# Patient Record
Sex: MALE | Race: WHITE | Employment: OTHER | URBAN - NONMETROPOLITAN AREA
[De-identification: names, ages, dates, MRNs, and addresses within clinical notes are randomized per-mention and may not be internally consistent; named-entity substitution may affect disease eponyms.]

---

## 2017-06-08 ENCOUNTER — HOSPITAL ENCOUNTER (OUTPATIENT)
Age: 62
Discharge: HOME | End: 2017-06-08
Payer: COMMERCIAL

## 2017-06-08 DIAGNOSIS — E78.49 OTHER HYPERLIPIDEMIA: ICD-10-CM

## 2017-06-08 DIAGNOSIS — Z12.5 SCREENING PSA (PROSTATE SPECIFIC ANTIGEN): ICD-10-CM

## 2017-06-08 LAB
ALT SERPL-CCNC: 18 U/L (ref 5–41)
ANION GAP SERPL CALCULATED.3IONS-SCNC: 13 MMOL/L (ref 9–17)
AST SERPL-CCNC: 19 U/L
BUN BLDV-MCNC: 7 MG/DL (ref 8–23)
BUN/CREAT BLD: 9 (ref 9–20)
CALCIUM SERPL-MCNC: 9.1 MG/DL (ref 8.6–10.4)
CHLORIDE BLD-SCNC: 99 MMOL/L (ref 98–107)
CHOLESTEROL/HDL RATIO: 2.5
CHOLESTEROL: 175 MG/DL
CO2: 24 MMOL/L (ref 20–31)
CREAT SERPL-MCNC: 0.78 MG/DL (ref 0.7–1.2)
GFR AFRICAN AMERICAN: >60 ML/MIN
GFR NON-AFRICAN AMERICAN: >60 ML/MIN
GFR SERPL CREATININE-BSD FRML MDRD: ABNORMAL ML/MIN/{1.73_M2}
GFR SERPL CREATININE-BSD FRML MDRD: ABNORMAL ML/MIN/{1.73_M2}
GLUCOSE BLD-MCNC: 81 MG/DL (ref 70–99)
HCT VFR BLD CALC: 40.9 % (ref 41–53)
HDLC SERPL-MCNC: 69 MG/DL
HEMOGLOBIN: 13.9 G/DL (ref 13.5–17)
HIGH SENSITIVE C-REACTIVE PROTEIN: 2.2 MG/L
LDL CHOLESTEROL: 96 MG/DL (ref 0–130)
MCH RBC QN AUTO: 33.8 PG (ref 26–34)
MCHC RBC AUTO-ENTMCNC: 34 G/DL (ref 31–37)
MCV RBC AUTO: 99.3 FL (ref 80–100)
PDW BLD-RTO: 13.5 % (ref 12.1–15.2)
PLATELET # BLD: 151 K/UL (ref 140–450)
PMV BLD AUTO: ABNORMAL FL (ref 6–12)
POTASSIUM SERPL-SCNC: 4.9 MMOL/L (ref 3.7–5.3)
PROSTATE SPECIFIC ANTIGEN: 3.2 UG/L
RBC # BLD: 4.12 M/UL (ref 4.5–5.9)
SODIUM BLD-SCNC: 136 MMOL/L (ref 135–144)
TRIGL SERPL-MCNC: 48 MG/DL
VLDLC SERPL CALC-MCNC: NORMAL MG/DL (ref 1–30)
WBC # BLD: 4.9 K/UL (ref 3.5–11)

## 2017-06-08 PROCEDURE — 80061 LIPID PANEL: CPT

## 2017-06-08 PROCEDURE — 80048 BASIC METABOLIC PNL TOTAL CA: CPT

## 2017-06-08 PROCEDURE — G0103 PSA SCREENING: HCPCS

## 2017-06-08 PROCEDURE — 85027 COMPLETE CBC AUTOMATED: CPT

## 2017-06-08 PROCEDURE — 36415 COLL VENOUS BLD VENIPUNCTURE: CPT

## 2017-06-08 PROCEDURE — 84460 ALANINE AMINO (ALT) (SGPT): CPT

## 2017-06-08 PROCEDURE — 86141 C-REACTIVE PROTEIN HS: CPT

## 2017-06-08 PROCEDURE — 84450 TRANSFERASE (AST) (SGOT): CPT

## 2017-06-13 PROBLEM — R97.20 RISING PSA LEVEL: Status: ACTIVE | Noted: 2017-06-13

## 2017-06-13 PROBLEM — N40.0 BENIGN PROSTATIC HYPERPLASIA: Status: ACTIVE | Noted: 2017-06-13

## 2017-12-05 PROBLEM — H25.042 POSTERIOR SUBCAPSULAR AGE-RELATED CATARACT OF LEFT EYE: Chronic | Status: ACTIVE | Noted: 2017-12-05

## 2017-12-06 ENCOUNTER — HOSPITAL ENCOUNTER (OUTPATIENT)
Age: 62
Setting detail: OUTPATIENT SURGERY
Discharge: HOME | End: 2017-12-06
Attending: OPHTHALMOLOGY | Admitting: OPHTHALMOLOGY
Payer: COMMERCIAL

## 2017-12-06 VITALS
HEART RATE: 68 BPM | RESPIRATION RATE: 18 BRPM | TEMPERATURE: 97.9 F | DIASTOLIC BLOOD PRESSURE: 87 MMHG | SYSTOLIC BLOOD PRESSURE: 165 MMHG | OXYGEN SATURATION: 98 % | BODY MASS INDEX: 35.79 KG/M2 | HEIGHT: 70 IN | WEIGHT: 250 LBS

## 2017-12-06 PROBLEM — H25.042 POSTERIOR SUBCAPSULAR AGE-RELATED CATARACT OF LEFT EYE: Chronic | Status: RESOLVED | Noted: 2017-12-05 | Resolved: 2017-12-06

## 2017-12-06 PROCEDURE — 2580000003 HC RX 258: Performed by: OPHTHALMOLOGY

## 2017-12-06 PROCEDURE — 99152 MOD SED SAME PHYS/QHP 5/>YRS: CPT | Performed by: OPHTHALMOLOGY

## 2017-12-06 PROCEDURE — 7100000011 HC PHASE II RECOVERY - ADDTL 15 MIN: Performed by: OPHTHALMOLOGY

## 2017-12-06 PROCEDURE — 6370000000 HC RX 637 (ALT 250 FOR IP): Performed by: OPHTHALMOLOGY

## 2017-12-06 PROCEDURE — 3600000003 HC SURGERY LEVEL 3 BASE: Performed by: OPHTHALMOLOGY

## 2017-12-06 PROCEDURE — 3600000013 HC SURGERY LEVEL 3 ADDTL 15MIN: Performed by: OPHTHALMOLOGY

## 2017-12-06 PROCEDURE — 2500000003 HC RX 250 WO HCPCS: Performed by: OPHTHALMOLOGY

## 2017-12-06 PROCEDURE — 7100000010 HC PHASE II RECOVERY - FIRST 15 MIN: Performed by: OPHTHALMOLOGY

## 2017-12-06 PROCEDURE — 6360000002 HC RX W HCPCS: Performed by: OPHTHALMOLOGY

## 2017-12-06 PROCEDURE — V2632 POST CHMBR INTRAOCULAR LENS: HCPCS | Performed by: OPHTHALMOLOGY

## 2017-12-06 PROCEDURE — 99153 MOD SED SAME PHYS/QHP EA: CPT | Performed by: OPHTHALMOLOGY

## 2017-12-06 DEVICE — IMPLANTABLE DEVICE: Type: IMPLANTABLE DEVICE | Site: EYE | Status: FUNCTIONAL

## 2017-12-06 RX ORDER — PHENYLEPHRINE HCL 2.5 %
1 DROPS OPHTHALMIC (EYE) SEE ADMIN INSTRUCTIONS
Status: DISCONTINUED | OUTPATIENT
Start: 2017-12-06 | End: 2017-12-06 | Stop reason: HOSPADM

## 2017-12-06 RX ORDER — ACETAMINOPHEN 325 MG/1
650 TABLET ORAL EVERY 4 HOURS PRN
Status: DISCONTINUED | OUTPATIENT
Start: 2017-12-06 | End: 2017-12-06 | Stop reason: HOSPADM

## 2017-12-06 RX ORDER — MIDAZOLAM HYDROCHLORIDE 1 MG/ML
INJECTION INTRAMUSCULAR; INTRAVENOUS PRN
Status: DISCONTINUED | OUTPATIENT
Start: 2017-12-06 | End: 2017-12-06 | Stop reason: HOSPADM

## 2017-12-06 RX ORDER — TETRACAINE HYDROCHLORIDE 5 MG/ML
SOLUTION OPHTHALMIC PRN
Status: DISCONTINUED | OUTPATIENT
Start: 2017-12-06 | End: 2017-12-06 | Stop reason: HOSPADM

## 2017-12-06 RX ORDER — SODIUM CHLORIDE 9 MG/ML
INJECTION, SOLUTION INTRAVENOUS CONTINUOUS
Status: DISCONTINUED | OUTPATIENT
Start: 2017-12-06 | End: 2017-12-06 | Stop reason: HOSPADM

## 2017-12-06 RX ORDER — SODIUM CHLORIDE 0.9 % (FLUSH) 0.9 %
10 SYRINGE (ML) INJECTION PRN
Status: DISCONTINUED | OUTPATIENT
Start: 2017-12-06 | End: 2017-12-06 | Stop reason: HOSPADM

## 2017-12-06 RX ORDER — TROPICAMIDE 10 MG/ML
1 SOLUTION/ DROPS OPHTHALMIC SEE ADMIN INSTRUCTIONS
Status: DISCONTINUED | OUTPATIENT
Start: 2017-12-06 | End: 2017-12-06 | Stop reason: HOSPADM

## 2017-12-06 RX ORDER — ONDANSETRON 2 MG/ML
4 INJECTION INTRAMUSCULAR; INTRAVENOUS EVERY 6 HOURS PRN
Status: DISCONTINUED | OUTPATIENT
Start: 2017-12-06 | End: 2017-12-06 | Stop reason: HOSPADM

## 2017-12-06 RX ORDER — SODIUM CHLORIDE 0.9 % (FLUSH) 0.9 %
10 SYRINGE (ML) INJECTION EVERY 12 HOURS SCHEDULED
Status: DISCONTINUED | OUTPATIENT
Start: 2017-12-06 | End: 2017-12-06 | Stop reason: HOSPADM

## 2017-12-06 RX ORDER — LIDOCAINE HYDROCHLORIDE 10 MG/ML
INJECTION, SOLUTION EPIDURAL; INFILTRATION; INTRACAUDAL; PERINEURAL PRN
Status: DISCONTINUED | OUTPATIENT
Start: 2017-12-06 | End: 2017-12-06 | Stop reason: HOSPADM

## 2017-12-06 RX ORDER — PROPARACAINE HYDROCHLORIDE 5 MG/ML
1 SOLUTION/ DROPS OPHTHALMIC SEE ADMIN INSTRUCTIONS
Status: DISCONTINUED | OUTPATIENT
Start: 2017-12-06 | End: 2017-12-06 | Stop reason: HOSPADM

## 2017-12-06 RX ORDER — TETRACAINE HYDROCHLORIDE 5 MG/ML
1 SOLUTION OPHTHALMIC SEE ADMIN INSTRUCTIONS
Status: DISCONTINUED | OUTPATIENT
Start: 2017-12-06 | End: 2017-12-06 | Stop reason: HOSPADM

## 2017-12-06 RX ORDER — CYCLOPENTOLATE HYDROCHLORIDE 10 MG/ML
1 SOLUTION/ DROPS OPHTHALMIC SEE ADMIN INSTRUCTIONS
Status: DISCONTINUED | OUTPATIENT
Start: 2017-12-06 | End: 2017-12-06 | Stop reason: HOSPADM

## 2017-12-06 RX ORDER — FENTANYL CITRATE 50 UG/ML
INJECTION, SOLUTION INTRAMUSCULAR; INTRAVENOUS PRN
Status: DISCONTINUED | OUTPATIENT
Start: 2017-12-06 | End: 2017-12-06 | Stop reason: HOSPADM

## 2017-12-06 RX ADMIN — TROPICAMIDE 1 DROP: 10 SOLUTION/ DROPS OPHTHALMIC at 10:25

## 2017-12-06 RX ADMIN — PROPARACAINE HYDROCHLORIDE 1 DROP: 5 SOLUTION/ DROPS OPHTHALMIC at 10:14

## 2017-12-06 RX ADMIN — PHENYLEPHRINE HYDROCHLORIDE 1 DROP: 25 SOLUTION/ DROPS OPHTHALMIC at 10:14

## 2017-12-06 RX ADMIN — TROPICAMIDE 1 DROP: 10 SOLUTION/ DROPS OPHTHALMIC at 10:14

## 2017-12-06 RX ADMIN — TROPICAMIDE 1 DROP: 10 SOLUTION/ DROPS OPHTHALMIC at 10:19

## 2017-12-06 RX ADMIN — PHENYLEPHRINE HYDROCHLORIDE 1 DROP: 25 SOLUTION/ DROPS OPHTHALMIC at 10:19

## 2017-12-06 RX ADMIN — SODIUM CHLORIDE: 9 INJECTION, SOLUTION INTRAVENOUS at 10:14

## 2017-12-06 RX ADMIN — PHENYLEPHRINE HYDROCHLORIDE 1 DROP: 25 SOLUTION/ DROPS OPHTHALMIC at 10:25

## 2017-12-06 RX ADMIN — TETRACAINE HYDROCHLORIDE 1 DROP: 25 LIQUID OPHTHALMIC at 10:34

## 2017-12-06 ASSESSMENT — PAIN SCALES - GENERAL
PAINLEVEL_OUTOF10: 0

## 2017-12-06 NOTE — H&P
Kalpana Pantoja is a 57y.o. year old who presents for elective outpatient ophthalmic surgery with Haleigh Baez. The patient complains of decreased vision, glare and halos around lights, and trouble with vision for activities of daily living. Past Medical History:   Diagnosis Date    Achilles tendon tear 2013    Allergic rhinitis     Atrial flutter ([address] 1996    Congestive heart failure ([address]     Coronary atherosclerosis     Hyperglycemia 2007    Hyperlipidemia 1996    Hypertension 2004    Mitral insufficiency     Obesity 2006    Osteoarthritis     Status post ablation of atrial flutter 05/09/13    Vasodepressor syncope 2001       Past Surgical History:   Procedure Laterality Date   [address] [address], 2013    st.vincents AV node    COLONOSCOPY  2/1/2010    negative    CYST REMOVAL      right side of face    KNEE SURGERY Right 2006    medial meniscus arthroscopy    MITRAL VALVE SURGERY  8/11/11    34 mm ring annuloplasty       Home meds:   Prior to Admission medications    Medication Sig Start Date End Date Taking?  Authorizing Provider   lovastatin (MEVACOR) 40 MG tablet TAKE 1 TABLET DAILY 6/8/17   Nikolay Allison MD   metoprolol succinate (TOPROL XL) 100 MG extended release tablet TAKE 1 TABLET DAILY 5/3/17   Nikolay Allison MD   lisinopril (PRINIVIL;ZESTRIL) 10 MG tablet TAKE 1 TABLET DAILY 2/14/17   Nikolay Allison MD   sertraline (ZOLOFT) 50 MG tablet TAKE 1 TABLET DAILY 2/14/17   Nikolay Allison MD   clobetasol (TEMOVATE) 0.05 % ointment Apply topically qhs 12/7/16   Nikolay Allison MD   rivaroxaban (XARELTO) 20 MG TABS tablet Take 1 tablet by mouth daily for 90 doses 12/7/16 6/13/17  Nikolay Allison MD   Spacer/Aero Chamber Mouthpiece MISC 1 each by Does not apply route once as needed 5/20/16 5/23/16  Nikolay Allison MD   albuterol sulfate HFA (VENTOLIN HFA) 108 (90 BASE) MCG/ACT inhaler Inhale 2 puffs into the lungs 4 times daily 5/20/16   Nikolay Allison MD   aspirin 81 MG chewable tablet Take 81 mg by mouth daily. Historical Provider, MD     Scheduled Meds:  Continuous Infusions:  PRN Meds:. No Known Allergies    There were no vitals filed for this visit. PHYSICAL EXAMINATION    Gen: NAD  HEENT: BCVA= 20/400, Glare testing= CF, Cataract severity/type-Posterior subcapsular Cataract-left eye, Other significant ocular findings= none  Pulm: CTA   Heart: RRR, no C/M/R/G  Abd: S/NT/ND  Neuro: no focal defecits    Assessment:   1. Posterior subcapsular Cataract-left eye  2. ASA Score-2  3. Mallampati Score-Class 2    Plan:   1. Risks, benefits, alternatives to surgery discussed with the patient and family. 2. All questions were answered to their satisfaction. 3. Ok to proceed with surgery as planned.     America December, DO

## 2017-12-06 NOTE — PROGRESS NOTES
Discharge instructions given to patient and patients wife. Both verbalize understanding and denies any questions at this time.

## 2017-12-06 NOTE — OP NOTE
disposable blade into the anterior chamber. Intracameral preservative free xylocaine was placed into the anterior chamber. Amvisc was then used to replace the aqueous of the anterior chamber. A 2.2 mm keratome blade was used to make a 3-plane clear corneal incision into cornea depending on the patient's astigmatism. The cystitome was used to make a tear in the anterior capsule. Fine forceps were used to make a complete curvilinear capsulorrhexis. The lens was hydrodissected and freely rotated using a balanced salt filled syringe. The ultrasound handpiece was then used to emulsify the nucleus and epi nucleus. The residual cortex was then aspirated using the IA handpiece. A small tear was noted in the posterior capsule. The eye was filled with viscoelastic and a foldable posterior chamber intraocular lens was injected into the ciliary sulcus. Irrigation/aspiration was used to remove all excess viscoelastic. The eye was pressurized and the wounds were check for leaks and none were found. A collagen shield, which had been soaked in antibiotic drops, was then placed onto the cornea. The lid speculum was removed. The eye was covered with a clear plastic shield. The patient was taken to the recovery area in excellent condition, having tolerated the procedure well, and will follow up with me tomorrow for postop care. ADDENDUM:  The phacoemulsification time 1:11.61 seconds @ 17% average ultrasound power for an effective phacoemulsification time of 12.46 seconds. The lens inserted was a model [id number] made by [employer] [facility] [employer] that was +17.5 diopters in power. The lens was inserted into the capsular bag utilizing the EZ-28 injector made by [employer] [facility] [employer].  The serial number on this lens was [id number]. There was no stitch placed to close this temporal posterior corneal incision. EBL was less than 1.0 ml. Reji Duggan.  Javier Cagle DO

## 2017-12-06 NOTE — H&P
I have examined the patient and reviewed the history and physical completed on 12/5/17 and find no relevant changes. I have reviewed with the patient and/or family the risks, benefits, and alternatives to the procedure and they have agreed to proceed.     Ciarra Nearing  12/6/2017  7:52 AM

## 2017-12-11 ENCOUNTER — HOSPITAL ENCOUNTER (OUTPATIENT)
Age: 62
Discharge: HOME | End: 2017-12-11
Payer: COMMERCIAL

## 2017-12-11 DIAGNOSIS — E78.5 HYPERLIPIDEMIA, UNSPECIFIED: ICD-10-CM

## 2017-12-11 LAB
ALT SERPL-CCNC: 12 U/L (ref 5–41)
ANION GAP SERPL CALCULATED.3IONS-SCNC: 13 MMOL/L (ref 9–17)
AST SERPL-CCNC: 16 U/L
BUN BLDV-MCNC: 8 MG/DL (ref 8–23)
BUN/CREAT BLD: 12 (ref 9–20)
CALCIUM SERPL-MCNC: 8.8 MG/DL (ref 8.6–10.4)
CHLORIDE BLD-SCNC: 100 MMOL/L (ref 98–107)
CHOLESTEROL/HDL RATIO: 2.7
CHOLESTEROL: 151 MG/DL
CO2: 25 MMOL/L (ref 20–31)
CREAT SERPL-MCNC: 0.68 MG/DL (ref 0.7–1.2)
GFR AFRICAN AMERICAN: >60 ML/MIN
GFR NON-AFRICAN AMERICAN: >60 ML/MIN
GFR SERPL CREATININE-BSD FRML MDRD: ABNORMAL ML/MIN/{1.73_M2}
GFR SERPL CREATININE-BSD FRML MDRD: ABNORMAL ML/MIN/{1.73_M2}
GLUCOSE BLD-MCNC: 83 MG/DL (ref 70–99)
HCT VFR BLD CALC: 42.3 % (ref 41–53)
HDLC SERPL-MCNC: 55 MG/DL
HEMOGLOBIN: 13.9 G/DL (ref 13.5–17)
HIGH SENSITIVE C-REACTIVE PROTEIN: 2.7 MG/L
LDL CHOLESTEROL: 87 MG/DL (ref 0–130)
MCH RBC QN AUTO: 33.5 PG (ref 26–34)
MCHC RBC AUTO-ENTMCNC: 32.8 G/DL (ref 31–37)
MCV RBC AUTO: 102.1 FL (ref 80–100)
PDW BLD-RTO: 14.1 % (ref 12.1–15.2)
PLATELET # BLD: 173 K/UL (ref 140–450)
PMV BLD AUTO: 9.6 FL (ref 6–12)
POTASSIUM SERPL-SCNC: 4.2 MMOL/L (ref 3.7–5.3)
RBC # BLD: 4.15 M/UL (ref 4.5–5.9)
SODIUM BLD-SCNC: 138 MMOL/L (ref 135–144)
T4 TOTAL: 5.4 UG/DL (ref 4.5–12)
TRIGL SERPL-MCNC: 45 MG/DL
TSH SERPL DL<=0.05 MIU/L-ACNC: 1.51 MIU/L (ref 0.3–5)
VLDLC SERPL CALC-MCNC: NORMAL MG/DL (ref 1–30)
WBC # BLD: 6.2 K/UL (ref 3.5–11)

## 2017-12-11 PROCEDURE — 80061 LIPID PANEL: CPT

## 2017-12-11 PROCEDURE — 84450 TRANSFERASE (AST) (SGOT): CPT

## 2017-12-11 PROCEDURE — 86141 C-REACTIVE PROTEIN HS: CPT

## 2017-12-11 PROCEDURE — 80048 BASIC METABOLIC PNL TOTAL CA: CPT

## 2017-12-11 PROCEDURE — 84436 ASSAY OF TOTAL THYROXINE: CPT

## 2017-12-11 PROCEDURE — 84460 ALANINE AMINO (ALT) (SGPT): CPT

## 2017-12-11 PROCEDURE — 84443 ASSAY THYROID STIM HORMONE: CPT

## 2017-12-11 PROCEDURE — 36415 COLL VENOUS BLD VENIPUNCTURE: CPT

## 2017-12-11 PROCEDURE — 85027 COMPLETE CBC AUTOMATED: CPT

## 2017-12-18 ENCOUNTER — HOSPITAL ENCOUNTER (OUTPATIENT)
Dept: GENERAL RADIOLOGY | Age: 62
Discharge: HOME | End: 2017-12-18
Payer: COMMERCIAL

## 2017-12-18 ENCOUNTER — HOSPITAL ENCOUNTER (OUTPATIENT)
Age: 62
Discharge: HOME | End: 2017-12-18
Payer: COMMERCIAL

## 2017-12-18 ENCOUNTER — OFFICE VISIT (OUTPATIENT)
Dept: FAMILY MEDICINE CLINIC | Age: 62
End: 2017-12-18
Payer: COMMERCIAL

## 2017-12-18 VITALS
SYSTOLIC BLOOD PRESSURE: 136 MMHG | BODY MASS INDEX: 35.48 KG/M2 | HEIGHT: 71 IN | DIASTOLIC BLOOD PRESSURE: 70 MMHG | WEIGHT: 253.4 LBS

## 2017-12-18 DIAGNOSIS — I10 HYPERTENSION, ESSENTIAL: ICD-10-CM

## 2017-12-18 DIAGNOSIS — M17.12 OSTEOARTHRITIS OF LEFT KNEE, UNSPECIFIED OSTEOARTHRITIS TYPE: ICD-10-CM

## 2017-12-18 DIAGNOSIS — R97.20 ELEVATED PSA: ICD-10-CM

## 2017-12-18 DIAGNOSIS — R97.20 ELEVATED PSA: Primary | ICD-10-CM

## 2017-12-18 DIAGNOSIS — E78.49 OTHER HYPERLIPIDEMIA: ICD-10-CM

## 2017-12-18 DIAGNOSIS — M17.12 OSTEOARTHRITIS OF LEFT KNEE, UNSPECIFIED OSTEOARTHRITIS TYPE: Primary | ICD-10-CM

## 2017-12-18 DIAGNOSIS — I48.0 PAROXYSMAL A-FIB (HCC): ICD-10-CM

## 2017-12-18 DIAGNOSIS — R73.9 HYPERGLYCEMIA: ICD-10-CM

## 2017-12-18 PROCEDURE — G8427 DOCREV CUR MEDS BY ELIG CLIN: HCPCS | Performed by: FAMILY MEDICINE

## 2017-12-18 PROCEDURE — 84154 ASSAY OF PSA FREE: CPT

## 2017-12-18 PROCEDURE — 36415 COLL VENOUS BLD VENIPUNCTURE: CPT

## 2017-12-18 PROCEDURE — G8598 ASA/ANTIPLAT THER USED: HCPCS | Performed by: FAMILY MEDICINE

## 2017-12-18 PROCEDURE — 1036F TOBACCO NON-USER: CPT | Performed by: FAMILY MEDICINE

## 2017-12-18 PROCEDURE — 99214 OFFICE O/P EST MOD 30 MIN: CPT | Performed by: FAMILY MEDICINE

## 2017-12-18 PROCEDURE — 73562 X-RAY EXAM OF KNEE 3: CPT

## 2017-12-18 PROCEDURE — G8484 FLU IMMUNIZE NO ADMIN: HCPCS | Performed by: FAMILY MEDICINE

## 2017-12-18 PROCEDURE — G8417 CALC BMI ABV UP PARAM F/U: HCPCS | Performed by: FAMILY MEDICINE

## 2017-12-18 PROCEDURE — 3017F COLORECTAL CA SCREEN DOC REV: CPT | Performed by: FAMILY MEDICINE

## 2017-12-18 RX ORDER — TRAMADOL HYDROCHLORIDE 50 MG/1
TABLET ORAL
Qty: 60 TABLET | Refills: 0 | Status: SHIPPED | OUTPATIENT
Start: 2017-12-18 | End: 2018-04-26 | Stop reason: ALTCHOICE

## 2017-12-18 ASSESSMENT — PATIENT HEALTH QUESTIONNAIRE - PHQ9
SUM OF ALL RESPONSES TO PHQ9 QUESTIONS 1 & 2: 0
2. FEELING DOWN, DEPRESSED OR HOPELESS: 0
1. LITTLE INTEREST OR PLEASURE IN DOING THINGS: 0
SUM OF ALL RESPONSES TO PHQ QUESTIONS 1-9: 0

## 2017-12-18 NOTE — PROGRESS NOTES
[address]  [phone]  Dept: [phone]    Dennise Dunlap is a 57 y.o. male here for 6 Month Follow-Up; Hypertension; Hyperlipidemia; and Hyperglycemia    HPI:  HYPERTENSION  He is not exercising and is adherent to a low-salt diet. Blood pressure is not being monitored at home. Cardiac symptoms: none. Patient denies: chest pain, dyspnea and palpitations.      HYPERLIPIDEMIA   Medication compliance: compliant all of the time. Patient is following a low fat, low cholesterol diet. He is not exercising regularly.      HYPERGLYCEMIA:  Diet compliance: compliant most of the time  Current exercise: no regular exercise, d/t L knee pain    Prior to Admission medications    Medication Sig Start Date End Date Taking? Authorizing Provider   rivaroxaban (XARELTO) 20 MG TABS tablet Take 1 tablet by mouth daily 12/18/17  Yes Nikolay Allison MD   sertraline (ZOLOFT) 50 MG tablet TAKE 1 TABLET DAILY 12/11/17  Yes Nikolay Allison MD   lisinopril (PRINIVIL;ZESTRIL) 10 MG tablet TAKE 1 TABLET DAILY 12/11/17  Yes Nikolay Allison MD   lovastatin (MEVACOR) 40 MG tablet TAKE 1 TABLET DAILY 6/8/17  Yes Nikolay Allison MD   metoprolol succinate (TOPROL XL) 100 MG extended release tablet TAKE 1 TABLET DAILY 5/3/17  Yes Nikolay Allison MD   aspirin 81 MG chewable tablet Take 81 mg by mouth daily. Yes Historical Provider, MD     ROS:  General Constitutional: Denies chills. Denies fever. Denies headache. Denies lightheadedness. Ophthalmologic: Denies blurred vision. ENT: Denies nasal congestion. Denies sore throat. Denies ear pain and pressure. Respiratory: Denies cough. Denies shortness of breath. Denies wheezing. Cardiovascular: Denies chest pain at rest. Denies irregular heartbeat. Denies palpitations. Gastrointestinal: Denies abdominal pain. Denies blood in the stool. Denies constipation. Denies diarrhea. Denies nausea. Denies vomiting.   Genitourinary: Denies blood in the urine. Denies difficulty urinating. Denies frequent urination. Denies painful urination. Denies urinary incontinence  Musculoskeletal: Denies muscle aches. Denies swollen joints. Admits L lateral knee pain, has been intermittent but over the past 3 weeks the pain has got worse and is constant, painful to stand up now. \"it almost feels like torn cartilage\", pain going down back of knee. Peripheral Vascular: Denies pain/cramping in legs after exertion. Skin: Denies dry skin. Denies itching. Denies rash. Neurologic: Denies falls. Denies dizziness. Denies fainting. Denies tingling/numbness. Psychiatric: Denies sleep disturbance. Denies anxiety. Denies depressed mood.     Past Surgical History:   Procedure Laterality Date    ATRIAL ABLATION SURGERY  1998, 2013    st.vincents AV node    CATARACT REMOVAL WITH IMPLANT  12/06/2017    Dr. Ranjeet Lawson COLONOSCOPY  2/1/2010    negative    CYST REMOVAL      right side of face    KNEE SURGERY Right 2006    medial meniscus arthroscopy, Carolyn Lopez    MITRAL VALVE SURGERY  8/11/11    34 mm ring annuloplasty    DC REMV CATARACT EXTRACAP,INSERT LENS Left 12/6/2017    EYE CATARACT EMULSIFICATION IOL IMPLANT performed by Ruby Gibbons DO at [facility] 99 History   Problem Relation Age of Onset    Heart Disease Mother      sudden cardiac death    Heart Attack Father        Past Medical History:   Diagnosis Date    Achilles tendon tear 2013    Allergic rhinitis     Atrial flutter ([address] 1996    Congestive heart failure ([address]     Coronary atherosclerosis     Hyperglycemia 2007    Hyperlipidemia 1996    Hypertension 2004    Mitral insufficiency     Obesity 2006    Osteoarthritis     Status post ablation of atrial flutter 05/09/13    Vasodepressor syncope 2001      Social History   Substance Use Topics    Smoking status: Former Smoker     Packs/day: 1.50     Years: 20.00     Types: Cigarettes     Quit date: 11/22/1996    Smokeless tobacco: Never Used  Alcohol use 0.0 oz/week      Comment: 6 per day      Current Outpatient Prescriptions   Medication Sig Dispense Refill    rivaroxaban (XARELTO) 20 MG TABS tablet Take 1 tablet by mouth daily 90 tablet 3    sertraline (ZOLOFT) 50 MG tablet TAKE 1 TABLET DAILY 90 tablet 2    lisinopril (PRINIVIL;ZESTRIL) 10 MG tablet TAKE 1 TABLET DAILY 90 tablet 2    lovastatin (MEVACOR) 40 MG tablet TAKE 1 TABLET DAILY 90 tablet 3    metoprolol succinate (TOPROL XL) 100 MG extended release tablet TAKE 1 TABLET DAILY 90 tablet 3    aspirin 81 MG chewable tablet Take 81 mg by mouth daily. No current facility-administered medications for this visit. No Known Allergies    PHYSICAL EXAM:    /70 (Site: Left Arm, Position: Sitting, Cuff Size: Large Adult)   Ht 5' 11\" (1.803 m)   Wt 253 lb 6.4 oz (114.9 kg)   BMI 35.34 kg/m²   Wt Readings from Last 3 Encounters:   12/18/17 253 lb 6.4 oz (114.9 kg)   12/06/17 250 lb (113.4 kg)   06/13/17 268 lb (121.6 kg)     BP Readings from Last 3 Encounters:   12/18/17 136/70   12/06/17 (!) 165/87   06/13/17 (!) 140/78     General Appearance: in no acute distress, well developed, well nourished. Eyes: pupils equal, round reactive to light and accommodation. Ears: normal canal and TM's. Nose: nares patent, no lesions. Oral Cavity: mucosa moist.  Throat: clear. Neck/Thyroid: neck supple, full range of motion, no cervical lymphadenopathy, no thyromegaly or carotid bruits. Skin: warm and dry. No suspicious lesions. Heart: No murmurs. S1, S2 normal, no gallops. Rate 75, irregular  Lungs: clear to auscultation bilaterally. Abdomen: bowel sounds present, soft, nontender, nondistended, no masses or organomegaly. Musculoskeletal: normal, full range of motion in hips, no swelling or tenderness.  Good ROM R knee, nodularity medial aspect, L knee nodularity swelling and medially, full extension and good flexion, moderate effusion, tender, external hip with squat and limited weight baring  Extremities: no cyanosis or edema. Peripheral Pulses: 2+ throughout, symetric. Neurologic: nonfocal, motor strength normal upper and lower extremities, sensory exam intact. Psych: normal affect, speech fluent. ASSESSMENT:  1. Elevated PSA  PSA, Diagnostic    PSA, Free   2. Osteoarthritis of left knee, unspecified osteoarthritis type  XR KNEE LEFT (3 VIEWS)   3. Hyperglycemia     4. Hypertension, essential     5. Other hyperlipidemia     6. Paroxysmal a-fib (HCC)         PLAN:  I will order an x-ray of his knee and will call with results and further treatment. Labs reviewed with patient, I am pleased with these results. I will also order a PSA level, there was an elevation in June and this has not been re-checked. He is agreeable with this. I will see him back in 6 months  Orders Placed This Encounter   Procedures    XR KNEE LEFT (3 VIEWS)     Standing Status:   Future     Number of Occurrences:   1     Standing Expiration Date:   12/19/2018    PSA, Diagnostic     Standing Status:   Future     Standing Expiration Date:   12/18/2018    PSA, Free     Standing Status:   Future     Number of Occurrences:   1     Standing Expiration Date:   12/18/2018     Orders Placed This Encounter   Medications    rivaroxaban (XARELTO) 20 MG TABS tablet     Sig: Take 1 tablet by mouth daily     Dispense:  90 tablet     Refill:  3     Scribed by: PERCY Schroeder     Controlled Substances Monitoring:     Attestation: The Prescription Monitoring Report for this patient was reviewed today. Stanislav dEward MD)  Documentation: No signs of potential drug abuse or diversion identified.  Stanislav Edward MD)

## 2017-12-18 NOTE — TELEPHONE ENCOUNTER
Notes Recorded by Stanislav Edward MD on 12/18/2017 at 11:57 AM EST  Notify mild to moderate arthritis in the joint  Lets give him tramadol 50mg tid # 60  And lets have him go to PT at [facility] to see if improves  See me back in about 3-4 weeks

## 2017-12-18 NOTE — PROGRESS NOTES
Notify mild to moderate arthritis in the joint  Lets give him tramadol 50mg tid # 60  And lets have him go to PT at [facility] to see if improves  See me back in about 3-4 weeks

## 2017-12-19 LAB
PROSTATE SPECIFIC ANTIGEN FREE: 0.3 UG/L
PROSTATE SPECIFIC ANTIGEN PERCENT FREE: 21.4 %
PROSTATE SPECIFIC ANTIGEN: 1.4 UG/L (ref 0–4)

## 2018-01-26 ENCOUNTER — OFFICE VISIT (OUTPATIENT)
Dept: FAMILY MEDICINE CLINIC | Age: 63
End: 2018-01-26
Payer: COMMERCIAL

## 2018-01-26 VITALS
WEIGHT: 251 LBS | HEIGHT: 71 IN | BODY MASS INDEX: 35.14 KG/M2 | SYSTOLIC BLOOD PRESSURE: 134 MMHG | DIASTOLIC BLOOD PRESSURE: 78 MMHG

## 2018-01-26 DIAGNOSIS — S83.8X2A ACUTE MEDIAL MENISCAL INJURY OF LEFT KNEE, INITIAL ENCOUNTER: Primary | ICD-10-CM

## 2018-01-26 PROCEDURE — G8598 ASA/ANTIPLAT THER USED: HCPCS | Performed by: FAMILY MEDICINE

## 2018-01-26 PROCEDURE — 3017F COLORECTAL CA SCREEN DOC REV: CPT | Performed by: FAMILY MEDICINE

## 2018-01-26 PROCEDURE — 99213 OFFICE O/P EST LOW 20 MIN: CPT | Performed by: FAMILY MEDICINE

## 2018-01-26 PROCEDURE — G8417 CALC BMI ABV UP PARAM F/U: HCPCS | Performed by: FAMILY MEDICINE

## 2018-01-26 PROCEDURE — G8484 FLU IMMUNIZE NO ADMIN: HCPCS | Performed by: FAMILY MEDICINE

## 2018-01-26 PROCEDURE — G8427 DOCREV CUR MEDS BY ELIG CLIN: HCPCS | Performed by: FAMILY MEDICINE

## 2018-01-26 PROCEDURE — 1036F TOBACCO NON-USER: CPT | Performed by: FAMILY MEDICINE

## 2018-01-26 RX ORDER — TRAMADOL HYDROCHLORIDE 50 MG/1
100 TABLET ORAL 2 TIMES DAILY
Qty: 32 TABLET | Refills: 0 | Status: SHIPPED | OUTPATIENT
Start: 2018-01-26 | End: 2018-02-03

## 2018-01-26 NOTE — PATIENT INSTRUCTIONS
PLAN:  I review his xray with him. He has some narrowing of cartilage on the medial aspect. He also has some thickening/sclerosis of the bone and some spurring. Based on his exam today, I suspect that he has a meniscus injury. I would like to get an MRI and then determine what the next step will be. He had been using advil for pain. He should avoid taking ibuprofen or any NSAIDs due to the xarelto treatment because this can cause ulcers and bleeding. He can try to take 100 mg tramadol bid to see if this is helpful. I will call with the results.

## 2018-01-26 NOTE — PROGRESS NOTES
[address] [address]  [phone]  Dept: [phone]    HPI:  Pt. Presents for f/u on knee pain. He was seen on 12/18 and c/o knee pain xray showed arthritis. And he was to use tramadol and go to therapy and he didn't realize he was supposed to go to therapy. He was taking tramadol 50 mg 1 tab po bid and this was not helpful. \"Advil (one in the morning and one at night) was helping more than the tramadol\". He states that pain has improved some but is still there. Sleeping is difficult because pain is worse with full extension while lying down. Pain is on medial aspect and radiates down the back of the leg. Current Outpatient Prescriptions   Medication Sig Dispense Refill    traMADol (ULTRAM) 50 MG tablet Take 2 tablets by mouth 2 times daily for 8 days. 32 tablet 0    rivaroxaban (XARELTO) 20 MG TABS tablet Take 1 tablet by mouth daily 90 tablet 3    sertraline (ZOLOFT) 50 MG tablet TAKE 1 TABLET DAILY 90 tablet 2    lisinopril (PRINIVIL;ZESTRIL) 10 MG tablet TAKE 1 TABLET DAILY 90 tablet 2    lovastatin (MEVACOR) 40 MG tablet TAKE 1 TABLET DAILY 90 tablet 3    metoprolol succinate (TOPROL XL) 100 MG extended release tablet TAKE 1 TABLET DAILY 90 tablet 3    aspirin 81 MG chewable tablet Take 81 mg by mouth daily.  traMADol (ULTRAM) 50 MG tablet TAKE 1 TABLET 3 TIMES A DAY. 60 tablet 0     No current facility-administered medications for this visit. ROS:  Admits L knee pain medially, radiates down the back of the leg, worse with ambulation. Admits sleep disturbance due to pain, has to sleep with a pillow under his leg because he cannot fully extend it due to pain. Denies tingling/numbness. Denies weakness.      EXAM:  /78   Ht 5' 11\" (1.803 m)   Wt 251 lb (113.9 kg)   BMI 35.01 kg/m²   Wt Readings from Last 3 Encounters:   01/26/18 251 lb (113.9 kg)   12/18/17 253 lb 6.4 oz (114.9 kg)   12/06/17 250 lb (113.4 kg)

## 2018-02-14 ENCOUNTER — TELEPHONE (OUTPATIENT)
Dept: FAMILY MEDICINE CLINIC | Age: 63
End: 2018-02-14

## 2018-02-14 NOTE — TELEPHONE ENCOUNTER
MRI knee is being denied by [facility]    Peer to Peer [phone] option #3    [facility]#[id number]

## 2018-04-26 ENCOUNTER — HOSPITAL ENCOUNTER (OUTPATIENT)
Dept: PREADMISSION TESTING | Age: 63
Discharge: HOME | End: 2018-04-30
Payer: COMMERCIAL

## 2018-04-26 VITALS
BODY MASS INDEX: 36.62 KG/M2 | HEIGHT: 71 IN | HEART RATE: 63 BPM | OXYGEN SATURATION: 97 % | SYSTOLIC BLOOD PRESSURE: 162 MMHG | RESPIRATION RATE: 16 BRPM | TEMPERATURE: 97.9 F | WEIGHT: 261.6 LBS | DIASTOLIC BLOOD PRESSURE: 98 MMHG

## 2018-04-26 LAB
ABSOLUTE EOS #: 0.21 K/UL (ref 0–0.44)
ABSOLUTE IMMATURE GRANULOCYTE: <0.03 K/UL (ref 0–0.3)
ABSOLUTE LYMPH #: 1.16 K/UL (ref 1.1–3.7)
ABSOLUTE MONO #: 0.62 K/UL (ref 0.1–1.2)
ANION GAP SERPL CALCULATED.3IONS-SCNC: 12 MMOL/L (ref 9–17)
BASOPHILS # BLD: 0 % (ref 0–2)
BASOPHILS ABSOLUTE: 0.03 K/UL (ref 0–0.2)
BUN BLDV-MCNC: 8 MG/DL (ref 8–23)
BUN/CREAT BLD: 10 (ref 9–20)
CALCIUM SERPL-MCNC: 9.1 MG/DL (ref 8.6–10.4)
CHLORIDE BLD-SCNC: 102 MMOL/L (ref 98–107)
CO2: 27 MMOL/L (ref 20–31)
CREAT SERPL-MCNC: 0.8 MG/DL (ref 0.7–1.2)
DIFFERENTIAL TYPE: ABNORMAL
EKG ATRIAL RATE: 36 BPM
EKG Q-T INTERVAL: 460 MS
EKG QRS DURATION: 88 MS
EKG QTC CALCULATION (BAZETT): 436 MS
EKG R AXIS: 51 DEGREES
EKG T AXIS: 42 DEGREES
EKG VENTRICULAR RATE: 54 BPM
EOSINOPHILS RELATIVE PERCENT: 3 % (ref 1–4)
GFR AFRICAN AMERICAN: >60 ML/MIN
GFR NON-AFRICAN AMERICAN: >60 ML/MIN
GFR SERPL CREATININE-BSD FRML MDRD: NORMAL ML/MIN/{1.73_M2}
GFR SERPL CREATININE-BSD FRML MDRD: NORMAL ML/MIN/{1.73_M2}
GLUCOSE BLD-MCNC: 83 MG/DL (ref 70–99)
HCT VFR BLD CALC: 39.2 % (ref 40.7–50.3)
HEMOGLOBIN: 12.8 G/DL (ref 13–17)
IMMATURE GRANULOCYTES: 0 %
LYMPHOCYTES # BLD: 17 % (ref 24–43)
MCH RBC QN AUTO: 33.2 PG (ref 25.2–33.5)
MCHC RBC AUTO-ENTMCNC: 32.7 G/DL (ref 28.4–34.8)
MCV RBC AUTO: 101.8 FL (ref 82.6–102.9)
MONOCYTES # BLD: 9 % (ref 3–12)
NRBC AUTOMATED: 0 PER 100 WBC
PDW BLD-RTO: 13.4 % (ref 11.8–14.4)
PLATELET # BLD: 183 K/UL (ref 138–453)
PLATELET ESTIMATE: ABNORMAL
PMV BLD AUTO: 10.4 FL (ref 8.1–13.5)
POTASSIUM SERPL-SCNC: 4.5 MMOL/L (ref 3.7–5.3)
RBC # BLD: 3.85 M/UL (ref 4.21–5.77)
RBC # BLD: ABNORMAL 10*6/UL
SEG NEUTROPHILS: 71 % (ref 36–65)
SEGMENTED NEUTROPHILS ABSOLUTE COUNT: 4.96 K/UL (ref 1.5–8.1)
SODIUM BLD-SCNC: 141 MMOL/L (ref 135–144)
WBC # BLD: 7 K/UL (ref 3.5–11.3)
WBC # BLD: ABNORMAL 10*3/UL

## 2018-04-26 PROCEDURE — 85025 COMPLETE CBC W/AUTO DIFF WBC: CPT

## 2018-04-26 PROCEDURE — 93005 ELECTROCARDIOGRAM TRACING: CPT

## 2018-04-26 PROCEDURE — 80048 BASIC METABOLIC PNL TOTAL CA: CPT

## 2018-04-26 PROCEDURE — 36415 COLL VENOUS BLD VENIPUNCTURE: CPT

## 2018-04-30 RX ORDER — METOPROLOL SUCCINATE 100 MG/1
TABLET, EXTENDED RELEASE ORAL
Qty: 90 TABLET | Refills: 3 | Status: SHIPPED | OUTPATIENT
Start: 2018-04-30 | End: 2018-12-26 | Stop reason: SDUPTHER

## 2018-05-03 ENCOUNTER — OFFICE VISIT (OUTPATIENT)
Dept: FAMILY MEDICINE CLINIC | Age: 63
End: 2018-05-03
Payer: COMMERCIAL

## 2018-05-03 VITALS
SYSTOLIC BLOOD PRESSURE: 138 MMHG | HEIGHT: 71 IN | WEIGHT: 256 LBS | DIASTOLIC BLOOD PRESSURE: 82 MMHG | BODY MASS INDEX: 35.84 KG/M2

## 2018-05-03 DIAGNOSIS — J45.909 REACTIVE AIRWAY DISEASE WITHOUT COMPLICATION, UNSPECIFIED ASTHMA SEVERITY, UNSPECIFIED WHETHER PERSISTENT: Primary | ICD-10-CM

## 2018-05-03 DIAGNOSIS — J01.90 ACUTE SINUSITIS, RECURRENCE NOT SPECIFIED, UNSPECIFIED LOCATION: ICD-10-CM

## 2018-05-03 DIAGNOSIS — J40 BRONCHITIS: ICD-10-CM

## 2018-05-03 PROCEDURE — G8427 DOCREV CUR MEDS BY ELIG CLIN: HCPCS | Performed by: FAMILY MEDICINE

## 2018-05-03 PROCEDURE — 99213 OFFICE O/P EST LOW 20 MIN: CPT | Performed by: FAMILY MEDICINE

## 2018-05-03 PROCEDURE — 1036F TOBACCO NON-USER: CPT | Performed by: FAMILY MEDICINE

## 2018-05-03 PROCEDURE — G8417 CALC BMI ABV UP PARAM F/U: HCPCS | Performed by: FAMILY MEDICINE

## 2018-05-03 PROCEDURE — G8598 ASA/ANTIPLAT THER USED: HCPCS | Performed by: FAMILY MEDICINE

## 2018-05-03 PROCEDURE — 3017F COLORECTAL CA SCREEN DOC REV: CPT | Performed by: FAMILY MEDICINE

## 2018-05-03 RX ORDER — AMOXICILLIN AND CLAVULANATE POTASSIUM 875; 125 MG/1; MG/1
1 TABLET, FILM COATED ORAL EVERY 12 HOURS
Qty: 20 TABLET | Refills: 0 | Status: SHIPPED | OUTPATIENT
Start: 2018-05-03 | End: 2018-05-13

## 2018-05-03 RX ORDER — BENZONATATE 100 MG/1
100 CAPSULE ORAL 3 TIMES DAILY PRN
Qty: 30 CAPSULE | Refills: 0 | Status: SHIPPED | OUTPATIENT
Start: 2018-05-03 | End: 2018-05-10

## 2018-05-03 RX ORDER — PREDNISONE 20 MG/1
TABLET ORAL
Qty: 9 TABLET | Refills: 0 | Status: SHIPPED | OUTPATIENT
Start: 2018-05-03 | End: 2018-06-11 | Stop reason: ALTCHOICE

## 2018-05-03 RX ORDER — INHALER, ASSIST DEVICES
1 SPACER (EA) MISCELLANEOUS DAILY
Qty: 1 DEVICE | Refills: 0 | Status: SHIPPED | OUTPATIENT
Start: 2018-05-03 | End: 2022-06-14

## 2018-05-03 RX ORDER — ALBUTEROL SULFATE 90 UG/1
2 AEROSOL, METERED RESPIRATORY (INHALATION) EVERY 6 HOURS PRN
Qty: 1 INHALER | Refills: 0 | Status: SHIPPED | OUTPATIENT
Start: 2018-05-03 | End: 2020-06-01 | Stop reason: SDUPTHER

## 2018-05-07 ENCOUNTER — OFFICE VISIT (OUTPATIENT)
Dept: FAMILY MEDICINE CLINIC | Age: 63
End: 2018-05-07
Payer: COMMERCIAL

## 2018-05-07 VITALS
WEIGHT: 262 LBS | HEIGHT: 71 IN | BODY MASS INDEX: 36.68 KG/M2 | DIASTOLIC BLOOD PRESSURE: 82 MMHG | SYSTOLIC BLOOD PRESSURE: 132 MMHG

## 2018-05-07 DIAGNOSIS — J45.21 MILD INTERMITTENT REACTIVE AIRWAY DISEASE WITH ACUTE EXACERBATION: Primary | ICD-10-CM

## 2018-05-07 DIAGNOSIS — J40 BRONCHITIS: ICD-10-CM

## 2018-05-07 PROCEDURE — G8598 ASA/ANTIPLAT THER USED: HCPCS | Performed by: FAMILY MEDICINE

## 2018-05-07 PROCEDURE — G8427 DOCREV CUR MEDS BY ELIG CLIN: HCPCS | Performed by: FAMILY MEDICINE

## 2018-05-07 PROCEDURE — 1036F TOBACCO NON-USER: CPT | Performed by: FAMILY MEDICINE

## 2018-05-07 PROCEDURE — 3017F COLORECTAL CA SCREEN DOC REV: CPT | Performed by: FAMILY MEDICINE

## 2018-05-07 PROCEDURE — 99213 OFFICE O/P EST LOW 20 MIN: CPT | Performed by: FAMILY MEDICINE

## 2018-05-07 PROCEDURE — G8417 CALC BMI ABV UP PARAM F/U: HCPCS | Performed by: FAMILY MEDICINE

## 2018-06-15 ENCOUNTER — HOSPITAL ENCOUNTER (OUTPATIENT)
Age: 63
Discharge: HOME | End: 2018-06-15
Payer: COMMERCIAL

## 2018-06-15 DIAGNOSIS — R97.20 ELEVATED PSA: ICD-10-CM

## 2018-06-15 LAB — PROSTATE SPECIFIC ANTIGEN: 2.99 UG/L

## 2018-06-15 PROCEDURE — 84153 ASSAY OF PSA TOTAL: CPT

## 2018-06-15 PROCEDURE — 36415 COLL VENOUS BLD VENIPUNCTURE: CPT

## 2018-06-19 ENCOUNTER — OFFICE VISIT (OUTPATIENT)
Dept: FAMILY MEDICINE CLINIC | Age: 63
End: 2018-06-19
Payer: COMMERCIAL

## 2018-06-19 VITALS
BODY MASS INDEX: 34.44 KG/M2 | DIASTOLIC BLOOD PRESSURE: 84 MMHG | HEIGHT: 71 IN | WEIGHT: 246 LBS | SYSTOLIC BLOOD PRESSURE: 126 MMHG

## 2018-06-19 DIAGNOSIS — M17.12 OSTEOARTHRITIS OF LEFT KNEE, UNSPECIFIED OSTEOARTHRITIS TYPE: ICD-10-CM

## 2018-06-19 DIAGNOSIS — E78.49 OTHER HYPERLIPIDEMIA: ICD-10-CM

## 2018-06-19 DIAGNOSIS — R73.9 HYPERGLYCEMIA: Primary | ICD-10-CM

## 2018-06-19 DIAGNOSIS — I10 HYPERTENSION, ESSENTIAL: ICD-10-CM

## 2018-06-19 DIAGNOSIS — J45.909 REACTIVE AIRWAY DISEASE WITHOUT COMPLICATION, UNSPECIFIED ASTHMA SEVERITY, UNSPECIFIED WHETHER PERSISTENT: ICD-10-CM

## 2018-06-19 PROCEDURE — 3017F COLORECTAL CA SCREEN DOC REV: CPT | Performed by: FAMILY MEDICINE

## 2018-06-19 PROCEDURE — 1036F TOBACCO NON-USER: CPT | Performed by: FAMILY MEDICINE

## 2018-06-19 PROCEDURE — G8427 DOCREV CUR MEDS BY ELIG CLIN: HCPCS | Performed by: FAMILY MEDICINE

## 2018-06-19 PROCEDURE — 99214 OFFICE O/P EST MOD 30 MIN: CPT | Performed by: FAMILY MEDICINE

## 2018-06-19 PROCEDURE — G8598 ASA/ANTIPLAT THER USED: HCPCS | Performed by: FAMILY MEDICINE

## 2018-06-19 PROCEDURE — G8417 CALC BMI ABV UP PARAM F/U: HCPCS | Performed by: FAMILY MEDICINE

## 2018-06-21 ENCOUNTER — ANESTHESIA (OUTPATIENT)
Dept: OPERATING ROOM | Age: 63
End: 2018-06-21
Payer: COMMERCIAL

## 2018-06-21 ENCOUNTER — HOSPITAL ENCOUNTER (OUTPATIENT)
Age: 63
Setting detail: OUTPATIENT SURGERY
Discharge: HOME | End: 2018-06-21
Attending: ORTHOPAEDIC SURGERY | Admitting: ORTHOPAEDIC SURGERY
Payer: COMMERCIAL

## 2018-06-21 ENCOUNTER — ANESTHESIA EVENT (OUTPATIENT)
Dept: OPERATING ROOM | Age: 63
End: 2018-06-21
Payer: COMMERCIAL

## 2018-06-21 VITALS
TEMPERATURE: 97.3 F | DIASTOLIC BLOOD PRESSURE: 93 MMHG | HEIGHT: 71 IN | OXYGEN SATURATION: 97 % | HEART RATE: 63 BPM | RESPIRATION RATE: 16 BRPM | SYSTOLIC BLOOD PRESSURE: 145 MMHG | BODY MASS INDEX: 33.6 KG/M2 | WEIGHT: 240 LBS

## 2018-06-21 VITALS
SYSTOLIC BLOOD PRESSURE: 121 MMHG | RESPIRATION RATE: 4 BRPM | TEMPERATURE: 97.8 F | OXYGEN SATURATION: 98 % | DIASTOLIC BLOOD PRESSURE: 68 MMHG

## 2018-06-21 DIAGNOSIS — Z98.890 STATUS POST ARTHROSCOPY OF LEFT KNEE: Primary | ICD-10-CM

## 2018-06-21 PROCEDURE — A6455 SELF-ADHER BAND >=5"/YD: HCPCS | Performed by: ORTHOPAEDIC SURGERY

## 2018-06-21 PROCEDURE — 3700000001 HC ADD 15 MINUTES (ANESTHESIA): Performed by: ORTHOPAEDIC SURGERY

## 2018-06-21 PROCEDURE — 2580000003 HC RX 258: Performed by: ORTHOPAEDIC SURGERY

## 2018-06-21 PROCEDURE — 7100000000 HC PACU RECOVERY - FIRST 15 MIN: Performed by: ORTHOPAEDIC SURGERY

## 2018-06-21 PROCEDURE — 7100000010 HC PHASE II RECOVERY - FIRST 15 MIN: Performed by: ORTHOPAEDIC SURGERY

## 2018-06-21 PROCEDURE — 3600000004 HC SURGERY LEVEL 4 BASE: Performed by: ORTHOPAEDIC SURGERY

## 2018-06-21 PROCEDURE — 2720000010 HC SURG SUPPLY STERILE: Performed by: ORTHOPAEDIC SURGERY

## 2018-06-21 PROCEDURE — 6370000000 HC RX 637 (ALT 250 FOR IP): Performed by: ORTHOPAEDIC SURGERY

## 2018-06-21 PROCEDURE — 7100000001 HC PACU RECOVERY - ADDTL 15 MIN: Performed by: ORTHOPAEDIC SURGERY

## 2018-06-21 PROCEDURE — 3700000000 HC ANESTHESIA ATTENDED CARE: Performed by: ORTHOPAEDIC SURGERY

## 2018-06-21 PROCEDURE — 7100000011 HC PHASE II RECOVERY - ADDTL 15 MIN: Performed by: ORTHOPAEDIC SURGERY

## 2018-06-21 PROCEDURE — 6360000002 HC RX W HCPCS: Performed by: NURSE ANESTHETIST, CERTIFIED REGISTERED

## 2018-06-21 PROCEDURE — 2500000003 HC RX 250 WO HCPCS: Performed by: ORTHOPAEDIC SURGERY

## 2018-06-21 PROCEDURE — 3600000014 HC SURGERY LEVEL 4 ADDTL 15MIN: Performed by: ORTHOPAEDIC SURGERY

## 2018-06-21 PROCEDURE — 6360000002 HC RX W HCPCS: Performed by: ORTHOPAEDIC SURGERY

## 2018-06-21 PROCEDURE — 2500000003 HC RX 250 WO HCPCS: Performed by: NURSE ANESTHETIST, CERTIFIED REGISTERED

## 2018-06-21 RX ORDER — HYDROCODONE BITARTRATE AND ACETAMINOPHEN 5; 325 MG/1; MG/1
2 TABLET ORAL PRN
Status: DISCONTINUED | OUTPATIENT
Start: 2018-06-21 | End: 2018-06-21 | Stop reason: HOSPADM

## 2018-06-21 RX ORDER — KETOROLAC TROMETHAMINE 30 MG/ML
INJECTION, SOLUTION INTRAMUSCULAR; INTRAVENOUS PRN
Status: DISCONTINUED | OUTPATIENT
Start: 2018-06-21 | End: 2018-06-21 | Stop reason: SDUPTHER

## 2018-06-21 RX ORDER — HYDROCODONE BITARTRATE AND ACETAMINOPHEN 5; 325 MG/1; MG/1
1 TABLET ORAL EVERY 4 HOURS PRN
Qty: 40 TABLET | Refills: 0 | Status: SHIPPED | OUTPATIENT
Start: 2018-06-21 | End: 2018-06-28

## 2018-06-21 RX ORDER — ONDANSETRON 2 MG/ML
INJECTION INTRAMUSCULAR; INTRAVENOUS PRN
Status: DISCONTINUED | OUTPATIENT
Start: 2018-06-21 | End: 2018-06-21 | Stop reason: SDUPTHER

## 2018-06-21 RX ORDER — SODIUM CHLORIDE, SODIUM LACTATE, POTASSIUM CHLORIDE, CALCIUM CHLORIDE 600; 310; 30; 20 MG/100ML; MG/100ML; MG/100ML; MG/100ML
INJECTION, SOLUTION INTRAVENOUS CONTINUOUS
Status: DISCONTINUED | OUTPATIENT
Start: 2018-06-21 | End: 2018-06-21 | Stop reason: HOSPADM

## 2018-06-21 RX ORDER — DEXAMETHASONE SODIUM PHOSPHATE 4 MG/ML
INJECTION, SOLUTION INTRA-ARTICULAR; INTRALESIONAL; INTRAMUSCULAR; INTRAVENOUS; SOFT TISSUE PRN
Status: DISCONTINUED | OUTPATIENT
Start: 2018-06-21 | End: 2018-06-21 | Stop reason: SDUPTHER

## 2018-06-21 RX ORDER — BUPIVACAINE HYDROCHLORIDE 5 MG/ML
INJECTION, SOLUTION EPIDURAL; INTRACAUDAL PRN
Status: DISCONTINUED | OUTPATIENT
Start: 2018-06-21 | End: 2018-06-21 | Stop reason: HOSPADM

## 2018-06-21 RX ORDER — FENTANYL CITRATE 50 UG/ML
25 INJECTION, SOLUTION INTRAMUSCULAR; INTRAVENOUS EVERY 5 MIN PRN
Status: DISCONTINUED | OUTPATIENT
Start: 2018-06-21 | End: 2018-06-21 | Stop reason: HOSPADM

## 2018-06-21 RX ORDER — FENTANYL CITRATE 50 UG/ML
INJECTION, SOLUTION INTRAMUSCULAR; INTRAVENOUS PRN
Status: DISCONTINUED | OUTPATIENT
Start: 2018-06-21 | End: 2018-06-21 | Stop reason: SDUPTHER

## 2018-06-21 RX ORDER — PROPOFOL 10 MG/ML
INJECTION, EMULSION INTRAVENOUS PRN
Status: DISCONTINUED | OUTPATIENT
Start: 2018-06-21 | End: 2018-06-21 | Stop reason: SDUPTHER

## 2018-06-21 RX ORDER — GLYCOPYRROLATE 1 MG/5 ML
SYRINGE (ML) INTRAVENOUS PRN
Status: DISCONTINUED | OUTPATIENT
Start: 2018-06-21 | End: 2018-06-21 | Stop reason: SDUPTHER

## 2018-06-21 RX ORDER — FENTANYL CITRATE 50 UG/ML
50 INJECTION, SOLUTION INTRAMUSCULAR; INTRAVENOUS EVERY 5 MIN PRN
Status: DISCONTINUED | OUTPATIENT
Start: 2018-06-21 | End: 2018-06-21 | Stop reason: HOSPADM

## 2018-06-21 RX ORDER — ACETAMINOPHEN 325 MG/1
650 TABLET ORAL ONCE
Status: COMPLETED | OUTPATIENT
Start: 2018-06-21 | End: 2018-06-21

## 2018-06-21 RX ORDER — METOCLOPRAMIDE HYDROCHLORIDE 5 MG/ML
10 INJECTION INTRAMUSCULAR; INTRAVENOUS
Status: DISCONTINUED | OUTPATIENT
Start: 2018-06-21 | End: 2018-06-21 | Stop reason: HOSPADM

## 2018-06-21 RX ORDER — LIDOCAINE HYDROCHLORIDE 20 MG/ML
INJECTION, SOLUTION EPIDURAL; INFILTRATION; INTRACAUDAL; PERINEURAL PRN
Status: DISCONTINUED | OUTPATIENT
Start: 2018-06-21 | End: 2018-06-21 | Stop reason: SDUPTHER

## 2018-06-21 RX ORDER — DIMENHYDRINATE 50 MG/1
50 TABLET ORAL ONCE
Status: COMPLETED | OUTPATIENT
Start: 2018-06-21 | End: 2018-06-21

## 2018-06-21 RX ORDER — HYDROCODONE BITARTRATE AND ACETAMINOPHEN 5; 325 MG/1; MG/1
1 TABLET ORAL PRN
Status: DISCONTINUED | OUTPATIENT
Start: 2018-06-21 | End: 2018-06-21 | Stop reason: HOSPADM

## 2018-06-21 RX ORDER — ONDANSETRON 2 MG/ML
4 INJECTION INTRAMUSCULAR; INTRAVENOUS
Status: DISCONTINUED | OUTPATIENT
Start: 2018-06-21 | End: 2018-06-21 | Stop reason: HOSPADM

## 2018-06-21 RX ORDER — LIDOCAINE HYDROCHLORIDE AND EPINEPHRINE 10; 10 MG/ML; UG/ML
INJECTION, SOLUTION INFILTRATION; PERINEURAL PRN
Status: DISCONTINUED | OUTPATIENT
Start: 2018-06-21 | End: 2018-06-21 | Stop reason: HOSPADM

## 2018-06-21 RX ADMIN — ACETAMINOPHEN 650 MG: 325 TABLET ORAL at 09:32

## 2018-06-21 RX ADMIN — LIDOCAINE HYDROCHLORIDE 80 MG: 20 INJECTION, SOLUTION EPIDURAL; INFILTRATION; INTRACAUDAL; PERINEURAL at 10:32

## 2018-06-21 RX ADMIN — DEXAMETHASONE SODIUM PHOSPHATE 8 MG: 4 INJECTION, SOLUTION INTRAMUSCULAR; INTRAVENOUS at 10:47

## 2018-06-21 RX ADMIN — DIMENHYDRINATE 50 MG: 50 TABLET ORAL at 09:32

## 2018-06-21 RX ADMIN — Medication 2 G: at 10:23

## 2018-06-21 RX ADMIN — PROPOFOL 150 MG: 10 INJECTION, EMULSION INTRAVENOUS at 10:32

## 2018-06-21 RX ADMIN — FENTANYL CITRATE 100 MCG: 50 INJECTION INTRAMUSCULAR; INTRAVENOUS at 10:32

## 2018-06-21 RX ADMIN — Medication 0.4 MG: at 10:38

## 2018-06-21 RX ADMIN — ONDANSETRON 4 MG: 2 INJECTION INTRAMUSCULAR; INTRAVENOUS at 10:47

## 2018-06-21 RX ADMIN — KETOROLAC TROMETHAMINE 30 MG: 30 INJECTION, SOLUTION INTRAMUSCULAR; INTRAVENOUS at 11:12

## 2018-06-21 RX ADMIN — SODIUM CHLORIDE, POTASSIUM CHLORIDE, SODIUM LACTATE AND CALCIUM CHLORIDE: 600; 310; 30; 20 INJECTION, SOLUTION INTRAVENOUS at 09:33

## 2018-06-21 ASSESSMENT — PULMONARY FUNCTION TESTS
PIF_VALUE: 14
PIF_VALUE: 19
PIF_VALUE: 0
PIF_VALUE: 17
PIF_VALUE: 16
PIF_VALUE: 18
PIF_VALUE: 2
PIF_VALUE: 19
PIF_VALUE: 17
PIF_VALUE: 15
PIF_VALUE: 4
PIF_VALUE: 16
PIF_VALUE: 18
PIF_VALUE: 16
PIF_VALUE: 17
PIF_VALUE: 16
PIF_VALUE: 19
PIF_VALUE: 2
PIF_VALUE: 16
PIF_VALUE: 11
PIF_VALUE: 4
PIF_VALUE: 0
PIF_VALUE: 0
PIF_VALUE: 19
PIF_VALUE: 17
PIF_VALUE: 16
PIF_VALUE: 25
PIF_VALUE: 18
PIF_VALUE: 18
PIF_VALUE: 17
PIF_VALUE: 16
PIF_VALUE: 19
PIF_VALUE: 14
PIF_VALUE: 19
PIF_VALUE: 4
PIF_VALUE: 16
PIF_VALUE: 17
PIF_VALUE: 17
PIF_VALUE: 18
PIF_VALUE: 17
PIF_VALUE: 0
PIF_VALUE: 1
PIF_VALUE: 20
PIF_VALUE: 17
PIF_VALUE: 3
PIF_VALUE: 15
PIF_VALUE: 17
PIF_VALUE: 16
PIF_VALUE: 3
PIF_VALUE: 14
PIF_VALUE: 18
PIF_VALUE: 5
PIF_VALUE: 17
PIF_VALUE: 17

## 2018-06-21 ASSESSMENT — PAIN SCALES - GENERAL
PAINLEVEL_OUTOF10: 0

## 2018-06-28 DIAGNOSIS — Z12.11 SCREENING FOR COLORECTAL CANCER: Primary | ICD-10-CM

## 2018-06-28 DIAGNOSIS — Z12.12 SCREENING FOR COLORECTAL CANCER: Primary | ICD-10-CM

## 2018-07-23 RX ORDER — LOVASTATIN 40 MG/1
TABLET ORAL
Qty: 90 TABLET | Refills: 3 | Status: SHIPPED | OUTPATIENT
Start: 2018-07-23 | End: 2018-11-26 | Stop reason: ALTCHOICE

## 2018-08-23 RX ORDER — LISINOPRIL 10 MG/1
TABLET ORAL
Qty: 90 TABLET | Refills: 2 | Status: SHIPPED | OUTPATIENT
Start: 2018-08-23 | End: 2018-12-26 | Stop reason: SDUPTHER

## 2018-11-13 ENCOUNTER — HOSPITAL ENCOUNTER (OUTPATIENT)
Age: 63
Discharge: HOME | End: 2018-11-13
Payer: MEDICARE

## 2018-11-13 DIAGNOSIS — E78.49 OTHER HYPERLIPIDEMIA: ICD-10-CM

## 2018-11-13 DIAGNOSIS — R73.9 HYPERGLYCEMIA: ICD-10-CM

## 2018-11-13 LAB
ALT SERPL-CCNC: 15 U/L (ref 5–41)
ANION GAP SERPL CALCULATED.3IONS-SCNC: 13 MMOL/L (ref 9–17)
AST SERPL-CCNC: 20 U/L
BUN BLDV-MCNC: 9 MG/DL (ref 8–23)
BUN/CREAT BLD: 12 (ref 9–20)
CALCIUM SERPL-MCNC: 9 MG/DL (ref 8.6–10.4)
CHLORIDE BLD-SCNC: 99 MMOL/L (ref 98–107)
CHOLESTEROL/HDL RATIO: 2.6
CHOLESTEROL: 156 MG/DL
CO2: 24 MMOL/L (ref 20–31)
CREAT SERPL-MCNC: 0.74 MG/DL (ref 0.7–1.2)
ESTIMATED AVERAGE GLUCOSE: 97 MG/DL
GFR AFRICAN AMERICAN: >60 ML/MIN
GFR NON-AFRICAN AMERICAN: >60 ML/MIN
GFR SERPL CREATININE-BSD FRML MDRD: NORMAL ML/MIN/{1.73_M2}
GFR SERPL CREATININE-BSD FRML MDRD: NORMAL ML/MIN/{1.73_M2}
GLUCOSE BLD-MCNC: 95 MG/DL (ref 70–99)
HBA1C MFR BLD: 5 % (ref 4.8–5.9)
HCT VFR BLD CALC: 42.7 % (ref 40.7–50.3)
HDLC SERPL-MCNC: 59 MG/DL
HEMOGLOBIN: 14.2 G/DL (ref 13–17)
HIGH SENSITIVE C-REACTIVE PROTEIN: 3.8 MG/L
LDL CHOLESTEROL: 88 MG/DL (ref 0–130)
MCH RBC QN AUTO: 33.3 PG (ref 25.2–33.5)
MCHC RBC AUTO-ENTMCNC: 33.3 G/DL (ref 28.4–34.8)
MCV RBC AUTO: 100.2 FL (ref 82.6–102.9)
NRBC AUTOMATED: 0 PER 100 WBC
PDW BLD-RTO: 13.2 % (ref 11.8–14.4)
PLATELET # BLD: 170 K/UL (ref 138–453)
PMV BLD AUTO: 10.5 FL (ref 8.1–13.5)
POTASSIUM SERPL-SCNC: 4.7 MMOL/L (ref 3.7–5.3)
RBC # BLD: 4.26 M/UL (ref 4.21–5.77)
SODIUM BLD-SCNC: 136 MMOL/L (ref 135–144)
TRIGL SERPL-MCNC: 43 MG/DL
VLDLC SERPL CALC-MCNC: NORMAL MG/DL (ref 1–30)
WBC # BLD: 4.7 K/UL (ref 3.5–11.3)

## 2018-11-13 PROCEDURE — 85027 COMPLETE CBC AUTOMATED: CPT

## 2018-11-13 PROCEDURE — 80061 LIPID PANEL: CPT

## 2018-11-13 PROCEDURE — 84460 ALANINE AMINO (ALT) (SGPT): CPT

## 2018-11-13 PROCEDURE — 84450 TRANSFERASE (AST) (SGOT): CPT

## 2018-11-13 PROCEDURE — 80048 BASIC METABOLIC PNL TOTAL CA: CPT

## 2018-11-13 PROCEDURE — 36415 COLL VENOUS BLD VENIPUNCTURE: CPT

## 2018-11-13 PROCEDURE — 83036 HEMOGLOBIN GLYCOSYLATED A1C: CPT

## 2018-11-13 PROCEDURE — 86141 C-REACTIVE PROTEIN HS: CPT

## 2018-11-20 RX ORDER — RIVAROXABAN 20 MG/1
TABLET, FILM COATED ORAL
Qty: 90 TABLET | Refills: 3 | Status: SHIPPED | OUTPATIENT
Start: 2018-11-20 | End: 2018-11-26 | Stop reason: SDUPTHER

## 2018-11-26 ENCOUNTER — OFFICE VISIT (OUTPATIENT)
Dept: FAMILY MEDICINE CLINIC | Age: 63
End: 2018-11-26
Payer: MEDICARE

## 2018-11-26 VITALS
DIASTOLIC BLOOD PRESSURE: 82 MMHG | BODY MASS INDEX: 36.26 KG/M2 | WEIGHT: 259 LBS | SYSTOLIC BLOOD PRESSURE: 134 MMHG | HEIGHT: 71 IN

## 2018-11-26 DIAGNOSIS — I48.0 PAROXYSMAL A-FIB (HCC): Primary | ICD-10-CM

## 2018-11-26 DIAGNOSIS — R97.20 ELEVATED PSA: ICD-10-CM

## 2018-11-26 DIAGNOSIS — R73.9 HYPERGLYCEMIA: ICD-10-CM

## 2018-11-26 DIAGNOSIS — E78.5 HYPERLIPIDEMIA, UNSPECIFIED HYPERLIPIDEMIA TYPE: ICD-10-CM

## 2018-11-26 DIAGNOSIS — M16.11 OSTEOARTHRITIS OF RIGHT HIP, UNSPECIFIED OSTEOARTHRITIS TYPE: ICD-10-CM

## 2018-11-26 DIAGNOSIS — I10 ESSENTIAL HYPERTENSION: ICD-10-CM

## 2018-11-26 PROCEDURE — 1036F TOBACCO NON-USER: CPT | Performed by: FAMILY MEDICINE

## 2018-11-26 PROCEDURE — G8417 CALC BMI ABV UP PARAM F/U: HCPCS | Performed by: FAMILY MEDICINE

## 2018-11-26 PROCEDURE — G8427 DOCREV CUR MEDS BY ELIG CLIN: HCPCS | Performed by: FAMILY MEDICINE

## 2018-11-26 PROCEDURE — G8598 ASA/ANTIPLAT THER USED: HCPCS | Performed by: FAMILY MEDICINE

## 2018-11-26 PROCEDURE — G8484 FLU IMMUNIZE NO ADMIN: HCPCS | Performed by: FAMILY MEDICINE

## 2018-11-26 PROCEDURE — 99214 OFFICE O/P EST MOD 30 MIN: CPT | Performed by: FAMILY MEDICINE

## 2018-11-26 PROCEDURE — 1123F ACP DISCUSS/DSCN MKR DOCD: CPT | Performed by: FAMILY MEDICINE

## 2018-11-26 PROCEDURE — 1101F PT FALLS ASSESS-DOCD LE1/YR: CPT | Performed by: FAMILY MEDICINE

## 2018-11-26 PROCEDURE — 4040F PNEUMOC VAC/ADMIN/RCVD: CPT | Performed by: FAMILY MEDICINE

## 2018-11-26 PROCEDURE — 3017F COLORECTAL CA SCREEN DOC REV: CPT | Performed by: FAMILY MEDICINE

## 2018-11-26 RX ORDER — ATORVASTATIN CALCIUM 40 MG/1
40 TABLET, FILM COATED ORAL DAILY
Qty: 30 TABLET | Refills: 3 | Status: SHIPPED | OUTPATIENT
Start: 2018-11-26 | End: 2018-12-26 | Stop reason: SDUPTHER

## 2018-11-26 ASSESSMENT — PATIENT HEALTH QUESTIONNAIRE - PHQ9
1. LITTLE INTEREST OR PLEASURE IN DOING THINGS: 0
SUM OF ALL RESPONSES TO PHQ9 QUESTIONS 1 & 2: 0
2. FEELING DOWN, DEPRESSED OR HOPELESS: 0
SUM OF ALL RESPONSES TO PHQ QUESTIONS 1-9: 0
SUM OF ALL RESPONSES TO PHQ QUESTIONS 1-9: 0

## 2018-12-26 RX ORDER — LISINOPRIL 10 MG/1
TABLET ORAL
Qty: 90 TABLET | Refills: 3 | Status: SHIPPED | OUTPATIENT
Start: 2018-12-26 | End: 2019-12-07 | Stop reason: SDUPTHER

## 2018-12-26 RX ORDER — ATORVASTATIN CALCIUM 40 MG/1
40 TABLET, FILM COATED ORAL DAILY
Qty: 90 TABLET | Refills: 3 | Status: SHIPPED | OUTPATIENT
Start: 2018-12-26 | End: 2019-12-07 | Stop reason: SDUPTHER

## 2018-12-26 RX ORDER — METOPROLOL SUCCINATE 100 MG/1
TABLET, EXTENDED RELEASE ORAL
Qty: 90 TABLET | Refills: 3 | Status: SHIPPED | OUTPATIENT
Start: 2018-12-26 | End: 2019-12-07 | Stop reason: SDUPTHER

## 2019-03-06 ENCOUNTER — OFFICE VISIT (OUTPATIENT)
Dept: FAMILY MEDICINE CLINIC | Age: 64
End: 2019-03-06
Payer: MEDICARE

## 2019-03-06 ENCOUNTER — HOSPITAL ENCOUNTER (OUTPATIENT)
Age: 64
Discharge: HOME | End: 2019-03-06
Payer: MEDICARE

## 2019-03-06 ENCOUNTER — TELEPHONE (OUTPATIENT)
Dept: GASTROENTEROLOGY | Age: 64
End: 2019-03-06

## 2019-03-06 VITALS
HEIGHT: 71 IN | DIASTOLIC BLOOD PRESSURE: 72 MMHG | SYSTOLIC BLOOD PRESSURE: 136 MMHG | BODY MASS INDEX: 35.08 KG/M2 | WEIGHT: 250.6 LBS

## 2019-03-06 DIAGNOSIS — K62.5 RECTAL BLEEDING: Primary | ICD-10-CM

## 2019-03-06 DIAGNOSIS — K92.1 BLOODY STOOL: ICD-10-CM

## 2019-03-06 DIAGNOSIS — K92.1 BLOODY STOOL: Primary | ICD-10-CM

## 2019-03-06 LAB
ANION GAP SERPL CALCULATED.3IONS-SCNC: 12 MMOL/L (ref 9–17)
BUN BLDV-MCNC: 9 MG/DL (ref 8–23)
BUN/CREAT BLD: 12 (ref 9–20)
CALCIUM SERPL-MCNC: 9.6 MG/DL (ref 8.6–10.4)
CHLORIDE BLD-SCNC: 101 MMOL/L (ref 98–107)
CO2: 26 MMOL/L (ref 20–31)
CREAT SERPL-MCNC: 0.74 MG/DL (ref 0.7–1.2)
GFR AFRICAN AMERICAN: >60 ML/MIN
GFR NON-AFRICAN AMERICAN: >60 ML/MIN
GFR SERPL CREATININE-BSD FRML MDRD: NORMAL ML/MIN/{1.73_M2}
GFR SERPL CREATININE-BSD FRML MDRD: NORMAL ML/MIN/{1.73_M2}
GLUCOSE BLD-MCNC: 89 MG/DL (ref 70–99)
HCT VFR BLD CALC: 41.6 % (ref 40.7–50.3)
HEMOGLOBIN: 13.5 G/DL (ref 13–17)
MCH RBC QN AUTO: 33.3 PG (ref 25.2–33.5)
MCHC RBC AUTO-ENTMCNC: 32.5 G/DL (ref 28.4–34.8)
MCV RBC AUTO: 102.5 FL (ref 82.6–102.9)
NRBC AUTOMATED: 0 PER 100 WBC
PDW BLD-RTO: 12.8 % (ref 11.8–14.4)
PLATELET # BLD: 196 K/UL (ref 138–453)
PMV BLD AUTO: 10.4 FL (ref 8.1–13.5)
POTASSIUM SERPL-SCNC: 4.2 MMOL/L (ref 3.7–5.3)
RBC # BLD: 4.06 M/UL (ref 4.21–5.77)
SODIUM BLD-SCNC: 139 MMOL/L (ref 135–144)
WBC # BLD: 4.8 K/UL (ref 3.5–11.3)

## 2019-03-06 PROCEDURE — 1101F PT FALLS ASSESS-DOCD LE1/YR: CPT | Performed by: FAMILY MEDICINE

## 2019-03-06 PROCEDURE — G8417 CALC BMI ABV UP PARAM F/U: HCPCS | Performed by: FAMILY MEDICINE

## 2019-03-06 PROCEDURE — 36415 COLL VENOUS BLD VENIPUNCTURE: CPT

## 2019-03-06 PROCEDURE — G8598 ASA/ANTIPLAT THER USED: HCPCS | Performed by: FAMILY MEDICINE

## 2019-03-06 PROCEDURE — 3017F COLORECTAL CA SCREEN DOC REV: CPT | Performed by: FAMILY MEDICINE

## 2019-03-06 PROCEDURE — G8427 DOCREV CUR MEDS BY ELIG CLIN: HCPCS | Performed by: FAMILY MEDICINE

## 2019-03-06 PROCEDURE — 1036F TOBACCO NON-USER: CPT | Performed by: FAMILY MEDICINE

## 2019-03-06 PROCEDURE — 1123F ACP DISCUSS/DSCN MKR DOCD: CPT | Performed by: FAMILY MEDICINE

## 2019-03-06 PROCEDURE — 80048 BASIC METABOLIC PNL TOTAL CA: CPT

## 2019-03-06 PROCEDURE — G8484 FLU IMMUNIZE NO ADMIN: HCPCS | Performed by: FAMILY MEDICINE

## 2019-03-06 PROCEDURE — 85027 COMPLETE CBC AUTOMATED: CPT

## 2019-03-06 PROCEDURE — 99213 OFFICE O/P EST LOW 20 MIN: CPT | Performed by: FAMILY MEDICINE

## 2019-03-06 PROCEDURE — 4040F PNEUMOC VAC/ADMIN/RCVD: CPT | Performed by: FAMILY MEDICINE

## 2019-03-06 RX ORDER — MOMETASONE FUROATE 1 MG/G
CREAM TOPICAL
Qty: 30 G | Refills: 0 | Status: SHIPPED | OUTPATIENT
Start: 2019-03-06 | End: 2020-01-27 | Stop reason: SDUPTHER

## 2019-03-08 RX ORDER — SODIUM, POTASSIUM,MAG SULFATES 17.5-3.13G
SOLUTION, RECONSTITUTED, ORAL ORAL
Qty: 2 BOTTLE | Refills: 0 | Status: ON HOLD | OUTPATIENT
Start: 2019-03-08 | End: 2019-03-25 | Stop reason: HOSPADM

## 2019-03-22 PROBLEM — K62.5 RECTAL BLEEDING: Status: ACTIVE | Noted: 2019-03-22

## 2019-03-25 ENCOUNTER — ANESTHESIA (OUTPATIENT)
Dept: OPERATING ROOM | Age: 64
End: 2019-03-25
Payer: MEDICARE

## 2019-03-25 ENCOUNTER — HOSPITAL ENCOUNTER (OUTPATIENT)
Age: 64
Setting detail: OUTPATIENT SURGERY
Discharge: HOME | End: 2019-03-25
Attending: INTERNAL MEDICINE | Admitting: INTERNAL MEDICINE
Payer: MEDICARE

## 2019-03-25 ENCOUNTER — ANESTHESIA EVENT (OUTPATIENT)
Dept: OPERATING ROOM | Age: 64
End: 2019-03-25
Payer: MEDICARE

## 2019-03-25 VITALS
DIASTOLIC BLOOD PRESSURE: 79 MMHG | HEIGHT: 71 IN | BODY MASS INDEX: 33.6 KG/M2 | OXYGEN SATURATION: 96 % | HEART RATE: 58 BPM | WEIGHT: 240 LBS | RESPIRATION RATE: 18 BRPM | SYSTOLIC BLOOD PRESSURE: 138 MMHG | TEMPERATURE: 98.3 F

## 2019-03-25 VITALS
DIASTOLIC BLOOD PRESSURE: 55 MMHG | SYSTOLIC BLOOD PRESSURE: 104 MMHG | OXYGEN SATURATION: 98 % | RESPIRATION RATE: 14 BRPM

## 2019-03-25 PROCEDURE — 3609010600 HC COLONOSCOPY POLYPECTOMY SNARE/COLD BIOPSY: Performed by: INTERNAL MEDICINE

## 2019-03-25 PROCEDURE — 3700000001 HC ADD 15 MINUTES (ANESTHESIA): Performed by: INTERNAL MEDICINE

## 2019-03-25 PROCEDURE — 3700000000 HC ANESTHESIA ATTENDED CARE: Performed by: INTERNAL MEDICINE

## 2019-03-25 PROCEDURE — 2580000003 HC RX 258: Performed by: INTERNAL MEDICINE

## 2019-03-25 PROCEDURE — 7100000011 HC PHASE II RECOVERY - ADDTL 15 MIN: Performed by: INTERNAL MEDICINE

## 2019-03-25 PROCEDURE — 2500000003 HC RX 250 WO HCPCS: Performed by: NURSE ANESTHETIST, CERTIFIED REGISTERED

## 2019-03-25 PROCEDURE — 88305 TISSUE EXAM BY PATHOLOGIST: CPT

## 2019-03-25 PROCEDURE — 45380 COLONOSCOPY AND BIOPSY: CPT | Performed by: INTERNAL MEDICINE

## 2019-03-25 PROCEDURE — 2709999900 HC NON-CHARGEABLE SUPPLY: Performed by: INTERNAL MEDICINE

## 2019-03-25 PROCEDURE — 6360000002 HC RX W HCPCS: Performed by: NURSE ANESTHETIST, CERTIFIED REGISTERED

## 2019-03-25 PROCEDURE — 7100000010 HC PHASE II RECOVERY - FIRST 15 MIN: Performed by: INTERNAL MEDICINE

## 2019-03-25 RX ORDER — PROPOFOL 10 MG/ML
INJECTION, EMULSION INTRAVENOUS PRN
Status: DISCONTINUED | OUTPATIENT
Start: 2019-03-25 | End: 2019-03-25 | Stop reason: SDUPTHER

## 2019-03-25 RX ORDER — SODIUM CHLORIDE, SODIUM LACTATE, POTASSIUM CHLORIDE, CALCIUM CHLORIDE 600; 310; 30; 20 MG/100ML; MG/100ML; MG/100ML; MG/100ML
INJECTION, SOLUTION INTRAVENOUS CONTINUOUS
Status: DISCONTINUED | OUTPATIENT
Start: 2019-03-25 | End: 2019-03-25 | Stop reason: HOSPADM

## 2019-03-25 RX ORDER — PROPOFOL 10 MG/ML
INJECTION, EMULSION INTRAVENOUS CONTINUOUS PRN
Status: DISCONTINUED | OUTPATIENT
Start: 2019-03-25 | End: 2019-03-25 | Stop reason: SDUPTHER

## 2019-03-25 RX ORDER — LIDOCAINE HYDROCHLORIDE 20 MG/ML
INJECTION, SOLUTION INFILTRATION; PERINEURAL PRN
Status: DISCONTINUED | OUTPATIENT
Start: 2019-03-25 | End: 2019-03-25 | Stop reason: SDUPTHER

## 2019-03-25 RX ADMIN — PROPOFOL 250 MCG/KG/MIN: 10 INJECTION, EMULSION INTRAVENOUS at 11:42

## 2019-03-25 RX ADMIN — SODIUM CHLORIDE, POTASSIUM CHLORIDE, SODIUM LACTATE AND CALCIUM CHLORIDE: 600; 310; 30; 20 INJECTION, SOLUTION INTRAVENOUS at 11:00

## 2019-03-25 RX ADMIN — PROPOFOL 50 MG: 10 INJECTION, EMULSION INTRAVENOUS at 11:48

## 2019-03-25 RX ADMIN — LIDOCAINE HYDROCHLORIDE 100 MG: 20 INJECTION, SOLUTION INFILTRATION; PERINEURAL at 11:42

## 2019-03-25 ASSESSMENT — PAIN SCALES - GENERAL
PAINLEVEL_OUTOF10: 0
PAINLEVEL_OUTOF10: 0

## 2019-03-25 ASSESSMENT — LIFESTYLE VARIABLES: SMOKING_STATUS: 0

## 2019-03-27 LAB — SURGICAL PATHOLOGY REPORT: NORMAL

## 2019-03-29 ENCOUNTER — TELEPHONE (OUTPATIENT)
Dept: GASTROENTEROLOGY | Age: 64
End: 2019-03-29

## 2019-04-01 ENCOUNTER — OFFICE VISIT (OUTPATIENT)
Dept: FAMILY MEDICINE CLINIC | Age: 64
End: 2019-04-01
Payer: MEDICARE

## 2019-04-01 ENCOUNTER — HOSPITAL ENCOUNTER (OUTPATIENT)
Dept: LAB | Age: 64
Discharge: HOME | End: 2019-04-01
Payer: MEDICARE

## 2019-04-01 ENCOUNTER — HOSPITAL ENCOUNTER (OUTPATIENT)
Dept: CT IMAGING | Age: 64
Discharge: HOME | End: 2019-04-03
Payer: MEDICARE

## 2019-04-01 VITALS
HEIGHT: 71 IN | SYSTOLIC BLOOD PRESSURE: 138 MMHG | DIASTOLIC BLOOD PRESSURE: 62 MMHG | WEIGHT: 239 LBS | BODY MASS INDEX: 33.46 KG/M2 | TEMPERATURE: 97.6 F

## 2019-04-01 DIAGNOSIS — R10.13 ABDOMINAL PAIN, EPIGASTRIC: ICD-10-CM

## 2019-04-01 DIAGNOSIS — R13.10 DYSPHAGIA, UNSPECIFIED TYPE: ICD-10-CM

## 2019-04-01 DIAGNOSIS — R11.11 VOMITING WITHOUT NAUSEA, INTRACTABILITY OF VOMITING NOT SPECIFIED, UNSPECIFIED VOMITING TYPE: Primary | ICD-10-CM

## 2019-04-01 DIAGNOSIS — R11.11 VOMITING WITHOUT NAUSEA, INTRACTABILITY OF VOMITING NOT SPECIFIED, UNSPECIFIED VOMITING TYPE: ICD-10-CM

## 2019-04-01 LAB
ABSOLUTE EOS #: 0.22 K/UL (ref 0–0.44)
ABSOLUTE IMMATURE GRANULOCYTE: <0.03 K/UL (ref 0–0.3)
ABSOLUTE LYMPH #: 1.63 K/UL (ref 1.1–3.7)
ABSOLUTE MONO #: 0.57 K/UL (ref 0.1–1.2)
ALBUMIN SERPL-MCNC: 4.4 G/DL (ref 3.5–5.2)
ALBUMIN/GLOBULIN RATIO: 1.2 (ref 1–2.5)
ALP BLD-CCNC: 92 U/L (ref 40–129)
ALT SERPL-CCNC: 18 U/L (ref 5–41)
AMYLASE: 48 U/L (ref 28–100)
ANION GAP SERPL CALCULATED.3IONS-SCNC: 14 MMOL/L (ref 9–17)
AST SERPL-CCNC: 20 U/L
BASOPHILS # BLD: 0 % (ref 0–2)
BASOPHILS ABSOLUTE: <0.03 K/UL (ref 0–0.2)
BILIRUB SERPL-MCNC: 0.83 MG/DL (ref 0.3–1.2)
BUN BLDV-MCNC: 14 MG/DL (ref 8–23)
BUN/CREAT BLD: 19 (ref 9–20)
CALCIUM SERPL-MCNC: 10 MG/DL (ref 8.6–10.4)
CHLORIDE BLD-SCNC: 98 MMOL/L (ref 98–107)
CO2: 26 MMOL/L (ref 20–31)
CREAT SERPL-MCNC: 0.73 MG/DL (ref 0.7–1.2)
DIFFERENTIAL TYPE: NORMAL
EOSINOPHILS RELATIVE PERCENT: 4 % (ref 1–4)
GFR AFRICAN AMERICAN: >60 ML/MIN
GFR NON-AFRICAN AMERICAN: >60 ML/MIN
GFR SERPL CREATININE-BSD FRML MDRD: NORMAL ML/MIN/{1.73_M2}
GFR SERPL CREATININE-BSD FRML MDRD: NORMAL ML/MIN/{1.73_M2}
GLUCOSE BLD-MCNC: 83 MG/DL (ref 70–99)
HCT VFR BLD CALC: 46.3 % (ref 40.7–50.3)
HEMOGLOBIN: 15 G/DL (ref 13–17)
IMMATURE GRANULOCYTES: 0 %
LIPASE: 14 U/L (ref 13–60)
LYMPHOCYTES # BLD: 26 % (ref 24–43)
MCH RBC QN AUTO: 33 PG (ref 25.2–33.5)
MCHC RBC AUTO-ENTMCNC: 32.4 G/DL (ref 28.4–34.8)
MCV RBC AUTO: 102 FL (ref 82.6–102.9)
MONOCYTES # BLD: 9 % (ref 3–12)
NRBC AUTOMATED: 0 PER 100 WBC
PDW BLD-RTO: 13.1 % (ref 11.8–14.4)
PLATELET # BLD: 182 K/UL (ref 138–453)
PLATELET ESTIMATE: NORMAL
PMV BLD AUTO: 10.7 FL (ref 8.1–13.5)
POTASSIUM SERPL-SCNC: 4.5 MMOL/L (ref 3.7–5.3)
RBC # BLD: 4.54 M/UL (ref 4.21–5.77)
RBC # BLD: NORMAL 10*6/UL
SEG NEUTROPHILS: 61 % (ref 36–65)
SEGMENTED NEUTROPHILS ABSOLUTE COUNT: 3.84 K/UL (ref 1.5–8.1)
SODIUM BLD-SCNC: 138 MMOL/L (ref 135–144)
TOTAL PROTEIN: 8.1 G/DL (ref 6.4–8.3)
WBC # BLD: 6.3 K/UL (ref 3.5–11.3)
WBC # BLD: NORMAL 10*3/UL

## 2019-04-01 PROCEDURE — 6360000004 HC RX CONTRAST MEDICATION: Performed by: FAMILY MEDICINE

## 2019-04-01 PROCEDURE — 83690 ASSAY OF LIPASE: CPT

## 2019-04-01 PROCEDURE — 85025 COMPLETE CBC W/AUTO DIFF WBC: CPT

## 2019-04-01 PROCEDURE — G8598 ASA/ANTIPLAT THER USED: HCPCS | Performed by: FAMILY MEDICINE

## 2019-04-01 PROCEDURE — G8427 DOCREV CUR MEDS BY ELIG CLIN: HCPCS | Performed by: FAMILY MEDICINE

## 2019-04-01 PROCEDURE — 82150 ASSAY OF AMYLASE: CPT

## 2019-04-01 PROCEDURE — 1036F TOBACCO NON-USER: CPT | Performed by: FAMILY MEDICINE

## 2019-04-01 PROCEDURE — 93000 ELECTROCARDIOGRAM COMPLETE: CPT | Performed by: FAMILY MEDICINE

## 2019-04-01 PROCEDURE — G8417 CALC BMI ABV UP PARAM F/U: HCPCS | Performed by: FAMILY MEDICINE

## 2019-04-01 PROCEDURE — 80053 COMPREHEN METABOLIC PANEL: CPT

## 2019-04-01 PROCEDURE — 74177 CT ABD & PELVIS W/CONTRAST: CPT

## 2019-04-01 PROCEDURE — 36415 COLL VENOUS BLD VENIPUNCTURE: CPT

## 2019-04-01 PROCEDURE — 3017F COLORECTAL CA SCREEN DOC REV: CPT | Performed by: FAMILY MEDICINE

## 2019-04-01 PROCEDURE — 1123F ACP DISCUSS/DSCN MKR DOCD: CPT | Performed by: FAMILY MEDICINE

## 2019-04-01 PROCEDURE — 99213 OFFICE O/P EST LOW 20 MIN: CPT | Performed by: FAMILY MEDICINE

## 2019-04-01 PROCEDURE — 4040F PNEUMOC VAC/ADMIN/RCVD: CPT | Performed by: FAMILY MEDICINE

## 2019-04-01 RX ADMIN — IOPAMIDOL 75 ML: 755 INJECTION, SOLUTION INTRAVENOUS at 17:35

## 2019-04-01 NOTE — PROGRESS NOTES
JENSEN Cruz MACARIO, am scribing for and in the presence of Dr. Verónica Mills. 4/1/19/4pm/AM    [address] [address]  [phone]  Dept: [phone]      Esteban Bustamante is a 70 y.o. male who presents today for   Chief Complaint   Patient presents with    Congestion    Cough    Pharyngitis    Gastroesophageal Reflux       HPI  Respiratory Symptoms:  Esteban Bustamante complains of 3 day(s) history of sore throat, congestion and productive cough Pt denies fever. Smoking history:  He  reports that he quit smoking about 22 years ago. His smoking use included cigarettes. He has a 30.00 pack-year smoking history. He has never used smokeless tobacco. Treatment to date: none. He states it feels like there is a \"blockage\" in his chest.  Every time he eats or drinks anything his chest burns and then he feels like he has to throw up and he usually does. To date he threw up 4 times Saturday, once on Sunday and he has not eaten anything today. He threw up after drinking coffee today. He can drink water and get the burning in his chest.  Anytime he takes a drink of anything it causes her chest to burn and then he throws up. He has not tried anything to relieve the burning. Current Outpatient Medications   Medication Sig Dispense Refill    mometasone (ELOCON) 0.1 % cream Apply topically daily.  30 g 0    atorvastatin (LIPITOR) 40 MG tablet Take 1 tablet by mouth daily 90 tablet 3    sertraline (ZOLOFT) 50 MG tablet TAKE 1 TABLET DAILY 90 tablet 3    rivaroxaban (XARELTO) 20 MG TABS tablet Take 1 tablet by mouth daily 90 tablet 3    metoprolol succinate (TOPROL XL) 100 MG extended release tablet TAKE 1 TABLET DAILY 90 tablet 3    lisinopril (PRINIVIL;ZESTRIL) 10 MG tablet TAKE 1 TABLET DAILY 90 tablet 3    albuterol sulfate HFA (PROAIR HFA) 108 (90 Base) MCG/ACT inhaler Inhale 2 puffs into the lungs every 6 hours as needed for Wheezing 1 Inhaler 0    Spacer/Aero-Holding Chambers ([facility]) MARISEL 1 Device by Does not apply route daily 1 Device 0    aspirin 81 MG chewable tablet Take 81 mg by mouth daily. No current facility-administered medications for this visit. Facility-Administered Medications Ordered in Other Visits   Medication Dose Route Frequency Provider Last Rate Last Dose    lactated ringers infusion   Intravenous Continuous Mac Mukherjee MD   Stopped at 04/02/19 1030       ROS:  General Constitutional: Denies chills. Denies fever. Denies headache. Denies lightheadedness. Ophthalmologic: Denies blurred vision. ENT: Denies nasal congestion. Denies sore throat. Denies ear pain and pressure. Respiratory: Admits cough. Admits shortness of breath. Admits wheezing.   Admits nausea and vomitting  Past Surgical History:   Procedure Laterality Date    ATRIAL ABLATION SURGERY  1998, 2013    [facility]ents AV node    CATARACT REMOVAL WITH IMPLANT  12/06/2017    Dr. Magaly Torres  02/01/2010    [place], negative    COLONOSCOPY  03/25/2019    Dr. Yue De Leon rectal bx(hyperplastic mucosa with evidence of ischemic injury,angiodysplasia no active bleeding,divetrticulosis,hemorroids    COLONOSCOPY N/A 3/25/2019    COLONOSCOPY POLYPECTOMY SNARE/COLD BIOPSY performed by Mac Mukherjee MD at [address]      right side of face    KNEE SURGERY Right 2006    medial meniscus arthroscopy, [place] Hira    KNEE SURGERY Left     MITRAL VALVE SURGERY  8/11/11    34 mm ring annuloplasty    HI KNEE SCOPE,MED OR LAT MENIS REPAIR Left 6/21/2018    KNEE ARTHROSCOPY-PARTIAL MEDIAL MENISCECTOMY AND CHONDROPLASTY performed by Maida Liu MD at [facility] Left 12/6/2017    EYE CATARACT EMULSIFICATION IOL IMPLANT performed by Jonathan Cook DO at [facility] 99 History   Problem Relation Age of Onset    Heart Disease Mother         sudden cardiac death    Heart Attack Father Past Medical History:   Diagnosis Date    Achilles tendon tear     Allergic rhinitis     [address] [address] ([facility]     Congestive heart failure ([facility]     Coronary atherosclerosis     Hyperglycemia 2007    Hyperlipidemia 1996    Hypertension 2004    Mitral insufficiency     Obesity 2006    Osteoarthritis     Status post ablation of atrial flutter 13    Vasodepressor syncope       Social History     Tobacco Use    Smoking status: Former Smoker     Packs/day: 1.50     Years: 20.00     Pack years: 30.00     Types: Cigarettes     Last attempt to quit: 1996     Years since quittin.3    Smokeless tobacco: Never Used   Substance Use Topics    Alcohol use: Yes     Alcohol/week: 0.0 oz     Comment: 4 beers a day       Current Outpatient Medications   Medication Sig Dispense Refill    mometasone (ELOCON) 0.1 % cream Apply topically daily. 30 g 0    atorvastatin (LIPITOR) 40 MG tablet Take 1 tablet by mouth daily 90 tablet 3    sertraline (ZOLOFT) 50 MG tablet TAKE 1 TABLET DAILY 90 tablet 3    rivaroxaban (XARELTO) 20 MG TABS tablet Take 1 tablet by mouth daily 90 tablet 3    metoprolol succinate (TOPROL XL) 100 MG extended release tablet TAKE 1 TABLET DAILY 90 tablet 3    lisinopril (PRINIVIL;ZESTRIL) 10 MG tablet TAKE 1 TABLET DAILY 90 tablet 3    albuterol sulfate HFA (PROAIR HFA) 108 (90 Base) MCG/ACT inhaler Inhale 2 puffs into the lungs every 6 hours as needed for Wheezing 1 Inhaler 0    Spacer/Aero-Holding Chambers ([facility]) MARISEL 1 Device by Does not apply route daily 1 Device 0    aspirin 81 MG chewable tablet Take 81 mg by mouth daily. No current facility-administered medications for this visit.       Facility-Administered Medications Ordered in Other Visits   Medication Dose Route Frequency Provider Last Rate Last Dose    lactated ringers infusion   Intravenous Continuous Gilmar Hankins MD   Stopped at 19 1030     No Known Allergies      Physical Exam    /62 (Site: Left Upper Arm, Position: Sitting, Cuff Size: Medium Adult)   Temp 97.6 °F (36.4 °C)   Ht 5' 11\" (1.803 m)   Wt 239 lb (108.4 kg)   BMI 33.33 kg/m²   Wt Readings from Last 3 Encounters:   04/02/19 235 lb (106.6 kg)   04/01/19 239 lb (108.4 kg)   03/25/19 240 lb (108.9 kg)     BP Readings from Last 3 Encounters:   04/02/19 132/83   04/02/19 (!) 86/53   04/01/19 138/62       General Appearance: well-developed and well nourished and in no acute distress  Skin: warm and dry, no rash or erythema  Eyes: pupils equal, round, and reactive to light  Ears: bilateral tympanic membrane normal  Nose: normal mucosa  Throat: clear   Neck: neck supple and non tender without mass, no thyromegaly or thyroid nodules, no cervical lymphadenopathy   Lungs: clear to auscultation bilaterally  Heart: normal rate, normal S1 and S2, no gallops   Abdomen: soft, non-tender, non-distended, normal bowel sounds, no masses or organomegaly   Neurologic: alert and oriented, and cooperative for exam.      ASSESSMENT:   Diagnosis Orders   1. Vomiting without nausea, intractability of vomiting not specified, unspecified vomiting type  EKG 12 lead unit performed    Amylase    CBC Auto Differential    Comprehensive Metabolic Panel    Lipase   2. Dysphagia, unspecified type  Amylase    CBC Auto Differential    Comprehensive Metabolic Panel    Lipase   3. Abdominal pain, epigastric  Amylase    CBC Auto Differential    Comprehensive Metabolic Panel    Lipase       PLAN:  This has never happened to him before that he can recall. He has not taken any antacids because he wanted to know first if it is ok to take something. I would like to get an EKG today. I was thinking gallbladder but his pain is higher, I am thinking maybe esophageal.    Lungs and heart sound fine.     Orders Placed This Encounter   Procedures    Amylase     Standing Status:   Future     Number of Occurrences:   1     Standing

## 2019-04-02 ENCOUNTER — HOSPITAL ENCOUNTER (OUTPATIENT)
Age: 64
Setting detail: OUTPATIENT SURGERY
Discharge: HOME | End: 2019-04-02
Attending: INTERNAL MEDICINE | Admitting: INTERNAL MEDICINE
Payer: MEDICARE

## 2019-04-02 ENCOUNTER — ANESTHESIA (OUTPATIENT)
Dept: OPERATING ROOM | Age: 64
End: 2019-04-02
Payer: MEDICARE

## 2019-04-02 ENCOUNTER — ANESTHESIA EVENT (OUTPATIENT)
Dept: OPERATING ROOM | Age: 64
End: 2019-04-02
Payer: MEDICARE

## 2019-04-02 VITALS
RESPIRATION RATE: 16 BRPM | HEIGHT: 71 IN | OXYGEN SATURATION: 94 % | BODY MASS INDEX: 32.9 KG/M2 | TEMPERATURE: 96.9 F | HEART RATE: 58 BPM | WEIGHT: 235 LBS | DIASTOLIC BLOOD PRESSURE: 83 MMHG | SYSTOLIC BLOOD PRESSURE: 132 MMHG

## 2019-04-02 VITALS
DIASTOLIC BLOOD PRESSURE: 53 MMHG | OXYGEN SATURATION: 99 % | RESPIRATION RATE: 13 BRPM | SYSTOLIC BLOOD PRESSURE: 86 MMHG

## 2019-04-02 PROCEDURE — 43235 EGD DIAGNOSTIC BRUSH WASH: CPT | Performed by: INTERNAL MEDICINE

## 2019-04-02 PROCEDURE — 3700000000 HC ANESTHESIA ATTENDED CARE: Performed by: INTERNAL MEDICINE

## 2019-04-02 PROCEDURE — 2500000003 HC RX 250 WO HCPCS: Performed by: NURSE ANESTHETIST, CERTIFIED REGISTERED

## 2019-04-02 PROCEDURE — 6360000002 HC RX W HCPCS: Performed by: NURSE ANESTHETIST, CERTIFIED REGISTERED

## 2019-04-02 PROCEDURE — 2709999900 HC NON-CHARGEABLE SUPPLY: Performed by: INTERNAL MEDICINE

## 2019-04-02 PROCEDURE — 2580000003 HC RX 258: Performed by: INTERNAL MEDICINE

## 2019-04-02 PROCEDURE — 7100000011 HC PHASE II RECOVERY - ADDTL 15 MIN: Performed by: INTERNAL MEDICINE

## 2019-04-02 PROCEDURE — 7100000010 HC PHASE II RECOVERY - FIRST 15 MIN: Performed by: INTERNAL MEDICINE

## 2019-04-02 PROCEDURE — 3609017100 HC EGD: Performed by: INTERNAL MEDICINE

## 2019-04-02 RX ORDER — PROPOFOL 10 MG/ML
INJECTION, EMULSION INTRAVENOUS CONTINUOUS PRN
Status: DISCONTINUED | OUTPATIENT
Start: 2019-04-02 | End: 2019-04-02 | Stop reason: SDUPTHER

## 2019-04-02 RX ORDER — LIDOCAINE HYDROCHLORIDE 20 MG/ML
INJECTION, SOLUTION INFILTRATION; PERINEURAL PRN
Status: DISCONTINUED | OUTPATIENT
Start: 2019-04-02 | End: 2019-04-02 | Stop reason: SDUPTHER

## 2019-04-02 RX ORDER — SODIUM CHLORIDE, SODIUM LACTATE, POTASSIUM CHLORIDE, CALCIUM CHLORIDE 600; 310; 30; 20 MG/100ML; MG/100ML; MG/100ML; MG/100ML
INJECTION, SOLUTION INTRAVENOUS CONTINUOUS
Status: DISCONTINUED | OUTPATIENT
Start: 2019-04-02 | End: 2019-04-02 | Stop reason: HOSPADM

## 2019-04-02 RX ORDER — FENTANYL CITRATE 50 UG/ML
INJECTION, SOLUTION INTRAMUSCULAR; INTRAVENOUS PRN
Status: DISCONTINUED | OUTPATIENT
Start: 2019-04-02 | End: 2019-04-02 | Stop reason: SDUPTHER

## 2019-04-02 RX ADMIN — LIDOCAINE HYDROCHLORIDE 100 MG: 20 INJECTION, SOLUTION INFILTRATION; PERINEURAL at 10:02

## 2019-04-02 RX ADMIN — SODIUM CHLORIDE, POTASSIUM CHLORIDE, SODIUM LACTATE AND CALCIUM CHLORIDE: 600; 310; 30; 20 INJECTION, SOLUTION INTRAVENOUS at 09:39

## 2019-04-02 RX ADMIN — PROPOFOL 140 MCG/KG/MIN: 10 INJECTION, EMULSION INTRAVENOUS at 10:02

## 2019-04-02 RX ADMIN — FENTANYL CITRATE 100 MCG: 50 INJECTION INTRAMUSCULAR; INTRAVENOUS at 10:03

## 2019-04-02 ASSESSMENT — PAIN SCALES - GENERAL
PAINLEVEL_OUTOF10: 0
PAINLEVEL_OUTOF10: 0

## 2019-04-02 ASSESSMENT — PAIN - FUNCTIONAL ASSESSMENT: PAIN_FUNCTIONAL_ASSESSMENT: 0-10

## 2019-04-02 NOTE — OP NOTE
PROCEDURE NOTE    DATE OF PROCEDURE: 4/2/2019     ENDOSCOPIST: Maryellen Wall. Kerrie Rojo MD, Alicia Espinosa    ASSISTANT: None    PREOPERATIVE DIAGNOSIS: Dysphagia, suspected food bolus    POSTOPERATIVE DIAGNOSIS: -3 cm hiatal hernia. , -Grade 3 reflux esophagitis. , -Erosive antral gastritis, erosive bulbar duodenitis    OPERATION: EGD --diagnostic    ANESTHESIA: MAC     ESTIMATED BLOOD LOSS:  None    COMPLICATIONS: None. SPECIMENS: were not obtained    HISTORY: The patient is a 70y.o. year old male with history of above preop diagnosis. Esophagogastroduodenoscopy with possible biopsy and dilation has been recommended. I explained the risk, benefits, expected outcome, and alternatives to the procedure. Risks include but are not limited to bleeding, infection, respiratory distress, hypotension, and perforation of the esophagus, stomach, or duodenum. Patient understands and is in agreement. PROCEDURE: The patient was given monitored anesthesia care. The patient was given oxygen by nasal cannula. The endoscope was inserted orally and advanced under direct vision through the esophagus, through the stomach, through the pylorus, and into the descending duodenum. Findings:  Duodenum:     Descending: normal    Bulb: abnormal: Multiple superficial erosions    Stomach:    Antrum: abnormal: Multiple superficial erosions    Body: normal    Fundus: normal    Esophagus: abnormal: Hiatal hernia with erosive esophagitis     The scope was removed and the patient tolerated the procedure well. Biopsies were not performed due to the fact that the patient is taking Xarelto.       Electronically signed by Laith Ray MD  on 4/2/2019 at 10:24 AM

## 2019-04-02 NOTE — PROGRESS NOTES
Discharge Criteria    Inpatients must meet Criteria 1 through 7. All other patients are either YES or N/A. If a NO is chosen then Anesthesia or Surgeon must be notified. 1.  Minimum 30 minutes after last dose of sedative medication, minimum 120 minutes after last dose of reversal agent. Yes      2. Systolic BP stable within 20 mmHg for 30 minutes & systolic BP between 90 & 607 or within 10 mmHg of baseline. Yes      3. Pulse between 60 and 100 or within 10 bpm of baseline. Yes      4. Spontaneous respiratory rate >/= 10 per minute. Yes      5. SaO2 >/= 95 or  >/= baseline. Yes      6. Able to cough and swallow or return to baseline function. Yes      7. Alert and oriented or return to baseline mental status. Yes      8. Demonstrates controlled, coordinated movements, ambulates with steady gait, or return to baseline activity function. Yes      9. Minimal or no pain or nausea, or at a level tolerable and acceptable to patient. Yes      10. Takes and retains oral fluids as allowed. Yes      11. Procedural / perioperative site stable. Minimal or no bleeding. Yes          12. If GI endoscopy procedure, minimal or no abdominal distention or passing flatus. Yes      13. Written discharge instructions and emergency telephone number provided. Yes      14. Accompanied by a responsible adult. Yes      Adult patient discharged from facility without responsible person meets above criteria plus the following:   a) remains awake without stimulus for 30 minutes     b) oriented appropriate for age      c) all vital signs stable   d) no significant risk of losing protective reflexes      e) able to maintain pre-procedure mobility without assistance   f) no nausea or dizziness      g) transportation arrangements that do not require patient to operate motor Vehicle.      N/A

## 2019-04-02 NOTE — H&P
Attack Father      Social History     Socioeconomic History    Marital status:      Spouse name: Not on file    Number of children: Not on file    Years of education: Not on file    Highest education level: Not on file   Occupational History    Not on file   Social Needs    Financial resource strain: Not on file    Food insecurity:     Worry: Not on file     Inability: Not on file    Transportation needs:     Medical: Not on file     Non-medical: Not on file   Tobacco Use    Smoking status: Former Smoker     Packs/day: 1.50     Years: 20.00     Pack years: 30.00     Types: Cigarettes     Last attempt to quit: 1996     Years since quittin.3    Smokeless tobacco: Never Used   Substance and Sexual Activity    Alcohol use: Yes     Alcohol/week: 0.0 oz     Comment: 4 beers a day     Drug use: No    Sexual activity: Not on file   Lifestyle    Physical activity:     Days per week: Not on file     Minutes per session: Not on file    Stress: Not on file   Relationships    Social connections:     Talks on phone: Not on file     Gets together: Not on file     Attends [employer] service: Not on file     Active member of club or organization: Not on file     Attends meetings of clubs or organizations: Not on file     Relationship status: Not on file    Intimate partner violence:     Fear of current or ex partner: Not on file     Emotionally abused: Not on file     Physically abused: Not on file     Forced sexual activity: Not on file   Other Topics Concern    Not on file   Social History Narrative    Not on file     ROS: Non-contributory    Physical Exam:  Vitals:    19 0913   BP: (!) 121/107   Pulse: 62   Resp: 18   Temp: 97.7 °F (36.5 °C)   SpO2: 97%       Chest: Breath sounds were clear and equal with no rales, wheezes, or rhonchi. Respiratory effort was normal with no retractions or use of accessory muscles. Cardiovascular: Heart sounds were normal with a regular rate and rhythm.

## 2019-04-02 NOTE — ANESTHESIA PRE PROCEDURE
Department of Anesthesiology  Preprocedure Note       Name:  Maureen Mendez   Age:  70 y.o.  :  1955                                          MRN:  [id number]         Date:  2019      Surgeon: Jose Hope):  Maria Isabel Rivers MD    Procedure: EGD ESOPHAGOGASTRODUODENOSCOPY (N/A )    Medications prior to admission:   Prior to Admission medications    Medication Sig Start Date End Date Taking? Authorizing Provider   mometasone (ELOCON) 0.1 % cream Apply topically daily. 3/6/19  Yes Felipa Alonzo MD   atorvastatin (LIPITOR) 40 MG tablet Take 1 tablet by mouth daily 18  Yes Felipa Alonzo MD   sertraline (ZOLOFT) 50 MG tablet TAKE 1 TABLET DAILY 18  Yes Felipa Alonzo MD   rivaroxaban (XARELTO) 20 MG TABS tablet Take 1 tablet by mouth daily 18  Yes Feliap Alonzo MD   metoprolol succinate (TOPROL XL) 100 MG extended release tablet TAKE 1 TABLET DAILY 18  Yes Felipa Alonzo MD   lisinopril (PRINIVIL;ZESTRIL) 10 MG tablet TAKE 1 TABLET DAILY 18  Yes Felipa Alonzo MD   albuterol sulfate HFA (PROAIR HFA) 108 (90 Base) MCG/ACT inhaler Inhale 2 puffs into the lungs every 6 hours as needed for Wheezing 5/3/18  Yes Felipa Alonzo MD   aspirin 81 MG chewable tablet Take 81 mg by mouth daily.      Yes Historical Provider, MD   Spacer/Aero-Holding Chambers [facility]) MARISEL 1 Device by Does not apply route daily 5/3/18   Felipa Alonzo MD       Current medications:    Current Facility-Administered Medications   Medication Dose Route Frequency Provider Last Rate Last Dose    lactated ringers infusion   Intravenous Continuous Maria Isabel Rivers  mL/hr at 19 1399         Allergies:  No Known Allergies    Problem List:    Patient Active Problem List   Diagnosis Code    Typical atrial flutter (HCC) I48.3    Paroxysmal A-fib (HCC) I48.0    Hypertension, essential I10    ASHD (arteriosclerotic heart disease) I25.10    Hyperglycemia R73.9    Other and unspecified performed by Anabela Callejas DO at [address] [place] [address] History:    Social History     Tobacco Use    Smoking status: Former Smoker     Packs/day: 1.50     Years: 20.00     Pack years: 30.00     Types: Cigarettes     Last attempt to quit: 1996     Years since quittin.3    Smokeless tobacco: Never Used   Substance Use Topics    Alcohol use: Yes     Alcohol/week: 0.0 oz     Comment: 4 beers a day                                 Counseling given: Not Answered      Vital Signs (Current):   Vitals:    19 0913   BP: (!) 121/107   Pulse: 62   Resp: 18   Temp: 36.5 °C (97.7 °F)   TempSrc: Temporal   SpO2: 97%   Weight: 235 lb (106.6 kg)   Height: 5' 11\" (1.803 m)                                              BP Readings from Last 3 Encounters:   19 (!) 121/107   19 138/62   19 138/79       NPO Status: Time of last liquid consumption: 0500(sips with meds)                        Time of last solid consumption: 1800                        Date of last liquid consumption: 19                        Date of last solid food consumption: 19    BMI:   Wt Readings from Last 3 Encounters:   19 235 lb (106.6 kg)   19 239 lb (108.4 kg)   19 240 lb (108.9 kg)     Body mass index is 32.78 kg/m².     CBC:   Lab Results   Component Value Date    WBC 6.3 2019    RBC 4.54 2019    RBC 4.56 2012    HGB 15.0 2019    HCT 46.3 2019    .0 2019    RDW 13.1 2019     2019     2012       CMP:   Lab Results   Component Value Date     2019    K 4.5 2019    CL 98 2019    CO2 26 2019    BUN 14 2019    CREATININE 0.73 2019    GFRAA >60 2019    LABGLOM >60 2019    GLUCOSE 83 2019    GLUCOSE 75 2012    PROT 8.1 2019    CALCIUM 10.0 2019    BILITOT 0.83 2019    ALKPHOS 92 2019    AST 20 2019    ALT 18 2019       POC Tests: No results for input(s): POCGLU, POCNA, POCK, POCCL, POCBUN, POCHEMO, POCHCT in the last 72 hours. Coags:   Lab Results   Component Value Date    PROTIME 11.1 05/09/2013    INR 0.9 05/09/2013       HCG (If Applicable): No results found for: PREGTESTUR, PREGSERUM, HCG, HCGQUANT     ABGs: No results found for: PHART, PO2ART, PUL1BDF, HMW6DHC, BEART, I3IXPLQO     Type & Screen (If Applicable):  No results found for: LABABO, [address]    Anesthesia Evaluation  Patient summary reviewed  Airway: Mallampati: II  TM distance: >3 FB   Neck ROM: full  Mouth opening: > = 3 FB Dental: normal exam         Pulmonary:Negative Pulmonary ROS breath sounds clear to auscultation                             Cardiovascular:  Exercise tolerance: good (>4 METS),   (+) hypertension:, CAD: no interval change, dysrhythmias: atrial fibrillation, CHF: no interval change,       ECG reviewed  Rhythm: irregular  Rate: normal           Beta Blocker:  Dose within 24 Hrs         Neuro/Psych:   Negative Neuro/Psych ROS              GI/Hepatic/Renal: Neg GI/Hepatic/Renal ROS            Endo/Other: Negative Endo/Other ROS                    Abdominal:   (+) obese,     Abdomen: soft. Vascular: negative vascular ROS. Anesthesia Plan      general     ASA 3       Induction: intravenous. Anesthetic plan and risks discussed with patient. Plan discussed with CRNA.                   CHENCHO [place] - JEFFREY   4/2/2019

## 2019-04-02 NOTE — ANESTHESIA POSTPROCEDURE EVALUATION
Department of Anesthesiology  Postprocedure Note    Patient: Chavez Luog  MRN: [id number]  YOB: 1955  Date of evaluation: 4/2/2019  Time:  10:39 AM     Procedure Summary     Date:  04/02/19 Room / Location:  [facility] [place] [address] / [facility]    Anesthesia Start:  1000 Anesthesia Stop:  1013    Procedure:  EGD ESOPHAGOGASTRODUODENOSCOPY (N/A ) Diagnosis:  (ESOPHAGEAL OBSTRUCTION)    Surgeon:  Anayeli Lynn MD Responsible Provider:  CHENCHO Goodwin CRNA    Anesthesia Type:  general ASA Status:  3          Anesthesia Type: general    Noe Phase I:      Noe Phase II:      Last vitals: Reviewed and per EMR flowsheets.        Anesthesia Post Evaluation    Patient location during evaluation: bedside  Patient participation: complete - patient participated  Level of consciousness: awake and alert  Pain score: 0  Airway patency: patent  Nausea & Vomiting: no nausea and no vomiting  Complications: no  Cardiovascular status: hemodynamically stable  Respiratory status: acceptable  Hydration status: stable

## 2019-04-03 NOTE — TELEPHONE ENCOUNTER
I called Tam Adjutant and informed him that the rectal biopsies showed hyperplastic mucosa with evidence of ischemia. These changes are consistent with rectal prolapse, especially in view of his history. Dr. Mabel Meyers would suggest to discuss referral to a colorectal surgeon with his PCP. Voices understanding will discuss with Dr. Slime Berrios.

## 2019-04-18 ENCOUNTER — HOSPITAL ENCOUNTER (EMERGENCY)
Age: 64
Discharge: HOME | End: 2019-04-18
Attending: EMERGENCY MEDICINE
Payer: MEDICARE

## 2019-04-18 ENCOUNTER — APPOINTMENT (OUTPATIENT)
Dept: CT IMAGING | Age: 64
End: 2019-04-18
Payer: MEDICARE

## 2019-04-18 ENCOUNTER — APPOINTMENT (OUTPATIENT)
Dept: GENERAL RADIOLOGY | Age: 64
End: 2019-04-18
Payer: MEDICARE

## 2019-04-18 VITALS
OXYGEN SATURATION: 96 % | SYSTOLIC BLOOD PRESSURE: 172 MMHG | HEART RATE: 67 BPM | DIASTOLIC BLOOD PRESSURE: 79 MMHG | TEMPERATURE: 97.8 F | RESPIRATION RATE: 18 BRPM

## 2019-04-18 DIAGNOSIS — S16.1XXA CERVICAL STRAIN, ACUTE, INITIAL ENCOUNTER: ICD-10-CM

## 2019-04-18 DIAGNOSIS — I10 ESSENTIAL HYPERTENSION: ICD-10-CM

## 2019-04-18 DIAGNOSIS — S32.009A LUMBAR TRANSVERSE PROCESS FRACTURE, CLOSED, INITIAL ENCOUNTER (HCC): Primary | ICD-10-CM

## 2019-04-18 DIAGNOSIS — S09.90XA MILD CLOSED HEAD INJURY, INITIAL ENCOUNTER: ICD-10-CM

## 2019-04-18 DIAGNOSIS — S40.012A CONTUSION OF LEFT SHOULDER, INITIAL ENCOUNTER: ICD-10-CM

## 2019-04-18 LAB
-: NORMAL
AMORPHOUS: NORMAL
BACTERIA: NORMAL
BILIRUBIN URINE: ABNORMAL
CASTS UA: NORMAL /LPF
COLOR: YELLOW
COMMENT UA: ABNORMAL
CRYSTALS, UA: NORMAL /HPF
EPITHELIAL CELLS UA: NORMAL /HPF (ref 0–5)
GLUCOSE URINE: NEGATIVE
KETONES, URINE: ABNORMAL
LEUKOCYTE ESTERASE, URINE: NEGATIVE
MUCUS: NORMAL
NITRITE, URINE: NEGATIVE
OTHER OBSERVATIONS UA: NORMAL
PH UA: 5.5 (ref 5–9)
PROTEIN UA: NEGATIVE
RBC UA: NORMAL /HPF (ref 0–2)
RENAL EPITHELIAL, UA: NORMAL /HPF
SPECIFIC GRAVITY UA: 1.02 (ref 1.01–1.02)
TRICHOMONAS: NORMAL
TURBIDITY: CLEAR
URINE HGB: NEGATIVE
UROBILINOGEN, URINE: NORMAL
WBC UA: NORMAL /HPF (ref 0–5)
YEAST: NORMAL

## 2019-04-18 PROCEDURE — 81001 URINALYSIS AUTO W/SCOPE: CPT

## 2019-04-18 PROCEDURE — 72125 CT NECK SPINE W/O DYE: CPT

## 2019-04-18 PROCEDURE — 6360000002 HC RX W HCPCS: Performed by: EMERGENCY MEDICINE

## 2019-04-18 PROCEDURE — 73030 X-RAY EXAM OF SHOULDER: CPT

## 2019-04-18 PROCEDURE — 70450 CT HEAD/BRAIN W/O DYE: CPT

## 2019-04-18 PROCEDURE — 72131 CT LUMBAR SPINE W/O DYE: CPT

## 2019-04-18 PROCEDURE — 96372 THER/PROPH/DIAG INJ SC/IM: CPT

## 2019-04-18 PROCEDURE — 99284 EMERGENCY DEPT VISIT MOD MDM: CPT

## 2019-04-18 RX ORDER — HYDROMORPHONE HCL 110MG/55ML
1 PATIENT CONTROLLED ANALGESIA SYRINGE INTRAVENOUS ONCE
Status: COMPLETED | OUTPATIENT
Start: 2019-04-18 | End: 2019-04-18

## 2019-04-18 RX ORDER — HYDROCODONE BITARTRATE AND ACETAMINOPHEN 5; 325 MG/1; MG/1
1 TABLET ORAL EVERY 6 HOURS PRN
Qty: 15 TABLET | Refills: 0 | Status: SHIPPED | OUTPATIENT
Start: 2019-04-18 | End: 2019-04-21

## 2019-04-18 RX ADMIN — HYDROMORPHONE HYDROCHLORIDE 1 MG: 2 INJECTION INTRAMUSCULAR; INTRAVENOUS; SUBCUTANEOUS at 05:29

## 2019-04-18 ASSESSMENT — PAIN SCALES - GENERAL
PAINLEVEL_OUTOF10: 8
PAINLEVEL_OUTOF10: 6

## 2019-04-18 ASSESSMENT — PAIN DESCRIPTION - LOCATION
LOCATION: BACK
LOCATION: BACK
LOCATION_3: NECK
LOCATION_2: SHOULDER

## 2019-04-18 ASSESSMENT — ENCOUNTER SYMPTOMS
PHOTOPHOBIA: 0
DIARRHEA: 0
NAUSEA: 0
VOICE CHANGE: 0
ABDOMINAL DISTENTION: 0
SORE THROAT: 0
SINUS PRESSURE: 0
EYE PAIN: 0
BLOOD IN STOOL: 0
FACIAL SWELLING: 0
EYE REDNESS: 0
EYE DISCHARGE: 0
CHEST TIGHTNESS: 0
RHINORRHEA: 0
COUGH: 0
COLOR CHANGE: 0
BACK PAIN: 1
SINUS PAIN: 0
RECTAL PAIN: 0
EYE ITCHING: 0
SHORTNESS OF BREATH: 0
ABDOMINAL PAIN: 0
VOMITING: 0
WHEEZING: 0
STRIDOR: 0
CONSTIPATION: 0
TROUBLE SWALLOWING: 0

## 2019-04-18 ASSESSMENT — PAIN DESCRIPTION - ORIENTATION
ORIENTATION: LEFT;LOWER
ORIENTATION_2: LEFT
ORIENTATION_3: LEFT

## 2019-04-18 ASSESSMENT — PAIN DESCRIPTION - INTENSITY
RATING_3: 10
RATING_2: 10

## 2019-04-18 ASSESSMENT — PAIN DESCRIPTION - PAIN TYPE
TYPE: ACUTE PAIN
TYPE: ACUTE PAIN

## 2019-04-18 ASSESSMENT — PAIN DESCRIPTION - DESCRIPTORS
DESCRIPTORS_3: SHARP;SHOOTING
DESCRIPTORS_2: SHARP
DESCRIPTORS: SHARP;SHOOTING

## 2019-04-18 NOTE — ED PROVIDER NOTES
[facility] ED  eMERGENCY dEPARTMENT eNCOUnter      Pt Name: Krishna Cleveland  MRN: [id number]  Beverlygflorie 1955  Date of evaluation: 4/18/2019  Provider: Erik Pena MD    CHIEF COMPLAINT     Chief Complaint   Patient presents with   Costa James down approximately 7 stairs on Monday morning    Back Pain     Left lower    Shoulder Pain     Bilateral     Neck Pain         HISTORY OF PRESENT ILLNESS   (Location/Symptom, Timing/Onset, Context/Setting,Quality, Duration, Modifying Factors, Severity)  Note limiting factors. Krishna Cleveland is a66 y.o. male who presents to the emergency department with neck, low back and left shoulder pain    HPI  Patient fell down 7 steps head forward 3 days ago. Patient states he did not want to turn on the light to wake up his wife, missed a step and fell forward. He is uncertain whether he hit his head, but has had neck pain, left shoulder pain, left lateral lower back pain since the fall that has worsened over the past 3 days. Patient states pain currently is very severe without any radiation down his arms or his legs. Patient denies any weakness or loss of sensation in the upper or lower extremities since the fall. Patient denies any episodes of nausea, vomiting, headache, amnesia or open wounds or bleeding from anywhere. Patient is on Xarelto for atrial fibrillation. Patient has not been evaluated or treated by me prior to coming to the emergency department today. Patient has not tried anything to help with his pain. Patient denies any hematuria, melena, hematochezia, epistaxis, bleeding from gums, or any loose dentition. Nursing Notes were reviewed. REVIEW OF SYSTEMS    (2-9 systems for level 4, 10 or more for level 5)     Review of Systems   Constitutional: Negative for activity change, chills, diaphoresis, fatigue, fever and unexpected weight change.    HENT: Negative for congestion, dental problem, drooling, ear discharge, ear pain, facial swelling, mouth sores, postnasal drip, rhinorrhea, sinus pressure, sinus pain, sore throat, trouble swallowing and voice change. Eyes: Negative for photophobia, pain, discharge, redness, itching and visual disturbance. Respiratory: Negative for cough, chest tightness, shortness of breath, wheezing and stridor. Cardiovascular: Negative for chest pain, palpitations and leg swelling. Gastrointestinal: Negative for abdominal distention, abdominal pain, blood in stool, constipation, diarrhea, nausea, rectal pain and vomiting. Endocrine: Negative for polydipsia and polyuria. Genitourinary: Negative for decreased urine volume, difficulty urinating, dysuria, flank pain, frequency, genital sores, hematuria and urgency. Musculoskeletal: Positive for back pain, neck pain and neck stiffness. Negative for arthralgias and myalgias. Skin: Negative for color change, pallor, rash and wound. Neurological: Negative for dizziness, tremors, seizures, speech difficulty, weakness, light-headedness, numbness and headaches. Hematological: Negative for adenopathy. Does not bruise/bleed easily. Psychiatric/Behavioral: Negative for agitation, behavioral problems, confusion, dysphoric mood, hallucinations, sleep disturbance and suicidal ideas. The patient is not nervous/anxious. Except as noted above the remainder of the review of systems was reviewed and negative.        PAST MEDICAL HISTORY     Past Medical History:   Diagnosis Date    Achilles tendon tear 2013    Allergic rhinitis     Atrial flutter ([facility] [address] 1996    Congestive heart failure ([facility]     Coronary atherosclerosis     Hyperglycemia 2007    Hyperlipidemia 1996    Hypertension 2004    Mitral insufficiency     Obesity 2006    Osteoarthritis     Status post ablation of atrial flutter 05/09/13    Vasodepressor syncope 2001         SURGICALHISTORY       Past Surgical History:   Procedure Laterality Date   [place] [address], 2013    st.vincents AV node    CATARACT REMOVAL WITH IMPLANT  12/06/2017    Dr. Luke Brannon  02/01/2010    [place], negative    COLONOSCOPY  03/25/2019    Dr. Chrystal Wheeler rectal bx(hyperplastic mucosa with evidence of ischemic injury,angiodysplasia no active bleeding,divetrticulosis,hemorroids    COLONOSCOPY N/A 3/25/2019    COLONOSCOPY POLYPECTOMY SNARE/COLD BIOPSY performed by Rita Bustos MD at [address]      right side of face    KNEE SURGERY Right 2006    medial meniscus arthroscopy, Carolyn Roof    KNEE SURGERY Left     MITRAL VALVE SURGERY  8/11/11    34 mm ring annuloplasty    [place] KNEE SCOPE,MED OR LAT MENIS REPAIR Left 6/21/2018    KNEE ARTHROSCOPY-PARTIAL MEDIAL MENISCECTOMY AND CHONDROPLASTY performed by Andrea Bautista MD at [address] [facility] Left 12/6/2017    EYE CATARACT EMULSIFICATION IOL IMPLANT performed by Michael Cope DO at [facility] 4/2/2019    -hiatal hernia,grade 3 reflux esophagitis,antral gastritis, erosive bulbar duodenitis         CURRENT MEDICATIONS       Previous Medications    ALBUTEROL SULFATE HFA (PROAIR HFA) 108 (90 BASE) MCG/ACT INHALER    Inhale 2 puffs into the lungs every 6 hours as needed for Wheezing    ASPIRIN 81 MG CHEWABLE TABLET    Take 81 mg by mouth daily. ATORVASTATIN (LIPITOR) 40 MG TABLET    Take 1 tablet by mouth daily    LISINOPRIL (PRINIVIL;ZESTRIL) 10 MG TABLET    TAKE 1 TABLET DAILY    METOPROLOL SUCCINATE (TOPROL XL) 100 MG EXTENDED RELEASE TABLET    TAKE 1 TABLET DAILY    MOMETASONE (ELOCON) 0.1 % CREAM    Apply topically daily. RIVAROXABAN (XARELTO) 20 MG TABS TABLET    Take 1 tablet by mouth daily    SERTRALINE (ZOLOFT) 50 MG TABLET    TAKE 1 TABLET DAILY    SPACER/AERO-HOLDING CHAMBERS (OPTICHAMBER PARESH) MARISEL    1 Device by Does not apply route daily       ALLERGIES     Patient has no known allergies.     FAMILY HISTORY       Family History   Problem Relation Age of Onset    Heart Disease Mother         sudden cardiac death    Heart Attack Father           SOCIAL HISTORY       Social History     Socioeconomic History    Marital status:      Spouse name: None    Number of children: None    Years of education: None    Highest education level: None   Occupational History    None   Social Needs    Financial resource strain: None    Food insecurity:     Worry: None     Inability: None    Transportation needs:     Medical: None     Non-medical: None   Tobacco Use    Smoking status: Former Smoker     Packs/day: 1.50     Years: 20.00     Pack years: 30.00     Types: Cigarettes     Last attempt to quit: 1996     Years since quittin.4    Smokeless tobacco: Never Used   Substance and Sexual Activity    Alcohol use:  Yes     Alcohol/week: 0.0 oz     Comment: 4 beers a day     Drug use: No    Sexual activity: None   Lifestyle    Physical activity:     Days per week: None     Minutes per session: None    Stress: None   Relationships    Social connections:     Talks on phone: None     Gets together: None     Attends Hindu service: None     Active member of club or organization: None     Attends meetings of clubs or organizations: None     Relationship status: None    Intimate partner violence:     Fear of current or ex partner: None     Emotionally abused: None     Physically abused: None     Forced sexual activity: None   Other Topics Concern    None   Social History Narrative    None       SCREENINGS    [place] Coma Scale  Eye Opening: Spontaneous  Best Verbal Response: Oriented  Best Motor Response: Obeys commands  [place] Coma Scale Score: 15        PHYSICAL EXAM    (up to 7 for level 4, 8 or more for level 5)     Vitals:    19 0507 19 0544 19 0604   BP: (!) 192/107 (!) 188/93 (!) 165/82   Pulse: 66     Resp: 20     Temp: 97.8 °F (36.6 °C)     TempSrc: Tympanic     SpO2: 96%           Physical Exam   Constitutional: He is oriented to person, place, and time. He appears well-developed and well-nourished. HENT:   Head: Normocephalic and atraumatic. Right Ear: External ear normal.   Left Ear: External ear normal.   Nose: Nose normal.   Mouth/Throat: Oropharynx is clear and moist.   Eyes: Pupils are equal, round, and reactive to light. Conjunctivae and EOM are normal.   Neck: Normal range of motion. Neck supple. Cardiovascular: Normal rate, regular rhythm, normal heart sounds and intact distal pulses. Pulmonary/Chest: Effort normal and breath sounds normal. No stridor. He has no wheezes. He has no rales. Abdominal: Soft. Bowel sounds are normal. He exhibits no distension and no mass. There is no tenderness. There is no rebound and no guarding. Musculoskeletal:        Right hip: He exhibits normal range of motion, normal strength, no tenderness and no bony tenderness. Left hip: He exhibits normal range of motion, normal strength, no tenderness and no bony tenderness. Cervical back: He exhibits decreased range of motion, tenderness, bony tenderness and pain. He exhibits no swelling, no edema and no deformity. Thoracic back: He exhibits normal range of motion, no tenderness, no bony tenderness, no swelling, no edema, no deformity, no laceration and no pain. Lumbar back: He exhibits decreased range of motion, tenderness, bony tenderness and pain. He exhibits no swelling, no edema, no deformity, no laceration and no spasm. Back:    Patient's neck is immobilized in a cervical hard collar before and after examination due to mechanism of injury and physical examination findings   Lymphadenopathy:     He has no cervical adenopathy. Neurological: He is alert and oriented to person, place, and time. He has normal strength. No cranial nerve deficit or sensory deficit. GCS eye subscore is 4. GCS verbal subscore is 5. GCS motor subscore is 6.    Skin: Skin is warm, dry and intact in lower extremities. MDM  Patient comes into the emergency room 3 days status post fall down approximately 7 steps, injuring his neck and back. Patient is on Xarelto and cannot really recall if he hurt his head, but patient was CAT scan from his head, cervical spine, and lumbar spine with a urinalysis and a left shoulder x-ray performed. CT of the head and CT of cervical spine were negative and left shoulder x-ray was negative per radiologist interpretation. CT lumbar spine showed a nondisplaced L1 transverse process fracture. I discussed case over with emergency Department attending covering trauma, Dr. Mukul Mack, at [place] as well as orthopedic surgery, Dr. Camila Mohr and it was agreed patient could be discharged home and follow-up as an outpatient with Dr. Camila Mohr. I reviewed with patient in detail what signs and symptoms return back tomorrow for including any worsening pain, new fever, new hematuria, new hematochezia, new melena, new perineal numbness or saddle paresthesias, new weakness lower extremity, new loss of sensation in the lower extremities, urinary retention or diarrhea he cannot control or any other concerning signs or symptoms that were not present on today's emergency department visit. CRITICAL CARE TIME   None    CONSULTS:  @07:26, Discussed the patient with Dr Mukul Mack, ED attending at [place] in [facility] covering trauma. Dr Mukul Mack recommended discussion with Orthopedic Surgery for their recommendation as patient usually can be discharged home with pain control and follow-up as an an outpatient. @07:37, D/W Dr Camila Mohr, Orthopedic Surgery. Dr. Camila Mohr states patient can be discharged home with pain control as it is a stable fracture and the patient is not to do any lifting, bending, or any exertional activity and he will see him in the office for further evaluation and management.     PROCEDURES:  Unless otherwise noted below, none     Procedures    FINAL IMPRESSION      1. Lumbar transverse process fracture, closed, initial encounter ([facility] [address]    2. Cervical strain, acute, initial encounter    3. Mild closed head injury, initial encounter    4. Contusion of left shoulder, initial encounter    5.  Essential hypertension          DISPOSITION/PLAN   DISPOSITION    Discharge home in good condition    PATIENT REFERRED TO:  Miguel James MD  [address] [facility]  [address] [phone]    In 1 day      [facility], [address] [place] [address].  [address] [place] [address]  [phone]    Call today      [facility] ED  [address] [facility]  [phone]    As needed, If symptoms worsen      DISCHARGE MEDICATIONS:  New Prescriptions    No medications on file              (Please note that portions of this note were completed with a voice recognition program.  Efforts were made to edit the dictations but occasionally words are mis-transcribed.)      Lyle Alegre MD (electronically signed)  Attending Emergency Physician          Lyle Alegre MD  04/18/19 [id number]

## 2019-04-18 NOTE — ED NOTES
Patient states he fell down approximately seven steps early Monday morning. Patient states he landed on left side but did not hit his head. Patient states he is having left shoulder pain, left sided neck pain and left lower back. Patient placed in cervical collar.       Whit Beasley RN  04/18/19 [id number]

## 2019-04-18 NOTE — ED NOTES
Discharge education reviewed with patient, he verbalized understanding of need to follow-up with PCP and ortho. He also verbalized understanding of pain control. Patient demonstrates ability to ambulate without assistance.       Christy Atwood RN  04/18/19 [id number]

## 2019-04-18 NOTE — ED NOTES
Patient ambulated to bathroom to obtain urine specimen. Wife and patient updated on plan of care. They deny further questions at this time.       Cameron Carrera RN  04/18/19 [id number]

## 2019-05-17 ENCOUNTER — HOSPITAL ENCOUNTER (OUTPATIENT)
Age: 64
Discharge: HOME | End: 2019-05-17
Payer: MEDICARE

## 2019-05-17 DIAGNOSIS — E78.5 HYPERLIPIDEMIA, UNSPECIFIED HYPERLIPIDEMIA TYPE: ICD-10-CM

## 2019-05-17 DIAGNOSIS — R97.20 ELEVATED PSA: ICD-10-CM

## 2019-05-17 DIAGNOSIS — R73.9 HYPERGLYCEMIA: ICD-10-CM

## 2019-05-17 LAB
ALT SERPL-CCNC: 14 U/L (ref 5–41)
ANION GAP SERPL CALCULATED.3IONS-SCNC: 12 MMOL/L (ref 9–17)
AST SERPL-CCNC: 17 U/L
BUN BLDV-MCNC: 8 MG/DL (ref 8–23)
BUN/CREAT BLD: 11 (ref 9–20)
CALCIUM SERPL-MCNC: 8.9 MG/DL (ref 8.6–10.4)
CHLORIDE BLD-SCNC: 99 MMOL/L (ref 98–107)
CHOLESTEROL/HDL RATIO: 2.3
CHOLESTEROL: 125 MG/DL
CO2: 25 MMOL/L (ref 20–31)
CREAT SERPL-MCNC: 0.76 MG/DL (ref 0.7–1.2)
ESTIMATED AVERAGE GLUCOSE: 103 MG/DL
GFR AFRICAN AMERICAN: >60 ML/MIN
GFR NON-AFRICAN AMERICAN: >60 ML/MIN
GFR SERPL CREATININE-BSD FRML MDRD: NORMAL ML/MIN/{1.73_M2}
GFR SERPL CREATININE-BSD FRML MDRD: NORMAL ML/MIN/{1.73_M2}
GLUCOSE BLD-MCNC: 88 MG/DL (ref 70–99)
HBA1C MFR BLD: 5.2 % (ref 4.8–5.9)
HCT VFR BLD CALC: 38.7 % (ref 40.7–50.3)
HDLC SERPL-MCNC: 54 MG/DL
HEMOGLOBIN: 12.5 G/DL (ref 13–17)
HIGH SENSITIVE C-REACTIVE PROTEIN: 3 MG/L
LDL CHOLESTEROL: 60 MG/DL (ref 0–130)
MCH RBC QN AUTO: 32.3 PG (ref 25.2–33.5)
MCHC RBC AUTO-ENTMCNC: 32.3 G/DL (ref 28.4–34.8)
MCV RBC AUTO: 100 FL (ref 82.6–102.9)
NRBC AUTOMATED: 0 PER 100 WBC
PDW BLD-RTO: 13.7 % (ref 11.8–14.4)
PLATELET # BLD: 147 K/UL (ref 138–453)
PMV BLD AUTO: 11 FL (ref 8.1–13.5)
POTASSIUM SERPL-SCNC: 4.7 MMOL/L (ref 3.7–5.3)
PROSTATE SPECIFIC ANTIGEN: 2.25 UG/L
RBC # BLD: 3.87 M/UL (ref 4.21–5.77)
SEDIMENTATION RATE, ERYTHROCYTE: 14 MM (ref 0–20)
SODIUM BLD-SCNC: 136 MMOL/L (ref 135–144)
TRIGL SERPL-MCNC: 54 MG/DL
VLDLC SERPL CALC-MCNC: NORMAL MG/DL (ref 1–30)
WBC # BLD: 4 K/UL (ref 3.5–11.3)

## 2019-05-17 PROCEDURE — 84460 ALANINE AMINO (ALT) (SGPT): CPT

## 2019-05-17 PROCEDURE — 84153 ASSAY OF PSA TOTAL: CPT

## 2019-05-17 PROCEDURE — 80061 LIPID PANEL: CPT

## 2019-05-17 PROCEDURE — 85651 RBC SED RATE NONAUTOMATED: CPT

## 2019-05-17 PROCEDURE — 80048 BASIC METABOLIC PNL TOTAL CA: CPT

## 2019-05-17 PROCEDURE — 84450 TRANSFERASE (AST) (SGOT): CPT

## 2019-05-17 PROCEDURE — 36415 COLL VENOUS BLD VENIPUNCTURE: CPT

## 2019-05-17 PROCEDURE — 85027 COMPLETE CBC AUTOMATED: CPT

## 2019-05-17 PROCEDURE — 86141 C-REACTIVE PROTEIN HS: CPT

## 2019-05-17 PROCEDURE — 83036 HEMOGLOBIN GLYCOSYLATED A1C: CPT

## 2019-05-20 ENCOUNTER — OFFICE VISIT (OUTPATIENT)
Dept: FAMILY MEDICINE CLINIC | Age: 64
End: 2019-05-20
Payer: MEDICARE

## 2019-05-20 VITALS
WEIGHT: 251 LBS | SYSTOLIC BLOOD PRESSURE: 132 MMHG | BODY MASS INDEX: 35.14 KG/M2 | HEIGHT: 71 IN | DIASTOLIC BLOOD PRESSURE: 62 MMHG

## 2019-05-20 DIAGNOSIS — K64.9 HEMORRHOIDS, UNSPECIFIED HEMORRHOID TYPE: ICD-10-CM

## 2019-05-20 DIAGNOSIS — I10 HYPERTENSION, ESSENTIAL: ICD-10-CM

## 2019-05-20 DIAGNOSIS — K21.00 GASTROESOPHAGEAL REFLUX DISEASE WITH ESOPHAGITIS: ICD-10-CM

## 2019-05-20 DIAGNOSIS — R73.9 HYPERGLYCEMIA: ICD-10-CM

## 2019-05-20 DIAGNOSIS — E78.5 HYPERLIPIDEMIA, UNSPECIFIED HYPERLIPIDEMIA TYPE: Primary | ICD-10-CM

## 2019-05-20 DIAGNOSIS — K55.20 ANGIODYSPLASIA OF COLON: ICD-10-CM

## 2019-05-20 DIAGNOSIS — S32.009D CLOSED FRACTURE OF TRANSVERSE PROCESS OF LUMBAR VERTEBRA WITH ROUTINE HEALING, SUBSEQUENT ENCOUNTER: ICD-10-CM

## 2019-05-20 DIAGNOSIS — K22.10 EROSIVE ESOPHAGITIS: ICD-10-CM

## 2019-05-20 DIAGNOSIS — K62.3 RECTAL PROLAPSE: ICD-10-CM

## 2019-05-20 DIAGNOSIS — I48.0 PAROXYSMAL A-FIB (HCC): ICD-10-CM

## 2019-05-20 DIAGNOSIS — I25.10 ASHD (ARTERIOSCLEROTIC HEART DISEASE): ICD-10-CM

## 2019-05-20 PROBLEM — S32.009A CLOSED FRACTURE OF TRANSVERSE PROCESS OF LUMBAR VERTEBRA (HCC): Status: ACTIVE | Noted: 2019-05-20

## 2019-05-20 PROCEDURE — 3017F COLORECTAL CA SCREEN DOC REV: CPT | Performed by: FAMILY MEDICINE

## 2019-05-20 PROCEDURE — 99214 OFFICE O/P EST MOD 30 MIN: CPT | Performed by: FAMILY MEDICINE

## 2019-05-20 PROCEDURE — G8427 DOCREV CUR MEDS BY ELIG CLIN: HCPCS | Performed by: FAMILY MEDICINE

## 2019-05-20 PROCEDURE — 1036F TOBACCO NON-USER: CPT | Performed by: FAMILY MEDICINE

## 2019-05-20 PROCEDURE — G8598 ASA/ANTIPLAT THER USED: HCPCS | Performed by: FAMILY MEDICINE

## 2019-05-20 PROCEDURE — 4040F PNEUMOC VAC/ADMIN/RCVD: CPT | Performed by: FAMILY MEDICINE

## 2019-05-20 PROCEDURE — 1123F ACP DISCUSS/DSCN MKR DOCD: CPT | Performed by: FAMILY MEDICINE

## 2019-05-20 PROCEDURE — G8417 CALC BMI ABV UP PARAM F/U: HCPCS | Performed by: FAMILY MEDICINE

## 2019-05-20 ASSESSMENT — PATIENT HEALTH QUESTIONNAIRE - PHQ9
SUM OF ALL RESPONSES TO PHQ QUESTIONS 1-9: 0
SUM OF ALL RESPONSES TO PHQ QUESTIONS 1-9: 0
2. FEELING DOWN, DEPRESSED OR HOPELESS: 0
1. LITTLE INTEREST OR PLEASURE IN DOING THINGS: 0
SUM OF ALL RESPONSES TO PHQ9 QUESTIONS 1 & 2: 0

## 2019-05-20 NOTE — PATIENT INSTRUCTIONS
PLAN:  Labs reviewed, I am overall pleased with these results. I would like for him to discontinue taking Aspirin 81 mg, I do not believe there is a big enough benefit to risk ratio for him. He is agreeable with this. He should stay on Xarelto. I will refer him to Dr. Myla Gao, a colorectal surgeon to discuss his hemorrhoids and prolapse. He is doing well overall, I will see him back in 4 months. SURVEY:    You may be receiving a survey from [employer] regarding your visit today. Please complete the survey to enable us to provide the highest quality of care to you and your family. If you cannot score us a very good on any question, please call the office to discuss how we could have made your experience a very good one. Thank you.

## 2019-05-20 NOTE — PROGRESS NOTES
PERCY Tucker, am scribing for and in the presence of Dr. Morenita Rivera. 5/20/19/7:59am/SNP    [address] [address] [address]  Dept: [phone]    Chavez Lugo is a 70 y.o. male here for 6 Month Follow-Up; Hyperlipidemia; Hyperglycemia; and Hypertension    HPI:  HYPERLIPIDEMIA   Medication compliance: compliant all of the time. Patient is following a low fat, low cholesterol diet. He is not exercising regularly.      HYPERGLYCEMIA:  Diet compliance: compliant most of the time  Current exercise: no regular exercise.     HYPERTENSION:  Medication compliance: compliant all of the time. Patient is controlled by the following drug category: Beta Blocker  Medication Therapy: metoprolol (Lopressor, Toprol-XL)  He is not exercising. He is adherent to a low-sodium diet.    Blood pressure is not being monitored at home. Patient reports that He has limited alcohol intake. Does the patient have sleep apnea? No  The patient's most recent LDL is below 190, which was checked on 5/17/19. Prior to Admission medications    Medication Sig Start Date End Date Taking? Authorizing Provider   atorvastatin (LIPITOR) 40 MG tablet Take 1 tablet by mouth daily 12/26/18  Yes Morenita Rivera MD   sertraline (ZOLOFT) 50 MG tablet TAKE 1 TABLET DAILY 12/26/18  Yes Morenita Rivera MD   rivaroxaban (XARELTO) 20 MG TABS tablet Take 1 tablet by mouth daily 12/26/18  Yes Morenita Rivera MD   metoprolol succinate (TOPROL XL) 100 MG extended release tablet TAKE 1 TABLET DAILY 12/26/18  Yes Morenita Rivera MD   lisinopril (PRINIVIL;ZESTRIL) 10 MG tablet TAKE 1 TABLET DAILY 12/26/18  Yes Morenita Rivera MD   aspirin 81 MG chewable tablet Take 81 mg by mouth daily. Yes Historical Provider, MD   mometasone (ELOCON) 0.1 % cream Apply topically daily.  3/6/19   Morenita Rivera MD   albuterol sulfate HFA (PROAIR HFA) 108 (90 Base) MCG/ACT inhaler Inhale 2 puffs into the lungs every 6 hours as needed for Wheezing 5/3/18   Fran Uriarte MD   Spacer/Aero-Holding Chambers [facility]) MARISEL 1 Device by Does not apply route daily 5/3/18   Fran Uriarte MD     ROS:  General Constitutional: Denies chills. Denies fever. Denies headache. Denies lightheadedness. Ophthalmologic: Denies blurred vision. ENT: Denies nasal congestion. Denies sore throat. Denies ear pain and pressure. Respiratory: Denies cough. Denies shortness of breath. Denies wheezing. Cardiovascular: Denies chest pain at rest. Denies irregular heartbeat. Denies palpitations. Gastrointestinal: Denies abdominal pain. Denies constipation. Denies diarrhea. Denies nausea. Denies vomiting. Admits occasional blood in stool  Genitourinary: Denies blood in the urine. Denies difficulty urinating. Denies frequent urination. Denies painful urination. Denies urinary incontinence. Musculoskeletal: Denies muscle aches. Denies painful joints. Denies swollen joints. Peripheral Vascular: Denies pain/cramping in legs after exertion. Skin: Denies dry skin. Denies itching. Denies rash. Neurologic: Denies dizziness. Denies fainting. Denies tingling/numbness. Admits falling down the steps 4/18/19, middle of the night was going to go to the bathroom and missed the first step to go downstairs and fell. Psychiatric: Denies sleep disturbance. Denies anxiety. Denies depressed mood.     Past Surgical History:   Procedure Laterality Date    ATRIAL ABLATION SURGERY  1998, 2013    st.vincents AV node    CATARACT REMOVAL WITH IMPLANT  12/06/2017    Dr. Brody Kaur  02/01/2010    annam[place], negative    COLONOSCOPY  03/25/2019    Dr. Beatriz Lindsay rectal bx(hyperplastic mucosa with evidence of ischemic injury,angiodysplasia no active bleeding,divetrticulosis,hemorroids    COLONOSCOPY N/A 3/25/2019    COLONOSCOPY POLYPECTOMY SNARE/COLD BIOPSY performed by Dorothy Richard MD at [address] [place] [address]      right side of face    KNEE SURGERY Right 2006    medial meniscus arthroscopy, Marilee Brandon    KNEE SURGERY Left     MITRAL VALVE SURGERY  11    34 mm ring annuloplasty    [place] KNEE SCOPE,MED OR LAT MENIS REPAIR Left 2018    KNEE ARTHROSCOPY-PARTIAL MEDIAL MENISCECTOMY AND CHONDROPLASTY performed by Hermelindo Gomez MD [facility] Left 2017    EYE CATARACT EMULSIFICATION IOL IMPLANT performed by Juana Mcnally DO at [facility] N/A 2019    -hiatal hernia,grade 3 reflux esophagitis,antral gastritis, erosive bulbar duodenitis       Family History   Problem Relation Age of Onset    Heart Disease Mother         sudden cardiac death    Heart Attack Father        Past Medical History:   Diagnosis Date    Achilles tendon tear     Allergic rhinitis     Atrial flutter ([address]     Congestive heart failure ([address]     Coronary atherosclerosis     Hyperglycemia 2007    Hyperlipidemia 1996    Hypertension 2004    Mitral insufficiency     Obesity 2006    Osteoarthritis     Status post ablation of atrial flutter 13    Vasodepressor syncope       Social History     Tobacco Use    Smoking status: Former Smoker     Packs/day: 1.50     Years: 20.00     Pack years: 30.00     Types: Cigarettes     Last attempt to quit: 1996     Years since quittin.5    Smokeless tobacco: Never Used   Substance Use Topics    Alcohol use:  Yes     Alcohol/week: 0.0 oz     Comment: 4 beers a day       Current Outpatient Medications   Medication Sig Dispense Refill    atorvastatin (LIPITOR) 40 MG tablet Take 1 tablet by mouth daily 90 tablet 3    sertraline (ZOLOFT) 50 MG tablet TAKE 1 TABLET DAILY 90 tablet 3    rivaroxaban (XARELTO) 20 MG TABS tablet Take 1 tablet by mouth daily 90 tablet 3    metoprolol succinate (TOPROL XL) 100 MG extended release tablet TAKE 1 TABLET DAILY 90 tablet 3    lisinopril (PRINIVIL;ZESTRIL) 10 MG tablet TAKE 1 TABLET DAILY 90 tablet 3    aspirin 81 MG chewable tablet Take 81 mg by mouth daily.  mometasone (ELOCON) 0.1 % cream Apply topically daily. 30 g 0    albuterol sulfate HFA (PROAIR HFA) 108 (90 Base) MCG/ACT inhaler Inhale 2 puffs into the lungs every 6 hours as needed for Wheezing 1 Inhaler 0    Spacer/Aero-Holding Chambers ([employer] PARESH) MARISEL 1 Device by Does not apply route daily 1 Device 0     No current facility-administered medications for this visit. No Known Allergies    PHYSICAL EXAM:    /62 (Site: Left Upper Arm, Position: Sitting, Cuff Size: Large Adult)   Ht 5' 11\" (1.803 m)   Wt 251 lb (113.9 kg)   BMI 35.01 kg/m²   Wt Readings from Last 3 Encounters:   05/20/19 251 lb (113.9 kg)   04/02/19 235 lb (106.6 kg)   04/01/19 239 lb (108.4 kg)     BP Readings from Last 3 Encounters:   05/20/19 132/62   04/18/19 (!) 172/79   04/02/19 132/83     General Appearance: in no acute distress, well developed, well nourished. Eyes: pupils equal, round reactive to light and accommodation. Ears: normal canal and TM's. Nose: nares patent, no lesions. Oral Cavity: mucosa moist.  Throat: clear. Neck/Thyroid: neck supple, full range of motion, no cervical lymphadenopathy, no thyromegaly or carotid bruits. Skin: warm and dry. No suspicious lesions. Heart: regular rate and rhythm. No murmurs. S1, S2 normal, no gallops. Irregular, rate 70  Lungs: clear to auscultation bilaterally. Abdomen: bowel sounds present, soft, nontender, nondistended, no masses or organomegaly. Musculoskeletal: normal, full range of motion in knees and hips, no swelling or tenderness. Extremities: no cyanosis, 1+ edema pre tibial  Peripheral Pulses: 2+ throughout, symetric. Neurologic: nonfocal, motor strength normal upper and lower extremities, sensory exam intact. Psych: normal affect, speech fluent. ASSESSMENT:   Diagnosis Orders   1.  Hyperlipidemia, unspecified hyperlipidemia type  ALT    AST    Basic

## 2019-09-20 ENCOUNTER — HOSPITAL ENCOUNTER (OUTPATIENT)
Age: 64
Discharge: HOME | End: 2019-09-20
Payer: MEDICARE

## 2019-09-20 DIAGNOSIS — R73.9 HYPERGLYCEMIA: ICD-10-CM

## 2019-09-20 DIAGNOSIS — E78.5 HYPERLIPIDEMIA, UNSPECIFIED HYPERLIPIDEMIA TYPE: ICD-10-CM

## 2019-09-20 LAB
ALT SERPL-CCNC: 29 U/L (ref 5–41)
ANION GAP SERPL CALCULATED.3IONS-SCNC: 15 MMOL/L (ref 9–17)
AST SERPL-CCNC: 32 U/L
BUN BLDV-MCNC: 12 MG/DL (ref 8–23)
BUN/CREAT BLD: 16 (ref 9–20)
CALCIUM SERPL-MCNC: 9.3 MG/DL (ref 8.6–10.4)
CHLORIDE BLD-SCNC: 101 MMOL/L (ref 98–107)
CO2: 23 MMOL/L (ref 20–31)
CREAT SERPL-MCNC: 0.77 MG/DL (ref 0.7–1.2)
ESTIMATED AVERAGE GLUCOSE: 103 MG/DL
GFR AFRICAN AMERICAN: >60 ML/MIN
GFR NON-AFRICAN AMERICAN: >60 ML/MIN
GFR SERPL CREATININE-BSD FRML MDRD: NORMAL ML/MIN/{1.73_M2}
GFR SERPL CREATININE-BSD FRML MDRD: NORMAL ML/MIN/{1.73_M2}
GLUCOSE BLD-MCNC: 98 MG/DL (ref 70–99)
HBA1C MFR BLD: 5.2 % (ref 4.8–5.9)
HCT VFR BLD CALC: 39.5 % (ref 40.7–50.3)
HEMOGLOBIN: 12.9 G/DL (ref 13–17)
HIGH SENSITIVE C-REACTIVE PROTEIN: 1.9 MG/L
MCH RBC QN AUTO: 32.7 PG (ref 25.2–33.5)
MCHC RBC AUTO-ENTMCNC: 32.7 G/DL (ref 28.4–34.8)
MCV RBC AUTO: 100.3 FL (ref 82.6–102.9)
NRBC AUTOMATED: 0 PER 100 WBC
PDW BLD-RTO: 14.3 % (ref 11.8–14.4)
PLATELET # BLD: 144 K/UL (ref 138–453)
PMV BLD AUTO: 11.1 FL (ref 8.1–13.5)
POTASSIUM SERPL-SCNC: 4.1 MMOL/L (ref 3.7–5.3)
RBC # BLD: 3.94 M/UL (ref 4.21–5.77)
SODIUM BLD-SCNC: 139 MMOL/L (ref 135–144)
WBC # BLD: 4.1 K/UL (ref 3.5–11.3)

## 2019-09-20 PROCEDURE — 86141 C-REACTIVE PROTEIN HS: CPT

## 2019-09-20 PROCEDURE — 80048 BASIC METABOLIC PNL TOTAL CA: CPT

## 2019-09-20 PROCEDURE — 36415 COLL VENOUS BLD VENIPUNCTURE: CPT

## 2019-09-20 PROCEDURE — 84450 TRANSFERASE (AST) (SGOT): CPT

## 2019-09-20 PROCEDURE — 85027 COMPLETE CBC AUTOMATED: CPT

## 2019-09-20 PROCEDURE — 84460 ALANINE AMINO (ALT) (SGPT): CPT

## 2019-09-20 PROCEDURE — 83036 HEMOGLOBIN GLYCOSYLATED A1C: CPT

## 2019-09-24 ENCOUNTER — OFFICE VISIT (OUTPATIENT)
Dept: FAMILY MEDICINE CLINIC | Age: 64
End: 2019-09-24
Payer: MEDICARE

## 2019-09-24 VITALS
DIASTOLIC BLOOD PRESSURE: 82 MMHG | WEIGHT: 247 LBS | SYSTOLIC BLOOD PRESSURE: 128 MMHG | BODY MASS INDEX: 34.58 KG/M2 | HEIGHT: 71 IN

## 2019-09-24 DIAGNOSIS — E78.5 HYPERLIPIDEMIA, UNSPECIFIED HYPERLIPIDEMIA TYPE: ICD-10-CM

## 2019-09-24 DIAGNOSIS — R73.9 HYPERGLYCEMIA: ICD-10-CM

## 2019-09-24 DIAGNOSIS — I10 ESSENTIAL HYPERTENSION: ICD-10-CM

## 2019-09-24 DIAGNOSIS — I48.20 CHRONIC A-FIB (HCC): ICD-10-CM

## 2019-09-24 DIAGNOSIS — E66.9 OBESITY, UNSPECIFIED CLASSIFICATION, UNSPECIFIED OBESITY TYPE, UNSPECIFIED WHETHER SERIOUS COMORBIDITY PRESENT: ICD-10-CM

## 2019-09-24 DIAGNOSIS — Z00.00 ANNUAL PHYSICAL EXAM: Primary | ICD-10-CM

## 2019-09-24 DIAGNOSIS — K21.9 GASTROESOPHAGEAL REFLUX DISEASE, ESOPHAGITIS PRESENCE NOT SPECIFIED: ICD-10-CM

## 2019-09-24 PROCEDURE — 4040F PNEUMOC VAC/ADMIN/RCVD: CPT | Performed by: FAMILY MEDICINE

## 2019-09-24 PROCEDURE — G8598 ASA/ANTIPLAT THER USED: HCPCS | Performed by: FAMILY MEDICINE

## 2019-09-24 PROCEDURE — G0402 INITIAL PREVENTIVE EXAM: HCPCS | Performed by: FAMILY MEDICINE

## 2019-09-24 PROCEDURE — 3017F COLORECTAL CA SCREEN DOC REV: CPT | Performed by: FAMILY MEDICINE

## 2019-09-24 PROCEDURE — 1123F ACP DISCUSS/DSCN MKR DOCD: CPT | Performed by: FAMILY MEDICINE

## 2019-09-24 RX ORDER — OMEPRAZOLE 20 MG/1
20 CAPSULE, DELAYED RELEASE ORAL EVERY OTHER DAY
COMMUNITY
End: 2021-01-04 | Stop reason: SDUPTHER

## 2019-09-24 ASSESSMENT — LIFESTYLE VARIABLES
AUDIT TOTAL SCORE: 9
HOW OFTEN DO YOU HAVE A DRINK CONTAINING ALCOHOL: 4
HOW OFTEN DURING THE LAST YEAR HAVE YOU FOUND THAT YOU WERE NOT ABLE TO STOP DRINKING ONCE YOU HAD STARTED: 0
AUDIT-C TOTAL SCORE: 9
HOW OFTEN DO YOU HAVE SIX OR MORE DRINKS ON ONE OCCASION: 3
HAS A RELATIVE, FRIEND, DOCTOR, OR ANOTHER HEALTH PROFESSIONAL EXPRESSED CONCERN ABOUT YOUR DRINKING OR SUGGESTED YOU CUT DOWN: 0
HOW OFTEN DO YOU HAVE SIX OR MORE DRINKS ON ONE OCCASION: 3
HOW MANY STANDARD DRINKS CONTAINING ALCOHOL DO YOU HAVE ON A TYPICAL DAY: 2
HOW OFTEN DURING THE LAST YEAR HAVE YOU FAILED TO DO WHAT WAS NORMALLY EXPECTED FROM YOU BECAUSE OF DRINKING: 0
HOW OFTEN DURING THE LAST YEAR HAVE YOU BEEN UNABLE TO REMEMBER WHAT HAPPENED THE NIGHT BEFORE BECAUSE YOU HAD BEEN DRINKING: 0
HOW MANY STANDARD DRINKS CONTAINING ALCOHOL DO YOU HAVE ON A TYPICAL DAY: 2
HAVE YOU OR SOMEONE ELSE BEEN INJURED AS A RESULT OF YOUR DRINKING: 0
HOW OFTEN DURING THE LAST YEAR HAVE YOU HAD A FEELING OF GUILT OR REMORSE AFTER DRINKING: 0
AUDIT-C TOTAL SCORE: 9
HOW OFTEN DURING THE LAST YEAR HAVE YOU NEEDED AN ALCOHOLIC DRINK FIRST THING IN THE MORNING TO GET YOURSELF GOING AFTER A NIGHT OF HEAVY DRINKING: 0
HOW OFTEN DO YOU HAVE A DRINK CONTAINING ALCOHOL: 4

## 2019-09-24 NOTE — PROGRESS NOTES
pratima, negative    COLONOSCOPY  03/25/2019    Dr. Jose J Canseco rectal bx(hyperplastic mucosa with evidence of ischemic injury,angiodysplasia no active bleeding,divetrticulosis,hemorroids    COLONOSCOPY N/A 3/25/2019    COLONOSCOPY POLYPECTOMY SNARE/COLD BIOPSY performed by Randall Sanchez MD at [address] [place] [address]      right side of face    KNEE SURGERY Right 2006    medial meniscus arthroscopy, May Esme    KNEE SURGERY Left     MITRAL VALVE SURGERY  8/11/11    34 mm ring annuloplasty    NV KNEE SCOPE,MED OR LAT MENIS REPAIR Left 6/21/2018    KNEE ARTHROSCOPY-PARTIAL MEDIAL MENISCECTOMY AND CHONDROPLASTY performed by Latisha Hodge MD at [facility] Left 12/6/2017    EYE CATARACT EMULSIFICATION IOL IMPLANT performed by Martin Larosn DO at [place] [address] N/A 4/2/2019    -hiatal hernia,grade 3 reflux esophagitis,antral gastritis, erosive bulbar duodenitis       Family History   Problem Relation Age of Onset    Heart Disease Mother         sudden cardiac death    Heart Attack Father        CareTeam (Including outside providers/suppliers regularly involved in providing care):   Patient Care Team:  Lupe Fischer MD as PCP - General  Lupe Fischer MD as PCP - REHABILITATION [facility]    Wt Readings from Last 3 Encounters:   09/24/19 247 lb (112 kg)   05/20/19 251 lb (113.9 kg)   04/02/19 235 lb (106.6 kg)     Vitals:    09/24/19 0753   BP: 128/82   Weight: 247 lb (112 kg)   Height: 5' 11\" (1.803 m)     Body mass index is 34.45 kg/m². Based upon direct observation of the patient, evaluation of cognition reveals recent and remote memory intact.     General Appearance: alert and oriented to person, place and time, well developed and well- nourished, in no acute distress  Skin: warm and dry, no rash or erythema  Head: normocephalic and atraumatic  Eyes: pupils equal, round, and reactive to light, extraocular eye movements

## 2019-12-09 RX ORDER — ATORVASTATIN CALCIUM 40 MG/1
TABLET, FILM COATED ORAL
Qty: 90 TABLET | Refills: 3 | Status: SHIPPED | OUTPATIENT
Start: 2019-12-09 | End: 2020-09-22

## 2019-12-09 RX ORDER — METOPROLOL SUCCINATE 100 MG/1
TABLET, EXTENDED RELEASE ORAL
Qty: 90 TABLET | Refills: 3 | Status: SHIPPED | OUTPATIENT
Start: 2019-12-09 | End: 2020-09-22

## 2019-12-09 RX ORDER — LISINOPRIL 10 MG/1
TABLET ORAL
Qty: 90 TABLET | Refills: 3 | Status: SHIPPED | OUTPATIENT
Start: 2019-12-09 | End: 2020-09-22

## 2020-01-22 ENCOUNTER — HOSPITAL ENCOUNTER (OUTPATIENT)
Age: 65
Discharge: HOME | End: 2020-01-22
Payer: MEDICARE

## 2020-01-22 LAB
ALT SERPL-CCNC: 20 U/L (ref 5–41)
ANION GAP SERPL CALCULATED.3IONS-SCNC: 13 MMOL/L (ref 9–17)
AST SERPL-CCNC: 22 U/L
BUN BLDV-MCNC: 9 MG/DL (ref 8–23)
BUN/CREAT BLD: 13 (ref 9–20)
CALCIUM SERPL-MCNC: 9.2 MG/DL (ref 8.6–10.4)
CHLORIDE BLD-SCNC: 99 MMOL/L (ref 98–107)
CHOLESTEROL/HDL RATIO: 2.2
CHOLESTEROL: 141 MG/DL
CO2: 25 MMOL/L (ref 20–31)
CREAT SERPL-MCNC: 0.69 MG/DL (ref 0.7–1.2)
GFR AFRICAN AMERICAN: >60 ML/MIN
GFR NON-AFRICAN AMERICAN: >60 ML/MIN
GFR SERPL CREATININE-BSD FRML MDRD: ABNORMAL ML/MIN/{1.73_M2}
GFR SERPL CREATININE-BSD FRML MDRD: ABNORMAL ML/MIN/{1.73_M2}
GLUCOSE BLD-MCNC: 82 MG/DL (ref 70–99)
HCT VFR BLD CALC: 41.1 % (ref 40.7–50.3)
HDLC SERPL-MCNC: 63 MG/DL
HEMOGLOBIN: 13.1 G/DL (ref 13–17)
LDL CHOLESTEROL: 69 MG/DL (ref 0–130)
MCH RBC QN AUTO: 32.8 PG (ref 25.2–33.5)
MCHC RBC AUTO-ENTMCNC: 31.9 G/DL (ref 28.4–34.8)
MCV RBC AUTO: 103 FL (ref 82.6–102.9)
NRBC AUTOMATED: 0 PER 100 WBC
PDW BLD-RTO: 13.8 % (ref 11.8–14.4)
PLATELET # BLD: 150 K/UL (ref 138–453)
PMV BLD AUTO: 11.1 FL (ref 8.1–13.5)
POTASSIUM SERPL-SCNC: 4.8 MMOL/L (ref 3.7–5.3)
RBC # BLD: 3.99 M/UL (ref 4.21–5.77)
SODIUM BLD-SCNC: 137 MMOL/L (ref 135–144)
TRIGL SERPL-MCNC: 43 MG/DL
VLDLC SERPL CALC-MCNC: NORMAL MG/DL (ref 1–30)
WBC # BLD: 4.7 K/UL (ref 3.5–11.3)

## 2020-01-22 PROCEDURE — 84450 TRANSFERASE (AST) (SGOT): CPT

## 2020-01-22 PROCEDURE — 85027 COMPLETE CBC AUTOMATED: CPT

## 2020-01-22 PROCEDURE — 84460 ALANINE AMINO (ALT) (SGPT): CPT

## 2020-01-22 PROCEDURE — 36415 COLL VENOUS BLD VENIPUNCTURE: CPT

## 2020-01-22 PROCEDURE — 80061 LIPID PANEL: CPT

## 2020-01-22 PROCEDURE — 80048 BASIC METABOLIC PNL TOTAL CA: CPT

## 2020-01-27 ENCOUNTER — OFFICE VISIT (OUTPATIENT)
Dept: FAMILY MEDICINE CLINIC | Age: 65
End: 2020-01-27
Payer: MEDICARE

## 2020-01-27 VITALS
BODY MASS INDEX: 35.76 KG/M2 | SYSTOLIC BLOOD PRESSURE: 124 MMHG | WEIGHT: 255.4 LBS | HEIGHT: 71 IN | DIASTOLIC BLOOD PRESSURE: 76 MMHG

## 2020-01-27 PROBLEM — I87.2 VENOUS INSUFFICIENCY: Status: ACTIVE | Noted: 2020-01-27

## 2020-01-27 PROBLEM — I87.2 VENOUS STASIS DERMATITIS OF LEFT LOWER EXTREMITY: Status: ACTIVE | Noted: 2020-01-27

## 2020-01-27 PROBLEM — L03.116 LEFT LEG CELLULITIS: Status: ACTIVE | Noted: 2020-01-27

## 2020-01-27 PROBLEM — I83.892 VARICOSE VEINS OF LEFT LEG WITH EDEMA: Status: ACTIVE | Noted: 2020-01-27

## 2020-01-27 PROBLEM — R60.0 LEG EDEMA, LEFT: Status: ACTIVE | Noted: 2020-01-27

## 2020-01-27 PROCEDURE — 1036F TOBACCO NON-USER: CPT | Performed by: FAMILY MEDICINE

## 2020-01-27 PROCEDURE — G8427 DOCREV CUR MEDS BY ELIG CLIN: HCPCS | Performed by: FAMILY MEDICINE

## 2020-01-27 PROCEDURE — 99214 OFFICE O/P EST MOD 30 MIN: CPT | Performed by: FAMILY MEDICINE

## 2020-01-27 PROCEDURE — 3017F COLORECTAL CA SCREEN DOC REV: CPT | Performed by: FAMILY MEDICINE

## 2020-01-27 PROCEDURE — G8417 CALC BMI ABV UP PARAM F/U: HCPCS | Performed by: FAMILY MEDICINE

## 2020-01-27 PROCEDURE — 1123F ACP DISCUSS/DSCN MKR DOCD: CPT | Performed by: FAMILY MEDICINE

## 2020-01-27 PROCEDURE — G8482 FLU IMMUNIZE ORDER/ADMIN: HCPCS | Performed by: FAMILY MEDICINE

## 2020-01-27 PROCEDURE — 4040F PNEUMOC VAC/ADMIN/RCVD: CPT | Performed by: FAMILY MEDICINE

## 2020-01-27 RX ORDER — CEPHALEXIN 500 MG/1
500 CAPSULE ORAL 3 TIMES DAILY
Qty: 21 CAPSULE | Refills: 0 | Status: SHIPPED | OUTPATIENT
Start: 2020-01-27 | End: 2020-02-03

## 2020-01-27 RX ORDER — MOMETASONE FUROATE 1 MG/G
CREAM TOPICAL
Qty: 30 G | Refills: 0 | Status: SHIPPED | OUTPATIENT
Start: 2020-01-27 | End: 2020-06-01 | Stop reason: SDUPTHER

## 2020-01-27 RX ORDER — HYDROXYZINE HYDROCHLORIDE 25 MG/1
25 TABLET, FILM COATED ORAL EVERY EVENING
Qty: 30 TABLET | Refills: 0 | Status: SHIPPED | OUTPATIENT
Start: 2020-01-27 | End: 2020-02-06

## 2020-01-27 ASSESSMENT — PATIENT HEALTH QUESTIONNAIRE - PHQ9
SUM OF ALL RESPONSES TO PHQ QUESTIONS 1-9: 0
1. LITTLE INTEREST OR PLEASURE IN DOING THINGS: 0
SUM OF ALL RESPONSES TO PHQ9 QUESTIONS 1 & 2: 0
2. FEELING DOWN, DEPRESSED OR HOPELESS: 0
SUM OF ALL RESPONSES TO PHQ QUESTIONS 1-9: 0

## 2020-01-27 NOTE — PATIENT INSTRUCTIONS
PLAN:  Labs reviewed, I am overall pleased with these results. I will treat him with elocon cream, keflex and atarax for his leg. I will see him back in 1 week for a follow up, I also will refer him to Dr. Anthony Gonzalez, vascular. I also will order an ultrasound of his leg. SURVEY:    You may be receiving a survey from [employer] regarding your visit today. Please complete the survey to enable us to provide the highest quality of care to you and your family. If you cannot score us a very good on any question, please call the office to discuss how we could have made your experience a very good one. Thank you.

## 2020-01-27 NOTE — PROGRESS NOTES
active bleeding,divetrticulosis,hemorroids    COLONOSCOPY N/A 3/25/2019    COLONOSCOPY POLYPECTOMY SNARE/COLD BIOPSY performed by Izell Galeazzi, MD at [address]      right side of face    KNEE SURGERY Right 2006    medial meniscus arthroscopy, Candice Poor    KNEE SURGERY Left     MITRAL VALVE SURGERY  11    34 mm ring annuloplasty    NM KNEE SCOPE,MED OR LAT MENIS REPAIR Left 2018    KNEE ARTHROSCOPY-PARTIAL MEDIAL MENISCECTOMY AND CHONDROPLASTY performed by Aramis Deutsch MD at [facility] IO LENS PROSTH W/O ECP Left 2017    EYE CATARACT EMULSIFICATION IOL IMPLANT performed by Laurent Grace DO at [facility] N/A 2019    -hiatal hernia,grade 3 reflux esophagitis,antral gastritis, erosive bulbar duodenitis       Family History   Problem Relation Age of Onset    Heart Disease Mother         sudden cardiac death    Heart Attack Father        Past Medical History:   Diagnosis Date    Achilles tendon tear     Allergic rhinitis     Atrial flutter ([address] 1996    Congestive heart failure ([address]     Coronary atherosclerosis     Hyperglycemia 2007    Hyperlipidemia 1996    Hypertension 2004    Mitral insufficiency     Obesity 2006    Osteoarthritis     Status post ablation of atrial flutter 13    Vasodepressor syncope       Social History     Tobacco Use    Smoking status: Former Smoker     Packs/day: 1.50     Years: 20.00     Pack years: 30.00     Types: Cigarettes     Last attempt to quit: 1996     Years since quittin.1    Smokeless tobacco: Never Used   Substance Use Topics    Alcohol use: Yes     Alcohol/week: 0.0 standard drinks     Comment: 4 beers a day       Current Outpatient Medications   Medication Sig Dispense Refill    mometasone (ELOCON) 0.1 % cream Apply topically daily.  30 g 0    hydrOXYzine (ATARAX) 25 MG tablet Take 1 tablet by mouth every evening for 10 days 30 tablet 0    cephALEXin (KEFLEX) 500 MG capsule Take 1 capsule by mouth 3 times daily for 7 days 21 capsule 0    atorvastatin (LIPITOR) 40 MG tablet TAKE 1 TABLET DAILY 90 tablet 3    metoprolol succinate (TOPROL XL) 100 MG extended release tablet TAKE 1 TABLET DAILY 90 tablet 3    sertraline (ZOLOFT) 50 MG tablet TAKE 1 TABLET DAILY 90 tablet 3    lisinopril (PRINIVIL;ZESTRIL) 10 MG tablet TAKE 1 TABLET DAILY 90 tablet 3    omeprazole (PRILOSEC) 20 MG delayed release capsule Take 20 mg by mouth every other day       rivaroxaban (XARELTO) 20 MG TABS tablet Take 1 tablet by mouth daily 90 tablet 3    albuterol sulfate HFA (PROAIR HFA) 108 (90 Base) MCG/ACT inhaler Inhale 2 puffs into the lungs every 6 hours as needed for Wheezing (Patient not taking: Reported on 1/27/2020) 1 Inhaler 0    Spacer/Aero-Holding Chambers (Turned [employer] Digital PARESH) MARISEL 1 Device by Does not apply route daily (Patient not taking: Reported on 1/27/2020) 1 Device 0     No current facility-administered medications for this visit. No Known Allergies    PHYSICAL EXAM:    /76 (Site: Left Upper Arm, Position: Sitting, Cuff Size: Large Adult)   Ht 5' 11\" (1.803 m)   Wt 255 lb 6.4 oz (115.8 kg)   BMI 35.62 kg/m²   Wt Readings from Last 3 Encounters:   01/27/20 255 lb 6.4 oz (115.8 kg)   09/24/19 247 lb (112 kg)   05/20/19 251 lb (113.9 kg)     BP Readings from Last 3 Encounters:   01/27/20 124/76   09/24/19 128/82   05/20/19 132/62       General Appearance: in no acute distress, well developed, well nourished. Eyes: pupils equal, round reactive to light and accommodation. Ears: normal canal and TM's. Nose: nares patent, no lesions. Oral Cavity: mucosa moist.  Throat: clear. Neck/Thyroid: neck supple, full range of motion, no cervical lymphadenopathy, no thyromegaly or carotid bruits. Skin: warm and dry. abraded erythematous dry rash, petechial changes on distal 2/3 LLE, varicosities  Heart: regular rate and rhythm.  No murmurs. S1, S2 normal, no gallops. Rate 80, mostly normal rhythm,   Lungs: clear to auscultation bilaterally. Bronchial sounds clear with cough RLL  Abdomen: bowel sounds present, soft, nontender, nondistended, no masses or organomegaly. Musculoskeletal: normal, full range of motion in knees and hips, no swelling or tenderness. Negative Luca's sign  Extremities: no cyanosis, 1+ pre tibial R, L calf noticely larger in calf, pitting edema  Peripheral Pulses: 2+ throughout, symetric. Neurologic: nonfocal, motor strength normal upper and lower extremities, sensory exam intact. Psych: normal affect, speech fluent. ASSESSMENT:   Diagnosis Orders   1. Left leg cellulitis  US DUP UPPER EXTREMITY LEFT VENOUS   2. Venous stasis dermatitis of left lower extremity  Wendi Bryant MD, Vascular Surgery, [place]    US DUP UPPER EXTREMITY LEFT VENOUS   3. Leg edema, left  Smiley Mohan MD, Vascular Surgery, [place]    US DUP UPPER EXTREMITY LEFT VENOUS   4. Paroxysmal A-fib ([address]     5. Essential hypertension     6. Varicose veins of left leg with edema  Wendi Bryant MD, Vascular Surgery, [place]    US DUP UPPER EXTREMITY LEFT VENOUS   7. Venous insufficiency  Smiley Mohan MD, Vascular Surgery, [facility] DUP UPPER EXTREMITY LEFT VENOUS       PLAN:  Labs reviewed, I am overall pleased with these results. I will treat him with elocon cream, keflex and atarax for his leg. I will see him back in 1 week for a follow up, I also will refer him to Dr. Chris Willis, vascular. I also will order an ultrasound of his leg.     Orders Placed This Encounter   Procedures    US DUP UPPER EXTREMITY LEFT VENOUS     Standing Status:   Future     Standing Expiration Date:   1/27/2021   Smiley Mohan MD, Vascular Surgery, [place]     Referral Priority:   Routine     Referral Type:   Eval and Treat     Referral Reason:   Specialty Services Required     Referred to Provider: Amarilis Yap MD     Requested Specialty:   Vascular Surgery     Number of Visits Requested:   1     Orders Placed This Encounter   Medications    mometasone (ELOCON) 0.1 % cream     Sig: Apply topically daily. Dispense:  30 g     Refill:  0    hydrOXYzine (ATARAX) 25 MG tablet     Sig: Take 1 tablet by mouth every evening for 10 days     Dispense:  30 tablet     Refill:  0    cephALEXin (KEFLEX) 500 MG capsule     Sig: Take 1 capsule by mouth 3 times daily for 7 days     Dispense:  21 capsule     Refill:  0   I, Dr. Salvador Crow, personally performed the services described in this documentation as scribed by GEOVANNI Witt in my presence, and is both accurate and complete.

## 2020-02-04 ENCOUNTER — OFFICE VISIT (OUTPATIENT)
Dept: FAMILY MEDICINE CLINIC | Age: 65
End: 2020-02-04
Payer: MEDICARE

## 2020-02-04 VITALS
SYSTOLIC BLOOD PRESSURE: 136 MMHG | HEIGHT: 71 IN | BODY MASS INDEX: 35.84 KG/M2 | WEIGHT: 256 LBS | DIASTOLIC BLOOD PRESSURE: 66 MMHG

## 2020-02-04 PROBLEM — L03.116 LEFT LEG CELLULITIS: Status: RESOLVED | Noted: 2020-01-27 | Resolved: 2020-02-04

## 2020-02-04 PROCEDURE — G8417 CALC BMI ABV UP PARAM F/U: HCPCS | Performed by: FAMILY MEDICINE

## 2020-02-04 PROCEDURE — 4040F PNEUMOC VAC/ADMIN/RCVD: CPT | Performed by: FAMILY MEDICINE

## 2020-02-04 PROCEDURE — G8482 FLU IMMUNIZE ORDER/ADMIN: HCPCS | Performed by: FAMILY MEDICINE

## 2020-02-04 PROCEDURE — 1123F ACP DISCUSS/DSCN MKR DOCD: CPT | Performed by: FAMILY MEDICINE

## 2020-02-04 PROCEDURE — 1036F TOBACCO NON-USER: CPT | Performed by: FAMILY MEDICINE

## 2020-02-04 PROCEDURE — 99213 OFFICE O/P EST LOW 20 MIN: CPT | Performed by: FAMILY MEDICINE

## 2020-02-04 PROCEDURE — G8427 DOCREV CUR MEDS BY ELIG CLIN: HCPCS | Performed by: FAMILY MEDICINE

## 2020-02-04 PROCEDURE — 3017F COLORECTAL CA SCREEN DOC REV: CPT | Performed by: FAMILY MEDICINE

## 2020-02-04 NOTE — PATIENT INSTRUCTIONS
PLAN:  If he has any antibiotics left, he should finish these. I'd like for him to continue using Elocon cream, and wrap his leg with cling wrap at night to help force the cream into his skin. SURVEY:    You may be receiving a survey from [employer] regarding your visit today. Please complete the survey to enable us to provide the highest quality of care to you and your family. If you cannot score us a very good on any question, please call the office to discuss how we could have made your experience a very good one. Thank you.

## 2020-02-04 NOTE — PROGRESS NOTES
PERCY Roberts, am scribing for and in the presence of Dr. Ulysses Coto. 2/4/20/9:40am/SNP    [address] [address]  Gertrudis Pratt [facility]  Dept: [phone]    HPI: Patient presents today for a 1 week follow up. He was last seen on 1/27 for cellulitis. He was given elocon cream, keflex and atarax for this and an ultrasound was ordered. Today he states, he used the meds as prescribed and his leg is not itching anymore. Current Outpatient Medications   Medication Sig Dispense Refill    mometasone (ELOCON) 0.1 % cream Apply topically daily. 30 g 0    hydrOXYzine (ATARAX) 25 MG tablet Take 1 tablet by mouth every evening for 10 days 30 tablet 0    atorvastatin (LIPITOR) 40 MG tablet TAKE 1 TABLET DAILY 90 tablet 3    metoprolol succinate (TOPROL XL) 100 MG extended release tablet TAKE 1 TABLET DAILY 90 tablet 3    sertraline (ZOLOFT) 50 MG tablet TAKE 1 TABLET DAILY 90 tablet 3    lisinopril (PRINIVIL;ZESTRIL) 10 MG tablet TAKE 1 TABLET DAILY 90 tablet 3    omeprazole (PRILOSEC) 20 MG delayed release capsule Take 20 mg by mouth every other day       rivaroxaban (XARELTO) 20 MG TABS tablet Take 1 tablet by mouth daily 90 tablet 3    albuterol sulfate HFA (PROAIR HFA) 108 (90 Base) MCG/ACT inhaler Inhale 2 puffs into the lungs every 6 hours as needed for Wheezing 1 Inhaler 0    Spacer/Aero-Holding Chambers ([facility] PARESH) MARISEL 1 Device by Does not apply route daily 1 Device 0     No current facility-administered medications for this visit.       ROS:  Admits rash which is clearing up  Denies itching    EXAM:  /66 (Site: Left Upper Arm, Position: Sitting, Cuff Size: Large Adult)   Ht 5' 11\" (1.803 m)   Wt 256 lb (116.1 kg)   BMI 35.70 kg/m²   Wt Readings from Last 3 Encounters:   02/05/20 250 lb (113.4 kg)   02/04/20 256 lb (116.1 kg)   01/27/20 255 lb 6.4 oz (115.8 kg)     BP Readings from Last 3 Encounters:   02/05/20 (!) 151/74 02/04/20 136/66   01/27/20 124/76     PHYSICAL EXAM:  General Appearance: in no acute distress, well developed, well nourished. Eyes: pupils equal, round reactive to light and accommodation. Ears: normal canal and TM's. Nose: nares patent, no lesions. Oral Cavity: mucosa moist.  Throat: clear. Neck/Thyroid: neck supple, full range of motion, no cervical lymphadenopathy, no thyromegaly or carotid bruits. Skin: warm and dry. Indurated circular patch 7 cm diameter, central scaling, hemosiderin deposits and petechial spots  Neurologic: nonfocal, motor strength normal upper and lower extremities, sensory exam intact. Psych: normal affect, speech fluent. ASSESSMENT:   Diagnosis Orders   1. Hyperglycemia     2. Hyperlipidemia, unspecified hyperlipidemia type  ALT    AST    Basic Metabolic Panel    CBC    Lipid Panel   3. Screening PSA (prostate specific antigen)  Psa screening   4. Venous stasis dermatitis, unspecified laterality         PLAN:  If he has any antibiotics left, he should finish these. I'd like for him to continue using Elocon cream, and wrap his leg with cling wrap at night to help force the cream into his skin. Orders Placed This Encounter   Procedures    ALT     Standing Status:   Future     Standing Expiration Date:   2/3/2021    AST     Standing Status:   Future     Standing Expiration Date:   2/3/2021    Basic Metabolic Panel     Standing Status:   Future     Standing Expiration Date:   2/3/2021    CBC     Standing Status:   Future     Standing Expiration Date:   2/3/2021    Lipid Panel     Standing Status:   Future     Standing Expiration Date:   2/3/2021     Order Specific Question:   Is Patient Fasting?/# of Hours     Answer:   no fasting    Psa screening     Standing Status:   Future     Standing Expiration Date:   2/3/2021     No orders of the defined types were placed in this encounter.     I, Dr. Holley Boateng, personally performed the services described in this documentation as scribed by GEOVANNI Walters in my presence, and is both accurate and complete.

## 2020-02-05 ENCOUNTER — OFFICE VISIT (OUTPATIENT)
Dept: VASCULAR SURGERY | Age: 65
End: 2020-02-05
Payer: MEDICARE

## 2020-02-05 VITALS
BODY MASS INDEX: 35 KG/M2 | TEMPERATURE: 97.9 F | HEIGHT: 71 IN | SYSTOLIC BLOOD PRESSURE: 151 MMHG | WEIGHT: 250 LBS | DIASTOLIC BLOOD PRESSURE: 74 MMHG | HEART RATE: 75 BPM | RESPIRATION RATE: 16 BRPM

## 2020-02-05 PROBLEM — I83.812 VARICOSE VEINS OF LOWER EXTREMITY WITH PAIN, LEFT: Status: ACTIVE | Noted: 2020-02-05

## 2020-02-05 PROCEDURE — 99203 OFFICE O/P NEW LOW 30 MIN: CPT | Performed by: SURGERY

## 2020-02-05 PROCEDURE — 4040F PNEUMOC VAC/ADMIN/RCVD: CPT | Performed by: SURGERY

## 2020-02-05 PROCEDURE — 99213 OFFICE O/P EST LOW 20 MIN: CPT

## 2020-02-05 PROCEDURE — G8482 FLU IMMUNIZE ORDER/ADMIN: HCPCS | Performed by: SURGERY

## 2020-02-05 PROCEDURE — 1123F ACP DISCUSS/DSCN MKR DOCD: CPT | Performed by: SURGERY

## 2020-02-05 PROCEDURE — G8417 CALC BMI ABV UP PARAM F/U: HCPCS | Performed by: SURGERY

## 2020-02-05 PROCEDURE — G8427 DOCREV CUR MEDS BY ELIG CLIN: HCPCS | Performed by: SURGERY

## 2020-02-05 PROCEDURE — 3017F COLORECTAL CA SCREEN DOC REV: CPT | Performed by: SURGERY

## 2020-02-05 PROCEDURE — 1036F TOBACCO NON-USER: CPT | Performed by: SURGERY

## 2020-02-05 NOTE — PATIENT INSTRUCTIONS
SURVEY:    You may be receiving a survey from [employer] regarding your visit today. Please complete the survey to enable us to provide the highest quality of care to you and your family. If you cannot score us a very good on any question, please call the office to discuss how we could have made your experience a very good one. Thank you. Patient Education        Varicose Veins: Care Instructions  Your Care Instructions  Varicose veins are twisted, enlarged veins near the surface of the skin. They develop most often in the legs and ankles. Some people may be more likely than others to get varicose veins because of aging or hormone changes or because a parent has them. Being overweight or pregnant can make varicose veins worse. Jobs that require standing for long periods of time also can make them worse. Follow-up care is a key part of your treatment and safety. Be sure to make and go to all appointments, and call your doctor if you are having problems. It's also a good idea to know your test results and keep a list of the medicines you take. How can you care for yourself at home? · Wear compression stockings during the day to help relieve symptoms. They improve blood flow and are the main treatment for varicose veins. Talk to your doctor about which ones to get and where to get them. · Prop up your legs at or above the level of your heart when possible. This helps keep the blood from pooling in your lower legs and improves blood flow to the rest of your body. · Avoid sitting and standing for long periods. This puts added stress on your veins. · Get regular exercise, and control your weight. Walk, bicycle, or swim to improve blood flow in your legs. · If you bump your leg so hard that you know it is likely to bruise, prop up your leg and put ice or a cold pack on the area for 10 to 20 minutes at a time.  Try to do this every 1 to 2 hours for the next 3 days (when you are awake) or until the sign in to your [employer] account. Enter Y318 in the [employer] box to learn more about \"Varicose Veins: Care Instructions. \"     If you do not have an account, please click on the \"Sign Up Now\" link. Current as of: July 14, 2019  Content Version: 12.3  © 6457-8782 Healthwise, Incorporated. Care instructions adapted under license by [facility] ([facility]). If you have questions about a medical condition or this instruction, always ask your healthcare professional. [address] any warranty or liability for your use of this information.

## 2020-02-05 NOTE — PROGRESS NOTES
Disp: 30 g, Rfl: 0    hydrOXYzine (ATARAX) 25 MG tablet, Take 1 tablet by mouth every evening for 10 days, Disp: 30 tablet, Rfl: 0    atorvastatin (LIPITOR) 40 MG tablet, TAKE 1 TABLET DAILY, Disp: 90 tablet, Rfl: 3    metoprolol succinate (TOPROL XL) 100 MG extended release tablet, TAKE 1 TABLET DAILY, Disp: 90 tablet, Rfl: 3    sertraline (ZOLOFT) 50 MG tablet, TAKE 1 TABLET DAILY, Disp: 90 tablet, Rfl: 3    lisinopril (PRINIVIL;ZESTRIL) 10 MG tablet, TAKE 1 TABLET DAILY, Disp: 90 tablet, Rfl: 3    omeprazole (PRILOSEC) 20 MG delayed release capsule, Take 20 mg by mouth every other day , Disp: , Rfl:     rivaroxaban (XARELTO) 20 MG TABS tablet, Take 1 tablet by mouth daily, Disp: 90 tablet, Rfl: 3    albuterol sulfate HFA (PROAIR HFA) 108 (90 Base) MCG/ACT inhaler, Inhale 2 puffs into the lungs every 6 hours as needed for Wheezing, Disp: 1 Inhaler, Rfl: 0    Spacer/Aero-Holding Chambers (OPTICHAMBER PARESH) MARISEL, 1 Device by Does not apply route daily, Disp: 1 Device, Rfl: 0    Vital Signs:  Vitals:    02/05/20 0932   BP: (!) 151/74   Pulse: 75   Resp:    Temp:        Physical Exam:  General appearance - alert, well appearing and in no acute distress  Mental status - oriented to person, place and time with normal affect  Head - normocephalic and atraumatic  Eyes - pupils equal and reactive, extraocular eye movements intact, conjunctiva clear  Ears - hearing appears to be intact  Nose - no drainage noted  Mouth - mucous membranes moist  Neck - supple, no carotid bruits, thyroid not palpable, no JVD  Chest - clear to auscultation, normal effort  Heart - normal rate, regular rhythm, no murmurs  Abdomen - soft, non-tender, non-distended, bowel sounds present all four quadrants, no masses, hepatomegaly, splenomegaly or aortic enlargement  Neurological - normal speech, no focal findings or movement disorder noted, cranial nerves II through XII grossly intact  Extremities - peripheral pulses palpable, no pedal

## 2020-02-13 ENCOUNTER — HOSPITAL ENCOUNTER (OUTPATIENT)
Dept: VASCULAR LAB | Age: 65
Discharge: HOME | End: 2020-02-15
Payer: MEDICARE

## 2020-02-13 ENCOUNTER — TELEPHONE (OUTPATIENT)
Dept: VASCULAR SURGERY | Age: 65
End: 2020-02-13

## 2020-02-13 PROCEDURE — 93971 EXTREMITY STUDY: CPT

## 2020-02-13 NOTE — TELEPHONE ENCOUNTER
Pt phoned in to state that he had his Vascular Studies (VL Dup Lower Ext) completed this morning, and that he was told to contact this office upon their completion. Please advise. Thank you.

## 2020-03-13 RX ORDER — RIVAROXABAN 20 MG/1
TABLET, FILM COATED ORAL
Qty: 90 TABLET | Refills: 3 | Status: SHIPPED | OUTPATIENT
Start: 2020-03-13 | End: 2020-10-02 | Stop reason: SDUPTHER

## 2020-05-28 ENCOUNTER — HOSPITAL ENCOUNTER (OUTPATIENT)
Age: 65
Discharge: HOME | End: 2020-05-28
Payer: MEDICARE

## 2020-05-28 LAB
ALT SERPL-CCNC: 18 U/L (ref 5–41)
ANION GAP SERPL CALCULATED.3IONS-SCNC: 12 MMOL/L (ref 9–17)
AST SERPL-CCNC: 22 U/L
BUN BLDV-MCNC: 9 MG/DL (ref 8–23)
BUN/CREAT BLD: 12 (ref 9–20)
CALCIUM SERPL-MCNC: 9 MG/DL (ref 8.6–10.4)
CHLORIDE BLD-SCNC: 102 MMOL/L (ref 98–107)
CHOLESTEROL/HDL RATIO: 2.5
CHOLESTEROL: 159 MG/DL
CO2: 25 MMOL/L (ref 20–31)
CREAT SERPL-MCNC: 0.76 MG/DL (ref 0.7–1.2)
GFR AFRICAN AMERICAN: >60 ML/MIN
GFR NON-AFRICAN AMERICAN: >60 ML/MIN
GFR SERPL CREATININE-BSD FRML MDRD: NORMAL ML/MIN/{1.73_M2}
GFR SERPL CREATININE-BSD FRML MDRD: NORMAL ML/MIN/{1.73_M2}
GLUCOSE BLD-MCNC: 88 MG/DL (ref 70–99)
HCT VFR BLD CALC: 40.7 % (ref 40.7–50.3)
HDLC SERPL-MCNC: 64 MG/DL
HEMOGLOBIN: 13.1 G/DL (ref 13–17)
LDL CHOLESTEROL: 85 MG/DL (ref 0–130)
MCH RBC QN AUTO: 33.1 PG (ref 25.2–33.5)
MCHC RBC AUTO-ENTMCNC: 32.2 G/DL (ref 28.4–34.8)
MCV RBC AUTO: 102.8 FL (ref 82.6–102.9)
NRBC AUTOMATED: 0 PER 100 WBC
PDW BLD-RTO: 14.4 % (ref 11.8–14.4)
PLATELET # BLD: 145 K/UL (ref 138–453)
PMV BLD AUTO: 10.2 FL (ref 8.1–13.5)
POTASSIUM SERPL-SCNC: 4.5 MMOL/L (ref 3.7–5.3)
PROSTATE SPECIFIC ANTIGEN: 2.18 UG/L
RBC # BLD: 3.96 M/UL (ref 4.21–5.77)
SODIUM BLD-SCNC: 139 MMOL/L (ref 135–144)
TRIGL SERPL-MCNC: 51 MG/DL
VLDLC SERPL CALC-MCNC: NORMAL MG/DL (ref 1–30)
WBC # BLD: 4.3 K/UL (ref 3.5–11.3)

## 2020-05-28 PROCEDURE — 80048 BASIC METABOLIC PNL TOTAL CA: CPT

## 2020-05-28 PROCEDURE — 84450 TRANSFERASE (AST) (SGOT): CPT

## 2020-05-28 PROCEDURE — 80061 LIPID PANEL: CPT

## 2020-05-28 PROCEDURE — 84460 ALANINE AMINO (ALT) (SGPT): CPT

## 2020-05-28 PROCEDURE — G0103 PSA SCREENING: HCPCS

## 2020-05-28 PROCEDURE — 85027 COMPLETE CBC AUTOMATED: CPT

## 2020-05-28 PROCEDURE — 36415 COLL VENOUS BLD VENIPUNCTURE: CPT

## 2020-06-01 ENCOUNTER — OFFICE VISIT (OUTPATIENT)
Dept: FAMILY MEDICINE CLINIC | Age: 65
End: 2020-06-01
Payer: MEDICARE

## 2020-06-01 VITALS
BODY MASS INDEX: 36.4 KG/M2 | WEIGHT: 260 LBS | HEIGHT: 71 IN | DIASTOLIC BLOOD PRESSURE: 84 MMHG | SYSTOLIC BLOOD PRESSURE: 124 MMHG

## 2020-06-01 PROBLEM — I48.20 CHRONIC A-FIB (HCC): Status: ACTIVE | Noted: 2020-06-01

## 2020-06-01 PROCEDURE — G8427 DOCREV CUR MEDS BY ELIG CLIN: HCPCS | Performed by: FAMILY MEDICINE

## 2020-06-01 PROCEDURE — 3017F COLORECTAL CA SCREEN DOC REV: CPT | Performed by: FAMILY MEDICINE

## 2020-06-01 PROCEDURE — G8417 CALC BMI ABV UP PARAM F/U: HCPCS | Performed by: FAMILY MEDICINE

## 2020-06-01 PROCEDURE — 1036F TOBACCO NON-USER: CPT | Performed by: FAMILY MEDICINE

## 2020-06-01 PROCEDURE — 1123F ACP DISCUSS/DSCN MKR DOCD: CPT | Performed by: FAMILY MEDICINE

## 2020-06-01 PROCEDURE — 99214 OFFICE O/P EST MOD 30 MIN: CPT | Performed by: FAMILY MEDICINE

## 2020-06-01 PROCEDURE — 4040F PNEUMOC VAC/ADMIN/RCVD: CPT | Performed by: FAMILY MEDICINE

## 2020-06-01 PROCEDURE — 99211 OFF/OP EST MAY X REQ PHY/QHP: CPT | Performed by: FAMILY MEDICINE

## 2020-06-01 RX ORDER — ALBUTEROL SULFATE 90 UG/1
2 AEROSOL, METERED RESPIRATORY (INHALATION) EVERY 6 HOURS PRN
Qty: 1 INHALER | Refills: 0 | Status: SHIPPED | OUTPATIENT
Start: 2020-06-01 | End: 2021-01-04 | Stop reason: SDUPTHER

## 2020-06-01 RX ORDER — MOMETASONE FUROATE 1 MG/G
CREAM TOPICAL
Qty: 30 G | Refills: 0 | Status: SHIPPED | OUTPATIENT
Start: 2020-06-01 | End: 2020-10-02 | Stop reason: SDUPTHER

## 2020-06-01 RX ORDER — HYDROXYZINE HYDROCHLORIDE 25 MG/1
25 TABLET, FILM COATED ORAL NIGHTLY
Qty: 30 TABLET | Refills: 0 | Status: SHIPPED | OUTPATIENT
Start: 2020-06-01 | End: 2020-07-01

## 2020-06-01 SDOH — ECONOMIC STABILITY: FOOD INSECURITY: WITHIN THE PAST 12 MONTHS, YOU WORRIED THAT YOUR FOOD WOULD RUN OUT BEFORE YOU GOT MONEY TO BUY MORE.: NEVER TRUE

## 2020-06-01 SDOH — ECONOMIC STABILITY: FOOD INSECURITY: WITHIN THE PAST 12 MONTHS, THE FOOD YOU BOUGHT JUST DIDN'T LAST AND YOU DIDN'T HAVE MONEY TO GET MORE.: NEVER TRUE

## 2020-06-01 SDOH — ECONOMIC STABILITY: INCOME INSECURITY: HOW HARD IS IT FOR YOU TO PAY FOR THE VERY BASICS LIKE FOOD, HOUSING, MEDICAL CARE, AND HEATING?: NOT HARD AT ALL

## 2020-06-01 NOTE — PROGRESS NOTES
PERCY Peterson, am scribing for and in the presence of Dr. Denver Chua. 6/1/20/8:50am/SNP    [address] [address] [address]  Dept: [phone]    Zaheer Ventura is a 75 y.o. male here for Follow-up (4 Months); Hyperlipidemia; Hyperglycemia; and Hypertension    HPI:  HYPERLIPIDEMIA   Medication compliance: compliant all of the time. Patient is following a low fat, low cholesterol diet. He is not exercising regularly.      HYPERGLYCEMIA:  Diet compliance: compliant most of the time  Current exercise: no regular exercise, d/t L knee pain     HYPERTENSION:  Medication compliance: compliant all of the time. Patient is controlled by the following drug category: Beta Blocker  Medication Therapy: lisinopril metoprolol (Lopressor, Toprol-XL)  He is not exercising. He is adherent to a low-sodium diet.    Blood pressure is not being monitored at home. Patient reports that He has limited alcohol intake. Does the patient have sleep apnea? No    Prior to Admission medications    Medication Sig Start Date End Date Taking? Authorizing Provider   hydrOXYzine (ATARAX) 25 MG tablet Take 1 tablet by mouth nightly 6/1/20 7/1/20 Yes Denver Chua MD   mometasone (ELOCON) 0.1 % cream Apply topically daily.  6/1/20  Yes Denver Chua MD   albuterol sulfate HFA (PROAIR HFA) 108 (90 Base) MCG/ACT inhaler Inhale 2 puffs into the lungs every 6 hours as needed for Wheezing 6/1/20  Yes Denver Chua MD   XARELTO 20 MG TABS tablet TAKE 1 TABLET DAILY 3/13/20  Yes Denver Chua MD   atorvastatin (LIPITOR) 40 MG tablet TAKE 1 TABLET DAILY 12/9/19  Yes Denver Chua MD   metoprolol succinate (TOPROL XL) 100 MG extended release tablet TAKE 1 TABLET DAILY 12/9/19  Yes Denver Chua MD   sertraline (ZOLOFT) 50 MG tablet TAKE 1 TABLET DAILY 12/9/19  Yes Denver Chua MD   lisinopril (PRINIVIL;ZESTRIL) 10 MG tablet TAKE 1 TABLET DAILY 12/9/19  Yes Denver Chua MD normal upper and lower extremities, sensory exam intact. Psych: normal affect, speech fluent. ASSESSMENT:   Diagnosis Orders   1. Hyperglycemia  Hemoglobin A1C   2. Hyperlipidemia, unspecified hyperlipidemia type  ALT    AST    Basic Metabolic Panel    CBC    CRP,High Sensitivity    Lipid Panel   3. Hypertension, essential     4. Varicose veins of left leg with edema     5. Acute stasis dermatitis of left lower extremity     6. Chronic a-fib         PLAN:  He is waiting to get re-scheduled to have his surgery on his left leg veins. I will give him a refill of Elocon cream to apply daily. I also will start him on Atarax 25 mg at bedtime. Labs reviewed, I am pleased with these results. I will see him back in 4 months. Orders Placed This Encounter   Procedures    ALT     Standing Status:   Future     Standing Expiration Date:   6/1/2021    AST     Standing Status:   Future     Standing Expiration Date:   6/1/2021    Basic Metabolic Panel     Standing Status:   Future     Standing Expiration Date:   6/1/2021    CBC     Standing Status:   Future     Standing Expiration Date:   6/1/2021    CRP,High Sensitivity     Standing Status:   Future     Standing Expiration Date:   6/1/2021    Hemoglobin A1C     Standing Status:   Future     Standing Expiration Date:   6/1/2021    Lipid Panel     Standing Status:   Future     Standing Expiration Date:   6/1/2021     Order Specific Question:   Is Patient Fasting?/# of Hours     Answer:   No fasting     Orders Placed This Encounter   Medications    hydrOXYzine (ATARAX) 25 MG tablet     Sig: Take 1 tablet by mouth nightly     Dispense:  30 tablet     Refill:  0    mometasone (ELOCON) 0.1 % cream     Sig: Apply topically daily.      Dispense:  30 g     Refill:  0    albuterol sulfate HFA (PROAIR HFA) 108 (90 Base) MCG/ACT inhaler     Sig: Inhale 2 puffs into the lungs every 6 hours as needed for Wheezing     Dispense:  1 Inhaler     Refill:  0   Dr. Dylan STYLES

## 2020-06-09 ENCOUNTER — HOSPITAL ENCOUNTER (OUTPATIENT)
Dept: PREADMISSION TESTING | Age: 65
Setting detail: SPECIMEN
Discharge: HOME | End: 2020-06-13
Payer: MEDICARE

## 2020-06-09 PROCEDURE — U0004 COV-19 TEST NON-CDC HGH THRU: HCPCS

## 2020-06-10 LAB
SARS-COV-2, PCR: NOT DETECTED
SARS-COV-2, RAPID: NORMAL
SARS-COV-2: NORMAL
SOURCE: NORMAL

## 2020-06-12 ENCOUNTER — HOSPITAL ENCOUNTER (OUTPATIENT)
Age: 65
Setting detail: OUTPATIENT SURGERY
Discharge: HOME | End: 2020-06-12
Attending: SURGERY | Admitting: SURGERY
Payer: MEDICARE

## 2020-06-12 ENCOUNTER — ANESTHESIA EVENT (OUTPATIENT)
Dept: OPERATING ROOM | Age: 65
End: 2020-06-12
Payer: MEDICARE

## 2020-06-12 ENCOUNTER — ANESTHESIA (OUTPATIENT)
Dept: OPERATING ROOM | Age: 65
End: 2020-06-12
Payer: MEDICARE

## 2020-06-12 VITALS
RESPIRATION RATE: 9 BRPM | TEMPERATURE: 94.9 F | DIASTOLIC BLOOD PRESSURE: 64 MMHG | SYSTOLIC BLOOD PRESSURE: 98 MMHG | OXYGEN SATURATION: 97 %

## 2020-06-12 VITALS
HEART RATE: 54 BPM | RESPIRATION RATE: 19 BRPM | WEIGHT: 250 LBS | HEIGHT: 71 IN | OXYGEN SATURATION: 100 % | DIASTOLIC BLOOD PRESSURE: 71 MMHG | BODY MASS INDEX: 35 KG/M2 | SYSTOLIC BLOOD PRESSURE: 148 MMHG | TEMPERATURE: 97.2 F

## 2020-06-12 LAB
GFR NON-AFRICAN AMERICAN: >60 ML/MIN
GFR SERPL CREATININE-BSD FRML MDRD: >60 ML/MIN
GFR SERPL CREATININE-BSD FRML MDRD: NORMAL ML/MIN/{1.73_M2}
GLUCOSE BLD-MCNC: 90 MG/DL (ref 74–100)
POC CREATININE: 0.82 MG/DL (ref 0.51–1.19)

## 2020-06-12 PROCEDURE — 82565 ASSAY OF CREATININE: CPT

## 2020-06-12 PROCEDURE — 2500000003 HC RX 250 WO HCPCS: Performed by: SURGERY

## 2020-06-12 PROCEDURE — 3600000014 HC SURGERY LEVEL 4 ADDTL 15MIN: Performed by: SURGERY

## 2020-06-12 PROCEDURE — 7100000041 HC SPAR PHASE II RECOVERY - ADDTL 15 MIN: Performed by: SURGERY

## 2020-06-12 PROCEDURE — 2580000003 HC RX 258: Performed by: ANESTHESIOLOGY

## 2020-06-12 PROCEDURE — 2709999900 HC NON-CHARGEABLE SUPPLY: Performed by: SURGERY

## 2020-06-12 PROCEDURE — 6360000002 HC RX W HCPCS: Performed by: SPECIALIST

## 2020-06-12 PROCEDURE — 3600000004 HC SURGERY LEVEL 4 BASE: Performed by: SURGERY

## 2020-06-12 PROCEDURE — 2580000003 HC RX 258: Performed by: SURGERY

## 2020-06-12 PROCEDURE — 2500000003 HC RX 250 WO HCPCS: Performed by: SPECIALIST

## 2020-06-12 PROCEDURE — 3700000000 HC ANESTHESIA ATTENDED CARE: Performed by: SURGERY

## 2020-06-12 PROCEDURE — 82947 ASSAY GLUCOSE BLOOD QUANT: CPT

## 2020-06-12 PROCEDURE — 2720000010 HC SURG SUPPLY STERILE: Performed by: SURGERY

## 2020-06-12 PROCEDURE — 7100000040 HC SPAR PHASE II RECOVERY - FIRST 15 MIN: Performed by: SURGERY

## 2020-06-12 PROCEDURE — 93005 ELECTROCARDIOGRAM TRACING: CPT | Performed by: ANESTHESIOLOGY

## 2020-06-12 PROCEDURE — 3700000001 HC ADD 15 MINUTES (ANESTHESIA): Performed by: SURGERY

## 2020-06-12 RX ORDER — MIDAZOLAM HYDROCHLORIDE 1 MG/ML
INJECTION INTRAMUSCULAR; INTRAVENOUS PRN
Status: DISCONTINUED | OUTPATIENT
Start: 2020-06-12 | End: 2020-06-12 | Stop reason: SDUPTHER

## 2020-06-12 RX ORDER — FENTANYL CITRATE 50 UG/ML
50 INJECTION, SOLUTION INTRAMUSCULAR; INTRAVENOUS EVERY 5 MIN PRN
Status: DISCONTINUED | OUTPATIENT
Start: 2020-06-12 | End: 2020-06-12 | Stop reason: HOSPADM

## 2020-06-12 RX ORDER — LIDOCAINE HYDROCHLORIDE 10 MG/ML
INJECTION, SOLUTION EPIDURAL; INFILTRATION; INTRACAUDAL; PERINEURAL PRN
Status: DISCONTINUED | OUTPATIENT
Start: 2020-06-12 | End: 2020-06-12 | Stop reason: ALTCHOICE

## 2020-06-12 RX ORDER — FENTANYL CITRATE 50 UG/ML
INJECTION, SOLUTION INTRAMUSCULAR; INTRAVENOUS PRN
Status: DISCONTINUED | OUTPATIENT
Start: 2020-06-12 | End: 2020-06-12 | Stop reason: SDUPTHER

## 2020-06-12 RX ORDER — FENTANYL CITRATE 50 UG/ML
25 INJECTION, SOLUTION INTRAMUSCULAR; INTRAVENOUS EVERY 5 MIN PRN
Status: DISCONTINUED | OUTPATIENT
Start: 2020-06-12 | End: 2020-06-12 | Stop reason: HOSPADM

## 2020-06-12 RX ORDER — LIDOCAINE HYDROCHLORIDE 10 MG/ML
INJECTION, SOLUTION EPIDURAL; INFILTRATION; INTRACAUDAL; PERINEURAL PRN
Status: DISCONTINUED | OUTPATIENT
Start: 2020-06-12 | End: 2020-06-12 | Stop reason: SDUPTHER

## 2020-06-12 RX ORDER — MEPERIDINE HYDROCHLORIDE 50 MG/ML
12.5 INJECTION INTRAMUSCULAR; INTRAVENOUS; SUBCUTANEOUS EVERY 5 MIN PRN
Status: DISCONTINUED | OUTPATIENT
Start: 2020-06-12 | End: 2020-06-12 | Stop reason: HOSPADM

## 2020-06-12 RX ORDER — SODIUM CHLORIDE, SODIUM LACTATE, POTASSIUM CHLORIDE, CALCIUM CHLORIDE 600; 310; 30; 20 MG/100ML; MG/100ML; MG/100ML; MG/100ML
INJECTION, SOLUTION INTRAVENOUS CONTINUOUS
Status: DISCONTINUED | OUTPATIENT
Start: 2020-06-12 | End: 2020-06-12 | Stop reason: HOSPADM

## 2020-06-12 RX ORDER — PROPOFOL 10 MG/ML
INJECTION, EMULSION INTRAVENOUS CONTINUOUS PRN
Status: DISCONTINUED | OUTPATIENT
Start: 2020-06-12 | End: 2020-06-12 | Stop reason: SDUPTHER

## 2020-06-12 RX ORDER — ONDANSETRON 2 MG/ML
4 INJECTION INTRAMUSCULAR; INTRAVENOUS
Status: DISCONTINUED | OUTPATIENT
Start: 2020-06-12 | End: 2020-06-12 | Stop reason: HOSPADM

## 2020-06-12 RX ADMIN — FENTANYL CITRATE 25 MCG: 50 INJECTION INTRAMUSCULAR; INTRAVENOUS at 14:09

## 2020-06-12 RX ADMIN — FENTANYL CITRATE 25 MCG: 50 INJECTION INTRAMUSCULAR; INTRAVENOUS at 14:15

## 2020-06-12 RX ADMIN — FENTANYL CITRATE 25 MCG: 50 INJECTION INTRAMUSCULAR; INTRAVENOUS at 14:06

## 2020-06-12 RX ADMIN — FENTANYL CITRATE 25 MCG: 50 INJECTION INTRAMUSCULAR; INTRAVENOUS at 15:05

## 2020-06-12 RX ADMIN — LIDOCAINE HYDROCHLORIDE 50 MG: 10 INJECTION, SOLUTION EPIDURAL; INFILTRATION; INTRACAUDAL; PERINEURAL at 14:06

## 2020-06-12 RX ADMIN — MIDAZOLAM HYDROCHLORIDE 2 MG: 1 INJECTION, SOLUTION INTRAMUSCULAR; INTRAVENOUS at 14:06

## 2020-06-12 RX ADMIN — SODIUM CHLORIDE, POTASSIUM CHLORIDE, SODIUM LACTATE AND CALCIUM CHLORIDE: 600; 310; 30; 20 INJECTION, SOLUTION INTRAVENOUS at 13:25

## 2020-06-12 RX ADMIN — PROPOFOL 100 MCG/KG/MIN: 10 INJECTION, EMULSION INTRAVENOUS at 14:06

## 2020-06-12 ASSESSMENT — PAIN SCALES - GENERAL
PAINLEVEL_OUTOF10: 0

## 2020-06-12 ASSESSMENT — PULMONARY FUNCTION TESTS
PIF_VALUE: 0
PIF_VALUE: 1
PIF_VALUE: 0
PIF_VALUE: 2
PIF_VALUE: 0

## 2020-06-12 ASSESSMENT — ENCOUNTER SYMPTOMS
BACK PAIN: 0
COLOR CHANGE: 1
EYES NEGATIVE: 1
CHEST TIGHTNESS: 0
SHORTNESS OF BREATH: 0

## 2020-06-12 ASSESSMENT — PAIN - FUNCTIONAL ASSESSMENT: PAIN_FUNCTIONAL_ASSESSMENT: 0-10

## 2020-06-12 NOTE — H&P
Skin:     General: Skin is warm and dry. Capillary Refill: Capillary refill takes less than 2 seconds. Findings: Rash present. No bruising. Neurological:      General: No focal deficit present. Mental Status: He is alert and oriented to person, place, and time. Psychiatric:         Mood and Affect: Mood normal.         Thought Content: Thought content normal.         Judgment: Judgment normal.         Assessment:  75 yr old male with varicose veins, venous ulcer    Plan:  1)  Endovenous ablation of left GSV with phlebectomies.     Viji Marinelli MD  6/12/2020

## 2020-06-12 NOTE — ANESTHESIA PRE PROCEDURE
 Paroxysmal A-fib (HCC) I48.0    Hypertension, essential I10    ASHD (arteriosclerotic heart disease) I25.10    Hyperglycemia R73.9    Vasodepressor syncope R55    Allergic rhinitis J30.9    Atrial flutter ([facility]) I48.92    Hyperlipidemia E78.5    Hypertension I10    Obesity E66.9    Achilles tendon tear S86.019A    Osteoarthritis M19.90    Essential hypertension I10    Neurodermatitis L28.0    Rising PSA level R97.20    Benign prostatic hyperplasia N40.0    Elevated PSA R97.20    Acute medial meniscal injury of left knee S83.8X2A    Reactive airway disease J45.909    Bronchitis J40    Acute sinusitis J01.90    Rectal bleeding K62.5    Closed fracture of transverse process of lumbar vertebra ([facility]) S32.009A    Angiodysplasia of colon K55.20    Rectal prolapse K62.3    Hemorrhoids K64.9    Gastroesophageal reflux disease with esophagitis K21.0    Erosive esophagitis K22.10    Leg edema, left R60.0    Acute stasis dermatitis of left lower extremity I87.2    Varicose veins of left leg with edema I83.892    Venous insufficiency I87.2    Varicose veins of lower extremity with pain, left I83.812    Chronic a-fib I48.20       Past Medical History:        Diagnosis Date    Achilles tendon tear 2013    Allergic rhinitis     Atrial flutter ([address] 1996    Congestive heart failure ([address]     Coronary atherosclerosis     Hyperglycemia 2007    Hyperlipidemia 1996    Hypertension 2004    Mitral insufficiency     Obesity 2006    Osteoarthritis     Status post ablation of atrial flutter 05/09/13    Vasodepressor syncope 2001       Past Surgical History:        Procedure Laterality Date    ATRIAL ABLATION SURGERY  1998, 2013    [facility] AV node    CATARACT REMOVAL WITH IMPLANT  12/06/2017    Dr. Argentina Soni     COLONOSCOPY  02/01/2010    [place], negative    COLONOSCOPY  03/25/2019    Dr. Crescencio Roldan rectal bx(hyperplastic mucosa with evidence of ischemic injury,angiodysplasia no active bleeding,divetrticulosis,hemorroids    COLONOSCOPY N/A 3/25/2019    COLONOSCOPY POLYPECTOMY SNARE/COLD BIOPSY performed by Corry Bellamy MD at [address] [place] [address]      right side of face    KNEE SURGERY Right 2006    medial meniscus arthroscopy, Zuleima Pica    KNEE SURGERY Left     MITRAL VALVE SURGERY  11    34 mm ring annuloplasty    CO KNEE SCOPE,MED OR LAT MENIS REPAIR Left 2018    KNEE ARTHROSCOPY-PARTIAL MEDIAL MENISCECTOMY AND [facility]ONDROPLASTY [facility] W/O ECP Left 2017    EYE CATARACT EMULSIFICATION IOL IMPLANT performed by Nasra Carlos DO at [address] [address] N/A 2019    -hiatal hernia,grade 3 reflux esophagitis,antral gastritis, erosive bulbar duodenitis       Social History:    Social History     Tobacco Use    Smoking status: Former Smoker     Packs/day: 1.50     Years: 20.00     Pack years: 30.00     Types: Cigarettes     Last attempt to quit: 1996     Years since quittin.5    Smokeless tobacco: Never Used   Substance Use Topics    Alcohol use:  Yes     Alcohol/week: 0.0 standard drinks     Comment: 4 beers a day                                 Counseling given: Not Answered      Vital Signs (Current):   Vitals:    20 1239 20 1256   BP:  (!) 146/68   Pulse:  60   Resp:  20   Temp:  97.3 °F (36.3 °C)   TempSrc:  Temporal   SpO2:  97%   Weight: 250 lb (113.4 kg)    Height: 5' 11\" (1.803 m)                                               BP Readings from Last 3 Encounters:   20 (!) 146/68   20 124/84   20 (!) 151/74       NPO Status: Time of last liquid consumption: 2100                        Time of last solid consumption: 1800                        Date of last liquid consumption: 20                        Date of last solid food consumption: 20    BMI:   Wt Readings from Last 3 Encounters:   20 250 lb Cardiovascular:    (+) hypertension:, valvular problems/murmurs:, CAD:, dysrhythmias: atrial fibrillation, CHF:,         Rhythm: irregular                   ROS comment: S/P MVR 2013     Neuro/Psych:   Negative Neuro/Psych ROS              GI/Hepatic/Renal:   (+) PUD,           Endo/Other: Negative Endo/Other ROS                    Abdominal:   (+) obese,         Vascular: negative vascular ROS. Anesthesia Plan      general     ASA 3       Induction: intravenous. MIPS: Postoperative opioids intended. Anesthetic plan and risks discussed with patient. Plan discussed with CRNA.                   Wayne Gooden MD   6/12/2020

## 2020-06-13 LAB
EKG ATRIAL RATE: 375 BPM
EKG Q-T INTERVAL: 440 MS
EKG QRS DURATION: 100 MS
EKG QTC CALCULATION (BAZETT): 412 MS
EKG R AXIS: 45 DEGREES
EKG T AXIS: 43 DEGREES
EKG VENTRICULAR RATE: 53 BPM

## 2020-06-13 PROCEDURE — 93010 ELECTROCARDIOGRAM REPORT: CPT | Performed by: INTERNAL MEDICINE

## 2020-07-01 ENCOUNTER — OFFICE VISIT (OUTPATIENT)
Dept: VASCULAR SURGERY | Age: 65
End: 2020-07-01
Payer: MEDICARE

## 2020-07-01 VITALS
BODY MASS INDEX: 35 KG/M2 | TEMPERATURE: 98.3 F | DIASTOLIC BLOOD PRESSURE: 68 MMHG | HEART RATE: 62 BPM | SYSTOLIC BLOOD PRESSURE: 150 MMHG | WEIGHT: 250 LBS | RESPIRATION RATE: 16 BRPM | HEIGHT: 71 IN

## 2020-07-01 PROCEDURE — 99024 POSTOP FOLLOW-UP VISIT: CPT | Performed by: SURGERY

## 2020-07-01 PROCEDURE — 99213 OFFICE O/P EST LOW 20 MIN: CPT

## 2020-07-01 NOTE — PATIENT INSTRUCTIONS
SURVEY:    You may be receiving a survey from [employer] regarding your visit today. Please complete the survey to enable us to provide the highest quality of care to you and your family. If you cannot score us a very good on any question, please call the office to discuss how we could have made your experience a very good one. Thank you. Patient Education        Varicose Veins: Care Instructions  Your Care Instructions  Varicose veins are twisted, enlarged veins near the surface of the skin. They develop most often in the legs and ankles. Some people may be more likely than others to get varicose veins because of aging or hormone changes or because a parent has them. Being overweight or pregnant can make varicose veins worse. Jobs that require standing for long periods of time also can make them worse. Follow-up care is a key part of your treatment and safety. Be sure to make and go to all appointments, and call your doctor if you are having problems. It's also a good idea to know your test results and keep a list of the medicines you take. How can you care for yourself at home? · Wear compression stockings during the day to help relieve symptoms. They improve blood flow and are the main treatment for varicose veins. Talk to your doctor about which ones to get and where to get them. · Prop up your legs at or above the level of your heart when possible. This helps keep the blood from pooling in your lower legs and improves blood flow to the rest of your body. · Avoid sitting and standing for long periods. This puts added stress on your veins. · Get regular exercise, and control your weight. Walk, bicycle, or swim to improve blood flow in your legs. · If you bump your leg so hard that you know it is likely to bruise, prop up your leg and put ice or a cold pack on the area for 10 to 20 minutes at a time.  Try to do this every 1 to 2 hours for the next 3 days (when you are awake) or until the swelling goes down. Put a thin cloth between the ice and your skin. · If you cut or scratch the skin over a vein, it may bleed a lot. Prop up your leg and apply firm pressure with a clean bandage over the site of the bleeding. Continue to apply pressure for a full 15 minutes. Do not check sooner to see if the bleeding has stopped. If the bleeding has not stopped after 15 minutes, apply pressure again for another 15 minutes. You can repeat this up to 3 times for a total of 45 minutes. If you have a blood clot in a varicose vein, you may have tenderness and swelling over the vein. The vein may feel firm. Be sure to call your doctor right away if you have these symptoms. If your doctor has told you how to care for the clot, follow his or her instructions. Care may include the following:  · Prop up your leg and apply heat with a warm, damp cloth or a heating pad set on low (put a towel or cloth between your leg and the heating pad to prevent burns). · Ask your doctor if you can take an over-the-counter pain medicine, such as acetaminophen (Tylenol), ibuprofen (Advil, Motrin), or naproxen (Aleve). Be safe with medicines. Read and follow all instructions on the label. When should you call for help? [id number] anytime you think you may need emergency care. For example, call if:  · You have sudden chest pain and shortness of breath, or you cough up blood. Call your doctor now or seek immediate medical care if:  · You have signs of a blood clot, such as:  ? Pain in your calf, back of the knee, thigh, or groin. ? Redness and swelling in your leg or groin. · A varicose vein begins to bleed and you cannot stop it. · You have a tender lump in your leg. · You get an open sore. Watch closely for changes in your health, and be sure to contact your doctor if:  · Your varicose vein symptoms do not improve with home treatment. Where can you learn more? Go to [email or web address][email or web address] org and sign in to your [employer] account. Enter Q711 in the [facility] box to learn more about \"Varicose Veins: Care Instructions. \"     If you do not have an account, please click on the \"Sign Up Now\" link. Current as of: March 4, 2020               Content Version: 12.5  © 6690-4752 Healthwise, Incorporated. Care instructions adapted under license by [facility] [place] ([facility]). If you have questions about a medical condition or this instruction, always ask your healthcare professional. [address] any warranty or liability for your use of this information. Please recheck your blood pressure when you go home and make sure you take your prescribed medication if applicable . Please follow up with your PCP or ER if needed.

## 2020-07-01 NOTE — PROGRESS NOTES
[address] [facility]  [facility]  [facility] VASCULAR PART OF [address]  1955  75 y.o.male       Chief Complaint:  Chief Complaint   Patient presents with    Post-Op Check     left leg check , reports left inner knee swelling and firm to touch       History of present Illness:  Pt is here today for postoperative evaluation after left leg saphenous vein ablation with phlebectomies. Since the procedure he is done very well and has noted that all the swelling in his left leg has disappeared. He has a firm area of thrombosed varicosities on the left medial knee. I reassured him that this is self-limited and should improve with warm compresses. He otherwise feels great and has no contralateral varicosities. Past Medical History:  has a past medical history of Achilles tendon tear, Allergic rhinitis, Atrial flutter ([facility]), Congestive heart failure ([address] Coronary atherosclerosis, Hyperglycemia, Hyperlipidemia, Hypertension, Mitral insufficiency, Obesity, Osteoarthritis, Status post ablation of atrial flutter, and Vasodepressor syncope.     Past Surgical History:   Past Surgical History:   Procedure Laterality Date    ATRIAL ABLATION SURGERY  1998, 2013    [facility] AV node    CATARACT REMOVAL WITH IMPLANT  12/06/2017    Dr. Yaneth Bautista COLONOSCOPY  02/01/2010    [place], negative    COLONOSCOPY  03/25/2019    Dr. Tiara Samano rectal bx(hyperplastic mucosa with evidence of ischemic injury,angiodysplasia no active bleeding,divetrticulosis,hemorroids    COLONOSCOPY N/A 3/25/2019    COLONOSCOPY POLYPECTOMY SNARE/COLD BIOPSY performed by Jamaal Hicks MD at [address]      right side of face    KNEE SURGERY Right 2006    medial meniscus arthroscopy, Elisabet Section    KNEE SURGERY Left     MITRAL VALVE SURGERY  8/11/11    34 mm ring annuloplasty    OTHER SURGICAL HISTORY Left 06/12/2020    LASER ABLATION GREATER SAPHENOUIS VEIN WITH PHLEBECTOMY - EVOLV     NM KNEE SCOPE,MED OR LAT MENIS REPAIR Left 6/21/2018    KNEE ARTHROSCOPY-PARTIAL MEDIAL MENISCECTOMY AND CHONDROPLASTY performed by Abelino Epley, MD at [facility] INSJ IO LENS PROSTH W/O ECP Left 12/6/2017    EYE CATARACT EMULSIFICATION IOL IMPLANT performed by Vandana Pedraza DO at [address] [facility] 4/2/2019    -hiatal hernia,grade 3 reflux esophagitis,antral gastritis, erosive bulbar duodenitis    VEIN SURGERY Left 6/12/2020    LASER ABLATION GREATER SAPHENOUIS VEIN WITH PHLEBECTOMY Jaclyn Obregon CONF# [id number] [place]) performed by Naomy Romano MD at  [facility] History:  reports that he quit smoking about 23 years ago. His smoking use included cigarettes. He has a 30.00 pack-year smoking history. He has never used smokeless tobacco. He reports current alcohol use. He reports that he does not use drugs. Family History: family history includes Heart Attack in his father; Heart Disease in his mother. Review of Systems:   Constitutional:  negative for  fevers, chills, sweats, fatigue, and weight loss  HEENT:  negative for vision or hearing changes,   Respiratory:  negative for shortness of breath, cough, or congestion  Cardiovascular:  negative for  chest pain, palpitations  Gastrointestinal:  negative for nausea, vomiting, diarrhea, constipation, abdominal pain  Genitourinary:  negative for frequency, dysuria  Integument/Breast:  negative for rash, skin lesions  Musculoskeletal:  negative for muscle aches or joint pain  Neurological:  negative for headaches, dizziness, lightheadedness, numbness, pain and tingling extremities  Behavior/Psych:  negative for depression and anxiety    Allergies: Patient has no known allergies.     Current Meds:  Current Outpatient Medications:     rivaroxaban (XARELTO) 20 MG TABS tablet, Take 1 tablet by mouth daily, Disp: 30 tablet, Rfl: 0    hydrOXYzine (ATARAX) 25 MG tablet, Take 1 tablet by mouth nightly, Disp: 30 tablet, Rfl: 0    mometasone (ELOCON) 0.1 % cream, Apply topically daily. , Disp: 30 g, Rfl: 0    albuterol sulfate HFA (PROAIR HFA) 108 (90 Base) MCG/ACT inhaler, Inhale 2 puffs into the lungs every 6 hours as needed for Wheezing, Disp: 1 Inhaler, Rfl: 0    XARELTO 20 MG TABS tablet, TAKE 1 TABLET DAILY, Disp: 90 tablet, Rfl: 3    atorvastatin (LIPITOR) 40 MG tablet, TAKE 1 TABLET DAILY, Disp: 90 tablet, Rfl: 3    metoprolol succinate (TOPROL XL) 100 MG extended release tablet, TAKE 1 TABLET DAILY, Disp: 90 tablet, Rfl: 3    sertraline (ZOLOFT) 50 MG tablet, TAKE 1 TABLET DAILY, Disp: 90 tablet, Rfl: 3    lisinopril (PRINIVIL;ZESTRIL) 10 MG tablet, TAKE 1 TABLET DAILY, Disp: 90 tablet, Rfl: 3    omeprazole (PRILOSEC) 20 MG delayed release capsule, Take 20 mg by mouth every other day , Disp: , Rfl:     Spacer/Aero-Holding Chambers (OPTICHAMBER PARESH) MARISEL, 1 Device by Does not apply route daily, Disp: 1 Device, Rfl: 0    Vital Signs:  Vitals:    07/01/20 0838   BP: (!) 150/68   Pulse: 62   Resp:    Temp:        Physical Exam:  General appearance - alert, well appearing and in no acute distress  Mental status - oriented to person, place and time with normal affect  Head - normocephalic and atraumatic  Eyes - pupils equal and reactive, extraocular eye movements intact, conjunctiva clear  Ears - hearing appears to be intact  Nose - no drainage noted  Mouth - mucous membranes moist  Neck - supple, no carotid bruits, thyroid not palpable, no JVD  Chest - clear to auscultation, normal effort  Heart - normal rate, regular rhythm, no murmurs  Abdomen - soft, non-tender, non-distended, bowel sounds present all four quadrants, no masses, hepatomegaly, splenomegaly or aortic enlargement  Neurological - normal speech, no focal findings or movement disorder noted, cranial nerves II through XII grossly intact  Extremities - peripheral pulses palpable, no pedal edema or calf pain with palpation. Firm area on the left medial knee consistent with superficial thrombosed varicose veins. No warmth erythema or tenderness. Skin - no gross lesions, rashes, or induration noted        Labs:   Lab Results   Component Value Date    WBC 4.3 05/28/2020    HGB 13.1 05/28/2020    HCT 40.7 05/28/2020    .8 05/28/2020     05/28/2020     01/20/2012     Lab Results   Component Value Date     05/28/2020    K 4.5 05/28/2020     05/28/2020    CO2 25 05/28/2020    BUN 9 05/28/2020    CREATININE 0.82 06/12/2020    CREATININE 0.76 05/28/2020    GLUCOSE 88 05/28/2020    GLUCOSE 75 01/20/2012    CALCIUM 9.0 05/28/2020     Lab Results   Component Value Date    ALKPHOS 92 04/01/2019    ALT 18 05/28/2020    AST 22 05/28/2020    PROT 8.1 04/01/2019    BILITOT 0.83 04/01/2019    LABALBU 4.4 04/01/2019     No results found for: LACTA  Lab Results   Component Value Date    AMYLASE 48 04/01/2019     Lab Results   Component Value Date    LIPASE 14 04/01/2019     Lab Results   Component Value Date    INR 0.9 05/09/2013         Assessment:  3 75-year-old male status post left GSV ablation with phlebectomies    1. Varicose veins of lower extremity with pain, left        Plan:   1. Overall he is doing well and has had resolution of his left leg swelling. I reassured him that the thrombosed varicosities are self-limited and will soften with time. 2. Follow-up as needed    No orders of the defined types were placed in this encounter.           Electronically signed by Alma Denton MD on 7/1/2020 at 9:15 AM     Copy sent to Dr. Priscila Mccain MD

## 2020-09-22 RX ORDER — LISINOPRIL 10 MG/1
TABLET ORAL
Qty: 90 TABLET | Refills: 3 | Status: SHIPPED | OUTPATIENT
Start: 2020-09-22 | End: 2020-12-28 | Stop reason: SDUPTHER

## 2020-09-22 RX ORDER — ATORVASTATIN CALCIUM 40 MG/1
TABLET, FILM COATED ORAL
Qty: 90 TABLET | Refills: 3 | Status: SHIPPED | OUTPATIENT
Start: 2020-09-22 | End: 2020-12-28 | Stop reason: SDUPTHER

## 2020-09-22 RX ORDER — METOPROLOL SUCCINATE 100 MG/1
TABLET, EXTENDED RELEASE ORAL
Qty: 90 TABLET | Refills: 3 | Status: SHIPPED | OUTPATIENT
Start: 2020-09-22 | End: 2021-01-04 | Stop reason: SDUPTHER

## 2020-09-29 ENCOUNTER — HOSPITAL ENCOUNTER (OUTPATIENT)
Age: 65
Discharge: HOME | End: 2020-09-29
Payer: MEDICARE

## 2020-09-29 LAB
ALT SERPL-CCNC: 17 U/L (ref 5–41)
ANION GAP SERPL CALCULATED.3IONS-SCNC: 11 MMOL/L (ref 9–17)
AST SERPL-CCNC: 20 U/L
BUN BLDV-MCNC: 12 MG/DL (ref 8–23)
BUN/CREAT BLD: 16 (ref 9–20)
CALCIUM SERPL-MCNC: 8.9 MG/DL (ref 8.6–10.4)
CHLORIDE BLD-SCNC: 105 MMOL/L (ref 98–107)
CHOLESTEROL/HDL RATIO: 2.7
CHOLESTEROL: 154 MG/DL
CO2: 24 MMOL/L (ref 20–31)
CREAT SERPL-MCNC: 0.77 MG/DL (ref 0.7–1.2)
GFR AFRICAN AMERICAN: >60 ML/MIN
GFR NON-AFRICAN AMERICAN: >60 ML/MIN
GFR SERPL CREATININE-BSD FRML MDRD: ABNORMAL ML/MIN/{1.73_M2}
GFR SERPL CREATININE-BSD FRML MDRD: ABNORMAL ML/MIN/{1.73_M2}
GLUCOSE BLD-MCNC: 106 MG/DL (ref 70–99)
HCT VFR BLD CALC: 40.9 % (ref 40.7–50.3)
HDLC SERPL-MCNC: 57 MG/DL
HEMOGLOBIN: 13.4 G/DL (ref 13–17)
HIGH SENSITIVE C-REACTIVE PROTEIN: 3.3 MG/L
LDL CHOLESTEROL: 88 MG/DL (ref 0–130)
MCH RBC QN AUTO: 33.4 PG (ref 25.2–33.5)
MCHC RBC AUTO-ENTMCNC: 32.8 G/DL (ref 28.4–34.8)
MCV RBC AUTO: 102 FL (ref 82.6–102.9)
NRBC AUTOMATED: 0 PER 100 WBC
PDW BLD-RTO: 13.6 % (ref 11.8–14.4)
PLATELET # BLD: 172 K/UL (ref 138–453)
PMV BLD AUTO: 10.1 FL (ref 8.1–13.5)
POTASSIUM SERPL-SCNC: 4.5 MMOL/L (ref 3.7–5.3)
RBC # BLD: 4.01 M/UL (ref 4.21–5.77)
SODIUM BLD-SCNC: 140 MMOL/L (ref 135–144)
TRIGL SERPL-MCNC: 45 MG/DL
VLDLC SERPL CALC-MCNC: NORMAL MG/DL (ref 1–30)
WBC # BLD: 5.6 K/UL (ref 3.5–11.3)

## 2020-09-29 PROCEDURE — 80048 BASIC METABOLIC PNL TOTAL CA: CPT

## 2020-09-29 PROCEDURE — 86141 C-REACTIVE PROTEIN HS: CPT

## 2020-09-29 PROCEDURE — 84460 ALANINE AMINO (ALT) (SGPT): CPT

## 2020-09-29 PROCEDURE — 84450 TRANSFERASE (AST) (SGOT): CPT

## 2020-09-29 PROCEDURE — 85027 COMPLETE CBC AUTOMATED: CPT

## 2020-09-29 PROCEDURE — 36415 COLL VENOUS BLD VENIPUNCTURE: CPT

## 2020-09-29 PROCEDURE — 80061 LIPID PANEL: CPT

## 2020-09-29 PROCEDURE — 83036 HEMOGLOBIN GLYCOSYLATED A1C: CPT

## 2020-09-30 LAB
ESTIMATED AVERAGE GLUCOSE: 105 MG/DL
HBA1C MFR BLD: 5.3 % (ref 4–6)

## 2020-10-02 ENCOUNTER — OFFICE VISIT (OUTPATIENT)
Dept: FAMILY MEDICINE CLINIC | Age: 65
End: 2020-10-02
Payer: MEDICARE

## 2020-10-02 VITALS
WEIGHT: 258 LBS | HEIGHT: 71 IN | DIASTOLIC BLOOD PRESSURE: 84 MMHG | SYSTOLIC BLOOD PRESSURE: 132 MMHG | BODY MASS INDEX: 36.12 KG/M2

## 2020-10-02 PROBLEM — J45.20 MILD INTERMITTENT ASTHMA WITHOUT COMPLICATION: Status: ACTIVE | Noted: 2020-10-02

## 2020-10-02 PROCEDURE — 4040F PNEUMOC VAC/ADMIN/RCVD: CPT | Performed by: FAMILY MEDICINE

## 2020-10-02 PROCEDURE — 3017F COLORECTAL CA SCREEN DOC REV: CPT | Performed by: FAMILY MEDICINE

## 2020-10-02 PROCEDURE — 1123F ACP DISCUSS/DSCN MKR DOCD: CPT | Performed by: FAMILY MEDICINE

## 2020-10-02 PROCEDURE — G0439 PPPS, SUBSEQ VISIT: HCPCS | Performed by: FAMILY MEDICINE

## 2020-10-02 PROCEDURE — G8482 FLU IMMUNIZE ORDER/ADMIN: HCPCS | Performed by: FAMILY MEDICINE

## 2020-10-02 PROCEDURE — 99211 OFF/OP EST MAY X REQ PHY/QHP: CPT | Performed by: FAMILY MEDICINE

## 2020-10-02 RX ORDER — MOMETASONE FUROATE 1 MG/G
CREAM TOPICAL
Qty: 30 G | Refills: 0 | Status: SHIPPED | OUTPATIENT
Start: 2020-10-02 | End: 2021-01-04 | Stop reason: SDUPTHER

## 2020-10-02 ASSESSMENT — PATIENT HEALTH QUESTIONNAIRE - PHQ9
2. FEELING DOWN, DEPRESSED OR HOPELESS: 0
SUM OF ALL RESPONSES TO PHQ9 QUESTIONS 1 & 2: 0
SUM OF ALL RESPONSES TO PHQ QUESTIONS 1-9: 0
SUM OF ALL RESPONSES TO PHQ QUESTIONS 1-9: 0
1. LITTLE INTEREST OR PLEASURE IN DOING THINGS: 0

## 2020-10-02 ASSESSMENT — LIFESTYLE VARIABLES
HOW OFTEN DO YOU HAVE SIX OR MORE DRINKS ON ONE OCCASION: 3
AUDIT-C TOTAL SCORE: 9
HOW MANY STANDARD DRINKS CONTAINING ALCOHOL DO YOU HAVE ON A TYPICAL DAY: 2
HOW OFTEN DO YOU HAVE A DRINK CONTAINING ALCOHOL: 4

## 2020-10-02 NOTE — PATIENT INSTRUCTIONS
plan:  We discuss risks associated with drinking large amounts of alcohol. His labs are reviewed and I am pleased with these. We discuss his apnea link results from 2015 showing suspicion for sleep apnea. I recommend he let me know if he starts to be symptomatic and he thinks its a good idea to treat this. We discuss the nocturia. I suspect that sleep apnea is contributing to this. I offer to treat      I will see him back in 4 months. SURVEY:    You may be receiving a survey from [employer] regarding your visit today. Please complete the survey to enable us to provide the highest quality of care to you and your family. If you cannot score us a very good on any question, please call the office to discuss how we could of made your experience a very good one. Thank you.

## 2020-10-02 NOTE — PROGRESS NOTES
PERCY Zamora am scribing for and in the presence of Dr. Yosi Pichardo MD. 10/02/20      Medicare Annual Wellness Visit  Name: Vinson Felty Date: 10/2/2020   MRN: [id number] Sex: Male   Age: 75 y.o. Ethnicity: Non-/Non    : 1955 Race: Ty Cheema is here for Medicare AWV; Hypertension; Hyperlipidemia; and Hyperglycemia    HYPERLIPIDEMIA   Medication compliance: compliant all of the time. Patient is following a low fat, low cholesterol diet. He is not exercising regularly.      HYPERGLYCEMIA:  Diet compliance: compliant most of the time  Current exercise: no regular exercise, d/t L knee pain     HYPERTENSION:  Medication compliance: compliant all of the time. Patient is controlled by the following drug category: Beta Blocker  Medication Therapy: lisinopril metoprolol (Lopressor, Toprol-XL)  He is not exercising. He is adherent to a low-sodium diet.    Blood pressure is not being monitored at home. Patient reports that He has limited alcohol intake. Does the patient have sleep apnea? No    Screenings for behavioral, psychosocial and functional/safety risks, and cognitive dysfunction are all negative except as indicated below. These results, as well as other patient data from the [address] [facility] form, are documented in Flowsheets linked to this Encounter. No Known Allergies      Prior to Visit Medications    Medication Sig Taking? Authorizing Provider   mometasone (ELOCON) 0.1 % cream Apply topically daily.  Yes Yosi Pichardo MD   sertraline (ZOLOFT) 50 MG tablet TAKE 1 TABLET DAILY Yes Yosi Pichardo MD   metoprolol succinate (TOPROL XL) 100 MG extended release tablet TAKE 1 TABLET DAILY Yes Yosi Pichardo MD   lisinopril (PRINIVIL;ZESTRIL) 10 MG tablet TAKE 1 TABLET DAILY Yes Yosi Pichardo MD   atorvastatin (LIPITOR) 40 MG tablet TAKE 1 TABLET DAILY Yes Yosi Pichardo MD   rivaroxaban (XARELTO) 20 MG TABS tablet Take 1 tablet by mouth daily Yes Chauncey Hightower MD   albuterol sulfate HFA (PROAIR HFA) 108 (90 Base) MCG/ACT inhaler Inhale 2 puffs into the lungs every 6 hours as needed for Wheezing Yes Chauncey Hightower MD   omeprazole (PRILOSEC) 20 MG delayed release capsule Take 20 mg by mouth every other day  Yes Historical Provider, MD   Spacer/Aero-Holding Chambers ([facility]) [address] 1 Device by Does not apply route daily Yes Chauncey Hightower MD         Past Medical History:   Diagnosis Date    Achilles tendon tear 2013    Allergic rhinitis     Atrial flutter ([address] 1996    Congestive heart failure ([address]     Coronary atherosclerosis     Hyperglycemia 2007    Hyperlipidemia 1996    Hypertension 2004    Mitral insufficiency     Obesity 2006    Osteoarthritis     Status post ablation of atrial flutter 05/09/13    Vasodepressor syncope 2001       Past Surgical History:   Procedure Laterality Date   [address] [address], 2013    st.vincents AV node    CATARACT REMOVAL WITH IMPLANT  12/06/2017    Dr. Kenia Segura     COLONOSCOPY  02/01/2010    [place], negative    COLONOSCOPY  03/25/2019    Dr. Veronica Davila rectal bx(hyperplastic mucosa with evidence of ischemic injury,angiodysplasia no active bleeding,divetrticulosis,hemorroids    COLONOSCOPY N/A 3/25/2019    COLONOSCOPY POLYPECTOMY SNARE/COLD BIOPSY performed by Rowe Fabry, MD at [address] [place] [address]      right side of face    KNEE SURGERY Right 2006    medial meniscus arthroscopy, Elier Broad    KNEE SURGERY Left     MITRAL VALVE SURGERY  8/11/11    34 mm ring annuloplasty    OTHER SURGICAL HISTORY Left 06/12/2020    LASER ABLATION GREATER SAPHENOUIS VEIN WITH PHLEBECTOMY - EVOLV     FL KNEE SCOPE,MED OR LAT MENIS REPAIR Left 6/21/2018    KNEE ARTHROSCOPY-PARTIAL MEDIAL MENISCECTOMY AND CHONDROPLASTY [facility] PR[facility] W/O ECP Left 12/6/2017    EYE CATARACT EMULSIFICATION IOL IMPLANT performed by Noel Burton rate: 75  Abdomen: soft, non-tender, non-distended, normal bowel sounds, no masses or organomegaly obese, round. Extremities: no cyanosis, clubbing or edema  Musculoskeletal: normal range of motion, no joint swelling, deformity or tenderness  Neurologic: reflexes normal and symmetric, no cranial nerve deficit, gait, coordination and speech normal resting tremor right hand. Patient's complete Health Risk Assessment and screening values have been reviewed and are found in Flowsheets. The following problems were reviewed today and where indicated follow up appointments were made and/or referrals ordered. Positive Risk Factor Screenings with Interventions:     Health Habits/Nutrition:  Health Habits/Nutrition  Do you exercise for at least 20 minutes 2-3 times per week?: (!) No  Have you lost any weight without trying in the past 3 months?: No  Do you eat fewer than 2 meals per day?: No  Have you seen a dentist within the past year?: (!) No  Body mass index is 35.98 kg/m².   Health Habits/Nutrition Interventions:  · see plan below    Personalized Preventive Plan   Current Health Maintenance Status  Immunization History   Administered Date(s) Administered    Influenza Vaccine, unspecified formulation 11/14/2016, 10/28/2017    Influenza Virus Vaccine 11/30/2011, 11/10/2018    Influenza, Quadv, IM, PF (6 mo and older Fluzone, Flulaval, Fluarix, and 3 yrs and older Afluria) 09/20/2020    Influenza, Triv, inactivated, subunit, adjuvanted, IM (Fluad 65 yrs and older) 11/20/2019    Pneumococcal Conjugate 13-valent ([id number]) 09/20/2020    Pneumococcal Polysaccharide ([id number]) 11/10/2018        Health Maintenance   Topic Date Due    Shingles Vaccine (1 of 2) 11/17/2003    Annual Wellness Visit (AWV)  05/28/2020    DTaP/Tdap/Td vaccine (1 - Tdap) 12/07/2021 (Originally 11/17/1972)    Lipid screen  09/29/2021    Potassium monitoring  09/29/2021    Creatinine monitoring  09/29/2021    Diabetes screen 09/29/2023    Pneumococcal 65+ years Vaccine (2 of 2 - PPSV23) 11/10/2023    Colon cancer screen colonoscopy  03/25/2029    Flu vaccine  Completed    AAA screen  Completed    Hepatitis C screen  Addressed    Hepatitis A vaccine  Aged Out    Hepatitis B vaccine  Aged Out    Hib vaccine  Aged Out    Meningococcal (ACWY) vaccine  Aged Out     Recommendations for aDealio Due: see orders and patient instructions/AVS.  . Recommended screening schedule for the next 5-10 years is provided to the patient in written form: see Patient Dustin Herrera was seen today for medicare awv, hypertension, hyperlipidemia and hyperglycemia. Diagnoses and all orders for this visit:    Essential hypertension  -     CBC; Future  -     Basic Metabolic Panel; Future    Hyperglycemia  -     Hemoglobin A1C; Future  -     CBC; Future  -     Basic Metabolic Panel; Future    Hyperlipidemia, unspecified hyperlipidemia type  -     Lipid Panel; Future  -     C-Reactive Protein High Sensitivity; Future  -     CBC; Future  -     Basic Metabolic Panel; Future  -     ALT; Future  -     AST; Future    Screening PSA (prostate specific antigen)    Mild intermittent asthma without complication    Chronic a-fib (HCC)    ASHD (arteriosclerotic heart disease)    Class 1 obesity due to excess calories without serious comorbidity with body mass index (BMI) of 34.0 to 34.9 in adult    Other orders  -     mometasone (ELOCON) 0.1 % cream; Apply topically daily. plan:  We discuss risks associated with drinking large amounts of alcohol. His labs are reviewed and I am pleased with these. We discuss his apnea link results from 2015 showing suspicion for sleep apnea. I recommend he let me know if he starts to be symptomatic and he thinks its a good idea to treat this. We discuss the nocturia. I suspect that sleep apnea is contributing to this. I offer to treat this but he declines. I will see him back in 4 months. I, Dr. Maria Antonia Thompson, personally performed the services described in this documentation as scribed by PERCY Avilez in my presence, and is both accurate and complete.

## 2020-11-03 PROBLEM — I10 HYPERTENSION: Status: RESOLVED | Noted: 2020-11-03 | Resolved: 2020-11-03

## 2020-12-28 RX ORDER — LISINOPRIL 10 MG/1
TABLET ORAL
Qty: 30 TABLET | Refills: 0 | Status: SHIPPED | OUTPATIENT
Start: 2020-12-28 | End: 2021-01-04 | Stop reason: SDUPTHER

## 2020-12-28 RX ORDER — ATORVASTATIN CALCIUM 40 MG/1
TABLET, FILM COATED ORAL
Qty: 30 TABLET | Refills: 0 | Status: SHIPPED | OUTPATIENT
Start: 2020-12-28 | End: 2021-01-04 | Stop reason: SDUPTHER

## 2020-12-28 NOTE — PROGRESS NOTES
Pt needs temporary refills to get by until he switches to [facility] Jan. 1st  [facility] is no longer covered

## 2021-01-04 ENCOUNTER — TELEPHONE (OUTPATIENT)
Dept: FAMILY MEDICINE CLINIC | Age: 66
End: 2021-01-04

## 2021-01-04 RX ORDER — ATORVASTATIN CALCIUM 40 MG/1
TABLET, FILM COATED ORAL
Qty: 90 TABLET | Refills: 3 | Status: SHIPPED | OUTPATIENT
Start: 2021-01-04 | End: 2021-02-22 | Stop reason: ALTCHOICE

## 2021-01-04 RX ORDER — LISINOPRIL 10 MG/1
TABLET ORAL
Qty: 90 TABLET | Refills: 3 | Status: SHIPPED | OUTPATIENT
Start: 2021-01-04 | End: 2021-11-16 | Stop reason: DRUGHIGH

## 2021-01-04 RX ORDER — OMEPRAZOLE 20 MG/1
20 CAPSULE, DELAYED RELEASE ORAL EVERY OTHER DAY
Qty: 90 CAPSULE | Refills: 3 | Status: ON HOLD | OUTPATIENT
Start: 2021-01-04 | End: 2022-04-05 | Stop reason: HOSPADM

## 2021-01-04 RX ORDER — METOPROLOL SUCCINATE 100 MG/1
TABLET, EXTENDED RELEASE ORAL
Qty: 90 TABLET | Refills: 3 | Status: SHIPPED | OUTPATIENT
Start: 2021-01-04 | End: 2022-03-18

## 2021-01-04 RX ORDER — ALBUTEROL SULFATE 90 UG/1
2 AEROSOL, METERED RESPIRATORY (INHALATION) EVERY 6 HOURS PRN
Qty: 3 INHALER | Refills: 0 | Status: SHIPPED | OUTPATIENT
Start: 2021-01-04 | End: 2021-07-13 | Stop reason: SDUPTHER

## 2021-01-04 RX ORDER — MOMETASONE FUROATE 1 MG/G
CREAM TOPICAL
Qty: 30 G | Refills: 3 | Status: SHIPPED | OUTPATIENT
Start: 2021-01-04 | End: 2022-03-22 | Stop reason: CLARIF

## 2021-02-17 ENCOUNTER — HOSPITAL ENCOUNTER (OUTPATIENT)
Age: 66
Discharge: HOME | End: 2021-02-17
Payer: MEDICARE

## 2021-02-17 DIAGNOSIS — R73.9 HYPERGLYCEMIA: ICD-10-CM

## 2021-02-17 DIAGNOSIS — I10 ESSENTIAL HYPERTENSION: ICD-10-CM

## 2021-02-17 DIAGNOSIS — E78.5 HYPERLIPIDEMIA, UNSPECIFIED HYPERLIPIDEMIA TYPE: ICD-10-CM

## 2021-02-17 LAB
ALT SERPL-CCNC: 18 U/L (ref 5–41)
ANION GAP SERPL CALCULATED.3IONS-SCNC: 10 MMOL/L (ref 9–17)
AST SERPL-CCNC: 24 U/L
BUN BLDV-MCNC: 10 MG/DL (ref 8–23)
BUN/CREAT BLD: 14 (ref 9–20)
CALCIUM SERPL-MCNC: 9.1 MG/DL (ref 8.6–10.4)
CHLORIDE BLD-SCNC: 102 MMOL/L (ref 98–107)
CHOLESTEROL/HDL RATIO: 2.9
CHOLESTEROL: 167 MG/DL
CO2: 27 MMOL/L (ref 20–31)
CREAT SERPL-MCNC: 0.74 MG/DL (ref 0.7–1.2)
ESTIMATED AVERAGE GLUCOSE: 103 MG/DL
GFR AFRICAN AMERICAN: >60 ML/MIN
GFR NON-AFRICAN AMERICAN: >60 ML/MIN
GFR SERPL CREATININE-BSD FRML MDRD: ABNORMAL ML/MIN/{1.73_M2}
GFR SERPL CREATININE-BSD FRML MDRD: ABNORMAL ML/MIN/{1.73_M2}
GLUCOSE BLD-MCNC: 100 MG/DL (ref 70–99)
HBA1C MFR BLD: 5.2 % (ref 4–6)
HCT VFR BLD CALC: 42.8 % (ref 40.7–50.3)
HDLC SERPL-MCNC: 57 MG/DL
HEMOGLOBIN: 13.8 G/DL (ref 13–17)
HIGH SENSITIVE C-REACTIVE PROTEIN: 1.4 MG/L
LDL CHOLESTEROL: 97 MG/DL (ref 0–130)
MCH RBC QN AUTO: 33.6 PG (ref 25.2–33.5)
MCHC RBC AUTO-ENTMCNC: 32.2 G/DL (ref 28.4–34.8)
MCV RBC AUTO: 104.1 FL (ref 82.6–102.9)
NRBC AUTOMATED: 0 PER 100 WBC
PDW BLD-RTO: 13.5 % (ref 11.8–14.4)
PLATELET # BLD: 156 K/UL (ref 138–453)
PMV BLD AUTO: 10.4 FL (ref 8.1–13.5)
POTASSIUM SERPL-SCNC: 4.3 MMOL/L (ref 3.7–5.3)
RBC # BLD: 4.11 M/UL (ref 4.21–5.77)
SODIUM BLD-SCNC: 139 MMOL/L (ref 135–144)
TRIGL SERPL-MCNC: 63 MG/DL
VLDLC SERPL CALC-MCNC: NORMAL MG/DL (ref 1–30)
WBC # BLD: 5.2 K/UL (ref 3.5–11.3)

## 2021-02-17 PROCEDURE — 80048 BASIC METABOLIC PNL TOTAL CA: CPT

## 2021-02-17 PROCEDURE — 84450 TRANSFERASE (AST) (SGOT): CPT

## 2021-02-17 PROCEDURE — 86141 C-REACTIVE PROTEIN HS: CPT

## 2021-02-17 PROCEDURE — 83036 HEMOGLOBIN GLYCOSYLATED A1C: CPT

## 2021-02-17 PROCEDURE — 85027 COMPLETE CBC AUTOMATED: CPT

## 2021-02-17 PROCEDURE — 84460 ALANINE AMINO (ALT) (SGPT): CPT

## 2021-02-17 PROCEDURE — 36415 COLL VENOUS BLD VENIPUNCTURE: CPT

## 2021-02-17 PROCEDURE — 80061 LIPID PANEL: CPT

## 2021-02-22 ENCOUNTER — OFFICE VISIT (OUTPATIENT)
Dept: FAMILY MEDICINE CLINIC | Age: 66
End: 2021-02-22
Payer: MEDICARE

## 2021-02-22 VITALS
HEIGHT: 71 IN | BODY MASS INDEX: 37.88 KG/M2 | WEIGHT: 270.6 LBS | DIASTOLIC BLOOD PRESSURE: 100 MMHG | SYSTOLIC BLOOD PRESSURE: 158 MMHG

## 2021-02-22 DIAGNOSIS — E78.5 HYPERLIPIDEMIA, UNSPECIFIED HYPERLIPIDEMIA TYPE: ICD-10-CM

## 2021-02-22 DIAGNOSIS — D75.89 MACROCYTOSIS: ICD-10-CM

## 2021-02-22 DIAGNOSIS — I83.812 VARICOSE VEINS OF LOWER EXTREMITY WITH PAIN, LEFT: ICD-10-CM

## 2021-02-22 DIAGNOSIS — K21.00 GASTROESOPHAGEAL REFLUX DISEASE WITH ESOPHAGITIS WITHOUT HEMORRHAGE: ICD-10-CM

## 2021-02-22 DIAGNOSIS — Z12.5 SCREENING FOR PROSTATE CANCER: ICD-10-CM

## 2021-02-22 DIAGNOSIS — N40.0 BENIGN PROSTATIC HYPERPLASIA, UNSPECIFIED WHETHER LOWER URINARY TRACT SYMPTOMS PRESENT: ICD-10-CM

## 2021-02-22 DIAGNOSIS — I10 ESSENTIAL HYPERTENSION: ICD-10-CM

## 2021-02-22 DIAGNOSIS — I83.892 VARICOSE VEINS OF LEFT LEG WITH EDEMA: ICD-10-CM

## 2021-02-22 DIAGNOSIS — I25.10 ASHD (ARTERIOSCLEROTIC HEART DISEASE): ICD-10-CM

## 2021-02-22 DIAGNOSIS — G25.0 ESSENTIAL TREMOR: ICD-10-CM

## 2021-02-22 DIAGNOSIS — I48.20 CHRONIC A-FIB (HCC): ICD-10-CM

## 2021-02-22 DIAGNOSIS — J45.20 MILD INTERMITTENT ASTHMA WITHOUT COMPLICATION: ICD-10-CM

## 2021-02-22 DIAGNOSIS — R73.9 HYPERGLYCEMIA: Primary | ICD-10-CM

## 2021-02-22 DIAGNOSIS — E66.09 CLASS 1 OBESITY DUE TO EXCESS CALORIES WITHOUT SERIOUS COMORBIDITY WITH BODY MASS INDEX (BMI) OF 34.0 TO 34.9 IN ADULT: ICD-10-CM

## 2021-02-22 PROBLEM — R97.20 RISING PSA LEVEL: Status: RESOLVED | Noted: 2017-06-13 | Resolved: 2021-02-22

## 2021-02-22 PROBLEM — J01.90 ACUTE SINUSITIS: Status: RESOLVED | Noted: 2018-05-03 | Resolved: 2021-02-22

## 2021-02-22 PROBLEM — R60.0 LEG EDEMA, LEFT: Status: RESOLVED | Noted: 2020-01-27 | Resolved: 2021-02-22

## 2021-02-22 PROBLEM — I87.2 ACUTE STASIS DERMATITIS OF LEFT LOWER EXTREMITY: Status: RESOLVED | Noted: 2020-01-27 | Resolved: 2021-02-22

## 2021-02-22 PROBLEM — K62.5 RECTAL BLEEDING: Status: RESOLVED | Noted: 2019-03-22 | Resolved: 2021-02-22

## 2021-02-22 PROBLEM — J40 BRONCHITIS: Status: RESOLVED | Noted: 2018-05-03 | Resolved: 2021-02-22

## 2021-02-22 PROBLEM — S83.8X2A ACUTE MEDIAL MENISCAL INJURY OF LEFT KNEE: Status: RESOLVED | Noted: 2018-01-26 | Resolved: 2021-02-22

## 2021-02-22 PROCEDURE — G8482 FLU IMMUNIZE ORDER/ADMIN: HCPCS | Performed by: FAMILY MEDICINE

## 2021-02-22 PROCEDURE — 99211 OFF/OP EST MAY X REQ PHY/QHP: CPT | Performed by: FAMILY MEDICINE

## 2021-02-22 PROCEDURE — 99214 OFFICE O/P EST MOD 30 MIN: CPT | Performed by: FAMILY MEDICINE

## 2021-02-22 PROCEDURE — 1036F TOBACCO NON-USER: CPT | Performed by: FAMILY MEDICINE

## 2021-02-22 PROCEDURE — 1123F ACP DISCUSS/DSCN MKR DOCD: CPT | Performed by: FAMILY MEDICINE

## 2021-02-22 PROCEDURE — G8417 CALC BMI ABV UP PARAM F/U: HCPCS | Performed by: FAMILY MEDICINE

## 2021-02-22 PROCEDURE — 3017F COLORECTAL CA SCREEN DOC REV: CPT | Performed by: FAMILY MEDICINE

## 2021-02-22 PROCEDURE — G8427 DOCREV CUR MEDS BY ELIG CLIN: HCPCS | Performed by: FAMILY MEDICINE

## 2021-02-22 PROCEDURE — 4040F PNEUMOC VAC/ADMIN/RCVD: CPT | Performed by: FAMILY MEDICINE

## 2021-02-22 RX ORDER — ROSUVASTATIN CALCIUM 40 MG/1
40 TABLET, COATED ORAL DAILY
Qty: 90 TABLET | Refills: 1 | Status: SHIPPED | OUTPATIENT
Start: 2021-02-22 | End: 2021-04-26 | Stop reason: SDUPTHER

## 2021-02-22 NOTE — PATIENT INSTRUCTIONS
PLAN:  His labs are reviewed. Overall, I am pleased with these numbers aside from his LDL cholesterol level. I would like to be a little more aggressive in treatment, attempting to get his LDL cholesterol down into the 70's. I will change lipitor to rosuvastatin 40 mg daily. He can finish up what lipitor he has left. We discuss the extra beats that he feels coming from his heart. As long as he is not symptomatic from this, I will just keep an eye on this. His blood pressure is elevated this morning. He attributes this to rushing around and panicking due to having a dead battery when he went to get in the car this morning. His weight has also trended up He had a procedure done with Dr. Enrico Ahmadi in June. His blood pressure initially started out high and then stayed down during the procedure. I recommend that he start checking his BP at home and let the office know what these readings are. I will see him back in 4 months. SURVEY:    You may be receiving a survey from [employer] regarding your visit today. Please complete the survey to enable us to provide the highest quality of care to you and your family. If you cannot score us a very good on any question, please call the office to discuss how we could of made your experience a very good one. Thank you.

## 2021-02-22 NOTE — PROGRESS NOTES
Mason STYLES RMA, am scribing for and in the presence of Dr. Yadi Avilez. 02/22/2021 8:24 am Gen [address]  [address]  [place] [id number]  Dept: [phone]    Isabell Gary is a 77 y.o. male here for Follow-up (4 month ), Hypertension, Hyperlipidemia, and Hyperglycemia      HPI:  HYPERLIPIDEMIA   Medication compliance: compliant all of the time. Patient is following a low fat, low cholesterol diet. He is not exercising regularly.      HYPERGLYCEMIA:  Diet compliance: compliant most of the time  Current exercise: no regular exercise, d/t L knee pain     HYPERTENSION:  Medication compliance: compliant all of the time. Patient is controlled by the following drug category: Beta Blocker  Medication Therapy: lisinopril metoprolol (Lopressor, Toprol-XL)  He is not exercising. He is adherent to a low-sodium diet.    Blood pressure is not being monitored at home. Patient reports that He has limited alcohol intake. Does the patient have sleep apnea? No    Prior to Admission medications    Medication Sig Start Date End Date Taking? Authorizing Provider   rosuvastatin (CRESTOR) 40 MG tablet Take 1 tablet by mouth daily 2/22/21  Yes Yadi Avilez MD   lisinopril (PRINIVIL;ZESTRIL) 10 MG tablet 1 po qd 1/4/21  Yes Yadi Avilez MD   metoprolol succinate (TOPROL XL) 100 MG extended release tablet 1 po qd 1/4/21  Yes Yadi Avilez MD   sertraline (ZOLOFT) 50 MG tablet 1 po qd 1/4/21  Yes Yadi Avilez MD   rivaroxaban (XARELTO) 20 MG TABS tablet Take 1 tablet by mouth daily 1/4/21  Yes Yadi Avilez MD   mometasone (ELOCON) 0.1 % cream Apply topically daily.  1/4/21  Yes Yadi Avilez MD   omeprazole (PRILOSEC) 20 MG delayed release capsule Take 1 capsule by mouth every other day 1/4/21  Yes Yadi Avilez MD   Spacer/Aero-Holding Chambers [facility]) MARISEL 1 Device by Does not apply route daily 5/3/18  Yes Yadi Avilez MD albuterol sulfate HFA (PROAIR HFA) 108 (90 Base) MCG/ACT inhaler Inhale 2 puffs into the lungs every 6 hours as needed for Wheezing  Patient not taking: Reported on 2/22/2021 1/4/21   Paco Thurman MD       ROS:  General Constitutional: Denies chills. Denies fever. Denies headache. Denies lightheadedness. Ophthalmologic: Denies blurred vision. ENT: Denies nasal congestion. Denies sore throat. Denies ear pain and pressure. Respiratory: Denies cough. Denies shortness of breath. Denies wheezing. Cardiovascular: Denies chest pain at rest. Admits more frequent irregular heartbeat 2x in the last couple weeks, has not seen cardiologist recently. Denies palpitations. Gastrointestinal: Denies abdominal pain. Denies blood in the stool. Denies constipation. Denies diarrhea. Denies nausea. Denies vomiting. Genitourinary: Denies blood in the urine. Denies difficulty urinating. Denies frequent urination. Denies painful urination. Denies urinary incontinence. Musculoskeletal: Denies muscle aches. Denies painful joints. Denies swollen joints. Peripheral Vascular: Denies pain/cramping in legs after exertion. Skin: Denies dry skin. Denies itching. Denies rash. Neurologic: Denies falls. Denies dizziness. Denies fainting. Denies tingling/numbness. Psychiatric: Denies sleep disturbance. Denies anxiety. Denies depressed mood.     Past Surgical History:   Procedure Laterality Date    ATRIAL ABLATION SURGERY  1998, 2013    st.vincents AV node    CATARACT REMOVAL WITH IMPLANT  12/06/2017    Dr. Kiara Cote COLONOSCOPY  02/01/2010    [place], negative    COLONOSCOPY  03/25/2019    Dr. Sindi Hodge rectal bx(hyperplastic mucosa with evidence of ischemic injury,angiodysplasia no active bleeding,divetrticulosis,hemorroids    COLONOSCOPY N/A 3/25/2019    COLONOSCOPY POLYPECTOMY SNARE/COLD BIOPSY performed by Pauline Rich MD at [address] [facility]      right side of face    KNEE SURGERY Right 2006 medial meniscus arthroscopy, Giuliana Hodgkin    KNEE SURGERY Left     MITRAL VALVE SURGERY  11    34 mm ring annuloplasty    OTHER SURGICAL HISTORY Left 2020    LASER ABLATION GREATER SAPHENOUIS VEIN WITH PHLEBECTOMY - EVOLV     DC KNEE SCOPE,MED OR LAT MENIS REPAIR Left 2018    KNEE ARTHROSCOPY-PARTIAL MEDIAL MENISCECTOMY AND CHONDROPLASTY performed by Mary Gallegos MD at [facility] INSJ IO LENS PROSTH W/O ECP Left 2017    EYE CATARACT EMULSIFICATION IOL IMPLANT performed by Belén Luther DO at [address] [place] [address] N/A 2019    -hiatal hernia,grade 3 reflux esophagitis,antral gastritis, erosive bulbar duodenitis    VEIN SURGERY Left 2020    LASER ABLATION GREATER SAPHENOUIS VEIN WITH PHLEBECTOMY Goldie Anadinesh CONF# [id number] - [place]) performed by Duke Young MD at [place] History   Problem Relation Age of Onset    Heart Disease Mother         sudden cardiac death    Heart Attack Father        Past Medical History:   Diagnosis Date    Achilles tendon tear     Allergic rhinitis     Atrial flutter ([address] 1996    Congestive heart failure ([address]     Coronary atherosclerosis     Hyperglycemia 2007    Hyperlipidemia 1996    Hypertension 2004    Mitral insufficiency     Obesity 2006    Osteoarthritis     Status post ablation of atrial flutter 13    Vasodepressor syncope       Social History     Tobacco Use    Smoking status: Former Smoker     Packs/day: 1.50     Years: 20.00     Pack years: 30.00     Types: Cigarettes     Quit date: 1996     Years since quittin.2    Smokeless tobacco: Never Used   Substance Use Topics    Alcohol use:  Yes     Alcohol/week: 0.0 standard drinks     Comment: 4 beers a day       Current Outpatient Medications   Medication Sig Dispense Refill    rosuvastatin (CRESTOR) 40 MG tablet Take 1 tablet by mouth daily 90 tablet 1  lisinopril (PRINIVIL;ZESTRIL) 10 MG tablet 1 po qd 90 tablet 3    metoprolol succinate (TOPROL XL) 100 MG extended release tablet 1 po qd 90 tablet 3    sertraline (ZOLOFT) 50 MG tablet 1 po qd 90 tablet 3    rivaroxaban (XARELTO) 20 MG TABS tablet Take 1 tablet by mouth daily 90 tablet 3    mometasone (ELOCON) 0.1 % cream Apply topically daily. 30 g 3    omeprazole (PRILOSEC) 20 MG delayed release capsule Take 1 capsule by mouth every other day 90 capsule 3    Spacer/Aero-Holding Chambers ([employer]) MARISEL 1 Device by Does not apply route daily 1 Device 0    albuterol sulfate HFA (PROAIR HFA) 108 (90 Base) MCG/ACT inhaler Inhale 2 puffs into the lungs every 6 hours as needed for Wheezing (Patient not taking: Reported on 2/22/2021) 3 Inhaler 0     No current facility-administered medications for this visit. No Known Allergies    PHYSICAL EXAM:    BP (!) 158/100   Ht 5' 11\" (1.803 m)   Wt 270 lb 9.6 oz (122.7 kg)   BMI 37.74 kg/m²   Wt Readings from Last 3 Encounters:   02/22/21 270 lb 9.6 oz (122.7 kg)   10/02/20 258 lb (117 kg)   07/01/20 250 lb (113.4 kg)     BP Readings from Last 3 Encounters:   02/22/21 (!) 158/100   10/02/20 132/84   07/01/20 (!) 150/68       General Appearance: in no acute distress, well developed, well nourished. Eyes: pupils equal, round reactive to light and accommodation. Ears: normal canal and TM's. Nose: nares patent, no lesions. Oral Cavity: mucosa moist.  Throat: clear. Neck/Thyroid: neck supple, full range of motion, no cervical lymphadenopathy, no thyromegaly or carotid bruits. Skin: warm and dry. No suspicious lesions. Heart: regular rate and rhythm. No murmurs. S1, S2 normal, no gallops. Rate: 70's irregular regular  Lungs: faint inspiratory/expiratory wheezing sound  Abdomen: bowel sounds present, soft, nontender, nondistended, no masses or organomegaly. Musculoskeletal: normal, full range of motion in knees and hips, no swelling or tenderness. Extremities: no cyanosis 1+ edema ankles. Stasis changes with hemosiderin deposits L lateral leg. Peripheral Pulses: 2+ throughout, symetric. Neurologic: nonfocal, motor strength normal upper and lower extremities, sensory exam intact. Resting tremor, R hand  Psych: normal affect, speech fluent. ASSESSMENT:   Diagnosis Orders   1. Hyperglycemia  rosuvastatin (CRESTOR) 40 MG tablet    Vitamin B12 & Folate    TSH without Reflex    Lipid Panel    Hemoglobin A1C    C-Reactive Protein High Sensitivity    CBC With Auto Differential    Basic Metabolic Panel    ALT    AST   2. Hyperlipidemia, unspecified hyperlipidemia type  rosuvastatin (CRESTOR) 40 MG tablet    Vitamin B12 & Folate    TSH without Reflex    Lipid Panel    Hemoglobin A1C    C-Reactive Protein High Sensitivity    CBC With Auto Differential    Basic Metabolic Panel    ALT    AST   3. Essential hypertension  rosuvastatin (CRESTOR) 40 MG tablet    Vitamin B12 & Folate    TSH without Reflex    Lipid Panel    Hemoglobin A1C    C-Reactive Protein High Sensitivity    CBC With Auto Differential    Basic Metabolic Panel    ALT    AST   4. Macrocytosis  TSH without Reflex   5. Benign prostatic hyperplasia, unspecified whether lower urinary tract symptoms present  PSA Screening   6. Screening for prostate cancer  PSA Screening   7. Mild intermittent asthma without complication     8. ASHD (arteriosclerotic heart disease)     9. Chronic a-fib ([address]     10. Varicose veins of lower extremity with pain, left     11. Class 1 obesity due to excess calories without serious comorbidity with body mass index (BMI) of 34.0 to 34.9 in adult     12. Gastroesophageal reflux disease with esophagitis without hemorrhage     13. Varicose veins of left leg with edema     14.  Essential tremor         PLAN: His labs are reviewed. Overall, I am pleased with these numbers aside from his LDL cholesterol level. I would like to be a little more aggressive in treatment, attempting to get his LDL cholesterol down into the 70's. I will change lipitor to rosuvastatin 40 mg daily. He can finish up what lipitor he has left. We discuss the extra beats that he feels coming from his heart. As long as he is not symptomatic from this, I will just keep an eye on this. His blood pressure is elevated this morning. He attributes this to rushing around and panicking due to having a dead battery when he went to get in the car this morning. His weight has also trended up He had a procedure done with Dr. Greg Babb in June. His blood pressure initially started out high and then stayed down during the procedure. I recommend that he start checking his BP at home and let the office know what these readings are. I will see him back in 4 months.    Orders Placed This Encounter   Procedures    Vitamin B12 & Folate     Standing Status:   Future     Standing Expiration Date:   2/22/2022    TSH without Reflex     Standing Status:   Future     Standing Expiration Date:   2/22/2022    Lipid Panel     Standing Status:   Future     Standing Expiration Date:   2/22/2022     Order Specific Question:   Is Patient Fasting?/# of Hours     Answer:   none    Hemoglobin A1C     Standing Status:   Future     Standing Expiration Date:   2/22/2022   Steven Arroyo C-Reactive Protein High Sensitivity     Standing Status:   Future     Standing Expiration Date:   2/22/2022    CBC With Auto Differential     Standing Status:   Future     Standing Expiration Date:   2/22/2022    Basic Metabolic Panel     Standing Status:   Future     Standing Expiration Date:   2/22/2022    ALT     Standing Status:   Future     Standing Expiration Date:   2/22/2022    AST     Standing Status:   Future     Standing Expiration Date:   2/22/2022    PSA Screening Standing Status:   Future     Standing Expiration Date:   2/22/2022     Orders Placed This Encounter   Medications    rosuvastatin (CRESTOR) 40 MG tablet     Sig: Take 1 tablet by mouth daily     Dispense:  90 tablet     Refill:  1     I, Dr. Teddy Mosley, personally performed the services described in this documentation as scribed by PERCY Little in my presence, and is both accurate and complete.

## 2021-03-23 ENCOUNTER — TELEPHONE (OUTPATIENT)
Dept: FAMILY MEDICINE CLINIC | Age: 66
End: 2021-03-23

## 2021-03-23 NOTE — TELEPHONE ENCOUNTER
Joe Bonds wanted to inform you that her pulse ox is not picking up theresa HR, she believes its because his heart is skipping a beat. She wanted you to be aware.

## 2021-04-26 DIAGNOSIS — I10 ESSENTIAL HYPERTENSION: ICD-10-CM

## 2021-04-26 DIAGNOSIS — R73.9 HYPERGLYCEMIA: ICD-10-CM

## 2021-04-26 DIAGNOSIS — E78.5 HYPERLIPIDEMIA, UNSPECIFIED HYPERLIPIDEMIA TYPE: ICD-10-CM

## 2021-04-26 RX ORDER — ROSUVASTATIN CALCIUM 40 MG/1
40 TABLET, COATED ORAL DAILY
Qty: 90 TABLET | Refills: 3 | Status: SHIPPED | OUTPATIENT
Start: 2021-04-26 | End: 2021-05-10 | Stop reason: SDUPTHER

## 2021-05-10 DIAGNOSIS — E78.5 HYPERLIPIDEMIA, UNSPECIFIED HYPERLIPIDEMIA TYPE: ICD-10-CM

## 2021-05-10 DIAGNOSIS — R73.9 HYPERGLYCEMIA: ICD-10-CM

## 2021-05-10 DIAGNOSIS — I10 ESSENTIAL HYPERTENSION: ICD-10-CM

## 2021-05-10 RX ORDER — ROSUVASTATIN CALCIUM 40 MG/1
40 TABLET, COATED ORAL EVERY EVENING
Qty: 30 TABLET | Refills: 0 | Status: SHIPPED | OUTPATIENT
Start: 2021-05-10 | End: 2021-07-06

## 2021-05-10 RX ORDER — ROSUVASTATIN CALCIUM 40 MG/1
40 TABLET, COATED ORAL DAILY
Qty: 90 TABLET | Refills: 3 | Status: SHIPPED | OUTPATIENT
Start: 2021-05-10 | End: 2022-06-14 | Stop reason: SDUPTHER

## 2021-07-06 ENCOUNTER — HOSPITAL ENCOUNTER (EMERGENCY)
Age: 66
Discharge: HOME | End: 2021-07-06
Attending: EMERGENCY MEDICINE
Payer: MEDICARE

## 2021-07-06 ENCOUNTER — APPOINTMENT (OUTPATIENT)
Dept: GENERAL RADIOLOGY | Age: 66
End: 2021-07-06
Payer: MEDICARE

## 2021-07-06 VITALS
SYSTOLIC BLOOD PRESSURE: 187 MMHG | HEART RATE: 74 BPM | DIASTOLIC BLOOD PRESSURE: 85 MMHG | OXYGEN SATURATION: 95 % | WEIGHT: 260 LBS | BODY MASS INDEX: 36.4 KG/M2 | TEMPERATURE: 96.2 F | RESPIRATION RATE: 18 BRPM | HEIGHT: 71 IN

## 2021-07-06 DIAGNOSIS — M10.9 PODAGRA: Primary | ICD-10-CM

## 2021-07-06 PROCEDURE — 6370000000 HC RX 637 (ALT 250 FOR IP): Performed by: EMERGENCY MEDICINE

## 2021-07-06 PROCEDURE — 73630 X-RAY EXAM OF FOOT: CPT

## 2021-07-06 PROCEDURE — 99284 EMERGENCY DEPT VISIT MOD MDM: CPT

## 2021-07-06 RX ORDER — COLCHICINE 0.6 MG/1
0.6 TABLET ORAL ONCE
Status: COMPLETED | OUTPATIENT
Start: 2021-07-06 | End: 2021-07-06

## 2021-07-06 RX ORDER — COLCHICINE 0.6 MG/1
0.6 TABLET ORAL
Qty: 21 TABLET | Refills: 0 | Status: SHIPPED | OUTPATIENT
Start: 2021-07-06 | End: 2021-07-13 | Stop reason: SDUPTHER

## 2021-07-06 RX ORDER — ACETAMINOPHEN 500 MG
1000 TABLET ORAL ONCE
Status: COMPLETED | OUTPATIENT
Start: 2021-07-06 | End: 2021-07-06

## 2021-07-06 RX ORDER — COLCHICINE 0.6 MG/1
1.2 TABLET ORAL ONCE
Status: COMPLETED | OUTPATIENT
Start: 2021-07-06 | End: 2021-07-06

## 2021-07-06 RX ADMIN — COLCHICINE 1.2 MG: 0.6 TABLET ORAL at 09:23

## 2021-07-06 RX ADMIN — ACETAMINOPHEN 1000 MG: 500 TABLET, FILM COATED ORAL at 09:23

## 2021-07-06 RX ADMIN — COLCHICINE 0.6 MG: 0.6 TABLET ORAL at 10:00

## 2021-07-06 ASSESSMENT — PAIN DESCRIPTION - LOCATION: LOCATION: FOOT

## 2021-07-06 ASSESSMENT — ENCOUNTER SYMPTOMS
DIARRHEA: 0
SHORTNESS OF BREATH: 0
WHEEZING: 0
VOMITING: 0
COUGH: 0
NAUSEA: 0
SORE THROAT: 0
CONSTIPATION: 0
RHINORRHEA: 0
ABDOMINAL DISTENTION: 0

## 2021-07-06 ASSESSMENT — PAIN DESCRIPTION - PAIN TYPE: TYPE: ACUTE PAIN

## 2021-07-06 ASSESSMENT — PAIN SCALES - GENERAL
PAINLEVEL_OUTOF10: 4
PAINLEVEL_OUTOF10: 10
PAINLEVEL_OUTOF10: 10

## 2021-07-06 NOTE — ED PROVIDER NOTES
[facility] ED  Emergency Department        Pt Name: Tatum Jewell  MRN: [id number]  [place] 1955  Date of evaluation: 7/6/21    CHIEF COMPLAINT       Chief Complaint   Patient presents with    Foot Pain     Right foot, pt states he thinks he hit his toes on something a few days but doesnt remeber and pain goes to top of foot near the base of he toes, especially the great toe.  Leg Swelling     pt has swelling to the right foot and toes, no redness or open wounds. Pt not able to bear weight on right foot       HISTORY OF PRESENT ILLNESS  (Location/Symptom, Timing/Onset, Context/Setting, Quality, Duration, ModifyingFactors, Severity.)      Tatum Jewell is a 77 y.o. male who presents with pain to the Right big toe, with some swelling to the foot, no trauma that he recalls, he has not taken anything for pain control. No fevers. Pain getting worse over the past 4-5 days. No previous h/o gout or inflammatory joint diseases. Patient is on xarelto for a h/o atrial flutter. PAST MEDICAL / SURGICAL / SOCIAL / FAMILY HISTORY      has a past medical history of Achilles tendon tear, Allergic rhinitis, Atrial flutter (HCC), Congestive heart failure ([address] Coronary atherosclerosis, Hyperglycemia, Hyperlipidemia, Hypertension, Mitral insufficiency, Obesity, Osteoarthritis, Status post ablation of atrial flutter, and Vasodepressor syncope.     has a past surgical history that includes Mitral valve surgery (8/11/11); Atrial ablation surgery (1998, 2013); knee surgery (Right, 2006); cyst removal; Colonoscopy (02/01/2010); Cataract removal with implant (12/06/2017); pr xcapsl ctrc rmvl insj io lens prosth w/o ecp (Left, 12/6/2017); knee surgery (Left); pr knee scope,med or lat menis repair (Left, 6/21/2018); Colonoscopy (03/25/2019); Colonoscopy (N/A, 3/25/2019); Upper gastrointestinal endoscopy (N/A, 4/2/2019); other surgical history (Left, 06/12/2020); and Vein Surgery (Left, 6/12/2020). Social History     Socioeconomic History    Marital status:      Spouse name: Not on file    Number of children: Not on file    Years of education: Not on file    Highest education level: Not on file   Occupational History    Not on file   Tobacco Use    Smoking status: Former Smoker     Packs/day: 1.50     Years: 20.00     Pack years: 30.00     Types: Cigarettes     Quit date: 1996     Years since quittin.6    Smokeless tobacco: Never Used   Vaping Use    Vaping Use: Never used   Substance and Sexual Activity    Alcohol use: Yes     Alcohol/week: 0.0 standard drinks     Comment: 4 beers a day     Drug use: No    Sexual activity: Not on file   Other Topics Concern    Not on file   Social History Narrative    Not on file     Social Determinants of Health     Financial Resource Strain:     Difficulty of Paying Living Expenses:    Food Insecurity:     Worried About Running Out of Food in the Last Year:     [address] in the Last Year:    Transportation Needs:     Lack of Transportation (Medical):  Lack of Transportation (Non-Medical):    Physical Activity:     Days of Exercise per Week:     Minutes of Exercise per Session:    Stress:     Feeling of Stress :    Social Connections:     Frequency of Communication with Friends and Family:     Frequency of Social Gatherings with Friends and Family:     Attends Zoroastrianism Services:     Active Member of Clubs or Organizations:     Attends Club or Organization Meetings:     Marital Status:    Intimate Partner Violence:     Fear of Current or Ex-Partner:     Emotionally Abused:     Physically Abused:     Sexually Abused:        Family History   Problem Relation Age of Onset    Heart Disease Mother         sudden cardiac death    Heart Attack Father        Allergies:  Patient has no known allergies. Home Medications:  Prior to Admission medications    Medication Sig Start Date End Date Taking?  Authorizing Provider colchicine (COLCRYS) 0.6 MG tablet Take 1 tablet by mouth 3 times daily (before meals) for 7 days 7/6/21 7/13/21 Yes Kenia Arevalo DO   rosuvastatin (CRESTOR) 40 MG tablet Take 1 tablet by mouth daily 5/10/21  Yes Nahomi Raza MD   lisinopril (PRINIVIL;ZESTRIL) 10 MG tablet 1 po qd 1/4/21  Yes Nahomi Raza MD   metoprolol succinate (TOPROL XL) 100 MG extended release tablet 1 po qd 1/4/21  Yes Nahomi Raza MD   sertraline (ZOLOFT) 50 MG tablet 1 po qd 1/4/21  Yes Nahomi Raza MD   rivaroxaban (XARELTO) 20 MG TABS tablet Take 1 tablet by mouth daily 1/4/21  Yes Nahomi Raza MD   omeprazole (PRILOSEC) 20 MG delayed release capsule Take 1 capsule by mouth every other day 1/4/21  Yes Nahomi Raza MD   mometasone (ELOCON) 0.1 % cream Apply topically daily. 1/4/21   Nahomi Raza MD   albuterol sulfate HFA (PROAIR HFA) 108 (90 Base) MCG/ACT inhaler Inhale 2 puffs into the lungs every 6 hours as needed for Wheezing  Patient not taking: Reported on 2/22/2021 1/4/21   Nahomi Raza MD   Spacer/Aero-Holding Chambers [facility]) MARISEL 1 Device by Does not apply route daily 5/3/18   Nahomi Raza MD       REVIEW OF SYSTEMS    (2-9 systems for level 4, 10 or more for level 5)      Review of Systems   Constitutional: Negative for activity change, appetite change, fatigue and fever. HENT: Negative for congestion, rhinorrhea and sore throat. Respiratory: Negative for cough, shortness of breath and wheezing. Cardiovascular: Negative for chest pain, palpitations and leg swelling. Gastrointestinal: Negative for abdominal distention, constipation, diarrhea, nausea and vomiting. Genitourinary: Negative for decreased urine volume and dysuria. Musculoskeletal: Positive for arthralgias, gait problem, joint swelling and myalgias. Skin: Negative for rash. Neurological: Negative for dizziness, weakness, light-headedness, numbness and headaches.        PHYSICAL EXAM   (up to 7 for level 4, 8 or more for level 5)     INITIAL VITALS:   BP (!) 227/104   Pulse 74   Temp 96.2 °F (35.7 °C)   Resp 18   Ht 5' 11\" (1.803 m)   Wt 260 lb (117.9 kg)   SpO2 96%   BMI 36.26 kg/m²     Physical Exam  Constitutional:       General: He is not in acute distress. Appearance: Normal appearance. He is not ill-appearing. HENT:      Head: Normocephalic and atraumatic. Eyes:      General:         Right eye: No discharge. Left eye: No discharge. Extraocular Movements: Extraocular movements intact. Pupils: Pupils are equal, round, and reactive to light. Cardiovascular:      Rate and Rhythm: Normal rate. Pulmonary:      Effort: Pulmonary effort is normal. No respiratory distress. Musculoskeletal:      Right lower leg: No edema. Left lower leg: No edema. Comments: Swelling and redness noted to the base of the right big toe. Some erythema and significant tenderness to palpation. There is additional edema over the dorsum of the foot but no extending redness. 2+ pedal pulses palpated capillary refill of less than 2 seconds. Patient is able to plantarflex and dorsiflex. But does have pain with movement of the big toe. No calf tenderness to palpation   Neurological:      General: No focal deficit present. Mental Status: He is alert and oriented to person, place, and time. DIFFERENTIAL  DIAGNOSIS     Patient with podagra. No history of gout in the past.  Differential diagnosis includes possible trauma versus septic joint versus gout. We will plan to treat with colchicine he does have basic labs done in the past that did not show any kidney dysfunction. We will do colchicine 1.6 mg followed by 0.6 mg dose and reassess. X-ray imaging to rule out fracture as patient does not recall a trauma trauma but could be a possibility. Patient does have outpatient follow-up with his primary care doctor in 1 week.   Low concern for infectious etiology as patient is well-appearing, no fevers    PLAN (LABS / IMAGING / EKG):  Orders Placed This Encounter   Procedures    XR FOOT RIGHT (MIN 3 VIEWS)       MEDICATIONS ORDERED:  Orders Placed This Encounter   Medications    colchicine (COLCRYS) tablet 1.2 mg    acetaminophen (TYLENOL) tablet 1,000 mg    colchicine (COLCRYS) tablet 0.6 mg    colchicine (COLCRYS) 0.6 MG tablet     Sig: Take 1 tablet by mouth 3 times daily (before meals) for 7 days     Dispense:  21 tablet     Refill:  0       DIAGNOSTIC RESULTS / EMERGENCY DEPARTMENT COURSE / MDM     LABS:  No results found for this visit on 07/06/21. IMPRESSION: Gout    RADIOLOGY:  XR FOOT RIGHT (MIN 3 VIEWS)   Preliminary Result   Mild medial soft tissue swelling. No acute osseous abnormality. EMERGENCY DEPARTMENT COURSE:  Patient with pain relief down to 4 out of 10 after initial colchicine his second dose was given. He will be given a prescription for a week. He has follow-up with his PCP in 1 week. Return precautions discussed no concern for septic joint. BP did improve with pain control. Last BP was recorded as 187/85. Plan for discharge which was discussed with patient who is agreeable. FINAL IMPRESSION      1.  Podagra          DISPOSITION / PLAN     DISPOSITION Decision To Discharge 07/06/2021 10:04:25 AM      PATIENT REFERRED TO:  Joy Camargo MD  [facility] [address]  [address]  [place]     On 7/13/2021  for your scheduled appointment with Dr. Butt Stagers:  New Prescriptions    COLCHICINE (COLCRYS) 0.6 MG TABLET    Take 1 tablet by mouth 3 times daily (before meals) for 7 days       Malcolm Ba DO  10:10 AM    Attending Emergency Physician  [facility] ED    (Please note that portions of this note were completed with a voice recognition program.  Colby Coto made to edit the dictations but occasionally words are mis-transcribed.)              Jesus Corado,   07/06/21 1010

## 2021-07-08 ENCOUNTER — HOSPITAL ENCOUNTER (OUTPATIENT)
Age: 66
Discharge: HOME | End: 2021-07-08
Payer: MEDICARE

## 2021-07-08 DIAGNOSIS — N40.0 BENIGN PROSTATIC HYPERPLASIA, UNSPECIFIED WHETHER LOWER URINARY TRACT SYMPTOMS PRESENT: ICD-10-CM

## 2021-07-08 DIAGNOSIS — R73.9 HYPERGLYCEMIA: ICD-10-CM

## 2021-07-08 DIAGNOSIS — I10 ESSENTIAL HYPERTENSION: ICD-10-CM

## 2021-07-08 DIAGNOSIS — Z12.5 SCREENING FOR PROSTATE CANCER: ICD-10-CM

## 2021-07-08 DIAGNOSIS — D75.89 MACROCYTOSIS: ICD-10-CM

## 2021-07-08 DIAGNOSIS — E78.5 HYPERLIPIDEMIA, UNSPECIFIED HYPERLIPIDEMIA TYPE: ICD-10-CM

## 2021-07-08 LAB
ABSOLUTE EOS #: 0.21 K/UL (ref 0–0.44)
ABSOLUTE IMMATURE GRANULOCYTE: <0.03 K/UL (ref 0–0.3)
ABSOLUTE LYMPH #: 1.1 K/UL (ref 1.1–3.7)
ABSOLUTE MONO #: 0.72 K/UL (ref 0.1–1.2)
ALT SERPL-CCNC: 15 U/L (ref 5–41)
ANION GAP SERPL CALCULATED.3IONS-SCNC: 13 MMOL/L (ref 9–17)
AST SERPL-CCNC: 21 U/L
BASOPHILS # BLD: 0 % (ref 0–2)
BASOPHILS ABSOLUTE: <0.03 K/UL (ref 0–0.2)
BUN BLDV-MCNC: 7 MG/DL (ref 8–23)
BUN/CREAT BLD: 13 (ref 9–20)
CALCIUM SERPL-MCNC: 9 MG/DL (ref 8.6–10.4)
CHLORIDE BLD-SCNC: 99 MMOL/L (ref 98–107)
CHOLESTEROL/HDL RATIO: 2.8
CHOLESTEROL: 132 MG/DL
CO2: 23 MMOL/L (ref 20–31)
CREAT SERPL-MCNC: 0.55 MG/DL (ref 0.7–1.2)
DIFFERENTIAL TYPE: ABNORMAL
EOSINOPHILS RELATIVE PERCENT: 3 % (ref 1–4)
ESTIMATED AVERAGE GLUCOSE: 103 MG/DL
GFR AFRICAN AMERICAN: >60 ML/MIN
GFR NON-AFRICAN AMERICAN: >60 ML/MIN
GFR SERPL CREATININE-BSD FRML MDRD: ABNORMAL ML/MIN/{1.73_M2}
GFR SERPL CREATININE-BSD FRML MDRD: ABNORMAL ML/MIN/{1.73_M2}
GLUCOSE BLD-MCNC: 94 MG/DL (ref 70–99)
HBA1C MFR BLD: 5.2 % (ref 4–6)
HCT VFR BLD CALC: 39.8 % (ref 40.7–50.3)
HDLC SERPL-MCNC: 47 MG/DL
HEMOGLOBIN: 13.3 G/DL (ref 13–17)
HIGH SENSITIVE C-REACTIVE PROTEIN: 34.6 MG/L
IMMATURE GRANULOCYTES: 0 %
LDL CHOLESTEROL: 72 MG/DL (ref 0–130)
LYMPHOCYTES # BLD: 18 % (ref 24–43)
MCH RBC QN AUTO: 33.8 PG (ref 25.2–33.5)
MCHC RBC AUTO-ENTMCNC: 33.4 G/DL (ref 28.4–34.8)
MCV RBC AUTO: 101.3 FL (ref 82.6–102.9)
MONOCYTES # BLD: 12 % (ref 3–12)
NRBC AUTOMATED: 0 PER 100 WBC
PDW BLD-RTO: 13 % (ref 11.8–14.4)
PLATELET # BLD: 176 K/UL (ref 138–453)
PLATELET ESTIMATE: ABNORMAL
PMV BLD AUTO: 10.2 FL (ref 8.1–13.5)
POTASSIUM SERPL-SCNC: 3.7 MMOL/L (ref 3.7–5.3)
PROSTATE SPECIFIC ANTIGEN: 1.81 UG/L
RBC # BLD: 3.93 M/UL (ref 4.21–5.77)
RBC # BLD: ABNORMAL 10*6/UL
SEG NEUTROPHILS: 67 % (ref 36–65)
SEGMENTED NEUTROPHILS ABSOLUTE COUNT: 4.14 K/UL (ref 1.5–8.1)
SODIUM BLD-SCNC: 135 MMOL/L (ref 135–144)
TRIGL SERPL-MCNC: 63 MG/DL
TSH SERPL DL<=0.05 MIU/L-ACNC: 1.8 MIU/L (ref 0.3–5)
VLDLC SERPL CALC-MCNC: NORMAL MG/DL (ref 1–30)
WBC # BLD: 6.2 K/UL (ref 3.5–11.3)
WBC # BLD: ABNORMAL 10*3/UL

## 2021-07-08 PROCEDURE — 83036 HEMOGLOBIN GLYCOSYLATED A1C: CPT

## 2021-07-08 PROCEDURE — 85025 COMPLETE CBC W/AUTO DIFF WBC: CPT

## 2021-07-08 PROCEDURE — 82746 ASSAY OF FOLIC ACID SERUM: CPT

## 2021-07-08 PROCEDURE — 84460 ALANINE AMINO (ALT) (SGPT): CPT

## 2021-07-08 PROCEDURE — 82607 VITAMIN B-12: CPT

## 2021-07-08 PROCEDURE — 84450 TRANSFERASE (AST) (SGOT): CPT

## 2021-07-08 PROCEDURE — 80061 LIPID PANEL: CPT

## 2021-07-08 PROCEDURE — 86141 C-REACTIVE PROTEIN HS: CPT

## 2021-07-08 PROCEDURE — 84443 ASSAY THYROID STIM HORMONE: CPT

## 2021-07-08 PROCEDURE — G0103 PSA SCREENING: HCPCS

## 2021-07-08 PROCEDURE — 36415 COLL VENOUS BLD VENIPUNCTURE: CPT

## 2021-07-08 PROCEDURE — 80048 BASIC METABOLIC PNL TOTAL CA: CPT

## 2021-07-09 LAB
FOLATE: 13 NG/ML
VITAMIN B-12: 296 PG/ML (ref 232–1245)

## 2021-07-13 ENCOUNTER — OFFICE VISIT (OUTPATIENT)
Dept: FAMILY MEDICINE CLINIC | Age: 66
End: 2021-07-13
Payer: MEDICARE

## 2021-07-13 VITALS
HEIGHT: 71 IN | OXYGEN SATURATION: 96 % | BODY MASS INDEX: 37.55 KG/M2 | WEIGHT: 268.2 LBS | SYSTOLIC BLOOD PRESSURE: 140 MMHG | DIASTOLIC BLOOD PRESSURE: 82 MMHG

## 2021-07-13 DIAGNOSIS — R73.9 HYPERGLYCEMIA: ICD-10-CM

## 2021-07-13 DIAGNOSIS — I48.20 CHRONIC A-FIB (HCC): ICD-10-CM

## 2021-07-13 DIAGNOSIS — E78.5 HYPERLIPIDEMIA, UNSPECIFIED HYPERLIPIDEMIA TYPE: ICD-10-CM

## 2021-07-13 DIAGNOSIS — Z12.5 SCREENING FOR PROSTATE CANCER: ICD-10-CM

## 2021-07-13 DIAGNOSIS — M10.9 ACUTE GOUT INVOLVING TOE OF RIGHT FOOT, UNSPECIFIED CAUSE: ICD-10-CM

## 2021-07-13 DIAGNOSIS — J30.9 ALLERGIC RHINITIS, UNSPECIFIED SEASONALITY, UNSPECIFIED TRIGGER: Primary | ICD-10-CM

## 2021-07-13 DIAGNOSIS — J45.20 MILD INTERMITTENT ASTHMA WITHOUT COMPLICATION: ICD-10-CM

## 2021-07-13 DIAGNOSIS — I10 ESSENTIAL HYPERTENSION: ICD-10-CM

## 2021-07-13 DIAGNOSIS — I25.10 ASHD (ARTERIOSCLEROTIC HEART DISEASE): ICD-10-CM

## 2021-07-13 PROCEDURE — 1123F ACP DISCUSS/DSCN MKR DOCD: CPT | Performed by: FAMILY MEDICINE

## 2021-07-13 PROCEDURE — 3017F COLORECTAL CA SCREEN DOC REV: CPT | Performed by: FAMILY MEDICINE

## 2021-07-13 PROCEDURE — G8427 DOCREV CUR MEDS BY ELIG CLIN: HCPCS | Performed by: FAMILY MEDICINE

## 2021-07-13 PROCEDURE — 4040F PNEUMOC VAC/ADMIN/RCVD: CPT | Performed by: FAMILY MEDICINE

## 2021-07-13 PROCEDURE — 1036F TOBACCO NON-USER: CPT | Performed by: FAMILY MEDICINE

## 2021-07-13 PROCEDURE — 99211 OFF/OP EST MAY X REQ PHY/QHP: CPT | Performed by: FAMILY MEDICINE

## 2021-07-13 PROCEDURE — G8417 CALC BMI ABV UP PARAM F/U: HCPCS | Performed by: FAMILY MEDICINE

## 2021-07-13 PROCEDURE — 99214 OFFICE O/P EST MOD 30 MIN: CPT | Performed by: FAMILY MEDICINE

## 2021-07-13 RX ORDER — COLCHICINE 0.6 MG/1
0.6 TABLET ORAL
Qty: 21 TABLET | Refills: 0 | Status: SHIPPED | OUTPATIENT
Start: 2021-07-13 | End: 2022-06-14 | Stop reason: ALTCHOICE

## 2021-07-13 RX ORDER — ALBUTEROL SULFATE 90 UG/1
2 AEROSOL, METERED RESPIRATORY (INHALATION) EVERY 6 HOURS PRN
Qty: 3 INHALER | Refills: 0 | Status: SHIPPED | OUTPATIENT
Start: 2021-07-13 | End: 2022-10-21

## 2021-07-13 NOTE — PATIENT INSTRUCTIONS
PLAN:  We discuss his recent trip to the ER for gout in his R great toe. They treated him with Colchicine (take before each meal, as pain subsides taper off the medication but keep around incase of flare ups)  I educate him that foods such as Beef, sea food and alcohol are all common triggers for a gout flare up. We discuss how he wakes up gasping when using AC, I recommend he start by cleaning out the filter to ensure the [facility] unit is not spreading bad bacteria in the air. I highly recommend that when using his inhaler he use the spacer along with it so that the medication gets to his lungs instead of collecting in the back of his throat which can cause thrush. I review his labs and his CRP shows inflammatory markers which can be explained due to the gout. I would like him to call the office if he has another flare up. I will see him back in 4 month with labs prior      SURVEY:    You may be receiving a survey from [facility] regarding your visit today. Please complete the survey to enable us to provide the highest quality of care to you and your family. If you cannot score us a very good on any question, please call the office to discuss how we could of made your experience a very good one. Thank you.

## 2021-07-13 NOTE — PROGRESS NOTES
I, Chinyere Bartlett [facility], am scribing for and in the presence of Dr. Sudeep Crisostomo. 7/13/21/8:55/CRF    [address] [address]  [place] [id number]  Dept: [phone]    Frances Oh is a 77 y.o. male here for Follow-up, Hyperlipidemia, Hypertension, and Hyperglycemia  Pt was in ER last week due to Gout. Er would like you to advise pt when to take Crestor he is currently taking at bedtime  Pt has no other concerns     HPI:  HYPERLIPIDEMIA   Medication compliance: compliant all of the time. Patient is following a low fat, low cholesterol diet. He is not exercising regularly.      HYPERGLYCEMIA:  Diet compliance: compliant most of the time  Current exercise: no regular exercise, d/t L knee pain     HYPERTENSION:  Medication compliance: compliant all of the time. Patient is controlled by the following drug category: Beta Blocker  Medication Therapy: lisinopril metoprolol (Lopressor, Toprol-XL)  He is not exercising. He is adherent to a low-sodium diet.    Blood pressure is not being monitored at home. Patient reports that He has limited alcohol intake. Does the patient have sleep apnea? No  Prior to Admission medications    Medication Sig Start Date End Date Taking?  Authorizing Provider   albuterol sulfate HFA (PROAIR HFA) 108 (90 Base) MCG/ACT inhaler Inhale 2 puffs into the lungs every 6 hours as needed for Wheezing 7/13/21  Yes Sudeep Crisostomo MD   colchicine (COLCRYS) 0.6 MG tablet Take 1 tablet by mouth 3 times daily (before meals) for 7 days 7/13/21 7/20/21 Yes Sudeep Crisostomo MD   rosuvastatin (CRESTOR) 40 MG tablet Take 1 tablet by mouth daily 5/10/21  Yes Sudeep Crisostomo MD   lisinopril (PRINIVIL;ZESTRIL) 10 MG tablet 1 po qd 1/4/21  Yes Sudeep Crisostomo MD   metoprolol succinate (TOPROL XL) 100 MG extended release tablet 1 po qd 1/4/21  Yes Sudeep Crisostomo MD   sertraline (ZOLOFT) 50 MG tablet 1 po qd 1/4/21  Yes Sudeep Crisostomo MD   rivaroxaban (XARELTO) 20 MG TABS tablet Take 1 tablet by mouth daily 1/4/21  Yes Sukhjinder Up MD   mometasone (ELOCON) 0.1 % cream Apply topically daily. 1/4/21  Yes Sukhjinder Up MD   omeprazole (PRILOSEC) 20 MG delayed release capsule Take 1 capsule by mouth every other day 1/4/21  Yes Sukhjinder Up MD   Spacer/Aero-Holding Chambers [facility]) MARISEL 1 Device by Does not apply route daily 5/3/18  Yes Sukhjinder Up MD       ROS:  General Constitutional: Denies chills. Denies fever. Denies headache. Denies lightheadedness. Ophthalmologic: Denies blurred vision. ENT: Denies nasal congestion. Denies sore throat. Denies ear pain and pressure. Respiratory: Denies cough. Denies shortness of breath. Denies wheezing. Cardiovascular: Denies chest pain at rest. Denies irregular heartbeat. Denies palpitations. Gastrointestinal: Denies abdominal pain. Denies blood in the stool. Denies constipation. Denies diarrhea. Denies nausea. Denies vomiting. Genitourinary: Denies blood in the urine. Denies difficulty urinating. Denies frequent urination. Denies painful urination. Denies urinary incontinence. Musculoskeletal: Denies muscle aches. Denies painful joints. Denies swollen joints. Admits painful toe from gout   Peripheral Vascular: Denies pain/cramping in legs after exertion. Skin: Denies dry skin. Denies itching. Denies rash. Neurologic: Denies falls. Denies dizziness. Denies fainting. Denies tingling/numbness. Psychiatric: Denies sleep disturbance. Denies anxiety. Denies depressed mood.     Past Surgical History:   Procedure Laterality Date    ATRIAL ABLATION SURGERY  1998, 2013    [facility] AV node    CATARACT REMOVAL WITH IMPLANT  12/06/2017    Dr. Yoo Parker  02/01/2010    pratima, negative    COLONOSCOPY  03/25/2019    Dr. Shukri Blakely rectal bx(hyperplastic mucosa with evidence of ischemic injury,angiodysplasia no active bleeding,divetrticulosis,hemorroids    COLONOSCOPY N/A 3/25/2019    COLONOSCOPY POLYPECTOMY SNARE/COLD BIOPSY performed by Gris Villalta MD at [address] [place] [address]      right side of face    KNEE SURGERY Right 2006    medial meniscus arthroscopy, Talisha Paradahold    KNEE SURGERY Left     MITRAL VALVE SURGERY  11    34 mm ring annuloplasty    OTHER SURGICAL HISTORY Left 2020    LASER ABLATION GREATER SAPHENOUIS VEIN WITH PHLEBECTOMY - EVOLV     [place] KNEE SCOPE,MED OR LAT MENIS REPAIR Left 2018    KNEE ARTHROSCOPY-PARTIAL MEDIAL MENISCECTOMY AND CHONDROPLASTY performed by Nuno Gao MD at [facility] RML INSJ IO LENS PROSTH W/O ECP Left 2017    EYE CATARACT EMULSIFICATION IOL IMPLANT performed by Yasmine Watkins DO at [facility] N/A 2019    -hiatal hernia,grade 3 reflux esophagitis,antral gastritis, erosive bulbar duodenitis    VEIN SURGERY Left 2020    LASER ABLATION GREATER SAPHENOUIS VEIN WITH PHLEBECTOMY Laurence Swapnil CONF# [id number] - [place]) performed by Marla Carver MD at [address] History   Problem Relation Age of Onset    Heart Disease Mother         sudden cardiac death    Heart Attack Father        Past Medical History:   Diagnosis Date    Achilles tendon tear     Allergic rhinitis     Atrial flutter ([address] 1996    Congestive heart failure ([address]     Coronary atherosclerosis     Hyperglycemia 2007    Hyperlipidemia 1996    Hypertension 2004    Mitral insufficiency     Obesity 2006    Osteoarthritis     Status post ablation of atrial flutter 13    Vasodepressor syncope       Social History     Tobacco Use    Smoking status: Former Smoker     Packs/day: 1.50     Years: 20.00     Pack years: 30.00     Types: Cigarettes     Quit date: 1996     Years since quittin.6    Smokeless tobacco: Never Used   Substance Use Topics    Alcohol use:  Yes     Alcohol/week: 0.0 standard drinks     Comment: 4 beers a day       Current Outpatient Medications Medication Sig Dispense Refill    albuterol sulfate HFA (PROAIR HFA) 108 (90 Base) MCG/ACT inhaler Inhale 2 puffs into the lungs every 6 hours as needed for Wheezing 3 Inhaler 0    colchicine (COLCRYS) 0.6 MG tablet Take 1 tablet by mouth 3 times daily (before meals) for 7 days 21 tablet 0    rosuvastatin (CRESTOR) 40 MG tablet Take 1 tablet by mouth daily 90 tablet 3    lisinopril (PRINIVIL;ZESTRIL) 10 MG tablet 1 po qd 90 tablet 3    metoprolol succinate (TOPROL XL) 100 MG extended release tablet 1 po qd 90 tablet 3    sertraline (ZOLOFT) 50 MG tablet 1 po qd 90 tablet 3    rivaroxaban (XARELTO) 20 MG TABS tablet Take 1 tablet by mouth daily 90 tablet 3    mometasone (ELOCON) 0.1 % cream Apply topically daily. 30 g 3    omeprazole (PRILOSEC) 20 MG delayed release capsule Take 1 capsule by mouth every other day 90 capsule 3    Spacer/Aero-Holding Chambers ([facility] MARISEL 1 Device by Does not apply route daily 1 Device 0     No current facility-administered medications for this visit. No Known Allergies    PHYSICAL EXAM:    BP (!) 140/82   Ht 5' 11\" (1.803 m)   Wt 268 lb 3.2 oz (121.7 kg)   SpO2 96%   BMI 37.41 kg/m²   Wt Readings from Last 3 Encounters:   07/13/21 268 lb 3.2 oz (121.7 kg)   07/06/21 260 lb (117.9 kg)   02/22/21 270 lb 9.6 oz (122.7 kg)     BP Readings from Last 3 Encounters:   07/13/21 (!) 140/82   07/06/21 (!) 187/85   02/22/21 (!) 158/100       General Appearance: in no acute distress, well developed, well nourished. Eyes: pupils equal, round reactive to light and accommodation. Ears: normal canal and TM's. Nose: nares patent, no lesions. Oral Cavity: mucosa moist.  Throat: clear. Neck/Thyroid: neck supple, full range of motion, no cervical lymphadenopathy, no thyromegaly or carotid bruits. Skin: warm and dry. No suspicious lesions. Heart: regular rate and rhythm. No murmurs. S1, S2 normal, no gallops.  Regular / irregular rate 80  Lungs: clear to auscultation bilaterally. Abdomen: bowel sounds present, soft, nontender, nondistended, no masses or organomegaly. Round obese  Musculoskeletal: normal, full range of motion in knees and hips, no swelling or tenderness. Extremities: no cyanosis or edema. R great MTP swollen with erythematous   Peripheral Pulses: 2+ throughout, symetric. Neurologic: nonfocal, motor strength normal upper and lower extremities, sensory exam intact. Psych: normal affect, speech fluent. ASSESSMENT:   Diagnosis Orders   1. Allergic rhinitis, unspecified seasonality, unspecified trigger  albuterol sulfate HFA (PROAIR HFA) 108 (90 Base) MCG/ACT inhaler   2. Acute gout involving toe of right foot, unspecified cause  colchicine (COLCRYS) 0.6 MG tablet    Uric Acid    CBC Auto Differential    Vitamin B12    Folate    Hemoglobin A1C    CRP,High Sensitivity    Basic Metabolic Panel    ALT    AST    Lipid Panel   3. Hyperglycemia  Uric Acid    CBC Auto Differential    Vitamin B12    Folate    Hemoglobin A1C    CRP,High Sensitivity    Basic Metabolic Panel    ALT    AST    Lipid Panel   4. Hyperlipidemia, unspecified hyperlipidemia type  Uric Acid    CBC Auto Differential    Vitamin B12    Folate    Hemoglobin A1C    CRP,High Sensitivity    Basic Metabolic Panel    ALT    AST    Lipid Panel   5. Essential hypertension  Uric Acid    CBC Auto Differential    Vitamin B12    Folate    Hemoglobin A1C    CRP,High Sensitivity    Basic Metabolic Panel    ALT    AST    Lipid Panel   6. Screening for prostate cancer     7. ASHD (arteriosclerotic heart disease)     8. Chronic a-fib ([address]     9. Mild intermittent asthma without complication         PLAN:  We discuss his recent trip to the ER for gout in his R great toe. They treated him with Colchicine (take before each meal, as pain subsides taper off the medication but keep around incase of flare ups)  I educate him that foods such as Beef, sea food and alcohol are all common triggers for a gout flare up.     We discuss how he wakes up gasping when using AC, I recommend he start by cleaning out the filter to ensure the [facility] unit is not spreading bad bacteria in the air. I highly recommend that when using his inhaler he use the spacer along with it so that the medication gets to his lungs instead of collecting in the back of his throat which can cause thrush. I review his labs and his CRP shows inflammatory markers which can be explained due to the gout.      I will see him back in 4 month with labs prior     Orders Placed This Encounter   Procedures    Uric Acid     Standing Status:   Future     Standing Expiration Date:   7/13/2022    CBC Auto Differential     Standing Status:   Future     Standing Expiration Date:   7/13/2022    Vitamin B12     Standing Status:   Future     Standing Expiration Date:   7/13/2022    Folate     Standing Status:   Future     Standing Expiration Date:   7/13/2022    Hemoglobin A1C     Standing Status:   Future     Standing Expiration Date:   7/13/2022   [facility] CRP,High Sensitivity     Standing Status:   Future     Standing Expiration Date:   7/13/2022    Basic Metabolic Panel     Standing Status:   Future     Standing Expiration Date:   7/13/2022    ALT     Standing Status:   Future     Standing Expiration Date:   7/13/2022    AST     Standing Status:   Future     Standing Expiration Date:   7/13/2022    Lipid Panel     Standing Status:   Future     Standing Expiration Date:   7/13/2022     Order Specific Question:   Is Patient Fasting?/# of Hours     Answer:   0     Orders Placed This Encounter   Medications    albuterol sulfate HFA (PROAIR HFA) 108 (90 Base) MCG/ACT inhaler     Sig: Inhale 2 puffs into the lungs every 6 hours as needed for Wheezing     Dispense:  3 Inhaler     Refill:  0    colchicine (COLCRYS) 0.6 MG tablet     Sig: Take 1 tablet by mouth 3 times daily (before meals) for 7 days     Dispense:  21 tablet     Refill:  0   IDr. Chinedu, personally performed the services described in this documentation as scribed by ADRIENNE Hdz in my presence, and is both accurate and complete.

## 2021-10-21 PROBLEM — H25.811 COMBINED FORMS OF AGE-RELATED CATARACT OF RIGHT EYE: Chronic | Status: ACTIVE | Noted: 2021-10-21

## 2021-10-21 NOTE — H&P
Viral Schuler is a 77y.o. year old who presents for elective outpatient ophthalmic surgery with Maddy Espinal DO. The patient complains of decreased vision, glare and halos around lights, and trouble with vision for activities of daily living.       Past Medical History:   Diagnosis Date    Achilles tendon tear 2013    Allergic rhinitis     Atrial flutter ([address] 1996    Congestive heart failure ([address]     Coronary atherosclerosis     Hyperglycemia 2007    Hyperlipidemia 1996    Hypertension 2004    Mitral insufficiency     Obesity 2006    Osteoarthritis     Status post ablation of atrial flutter 05/09/13    Vasodepressor syncope 2001       Past Surgical History:   Procedure Laterality Date   [place] [address], 2013    st.vincents AV node    CATARACT REMOVAL WITH IMPLANT  12/06/2017    Dr. Owens Ra COLONOSCOPY  02/01/2010    [place], negative    COLONOSCOPY  03/25/2019    Dr. Sarah Hamilton rectal bx(hyperplastic mucosa with evidence of ischemic injury,angiodysplasia no active bleeding,divetrticulosis,hemorroids    COLONOSCOPY N/A 3/25/2019    COLONOSCOPY POLYPECTOMY SNARE/COLD BIOPSY performed by Ainsley Kline MD at [address]      right side of face    KNEE SURGERY Right 2006    [facility], [facility]    KNEE SURGERY Left     MITRAL VALVE SURGERY  8/11/11    34 mm ring annuloplasty    OTHER SURGICAL HISTORY Left 06/12/2020    LASER ABLATION GREATER SAPHENOUIS VEIN WITH PHLEBECTOMY - EVOLV     HI KNEE SCOPE,MED OR LAT MENIS REPAIR Left 6/21/2018    KNEE ARTHROSCOPY-PARTIAL MEDIAL MENISCECTOMY AND CHONDROPLASTY performed by Hemal Loyd MD at [facility] [place] [facility] INSJ IO LENS PROSTH W/O ECP Left 12/6/2017    EYE CATARACT EMULSIFICATION IOL IMPLANT performed by Maddy Espinal DO at [address] [facility] 4/2/2019    -hiatal hernia,grade 3 reflux esophagitis,antral gastritis, erosive bulbar duodenitis    VEIN SURGERY Left 6/12/2020    LASER ABLATION GREATER SAPHENOUIS VEIN WITH PHLEBECTOMY Karie Code Daya Mendoza CONF# [id number] - Robby Mohan) performed by Chikis Lazo MD at [address] [place] [address] meds:   Prior to Admission medications    Medication Sig Start Date End Date Taking? Authorizing Provider   albuterol sulfate HFA (PROAIR HFA) 108 (90 Base) MCG/ACT inhaler Inhale 2 puffs into the lungs every 6 hours as needed for Wheezing 7/13/21   Valdez Montaño MD   colchicine (COLCRYS) 0.6 MG tablet Take 1 tablet by mouth 3 times daily (before meals) for 7 days 7/13/21 7/20/21  Valdez Montaño MD   rosuvastatin (CRESTOR) 40 MG tablet Take 1 tablet by mouth daily 5/10/21   Valdez Montaño MD   lisinopril (PRINIVIL;ZESTRIL) 10 MG tablet 1 po qd 1/4/21   Valdez Montaño MD   metoprolol succinate (TOPROL XL) 100 MG extended release tablet 1 po qd 1/4/21   Valdez Montaño MD   sertraline (ZOLOFT) 50 MG tablet 1 po qd 1/4/21   Valdez Montaño MD   rivaroxaban (XARELTO) 20 MG TABS tablet Take 1 tablet by mouth daily 1/4/21   Valdez Montaño MD   mometasone (ELOCON) 0.1 % cream Apply topically daily. 1/4/21   Valdez Montaño MD   omeprazole (PRILOSEC) 20 MG delayed release capsule Take 1 capsule by mouth every other day 1/4/21   Valdez Montaño MD   Spacer/Aero-Holding Chambers [facility]) MARISEL 1 Device by Does not apply route daily 5/3/18   Valdez Montaño MD     Scheduled Meds:  Continuous Infusions:  PRN Meds:. No Known Allergies    There were no vitals filed for this visit. PHYSICAL EXAMINATION    Gen: NAD  HEENT: BCVA= 20/40, Glare testing= 20/70, Cataract severity/type-2+ combined Forms of Age Related Cataract-right eye, Other significant ocular findings= none  Pulm: CTA   Heart: RRR, no C/M/R/G  Abd: S/NT/ND  Neuro: no focal defecits    Assessment:   1. Combined Forms of Age Related Cataract-right eye  2. ASA Score-2  3. Mallampati Score-Class 2    Plan:   1.  Risks, benefits, alternatives to surgery discussed with the patient and family. 2. All questions were answered to their satisfaction. 3. Ok to proceed with surgery as planned.     Jabier Aguila, DO, DO

## 2021-10-25 ENCOUNTER — HOSPITAL ENCOUNTER (OUTPATIENT)
Age: 66
Setting detail: OUTPATIENT SURGERY
Discharge: HOME | End: 2021-10-25
Attending: OPHTHALMOLOGY | Admitting: OPHTHALMOLOGY
Payer: MEDICARE

## 2021-10-25 VITALS
TEMPERATURE: 97 F | HEIGHT: 71 IN | OXYGEN SATURATION: 94 % | HEART RATE: 65 BPM | DIASTOLIC BLOOD PRESSURE: 82 MMHG | BODY MASS INDEX: 37.94 KG/M2 | WEIGHT: 271 LBS | SYSTOLIC BLOOD PRESSURE: 185 MMHG | RESPIRATION RATE: 10 BRPM

## 2021-10-25 PROBLEM — H25.811 COMBINED FORMS OF AGE-RELATED CATARACT OF RIGHT EYE: Chronic | Status: RESOLVED | Noted: 2021-10-21 | Resolved: 2021-10-25

## 2021-10-25 PROCEDURE — 2580000003 HC RX 258: Performed by: OPHTHALMOLOGY

## 2021-10-25 PROCEDURE — 7100000011 HC PHASE II RECOVERY - ADDTL 15 MIN: Performed by: OPHTHALMOLOGY

## 2021-10-25 PROCEDURE — 3600000013 HC SURGERY LEVEL 3 ADDTL 15MIN: Performed by: OPHTHALMOLOGY

## 2021-10-25 PROCEDURE — V2632 POST CHMBR INTRAOCULAR LENS: HCPCS | Performed by: OPHTHALMOLOGY

## 2021-10-25 PROCEDURE — 2709999900 HC NON-CHARGEABLE SUPPLY: Performed by: OPHTHALMOLOGY

## 2021-10-25 PROCEDURE — 6370000000 HC RX 637 (ALT 250 FOR IP): Performed by: OPHTHALMOLOGY

## 2021-10-25 PROCEDURE — 99153 MOD SED SAME PHYS/QHP EA: CPT | Performed by: OPHTHALMOLOGY

## 2021-10-25 PROCEDURE — 7100000010 HC PHASE II RECOVERY - FIRST 15 MIN: Performed by: OPHTHALMOLOGY

## 2021-10-25 PROCEDURE — 99152 MOD SED SAME PHYS/QHP 5/>YRS: CPT | Performed by: OPHTHALMOLOGY

## 2021-10-25 PROCEDURE — 3600000003 HC SURGERY LEVEL 3 BASE: Performed by: OPHTHALMOLOGY

## 2021-10-25 PROCEDURE — 2500000003 HC RX 250 WO HCPCS: Performed by: OPHTHALMOLOGY

## 2021-10-25 PROCEDURE — 6360000002 HC RX W HCPCS: Performed by: OPHTHALMOLOGY

## 2021-10-25 DEVICE — LENS INTRAOCULAR BCNVX 19+ DIOPT 6X12.5 MM ACRYL ENVISTA: Type: IMPLANTABLE DEVICE | Site: EYE | Status: FUNCTIONAL

## 2021-10-25 RX ORDER — LABETALOL HYDROCHLORIDE 5 MG/ML
10 INJECTION, SOLUTION INTRAVENOUS ONCE
Status: COMPLETED | OUTPATIENT
Start: 2021-10-25 | End: 2021-10-25

## 2021-10-25 RX ORDER — SODIUM CHLORIDE 0.9 % (FLUSH) 0.9 %
10 SYRINGE (ML) INJECTION PRN
Status: DISCONTINUED | OUTPATIENT
Start: 2021-10-25 | End: 2021-10-25 | Stop reason: HOSPADM

## 2021-10-25 RX ORDER — SODIUM CHLORIDE 9 MG/ML
25 INJECTION, SOLUTION INTRAVENOUS PRN
Status: DISCONTINUED | OUTPATIENT
Start: 2021-10-25 | End: 2021-10-25 | Stop reason: HOSPADM

## 2021-10-25 RX ORDER — TETRACAINE HYDROCHLORIDE 5 MG/ML
1 SOLUTION OPHTHALMIC SEE ADMIN INSTRUCTIONS
Status: DISCONTINUED | OUTPATIENT
Start: 2021-10-25 | End: 2021-10-25 | Stop reason: HOSPADM

## 2021-10-25 RX ORDER — MIDAZOLAM HYDROCHLORIDE 1 MG/ML
INJECTION INTRAMUSCULAR; INTRAVENOUS PRN
Status: DISCONTINUED | OUTPATIENT
Start: 2021-10-25 | End: 2021-10-25 | Stop reason: ALTCHOICE

## 2021-10-25 RX ORDER — SODIUM CHLORIDE 0.9 % (FLUSH) 0.9 %
10 SYRINGE (ML) INJECTION EVERY 12 HOURS SCHEDULED
Status: DISCONTINUED | OUTPATIENT
Start: 2021-10-25 | End: 2021-10-25 | Stop reason: HOSPADM

## 2021-10-25 RX ORDER — FENTANYL CITRATE 50 UG/ML
INJECTION, SOLUTION INTRAMUSCULAR; INTRAVENOUS PRN
Status: DISCONTINUED | OUTPATIENT
Start: 2021-10-25 | End: 2021-10-25 | Stop reason: ALTCHOICE

## 2021-10-25 RX ORDER — TETRACAINE HYDROCHLORIDE 5 MG/ML
SOLUTION OPHTHALMIC PRN
Status: DISCONTINUED | OUTPATIENT
Start: 2021-10-25 | End: 2021-10-25 | Stop reason: ALTCHOICE

## 2021-10-25 RX ORDER — PHENYLEPHRINE HCL 2.5 %
1 DROPS OPHTHALMIC (EYE) SEE ADMIN INSTRUCTIONS
Status: DISCONTINUED | OUTPATIENT
Start: 2021-10-25 | End: 2021-10-25 | Stop reason: HOSPADM

## 2021-10-25 RX ORDER — SODIUM CHLORIDE 0.9 % (FLUSH) 0.9 %
5-40 SYRINGE (ML) INJECTION PRN
Status: DISCONTINUED | OUTPATIENT
Start: 2021-10-25 | End: 2021-10-25 | Stop reason: HOSPADM

## 2021-10-25 RX ORDER — TROPICAMIDE 10 MG/ML
1 SOLUTION/ DROPS OPHTHALMIC SEE ADMIN INSTRUCTIONS
Status: DISCONTINUED | OUTPATIENT
Start: 2021-10-25 | End: 2021-10-25 | Stop reason: HOSPADM

## 2021-10-25 RX ORDER — SODIUM CHLORIDE 9 MG/ML
INJECTION, SOLUTION INTRAVENOUS CONTINUOUS
Status: DISCONTINUED | OUTPATIENT
Start: 2021-10-25 | End: 2021-10-25 | Stop reason: HOSPADM

## 2021-10-25 RX ORDER — PROPARACAINE HYDROCHLORIDE 5 MG/ML
1 SOLUTION/ DROPS OPHTHALMIC SEE ADMIN INSTRUCTIONS
Status: DISCONTINUED | OUTPATIENT
Start: 2021-10-25 | End: 2021-10-25 | Stop reason: HOSPADM

## 2021-10-25 RX ORDER — SODIUM CHLORIDE 0.9 % (FLUSH) 0.9 %
5-40 SYRINGE (ML) INJECTION EVERY 12 HOURS SCHEDULED
Status: DISCONTINUED | OUTPATIENT
Start: 2021-10-25 | End: 2021-10-25 | Stop reason: HOSPADM

## 2021-10-25 RX ORDER — LIDOCAINE HYDROCHLORIDE 10 MG/ML
INJECTION, SOLUTION EPIDURAL; INFILTRATION; INTRACAUDAL; PERINEURAL PRN
Status: DISCONTINUED | OUTPATIENT
Start: 2021-10-25 | End: 2021-10-25 | Stop reason: ALTCHOICE

## 2021-10-25 RX ADMIN — PHENYLEPHRINE HYDROCHLORIDE 1 DROP: 25 SOLUTION/ DROPS OPHTHALMIC at 10:04

## 2021-10-25 RX ADMIN — PROPARACAINE HYDROCHLORIDE 1 DROP: 5 SOLUTION/ DROPS OPHTHALMIC at 10:04

## 2021-10-25 RX ADMIN — TETRACAINE HYDROCHLORIDE 1 DROP: 5 SOLUTION OPHTHALMIC at 10:21

## 2021-10-25 RX ADMIN — TROPICAMIDE 1 DROP: 10 SOLUTION/ DROPS OPHTHALMIC at 09:53

## 2021-10-25 RX ADMIN — SODIUM CHLORIDE: 9 INJECTION, SOLUTION INTRAVENOUS at 10:04

## 2021-10-25 RX ADMIN — LABETALOL HYDROCHLORIDE 10 MG: 5 INJECTION INTRAVENOUS at 11:03

## 2021-10-25 RX ADMIN — LABETALOL HYDROCHLORIDE 10 MG: 5 INJECTION INTRAVENOUS at 10:13

## 2021-10-25 RX ADMIN — PHENYLEPHRINE HYDROCHLORIDE 1 DROP: 25 SOLUTION/ DROPS OPHTHALMIC at 09:53

## 2021-10-25 RX ADMIN — TROPICAMIDE 1 DROP: 10 SOLUTION/ DROPS OPHTHALMIC at 10:04

## 2021-10-25 RX ADMIN — PROPARACAINE HYDROCHLORIDE 1 DROP: 5 SOLUTION/ DROPS OPHTHALMIC at 09:53

## 2021-10-25 ASSESSMENT — PAIN SCALES - GENERAL
PAINLEVEL_OUTOF10: 0

## 2021-10-25 ASSESSMENT — PAIN - FUNCTIONAL ASSESSMENT: PAIN_FUNCTIONAL_ASSESSMENT: 0-10

## 2021-10-25 NOTE — H&P
I have examined the patient and reviewed the history and physical completed on 10/21/21 and find no relevant changes. I have reviewed with the patient and/or family the risks, benefits, and alternatives to the procedure and they have agreed to proceed.     Edinson Morris,   10/25/2021  10:20 AM

## 2021-10-25 NOTE — OP NOTE
OPERATIVE NOTE      Patient:  Kilo Prakash    YOB: 1955    Account #:  [de-identified]    Date:  10/25/2021    Surgeon:  Mitch Morgan. Debbie Sepulveda    Primary Care Physician:Omra Steve MD      Preoperative Diagnosis: Combined Forms of Age Related Cataract- right eye    Postoperative Diagnosis: Same    Procedure: Phacoemulsification with intraocular lens implantation, right eye    Anesthesia: Topical and intracameral anesthesia with intravenous sedation    Complications: none    Specimens: none    Indications for procedure: The patient is a 77y.o. year old with decreased vision, glare and halos around lights, and trouble with activities of daily living. Examination revealed a visually significant cataract in the right eye. Other eye diseases have been ruled out as the primary cause of decreased visual function. Significant improvement is expected in the patient's visual acuity and/or visual function from the removal of the cataract. Risks, benefits, and alternatives to surgery were discussed with the patient and the patient elected to proceed with phacoemulsification with lens implantation. Details of the procedure: Following informed consent, the patient was identified by name and identification tag in the holding area,taken to the operating room and placed in the supine position. A time out was performed by all present in the room to identify the patient, the eye to be operated on, the correct operative procedure, and the correct intraocular lens. Monitoring leads were placed. The patient was positioned. An eyelid prep of half-strength Betadine was performed. The patient was administered intravenous sedation if desired and topical anesthetic was placed in the operative eye. The eye prepped a second time and draped in the usual sterile fashion using aseptic technique for cataract surgery. A lid speculum was then inserted. [place] was placed onto the corneal surface.   A stab incision was made using a disposable blade into the anterior chamber. Intracameral preservative free xylocaine was placed into the anterior chamber. Amvisc was then used to replace the aqueous of the anterior chamber. A 2.2 mm keratome blade was used to make a 3-plane clear corneal incision into cornea depending on the patient's astigmatism. The cystitome was used to make a tear in the anterior capsule. Fine forceps were used to make a complete curvilinear capsulorrhexis. The lens was hydrodissected and freely rotated using a balanced salt filled syringe. The ultrasound handpiece was then used to emulsify the nucleus and epi nucleus. The residual cortex was then aspirated using the IA handpiece. The posterior capsule was polished. The eye was filled with viscoelastic and a foldable posterior chamber intraocular lens was injected into the capsular bag unless otherwise stated in the addendum. Irrigation/aspiration was used to remove all excess viscoelastic. The eye was pressurized and the wounds were check for leaks and none were found. A collagen shield, which had been soaked in antibiotic drops, was then placed onto the cornea. The lid speculum was removed. The eye was covered with a clear plastic shield. The patient was taken to the recovery area in excellent condition, having tolerated the procedure well, and will follow up with me tomorrow for postop care. ADDENDUM:  The phacoemulsification time 27.17 seconds @ 21% average ultrasound power for an effective phacoemulsification time of 5.86 seconds. The lens inserted was a model MX60 made by [employer] that was +19.0 diopters in power. The lens was inserted into the capsular bag utilizing the BLIS-X1 injector made by [employer].  The serial number on this lens was [id number]. There was no stitch placed to close this temporal posterior corneal incision. EBL was less than 1.0 ml. Addie [place].  Librado Fuller DO

## 2021-10-25 NOTE — PROGRESS NOTES
Patient verbalizes readiness for discharge. Discharge instructions given to patient and responsible adult, answered all questions, and verbalized understanding of discharge instructions. Discharge Criteria    Inpatients must meet Criteria 1 through 7. All other patients are either YES or N/A. If a NO is chosen then Anesthesia or Surgeon must be notified. 1.  Minimum 30 minutes after last dose of sedative medication, minimum 120 minutes after last dose of reversal agent. Yes      2. Systolic BP stable within 20 mmHg for 30 minutes & systolic BP between 90 & 123 or within 10 mmHg of baseline. Yes      3. Pulse between 60 and 100 or within 10 bpm of baseline. Yes      4. Spontaneous respiratory rate >/= 10 per minute. Yes      5. SaO2 >/= 95 or  >/= baseline. Yes      6. Able to cough and swallow or return to baseline function. Yes      7. Alert and oriented or return to baseline mental status. Yes      8. Demonstrates controlled, coordinated movements, ambulates with steady gait, or return to baseline activity function. Yes      9. Minimal or no pain or nausea, or at a level tolerable and acceptable to patient. Yes      10. Takes and retains oral fluids as allowed. Yes      11. Procedural / perioperative site stable. Minimal or no bleeding. Yes          12. If GI endoscopy procedure, minimal or no abdominal distention or passing flatus. N/A      13. Written discharge instructions and emergency telephone number provided. Yes      14. Accompanied by a responsible adult.     Yes

## 2021-11-12 ENCOUNTER — HOSPITAL ENCOUNTER (OUTPATIENT)
Age: 66
Discharge: HOME | End: 2021-11-12
Payer: MEDICARE

## 2021-11-12 DIAGNOSIS — M10.9 ACUTE GOUT INVOLVING TOE OF RIGHT FOOT, UNSPECIFIED CAUSE: ICD-10-CM

## 2021-11-12 DIAGNOSIS — E78.5 HYPERLIPIDEMIA, UNSPECIFIED HYPERLIPIDEMIA TYPE: ICD-10-CM

## 2021-11-12 DIAGNOSIS — R73.9 HYPERGLYCEMIA: ICD-10-CM

## 2021-11-12 DIAGNOSIS — I10 ESSENTIAL HYPERTENSION: ICD-10-CM

## 2021-11-12 LAB
ABSOLUTE EOS #: 0.19 K/UL (ref 0–0.44)
ABSOLUTE IMMATURE GRANULOCYTE: <0.03 K/UL (ref 0–0.3)
ABSOLUTE LYMPH #: 1.07 K/UL (ref 1.1–3.7)
ABSOLUTE MONO #: 0.67 K/UL (ref 0.1–1.2)
ALT SERPL-CCNC: 26 U/L (ref 5–41)
ANION GAP SERPL CALCULATED.3IONS-SCNC: 13 MMOL/L (ref 9–17)
AST SERPL-CCNC: 36 U/L
BASOPHILS # BLD: 1 % (ref 0–2)
BASOPHILS ABSOLUTE: 0.03 K/UL (ref 0–0.2)
BUN BLDV-MCNC: 10 MG/DL (ref 8–23)
BUN/CREAT BLD: 18 (ref 9–20)
CALCIUM SERPL-MCNC: 9 MG/DL (ref 8.6–10.4)
CHLORIDE BLD-SCNC: 102 MMOL/L (ref 98–107)
CHOLESTEROL/HDL RATIO: 2.2
CHOLESTEROL: 148 MG/DL
CO2: 22 MMOL/L (ref 20–31)
CREAT SERPL-MCNC: 0.57 MG/DL (ref 0.7–1.2)
DIFFERENTIAL TYPE: ABNORMAL
EOSINOPHILS RELATIVE PERCENT: 3 % (ref 1–4)
ESTIMATED AVERAGE GLUCOSE: 105 MG/DL
FOLATE: 8.6 NG/ML
GFR AFRICAN AMERICAN: >60 ML/MIN
GFR NON-AFRICAN AMERICAN: >60 ML/MIN
GFR SERPL CREATININE-BSD FRML MDRD: ABNORMAL ML/MIN/{1.73_M2}
GFR SERPL CREATININE-BSD FRML MDRD: ABNORMAL ML/MIN/{1.73_M2}
GLUCOSE BLD-MCNC: 95 MG/DL (ref 70–99)
HBA1C MFR BLD: 5.3 % (ref 4–6)
HCT VFR BLD CALC: 41.7 % (ref 40.7–50.3)
HDLC SERPL-MCNC: 66 MG/DL
HEMOGLOBIN: 13.7 G/DL (ref 13–17)
HIGH SENSITIVE C-REACTIVE PROTEIN: 3.5 MG/L
IMMATURE GRANULOCYTES: 0 %
LDL CHOLESTEROL: 69 MG/DL (ref 0–130)
LYMPHOCYTES # BLD: 18 % (ref 24–43)
MCH RBC QN AUTO: 32.9 PG (ref 25.2–33.5)
MCHC RBC AUTO-ENTMCNC: 32.9 G/DL (ref 28.4–34.8)
MCV RBC AUTO: 100.2 FL (ref 82.6–102.9)
MONOCYTES # BLD: 12 % (ref 3–12)
NRBC AUTOMATED: 0 PER 100 WBC
PDW BLD-RTO: 13.8 % (ref 11.8–14.4)
PLATELET # BLD: 157 K/UL (ref 138–453)
PLATELET ESTIMATE: ABNORMAL
PMV BLD AUTO: 10.8 FL (ref 8.1–13.5)
POTASSIUM SERPL-SCNC: 4.9 MMOL/L (ref 3.7–5.3)
PROSTATE SPECIFIC ANTIGEN: 2.03 UG/L
RBC # BLD: 4.16 M/UL (ref 4.21–5.77)
RBC # BLD: ABNORMAL 10*6/UL
SEG NEUTROPHILS: 66 % (ref 36–65)
SEGMENTED NEUTROPHILS ABSOLUTE COUNT: 3.84 K/UL (ref 1.5–8.1)
SODIUM BLD-SCNC: 137 MMOL/L (ref 135–144)
TRIGL SERPL-MCNC: 66 MG/DL
URIC ACID: 5.9 MG/DL (ref 3.4–7)
VITAMIN B-12: 228 PG/ML (ref 232–1245)
VLDLC SERPL CALC-MCNC: NORMAL MG/DL (ref 1–30)
WBC # BLD: 5.8 K/UL (ref 3.5–11.3)
WBC # BLD: ABNORMAL 10*3/UL

## 2021-11-12 PROCEDURE — 82746 ASSAY OF FOLIC ACID SERUM: CPT

## 2021-11-12 PROCEDURE — 84460 ALANINE AMINO (ALT) (SGPT): CPT

## 2021-11-12 PROCEDURE — 84450 TRANSFERASE (AST) (SGOT): CPT

## 2021-11-12 PROCEDURE — 80048 BASIC METABOLIC PNL TOTAL CA: CPT

## 2021-11-12 PROCEDURE — 36415 COLL VENOUS BLD VENIPUNCTURE: CPT

## 2021-11-12 PROCEDURE — 82607 VITAMIN B-12: CPT

## 2021-11-12 PROCEDURE — 86141 C-REACTIVE PROTEIN HS: CPT

## 2021-11-12 PROCEDURE — 85025 COMPLETE CBC W/AUTO DIFF WBC: CPT

## 2021-11-12 PROCEDURE — 83036 HEMOGLOBIN GLYCOSYLATED A1C: CPT

## 2021-11-12 PROCEDURE — 80061 LIPID PANEL: CPT

## 2021-11-12 PROCEDURE — G0103 PSA SCREENING: HCPCS

## 2021-11-12 PROCEDURE — 84550 ASSAY OF BLOOD/URIC ACID: CPT

## 2021-11-16 ENCOUNTER — OFFICE VISIT (OUTPATIENT)
Dept: FAMILY MEDICINE CLINIC | Age: 66
End: 2021-11-16
Payer: MEDICARE

## 2021-11-16 VITALS
SYSTOLIC BLOOD PRESSURE: 152 MMHG | DIASTOLIC BLOOD PRESSURE: 80 MMHG | WEIGHT: 268 LBS | OXYGEN SATURATION: 97 % | HEIGHT: 71 IN | BODY MASS INDEX: 37.52 KG/M2

## 2021-11-16 DIAGNOSIS — E53.8 VITAMIN B12 DEFICIENCY: ICD-10-CM

## 2021-11-16 DIAGNOSIS — E78.5 HYPERLIPIDEMIA, UNSPECIFIED HYPERLIPIDEMIA TYPE: ICD-10-CM

## 2021-11-16 DIAGNOSIS — I25.10 ASHD (ARTERIOSCLEROTIC HEART DISEASE): ICD-10-CM

## 2021-11-16 DIAGNOSIS — J45.20 MILD INTERMITTENT ASTHMA WITHOUT COMPLICATION: ICD-10-CM

## 2021-11-16 DIAGNOSIS — I10 ESSENTIAL HYPERTENSION: ICD-10-CM

## 2021-11-16 DIAGNOSIS — I48.20 CHRONIC A-FIB (HCC): ICD-10-CM

## 2021-11-16 DIAGNOSIS — R73.9 HYPERGLYCEMIA: Primary | ICD-10-CM

## 2021-11-16 PROCEDURE — G8417 CALC BMI ABV UP PARAM F/U: HCPCS | Performed by: FAMILY MEDICINE

## 2021-11-16 PROCEDURE — 4040F PNEUMOC VAC/ADMIN/RCVD: CPT | Performed by: FAMILY MEDICINE

## 2021-11-16 PROCEDURE — 99214 OFFICE O/P EST MOD 30 MIN: CPT | Performed by: FAMILY MEDICINE

## 2021-11-16 PROCEDURE — 3017F COLORECTAL CA SCREEN DOC REV: CPT | Performed by: FAMILY MEDICINE

## 2021-11-16 PROCEDURE — 1036F TOBACCO NON-USER: CPT | Performed by: FAMILY MEDICINE

## 2021-11-16 PROCEDURE — G8482 FLU IMMUNIZE ORDER/ADMIN: HCPCS | Performed by: FAMILY MEDICINE

## 2021-11-16 PROCEDURE — G8427 DOCREV CUR MEDS BY ELIG CLIN: HCPCS | Performed by: FAMILY MEDICINE

## 2021-11-16 PROCEDURE — 99211 OFF/OP EST MAY X REQ PHY/QHP: CPT | Performed by: FAMILY MEDICINE

## 2021-11-16 PROCEDURE — 1123F ACP DISCUSS/DSCN MKR DOCD: CPT | Performed by: FAMILY MEDICINE

## 2021-11-16 RX ORDER — LISINOPRIL 20 MG/1
20 TABLET ORAL DAILY
Qty: 90 TABLET | Refills: 1 | Status: SHIPPED | OUTPATIENT
Start: 2021-11-16 | End: 2022-06-14 | Stop reason: SDUPTHER

## 2021-11-16 NOTE — PATIENT INSTRUCTIONS
PLAN:  I review his recent labs and it appears he has a Vitamin B-12 deficiency. I would like him to start taking Vitamin B-12 supplement 500 mcg. I review that his blood pressure has been noticeably high since his R cataract removal 10/25/21. It is high in office today as well (152/80). I would like him to increase his Lisinopril to 20 mg.        SURVEY:    You may be receiving a survey from [employer] regarding your visit today. Please complete the survey to enable us to provide the highest quality of care to you and your family. If you cannot score us a very good on any question, please call the office to discuss how we could of made your experience a very good one. Thank you.

## 2022-01-31 RX ORDER — RIVAROXABAN 20 MG/1
TABLET, FILM COATED ORAL
Qty: 90 TABLET | Refills: 3 | Status: SHIPPED | OUTPATIENT
Start: 2022-01-31

## 2022-03-17 LAB
ABSOLUTE BASO #: 0 X10E9/L (ref 0–0.2)
ABSOLUTE EOS #: 0.2 X10E9/L (ref 0–0.4)
ABSOLUTE LYMPH #: 1.5 X10E9/L (ref 1–3.5)
ABSOLUTE MONO #: 0.5 X10E9/L (ref 0–0.9)
ABSOLUTE NEUT #: 3.6 X10E9/L (ref 1.5–6.6)
ALT SERPL-CCNC: 21 U/L (ref 0–40)
ANION GAP SERPL CALCULATED.3IONS-SCNC: 10 MMOL/L (ref 5–15)
AST SERPL-CCNC: 23 U/L (ref 0–41)
AVERAGE GLUCOSE: 117 MG/DL
BASOPHILS RELATIVE PERCENT: 0.6 %
BUN BLDV-MCNC: 13 MG/DL (ref 5–27)
CALCIUM SERPL-MCNC: 8.9 MG/DL (ref 8.5–10.5)
CHLORIDE BLD-SCNC: 104 MMOL/L (ref 98–109)
CO2: 27 MMOL/L (ref 22–32)
CREAT SERPL-MCNC: 0.79 MG/DL (ref 0.6–1.3)
EGFR AFRICAN AMERICAN: >60 ML/MIN/1.73SQ.M
EGFR IF NONAFRICAN AMERICAN: >60 ML/MIN/1.73SQ.M
EOSINOPHILS RELATIVE PERCENT: 2.8 %
FOLATE: 7.1 NG/ML
GLUCOSE: 83 MG/DL (ref 65–99)
HBA1C MFR BLD: 5.7 % (ref 4.4–6.4)
HCT VFR BLD CALC: 41.5 % (ref 39–49)
HEMOGLOBIN: 14.1 G/DL (ref 13–17)
HIGH SENSITIVE C-REACTIVE PROTEIN: 0.21 MG/DL (ref 0–0.74)
LYMPHOCYTE %: 25.8 %
MCH RBC QN AUTO: 33 PG (ref 27–34)
MCHC RBC AUTO-ENTMCNC: 33.9 G/DL (ref 32–36)
MCV RBC AUTO: 98 FL (ref 80–100)
MONOCYTES # BLD: 9.4 %
NEUTROPHILS RELATIVE PERCENT: 61.4 %
PDW BLD-RTO: 15.2 % (ref 11.5–15)
PLATELETS: 195 X10E9/L (ref 150–450)
PMV BLD AUTO: 9.9 FL (ref 7–12)
POTASSIUM SERPL-SCNC: 4 MMOL/L (ref 3.5–5)
RBC: 4.26 X10E12/L (ref 4.1–5.7)
SODIUM BLD-SCNC: 141 MMOL/L (ref 134–146)
VITAMIN B-12: 316 PG/ML (ref 180–914)
WBC: 5.8 X10E9/L (ref 4–11)

## 2022-03-18 RX ORDER — METOPROLOL SUCCINATE 100 MG/1
TABLET, EXTENDED RELEASE ORAL
Qty: 90 TABLET | Refills: 3 | Status: SHIPPED | OUTPATIENT
Start: 2022-03-18

## 2022-03-22 ENCOUNTER — OFFICE VISIT (OUTPATIENT)
Dept: FAMILY MEDICINE CLINIC | Age: 67
End: 2022-03-22
Payer: MEDICARE

## 2022-03-22 VITALS
WEIGHT: 272 LBS | SYSTOLIC BLOOD PRESSURE: 166 MMHG | DIASTOLIC BLOOD PRESSURE: 98 MMHG | HEIGHT: 71 IN | BODY MASS INDEX: 38.08 KG/M2

## 2022-03-22 DIAGNOSIS — I48.20 CHRONIC A-FIB (HCC): ICD-10-CM

## 2022-03-22 DIAGNOSIS — G25.0 ESSENTIAL TREMOR: ICD-10-CM

## 2022-03-22 DIAGNOSIS — E78.5 HYPERLIPIDEMIA, UNSPECIFIED HYPERLIPIDEMIA TYPE: ICD-10-CM

## 2022-03-22 DIAGNOSIS — R73.9 HYPERGLYCEMIA: Primary | ICD-10-CM

## 2022-03-22 DIAGNOSIS — E53.8 VITAMIN B12 DEFICIENCY: ICD-10-CM

## 2022-03-22 DIAGNOSIS — G25.2 RESTING TREMOR: ICD-10-CM

## 2022-03-22 DIAGNOSIS — I10 ESSENTIAL HYPERTENSION: ICD-10-CM

## 2022-03-22 DIAGNOSIS — J45.20 MILD INTERMITTENT ASTHMA WITHOUT COMPLICATION: ICD-10-CM

## 2022-03-22 PROCEDURE — 1123F ACP DISCUSS/DSCN MKR DOCD: CPT | Performed by: FAMILY MEDICINE

## 2022-03-22 PROCEDURE — G8427 DOCREV CUR MEDS BY ELIG CLIN: HCPCS | Performed by: FAMILY MEDICINE

## 2022-03-22 PROCEDURE — G8417 CALC BMI ABV UP PARAM F/U: HCPCS | Performed by: FAMILY MEDICINE

## 2022-03-22 PROCEDURE — 3017F COLORECTAL CA SCREEN DOC REV: CPT | Performed by: FAMILY MEDICINE

## 2022-03-22 PROCEDURE — 99211 OFF/OP EST MAY X REQ PHY/QHP: CPT

## 2022-03-22 PROCEDURE — 1036F TOBACCO NON-USER: CPT | Performed by: FAMILY MEDICINE

## 2022-03-22 PROCEDURE — 4040F PNEUMOC VAC/ADMIN/RCVD: CPT | Performed by: FAMILY MEDICINE

## 2022-03-22 PROCEDURE — 99214 OFFICE O/P EST MOD 30 MIN: CPT | Performed by: FAMILY MEDICINE

## 2022-03-22 PROCEDURE — G8482 FLU IMMUNIZE ORDER/ADMIN: HCPCS | Performed by: FAMILY MEDICINE

## 2022-03-22 RX ORDER — LANOLIN ALCOHOL/MO/W.PET/CERES
1000 CREAM (GRAM) TOPICAL DAILY
COMMUNITY

## 2022-03-22 RX ORDER — PRIMIDONE 50 MG/1
TABLET ORAL
Qty: 30 TABLET | Refills: 1 | Status: SHIPPED | OUTPATIENT
Start: 2022-03-22 | End: 2022-05-27 | Stop reason: SDUPTHER

## 2022-03-22 RX ORDER — AMLODIPINE BESYLATE 5 MG/1
5 TABLET ORAL DAILY
Qty: 90 TABLET | Refills: 1 | Status: ON HOLD | OUTPATIENT
Start: 2022-03-22 | End: 2022-04-05 | Stop reason: SDUPTHER

## 2022-03-22 NOTE — PATIENT INSTRUCTIONS
PLAN:  I review his labs and they are the best they have looked in awhile it appears him taking Vitamin B12 has helped him. He denies any cardiac or respiratory troubles. His tremor doesn't bother him unless he is writing. He is interested in trying to control this I will start him on Mysoline 50 mg. I would like him to take half tablet twice a day. His blood pressure is elevated I will add Amlodipine 5 mg (continue taking Lisinopril 20 mg)    He can wean off the Omeprazole as he has not had any trouble with his stomach.      I will see him back in 6 months

## 2022-03-22 NOTE — PROGRESS NOTES
Vandana Bonner [facility], am scribing for and in the presence of Dr. Albino Restrepo. 3/22/22/8:20/CRF    [address] [address]  [place] [id number]  Dept: [phone]    Judie Thornton is a 74 y.o. male here for Follow-up, Hyperlipidemia, Hyperglycemia, and Hypertension      HPI:  HYPERLIPIDEMIA   Medication compliance: compliant all of the time. Patient is following a low fat, low cholesterol diet. He is not exercising regularly.      HYPERGLYCEMIA:  Diet compliance: compliant most of the time  Current exercise: no regular exercise, d/t L knee pain     HYPERTENSION:  Medication compliance: compliant all of the time. Patient is controlled by the following drug category: Beta Blocker  Medication Therapy: lisinopril metoprolol (Lopressor, Toprol-XL)  He is not exercising. He is adherent to a low-sodium diet.    Blood pressure is not being monitored at home. Patient reports that He has limited alcohol intake. Does the patient have sleep apnea? No  Prior to Admission medications    Medication Sig Start Date End Date Taking?  Authorizing Provider   vitamin B-12 (CYANOCOBALAMIN) 1000 MCG tablet Take 1,000 mcg by mouth daily   Yes Historical Provider, MD   primidone (MYSOLINE) 50 MG tablet Take Half tablet twice daily 3/22/22  Yes Albino Restrepo MD   amLODIPine (NORVASC) 5 MG tablet Take 1 tablet by mouth daily 3/22/22  Yes Albino Restrepo MD   metoprolol succinate (TOPROL XL) 100 MG extended release tablet TAKE 1 TABLET DAILY 3/18/22  Yes Albino Restrepo MD   XARELTO 20 MG TABS tablet TAKE 1 TABLET DAILY 1/31/22  Yes Albino Restrepo MD   sertraline (ZOLOFT) 50 MG tablet TAKE 1 TABLET DAILY 1/31/22  Yes Albino Restrepo MD   lisinopril (PRINIVIL;ZESTRIL) 20 MG tablet Take 1 tablet by mouth daily 11/16/21  Yes Albino Restrepo MD   albuterol sulfate HFA (PROAIR HFA) 108 (90 Base) MCG/ACT inhaler Inhale 2 puffs into the lungs every 6 hours as needed for Wheezing 7/13/21  Yes Eloise Goss MD Sophia   rosuvastatin (CRESTOR) 40 MG tablet Take 1 tablet by mouth daily 5/10/21  Yes Andres Claudio MD   omeprazole (PRILOSEC) 20 MG delayed release capsule Take 1 capsule by mouth every other day 1/4/21  Yes Andres Claudio MD   colchicine (COLCRYS) 0.6 MG tablet Take 1 tablet by mouth 3 times daily (before meals) for 7 days 7/13/21 7/20/21  Andres Claudio MD   Spacer/Aero-Holding Chambers [facility]) [address] 1 Device by Does not apply route daily  Patient not taking: Reported on 3/22/2022 5/3/18   Andres Claudio MD       ROS:  General Constitutional: Denies chills. Denies fever. Denies headache. Denies lightheadedness. Ophthalmologic: Denies blurred vision. ENT: Denies nasal congestion. Denies sore throat. Denies ear pain and pressure. Respiratory: Denies cough. Denies shortness of breath. Denies wheezing. Cardiovascular: Denies chest pain at rest. Denies irregular heartbeat. Denies palpitations. Gastrointestinal: Denies abdominal pain. Denies blood in the stool. Denies constipation. Denies diarrhea. Denies nausea. Denies vomiting. Genitourinary: Denies blood in the urine. Denies difficulty urinating. Denies frequent urination. Denies painful urination. Denies urinary incontinence. Musculoskeletal: Denies muscle aches. Denies painful joints. Denies swollen joints. Peripheral Vascular: Denies pain/cramping in legs after exertion. Skin: Denies dry skin. Denies itching. Denies rash. Neurologic: Denies falls. Denies dizziness. Denies fainting. Denies tingling/numbness. Psychiatric: Denies sleep disturbance. Denies anxiety. Denies depressed mood.     Past Surgical History:   Procedure Laterality Date    ATRIAL ABLATION SURGERY  1998, 2013    st.vincents AV node    CATARACT REMOVAL WITH IMPLANT  12/06/2017    Dr. Enoc Hassan Right 10/25/2021    Dr. Roni Daugherty  02/01/2010    pratima, negative    COLONOSCOPY  03/25/2019    Dr. Darlyn Angulo rectal bx(hyperplastic mucosa with evidence of ischemic injury,angiodysplasia no active bleeding,divetrticulosis,hemorroids    COLONOSCOPY N/A 3/25/2019    COLONOSCOPY POLYPECTOMY SNARE/COLD BIOPSY performed by Alexia Betancur MD at [address] [place] [address]      right side of face    INTRACAPSULAR CATARACT EXTRACTION Right 10/25/2021    EYE CATARACT EMULSIFICATION IOL IMPLANT performed by Agnes Sarah DO at [facility] Right 2006    medial [facility]iscus [facility]roscopy, [facility]    KNEE SURGERY Left     MITRAL VALVE SURGERY  8/11/11    34 mm ring annuloplasty    OTHER SURGICAL HISTORY Left 06/12/2020    LASER ABLATION GREATER SAPHENOUIS VEIN WITH PHLEBECTOMY - EVOLV     MA KNEE SCOPE,MED OR LAT MENIS REPAIR Left 6/21/2018    KNEE ARTHROSCOPY-PARTIAL MEDIAL MENISCECTOMY AND [facility]DROPLASTY [facility] W/O ECP Left 12/6/2017    EYE CATARACT EMULSIFICATION IOL IMPLANT performed by Agnes Sarah DO at [address] [place] [address] N/A 4/2/2019    -hiatal hernia,grade 3 reflux esophagitis,antral gastritis, erosive bulbar duodenitis    VEIN SURGERY Left 6/12/2020    LASER ABLATION GREATER SAPHENOUIS VEIN WITH PHLEBECTOMY Hayden Cruz CONF# [id number] - [place]) performed by Akila Greenwood MD at [facility] History   Problem Relation Age of Onset    Heart Disease Mother         sudden cardiac death    Heart Attack Father        Past Medical History:   Diagnosis Date    Achilles tendon tear 2013    Allergic rhinitis     Atrial flutter ([address] 1996    Congestive heart failure ([address]     Coronary atherosclerosis     Hyperglycemia 2007    Hyperlipidemia 1996    Hypertension 2004    Mitral insufficiency     Obesity 2006    Osteoarthritis     Status post ablation of atrial flutter 05/09/13    Vasodepressor syncope 2001      Social History     Tobacco Use    Smoking status: Former Smoker Packs/day: 1.50     Years: 20.00     Pack years: 30.00     Types: Cigarettes     Quit date: 1996     Years since quittin.3    Smokeless tobacco: Never Used   Substance Use Topics    Alcohol use: Yes     Alcohol/week: 0.0 standard drinks     Comment: 4 beers a day       Current Outpatient Medications   Medication Sig Dispense Refill    vitamin B-12 (CYANOCOBALAMIN) 1000 MCG tablet Take 1,000 mcg by mouth daily      primidone (MYSOLINE) 50 MG tablet Take Half tablet twice daily 30 tablet 1    amLODIPine (NORVASC) 5 MG tablet Take 1 tablet by mouth daily 90 tablet 1    metoprolol succinate (TOPROL XL) 100 MG extended release tablet TAKE 1 TABLET DAILY 90 tablet 3    XARELTO 20 MG TABS tablet TAKE 1 TABLET DAILY 90 tablet 3    sertraline (ZOLOFT) 50 MG tablet TAKE 1 TABLET DAILY 90 tablet 3    lisinopril (PRINIVIL;ZESTRIL) 20 MG tablet Take 1 tablet by mouth daily 90 tablet 1    albuterol sulfate HFA (PROAIR HFA) 108 (90 Base) MCG/ACT inhaler Inhale 2 puffs into the lungs every 6 hours as needed for Wheezing 3 Inhaler 0    rosuvastatin (CRESTOR) 40 MG tablet Take 1 tablet by mouth daily 90 tablet 3    omeprazole (PRILOSEC) 20 MG delayed release capsule Take 1 capsule by mouth every other day 90 capsule 3    colchicine (COLCRYS) 0.6 MG tablet Take 1 tablet by mouth 3 times daily (before meals) for 7 days 21 tablet 0    Spacer/Aero-Holding Chambers ([facility] MARISEL 1 Device by Does not apply route daily (Patient not taking: Reported on 3/22/2022) 1 Device 0     No current facility-administered medications for this visit.      No Known Allergies    PHYSICAL EXAM:    BP (!) 166/98   Ht 5' 11\" (1.803 m)   Wt 272 lb (123.4 kg)   BMI 37.94 kg/m²   Wt Readings from Last 3 Encounters:   22 272 lb (123.4 kg)   21 268 lb (121.6 kg)   10/25/21 271 lb (122.9 kg)     BP Readings from Last 3 Encounters:   22 (!) 166/98   21 (!) 152/80   10/25/21 (!) 185/82       General Appearance: in no acute distress, well developed, well nourished. Eyes: pupils equal, round reactive to light and accommodation. Ears: normal canal and TM's. Nose: nares patent, no lesions. Oral Cavity: mucosa moist.  Throat: clear. Neck/Thyroid: neck supple, full range of motion, no cervical lymphadenopathy, no thyromegaly or carotid bruits. Skin: warm and dry. No suspicious lesions. Heart: regular rate and rhythm. No murmurs. S1, S2 normal, no gallops. Irreg/Reg 80   Lungs: clear to auscultation bilaterally. Faint bronchial wheeze cleared with a cough  Abdomen: bowel sounds present, soft, nontender, nondistended, no masses or organomegaly. round  Musculoskeletal: normal, full range of motion in knees and hips, no swelling or tenderness. Extremities: no cyanosis or edema. Intension tremor and resting tremor on the R  Peripheral Pulses: 2+ throughout, symetric. Good pulses in feet  Neurologic: nonfocal, motor strength normal upper and lower extremities, sensory exam intact. Psych: normal affect, speech fluent. ASSESSMENT:   Diagnosis Orders   1. Hyperglycemia  CBC with Auto Differential    ALT    AST    Basic Metabolic Panel    Hemoglobin A1C    Lipid Panel    Vitamin B12   2. Chronic a-fib (HCC)  CBC with Auto Differential    ALT    AST    Basic Metabolic Panel    Hemoglobin A1C    Lipid Panel    Vitamin B12   3. Hyperlipidemia, unspecified hyperlipidemia type  CBC with Auto Differential    ALT    AST    Basic Metabolic Panel    Hemoglobin A1C    Lipid Panel    Vitamin B12   4. Essential hypertension  CBC with Auto Differential    ALT    AST    Basic Metabolic Panel    Hemoglobin A1C    Lipid Panel    Vitamin B12    amLODIPine (NORVASC) 5 MG tablet   5. Vitamin B12 deficiency  Vitamin B12   6. Resting tremor  primidone (MYSOLINE) 50 MG tablet   7. Essential tremor     8.  Mild intermittent asthma without complication         PLAN:  I review his labs and they are the best they have looked in awhile it appears him taking Vitamin B12 has helped him. He denies any cardiac or respiratory troubles. His tremor doesn't bother him unless he is writing. He is interested in trying to control this I will start him on Mysoline 50 mg. I would like him to take half tablet twice a day. Call the office with an update    His blood pressure is elevated I will add Amlodipine 5 mg (continue taking Lisinopril 20 mg)    He can wean off the Omeprazole as he has not had any trouble with his stomach. I will see him back in 6 months     Orders Placed This Encounter   Procedures    CBC with Auto Differential     Standing Status:   Future     Standing Expiration Date:   3/22/2023    ALT     Standing Status:   Future     Standing Expiration Date:   3/22/2023    AST     Standing Status:   Future     Standing Expiration Date:   3/22/2023    Basic Metabolic Panel     Standing Status:   Future     Standing Expiration Date:   3/22/2023    Hemoglobin A1C     Standing Status:   Future     Standing Expiration Date:   3/22/2023    Lipid Panel     Standing Status:   Future     Standing Expiration Date:   3/22/2023     Order Specific Question:   Is Patient Fasting?/# of Hours     Answer:   0    Vitamin B12     Standing Status:   Future     Standing Expiration Date:   3/22/2023     Orders Placed This Encounter   Medications    primidone (MYSOLINE) 50 MG tablet     Sig: Take Half tablet twice daily     Dispense:  30 tablet     Refill:  1    amLODIPine (NORVASC) 5 MG tablet     Sig: Take 1 tablet by mouth daily     Dispense:  90 tablet     Refill:  1   I, Dr. Suellen Jenkins, personally performed the services described in this documentation as scribed/transcribed by C. Marlan Lesches in my presence, and is both accurate and complete.

## 2022-04-02 ENCOUNTER — APPOINTMENT (OUTPATIENT)
Dept: CT IMAGING | Age: 67
End: 2022-04-02
Payer: MEDICARE

## 2022-04-02 ENCOUNTER — HOSPITAL ENCOUNTER (EMERGENCY)
Age: 67
Discharge: ANOTHER HOSPITAL | End: 2022-04-03
Attending: EMERGENCY MEDICINE
Payer: MEDICARE

## 2022-04-02 DIAGNOSIS — K62.5 RECTAL BLEEDING: Primary | ICD-10-CM

## 2022-04-02 LAB
ABSOLUTE EOS #: 0.06 K/UL (ref 0–0.44)
ABSOLUTE EOS #: 0.11 K/UL (ref 0–0.44)
ABSOLUTE IMMATURE GRANULOCYTE: <0.03 K/UL (ref 0–0.3)
ABSOLUTE IMMATURE GRANULOCYTE: <0.03 K/UL (ref 0–0.3)
ABSOLUTE LYMPH #: 1.09 K/UL (ref 1.1–3.7)
ABSOLUTE LYMPH #: 1.13 K/UL (ref 1.1–3.7)
ABSOLUTE MONO #: 0.41 K/UL (ref 0.1–1.2)
ABSOLUTE MONO #: 0.52 K/UL (ref 0.1–1.2)
ALBUMIN SERPL-MCNC: 4 G/DL (ref 3.5–5.2)
ALBUMIN/GLOBULIN RATIO: 1.3 (ref 1–2.5)
ALP BLD-CCNC: 95 U/L (ref 40–129)
ALT SERPL-CCNC: 13 U/L (ref 5–41)
ANION GAP SERPL CALCULATED.3IONS-SCNC: 12 MMOL/L (ref 9–17)
AST SERPL-CCNC: 22 U/L
BASOPHILS # BLD: 1 % (ref 0–2)
BASOPHILS # BLD: 1 % (ref 0–2)
BASOPHILS ABSOLUTE: 0.03 K/UL (ref 0–0.2)
BASOPHILS ABSOLUTE: <0.03 K/UL (ref 0–0.2)
BILIRUB SERPL-MCNC: 0.36 MG/DL (ref 0.3–1.2)
BUN BLDV-MCNC: 6 MG/DL (ref 8–23)
BUN/CREAT BLD: 10 (ref 9–20)
CALCIUM SERPL-MCNC: 8.8 MG/DL (ref 8.6–10.4)
CHLORIDE BLD-SCNC: 94 MMOL/L (ref 98–107)
CO2: 24 MMOL/L (ref 20–31)
CREAT SERPL-MCNC: 0.63 MG/DL (ref 0.7–1.2)
EOSINOPHILS RELATIVE PERCENT: 1 % (ref 1–4)
EOSINOPHILS RELATIVE PERCENT: 2 % (ref 1–4)
GFR AFRICAN AMERICAN: >60 ML/MIN
GFR NON-AFRICAN AMERICAN: >60 ML/MIN
GFR SERPL CREATININE-BSD FRML MDRD: ABNORMAL ML/MIN/{1.73_M2}
GFR SERPL CREATININE-BSD FRML MDRD: ABNORMAL ML/MIN/{1.73_M2}
GLUCOSE BLD-MCNC: 92 MG/DL (ref 70–99)
HCT VFR BLD CALC: 35.1 % (ref 40.7–50.3)
HCT VFR BLD CALC: 37.7 % (ref 40.7–50.3)
HEMOGLOBIN: 11.9 G/DL (ref 13–17)
HEMOGLOBIN: 12.6 G/DL (ref 13–17)
IMMATURE GRANULOCYTES: 0 %
IMMATURE GRANULOCYTES: 0 %
INR BLD: 1.6
LIPASE: 13 U/L (ref 13–60)
LYMPHOCYTES # BLD: 21 % (ref 24–43)
LYMPHOCYTES # BLD: 26 % (ref 24–43)
MCH RBC QN AUTO: 33.7 PG (ref 25.2–33.5)
MCH RBC QN AUTO: 33.9 PG (ref 25.2–33.5)
MCHC RBC AUTO-ENTMCNC: 33.4 G/DL (ref 28.4–34.8)
MCHC RBC AUTO-ENTMCNC: 33.9 G/DL (ref 28.4–34.8)
MCV RBC AUTO: 100 FL (ref 82.6–102.9)
MCV RBC AUTO: 100.8 FL (ref 82.6–102.9)
MONOCYTES # BLD: 10 % (ref 3–12)
MONOCYTES # BLD: 9 % (ref 3–12)
NRBC AUTOMATED: 0 PER 100 WBC
NRBC AUTOMATED: 0 PER 100 WBC
PARTIAL THROMBOPLASTIN TIME: 36.4 SEC (ref 26.8–34.8)
PDW BLD-RTO: 13.9 % (ref 11.8–14.4)
PDW BLD-RTO: 14 % (ref 11.8–14.4)
PLATELET # BLD: 152 K/UL (ref 138–453)
PLATELET # BLD: 159 K/UL (ref 138–453)
PMV BLD AUTO: 10.7 FL (ref 8.1–13.5)
PMV BLD AUTO: 11.2 FL (ref 8.1–13.5)
POTASSIUM SERPL-SCNC: 3.9 MMOL/L (ref 3.7–5.3)
PROTHROMBIN TIME: 19.2 SEC (ref 11.5–14.2)
RBC # BLD: 3.51 M/UL (ref 4.21–5.77)
RBC # BLD: 3.74 M/UL (ref 4.21–5.77)
SEG NEUTROPHILS: 63 % (ref 36–65)
SEG NEUTROPHILS: 66 % (ref 36–65)
SEGMENTED NEUTROPHILS ABSOLUTE COUNT: 2.78 K/UL (ref 1.5–8.1)
SEGMENTED NEUTROPHILS ABSOLUTE COUNT: 3.55 K/UL (ref 1.5–8.1)
SODIUM BLD-SCNC: 130 MMOL/L (ref 135–144)
TOTAL PROTEIN: 7.1 G/DL (ref 6.4–8.3)
WBC # BLD: 4.4 K/UL (ref 3.5–11.3)
WBC # BLD: 5.3 K/UL (ref 3.5–11.3)

## 2022-04-02 PROCEDURE — 85610 PROTHROMBIN TIME: CPT

## 2022-04-02 PROCEDURE — 83690 ASSAY OF LIPASE: CPT

## 2022-04-02 PROCEDURE — 74177 CT ABD & PELVIS W/CONTRAST: CPT

## 2022-04-02 PROCEDURE — 2580000003 HC RX 258: Performed by: EMERGENCY MEDICINE

## 2022-04-02 PROCEDURE — 36415 COLL VENOUS BLD VENIPUNCTURE: CPT

## 2022-04-02 PROCEDURE — 85730 THROMBOPLASTIN TIME PARTIAL: CPT

## 2022-04-02 PROCEDURE — 80053 COMPREHEN METABOLIC PANEL: CPT

## 2022-04-02 PROCEDURE — 99283 EMERGENCY DEPT VISIT LOW MDM: CPT

## 2022-04-02 PROCEDURE — 6360000004 HC RX CONTRAST MEDICATION: Performed by: EMERGENCY MEDICINE

## 2022-04-02 PROCEDURE — 85025 COMPLETE CBC W/AUTO DIFF WBC: CPT

## 2022-04-02 RX ORDER — SODIUM CHLORIDE 9 MG/ML
1000 INJECTION, SOLUTION INTRAVENOUS CONTINUOUS
Status: DISCONTINUED | OUTPATIENT
Start: 2022-04-02 | End: 2022-04-03 | Stop reason: HOSPADM

## 2022-04-02 RX ADMIN — SODIUM CHLORIDE 1000 ML: 9 INJECTION, SOLUTION INTRAVENOUS at 18:35

## 2022-04-02 RX ADMIN — IOPAMIDOL 75 ML: 755 INJECTION, SOLUTION INTRAVENOUS at 20:48

## 2022-04-02 NOTE — ED NOTES
Dr. Chris Robles at bedside completing rectal exam. POCT hemocult negative.  Alejandro Mueller, RN       Alejandro Mueller RN  04/02/22 [id number]

## 2022-04-02 NOTE — ED PROVIDER NOTES
[facility] [place] ED  EMERGENCY DEPARTMENT ENCOUNTER      Pt Name: Quan Sims  MRN: [id number]  [place] 1955  Date of evaluation: 4/2/2022  Provider: Pantera Regalado MD    [address] [place] [address]       Chief Complaint   Patient presents with    Rectal Bleeding     reports blood and puss in stool started 1hr pta         HISTORY OF PRESENT ILLNESS   (Location/Symptom, Timing/Onset, Context/Setting, Quality, Duration, Modifying Factors, Severity)  Note limiting factors. Quan Sims is a 74 y.o. male who presents to the emergency department     This is a 67-year-old patient presents to the emergency department with concerns of passing blood and mucus rectally just prior to ED arrival.  Patient denies any specific abdominal pain. Denies any nausea or vomiting. The patient is on Xarelto. There has been no history for syncopal episode. The patient denies any history for blood dyscrasias          Nursing Notes were reviewed. REVIEW OF SYSTEMS    (2-9 systems for level 4, 10 or more for level 5)     Review of Systems   All other systems reviewed and are negative. Except as noted above the remainder of the review of systems was reviewed and negative.        PAST MEDICAL HISTORY     Past Medical History:   Diagnosis Date    Achilles tendon tear 2013    Allergic rhinitis     Atrial flutter ([address] 1996    Congestive heart failure ([address]     Coronary atherosclerosis     Hyperglycemia 2007    Hyperlipidemia 1996    Hypertension 2004    Mitral insufficiency     Obesity 2006    Osteoarthritis     Status post ablation of atrial flutter 05/09/13    Vasodepressor syncope 2001         SURGICAL HISTORY       Past Surgical History:   Procedure Laterality Date    ATRIAL ABLATION SURGERY  1998, 2013    st.vincmarily AV node    CATARACT REMOVAL WITH IMPLANT  12/06/2017    Dr. Quincy Limon 10/25/2021    Dr. Casillas Rounds COLONOSCOPY  02/01/2010    pratima, jarvis    COLONOSCOPY  03/25/2019    Dr. Neftali Abdalla rectal bx(hyperplastic mucosa with evidence of ischemic injury,angiodysplasia no active bleeding,divetrticulosis,hemorroids    COLONOSCOPY N/A 3/25/2019    COLONOSCOPY POLYPECTOMY SNARE/COLD BIOPSY performed by Natalya Ndiaye MD at [address] [place] [address]      right side of face    INTRACAPSULAR CATARACT EXTRACTION Right 10/25/2021    EYE CATARACT EMULSIFICATION IOL IMPLANT performed by Chris Baez DO at [place] [address]    medial meniscus arthroscopy, Braxton Oneal    KNEE SURGERY Left     MITRAL VALVE SURGERY  8/11/11    34 mm ring annuloplasty    OTHER SURGICAL HISTORY Left 06/12/2020    LASER ABLATION GREATER SAPHENOUIS VEIN WITH PHLEBECTOMY - EVOLV     WI KNEE SCOPE,MED OR LAT MENIS REPAIR Left 6/21/2018    KNEE ARTHROSCOPY-PARTIAL MEDIAL MENISCECTOMY AND CHONDROPLASTY [facility] LENS PROSTH W/O ECP Left 12/6/2017    EYE CATARACT EMULSIFICATION IOL IMPLANT performed by Chris Baez DO at [facility] 4/2/2019    -hiatal hernia,grade 3 reflux esophagitis,antral gastritis, erosive bulbar duodenitis    VEIN SURGERY Left 6/12/2020    LASER ABLATION GREATER SAPHENOUIS VEIN WITH PHLEBECTOMY - [facility] Fletcher CONF# [id number] - [place]) performed by Georgia Langford MD at [address] [place] [address]       Previous Medications    ALBUTEROL SULFATE HFA (PROAIR HFA) 108 (90 BASE) MCG/ACT INHALER    Inhale 2 puffs into the lungs every 6 hours as needed for Wheezing    AMLODIPINE (NORVASC) 5 MG TABLET    Take 1 tablet by mouth daily    COLCHICINE (COLCRYS) 0.6 MG TABLET    Take 1 tablet by mouth 3 times daily (before meals) for 7 days    LISINOPRIL (PRINIVIL;ZESTRIL) 20 MG TABLET    Take 1 tablet by mouth daily    METOPROLOL SUCCINATE (TOPROL XL) 100 MG EXTENDED RELEASE TABLET    TAKE 1 TABLET DAILY    OMEPRAZOLE (PRILOSEC) 20 MG DELAYED RELEASE CAPSULE    Take 1 capsule by mouth every other day    PRIMIDONE (MYSOLINE) 50 MG TABLET    Take Half tablet twice daily    ROSUVASTATIN (CRESTOR) 40 MG TABLET    Take 1 tablet by mouth daily    SERTRALINE (ZOLOFT) 50 MG TABLET    TAKE 1 TABLET DAILY    SPACER/AERO-HOLDING CHAMBERS (OPTICHAMBER PARESH) MARISEL    1 Device by Does not apply route daily    VITAMIN B-12 (CYANOCOBALAMIN) 1000 MCG TABLET    Take 1,000 mcg by mouth daily    XARELTO 20 MG TABS TABLET    TAKE 1 TABLET DAILY       ALLERGIES     Patient has no known allergies. FAMILY HISTORY       Family History   Problem Relation Age of Onset    Heart Disease Mother         sudden cardiac death    Heart Attack Father           SOCIAL HISTORY       Social History     Socioeconomic History    Marital status:      Spouse name: None    Number of children: None    Years of education: None    Highest education level: None   Occupational History    None   Tobacco Use    Smoking status: Former Smoker     Packs/day: 1.50     Years: 20.00     Pack years: 30.00     Types: Cigarettes     Quit date: 1996     Years since quittin.3    Smokeless tobacco: Never Used   Vaping Use    Vaping Use: Never used   Substance and Sexual Activity    Alcohol use: Yes     Alcohol/week: 0.0 standard drinks     Comment: 4 beers a day     Drug use: No    Sexual activity: None   Other Topics Concern    None   Social History Narrative    None     Social Determinants of Health     Financial Resource Strain:     Difficulty of Paying Living Expenses: Not on file   Food Insecurity:     Worried About Running Out of Food in the Last Year: Not on file    Mariela of Food in the Last Year: Not on file   Transportation Needs:     Lack of Transportation (Medical): Not on file    Lack of Transportation (Non-Medical):  Not on file   Physical Activity:     Days of Exercise per Week: Not on file    Minutes of Exercise per Session: Not on file   Stress:  Feeling of Stress : Not on file   Social Connections:     Frequency of Communication with Friends and Family: Not on file    Frequency of Social Gatherings with Friends and Family: Not on file    Attends Hinduism Services: Not on file    Active Member of Clubs or Organizations: Not on file    Attends Club or Organization Meetings: Not on file    Marital Status: Not on file   Intimate Partner Violence:     Fear of Current or Ex-Partner: Not on file    Emotionally Abused: Not on file    Physically Abused: Not on file    Sexually Abused: Not on file   Housing Stability:     Unable to Pay for Housing in the Last Year: Not on file    Number of [place] in the Last Year: Not on file    Unstable Housing in the Last Year: Not on file       SCREENINGS        [place] Coma Scale  Eye Opening: Spontaneous  Best Verbal Response: Oriented  Best Motor Response: Obeys commands  [place] Coma Scale Score: 15               PHYSICAL EXAM    (up to 7 for level 4, 8 or more for level 5)     ED Triage Vitals [04/02/22 1658]   BP Temp Temp Source Pulse Resp SpO2 Height Weight   103/86 97.1 °F (36.2 °C) Tympanic 57 18 97 % -- 270 lb (122.5 kg)       Physical Exam  Vitals and nursing note reviewed. HENT:      Head: Normocephalic and atraumatic. Nose: Nose normal.      Mouth/Throat:      Mouth: Mucous membranes are moist.   Eyes:      Extraocular Movements: Extraocular movements intact. Pupils: Pupils are equal, round, and reactive to light. Cardiovascular:      Rate and Rhythm: Normal rate and regular rhythm. Pulses: Normal pulses. Heart sounds: Normal heart sounds. Pulmonary:      Effort: Pulmonary effort is normal.      Breath sounds: Normal breath sounds. Abdominal:      General: There is distension. Palpations: There is no mass. Tenderness: There is no abdominal tenderness. Hernia: No hernia is present. Genitourinary:     Rectum: Guaiac result negative.       Comments: COMPREHENSIVE METABOLIC PANEL W/ REFLEX TO MG FOR LOW K - Abnormal; Notable for the following components:    BUN 6 (*)     CREATININE 0.63 (*)     Sodium 130 (*)     Chloride 94 (*)     All other components within normal limits   PROTIME-INR - Abnormal; Notable for the following components:    Protime 19.2 (*)     All other components within normal limits   APTT - Abnormal; Notable for the following components:    PTT 36.4 (*)     All other components within normal limits   CBC WITH AUTO DIFFERENTIAL - Abnormal; Notable for the following components:    RBC 3.51 (*)     Hemoglobin 11.9 (*)     Hematocrit 35.1 (*)     MCH 33.9 (*)     All other components within normal limits   LIPASE   POCT OCCULT BLOOD STOOL COLON CA SCREEN 1-3       All other labs were within normal range or not returned as of this dictation. EMERGENCY DEPARTMENT COURSE and DIFFERENTIAL DIAGNOSIS/MDM:   Vitals:    Vitals:    04/03/22 0445 04/03/22 0500 04/03/22 0516 04/03/22 0717   BP: (!) 158/75 (!) 161/72 (!) 161/72 (S) (!) 181/91   Pulse:       Resp:       Temp:       TempSrc:       SpO2: 94% 92% 94% 94%   Weight:             MDM  Number of Diagnoses or Management Options  Rectal bleeding  Diagnosis management comments: 68-year-old patient presents emergency department with history for passing blood and mucus rectally    Diagnostic considerations, diverticulosis, diverticulitis, internal hemorrhoid    Baseline laboratory studies have been requested care the patient transferred to Dr. Amelia Tripathi at change of shift       Amount and/or Complexity of Data Reviewed  Decide to obtain previous medical records or to obtain history from someone other than the patient: yes          REASSESSMENT              CRITICAL CARE TIME   Total Critical Care time was minutes, excluding separately reportable procedures.   There was a high probability of clinically significant/life threatening deterioration in the patient's condition which required my urgent intervention. CONSULTS:  None    PROCEDURES:  Unless otherwise noted below, none     Procedures    FINAL IMPRESSION      1. Rectal bleeding          DISPOSITION/PLAN   DISPOSITION        PATIENT REFERRED TO:  No follow-up provider specified. DISCHARGE MEDICATIONS:  New Prescriptions    No medications on file     Controlled Substances Monitoring:     RX Monitoring 1/26/2018   Attestation The Prescription Monitoring Report for this patient was reviewed today. Periodic Controlled Substance Monitoring No signs of potential drug abuse or diversion identified.        (Please note that portions of this note were completed with a voice recognition program.  Efforts were made to edit the dictations but occasionally words are mis-transcribed.)    Kia Santiago MD (electronically signed)  Attending Emergency Physician           Kia Santiago MD  04/02/22 7534       Kia Santiago MD  04/03/22 4419

## 2022-04-03 ENCOUNTER — HOSPITAL ENCOUNTER (INPATIENT)
Age: 67
LOS: 2 days | Discharge: HOME | DRG: 378 | End: 2022-04-05
Attending: INTERNAL MEDICINE | Admitting: STUDENT IN AN ORGANIZED HEALTH CARE EDUCATION/TRAINING PROGRAM
Payer: MEDICARE

## 2022-04-03 VITALS
RESPIRATION RATE: 18 BRPM | OXYGEN SATURATION: 94 % | SYSTOLIC BLOOD PRESSURE: 181 MMHG | BODY MASS INDEX: 37.66 KG/M2 | DIASTOLIC BLOOD PRESSURE: 91 MMHG | HEART RATE: 60 BPM | WEIGHT: 270 LBS | TEMPERATURE: 97.1 F

## 2022-04-03 DIAGNOSIS — D64.9 NORMOCYTIC NORMOCHROMIC ANEMIA: Primary | ICD-10-CM

## 2022-04-03 DIAGNOSIS — K52.9 COLITIS WITH RECTAL BLEEDING: ICD-10-CM

## 2022-04-03 DIAGNOSIS — I10 ESSENTIAL HYPERTENSION: ICD-10-CM

## 2022-04-03 DIAGNOSIS — K62.5 COLITIS WITH RECTAL BLEEDING: ICD-10-CM

## 2022-04-03 PROBLEM — E87.1 HYPONATREMIA: Status: ACTIVE | Noted: 2022-04-03

## 2022-04-03 PROBLEM — K92.2 GI BLEED: Status: ACTIVE | Noted: 2022-04-03

## 2022-04-03 PROBLEM — Z95.3 HISTORY OF MITRAL VALVE REPLACEMENT WITH BIOPROSTHETIC VALVE: Status: ACTIVE | Noted: 2022-04-03

## 2022-04-03 LAB
C-REACTIVE PROTEIN: 4.1 MG/L (ref 0–5)
HCT VFR BLD CALC: 40.3 % (ref 40.7–50.3)
HCT VFR BLD CALC: 42.5 % (ref 40.7–50.3)
HEMOGLOBIN: 13.2 G/DL (ref 13–17)
HEMOGLOBIN: 14 G/DL (ref 13–17)
OSMOLALITY URINE: 484 MOSM/KG (ref 80–1300)
SEDIMENTATION RATE, ERYTHROCYTE: 9 MM/HR (ref 0–20)
SERUM OSMOLALITY: 289 MOSM/KG (ref 275–295)
SODIUM,UR: 134 MMOL/L
TSH SERPL DL<=0.05 MIU/L-ACNC: 1.18 UIU/ML (ref 0.3–5)

## 2022-04-03 PROCEDURE — 85018 HEMOGLOBIN: CPT

## 2022-04-03 PROCEDURE — 83935 ASSAY OF URINE OSMOLALITY: CPT

## 2022-04-03 PROCEDURE — 85014 HEMATOCRIT: CPT

## 2022-04-03 PROCEDURE — 6370000000 HC RX 637 (ALT 250 FOR IP): Performed by: EMERGENCY MEDICINE

## 2022-04-03 PROCEDURE — 6370000000 HC RX 637 (ALT 250 FOR IP): Performed by: INTERNAL MEDICINE

## 2022-04-03 PROCEDURE — 2500000003 HC RX 250 WO HCPCS: Performed by: NURSE PRACTITIONER

## 2022-04-03 PROCEDURE — 2060000000 HC ICU INTERMEDIATE R&B

## 2022-04-03 PROCEDURE — 86140 C-REACTIVE PROTEIN: CPT

## 2022-04-03 PROCEDURE — 87040 BLOOD CULTURE FOR BACTERIA: CPT

## 2022-04-03 PROCEDURE — 6360000002 HC RX W HCPCS: Performed by: INTERNAL MEDICINE

## 2022-04-03 PROCEDURE — 99221 1ST HOSP IP/OBS SF/LOW 40: CPT | Performed by: INTERNAL MEDICINE

## 2022-04-03 PROCEDURE — 6360000002 HC RX W HCPCS: Performed by: NURSE PRACTITIONER

## 2022-04-03 PROCEDURE — 84300 ASSAY OF URINE SODIUM: CPT

## 2022-04-03 PROCEDURE — 36415 COLL VENOUS BLD VENIPUNCTURE: CPT

## 2022-04-03 PROCEDURE — 85652 RBC SED RATE AUTOMATED: CPT

## 2022-04-03 PROCEDURE — 2580000003 HC RX 258: Performed by: NURSE PRACTITIONER

## 2022-04-03 PROCEDURE — 83930 ASSAY OF BLOOD OSMOLALITY: CPT

## 2022-04-03 PROCEDURE — 99223 1ST HOSP IP/OBS HIGH 75: CPT | Performed by: INTERNAL MEDICINE

## 2022-04-03 PROCEDURE — 84443 ASSAY THYROID STIM HORMONE: CPT

## 2022-04-03 PROCEDURE — 94761 N-INVAS EAR/PLS OXIMETRY MLT: CPT

## 2022-04-03 RX ORDER — ROSUVASTATIN CALCIUM 20 MG/1
40 TABLET, COATED ORAL DAILY
Status: DISCONTINUED | OUTPATIENT
Start: 2022-04-03 | End: 2022-04-05 | Stop reason: HOSPADM

## 2022-04-03 RX ORDER — SODIUM CHLORIDE 0.9 % (FLUSH) 0.9 %
5-40 SYRINGE (ML) INJECTION EVERY 12 HOURS SCHEDULED
Status: DISCONTINUED | OUTPATIENT
Start: 2022-04-03 | End: 2022-04-05 | Stop reason: HOSPADM

## 2022-04-03 RX ORDER — ACETAMINOPHEN 650 MG/1
650 SUPPOSITORY RECTAL EVERY 6 HOURS PRN
Status: DISCONTINUED | OUTPATIENT
Start: 2022-04-03 | End: 2022-04-05 | Stop reason: HOSPADM

## 2022-04-03 RX ORDER — CHOLECALCIFEROL (VITAMIN D3) 125 MCG
1000 CAPSULE ORAL DAILY
Status: DISCONTINUED | OUTPATIENT
Start: 2022-04-03 | End: 2022-04-05 | Stop reason: HOSPADM

## 2022-04-03 RX ORDER — AMLODIPINE BESYLATE 5 MG/1
5 TABLET ORAL DAILY
Status: DISCONTINUED | OUTPATIENT
Start: 2022-04-03 | End: 2022-04-03 | Stop reason: HOSPADM

## 2022-04-03 RX ORDER — METOPROLOL SUCCINATE 100 MG/1
100 TABLET, EXTENDED RELEASE ORAL DAILY
Status: DISCONTINUED | OUTPATIENT
Start: 2022-04-03 | End: 2022-04-05 | Stop reason: HOSPADM

## 2022-04-03 RX ORDER — AMLODIPINE BESYLATE 5 MG/1
5 TABLET ORAL ONCE
Status: COMPLETED | OUTPATIENT
Start: 2022-04-03 | End: 2022-04-03

## 2022-04-03 RX ORDER — SODIUM CHLORIDE 0.9 % (FLUSH) 0.9 %
5-40 SYRINGE (ML) INJECTION PRN
Status: DISCONTINUED | OUTPATIENT
Start: 2022-04-03 | End: 2022-04-05 | Stop reason: HOSPADM

## 2022-04-03 RX ORDER — ALBUTEROL SULFATE 90 UG/1
2 AEROSOL, METERED RESPIRATORY (INHALATION) EVERY 6 HOURS PRN
Status: DISCONTINUED | OUTPATIENT
Start: 2022-04-03 | End: 2022-04-05 | Stop reason: HOSPADM

## 2022-04-03 RX ORDER — SODIUM CHLORIDE 9 MG/ML
INJECTION, SOLUTION INTRAVENOUS CONTINUOUS
Status: DISCONTINUED | OUTPATIENT
Start: 2022-04-03 | End: 2022-04-03

## 2022-04-03 RX ORDER — ACETAMINOPHEN 325 MG/1
650 TABLET ORAL EVERY 6 HOURS PRN
Status: DISCONTINUED | OUTPATIENT
Start: 2022-04-03 | End: 2022-04-05 | Stop reason: HOSPADM

## 2022-04-03 RX ORDER — ONDANSETRON 4 MG/1
4 TABLET, ORALLY DISINTEGRATING ORAL EVERY 8 HOURS PRN
Status: DISCONTINUED | OUTPATIENT
Start: 2022-04-03 | End: 2022-04-05 | Stop reason: HOSPADM

## 2022-04-03 RX ORDER — LISINOPRIL 20 MG/1
20 TABLET ORAL DAILY
Status: DISCONTINUED | OUTPATIENT
Start: 2022-04-03 | End: 2022-04-05 | Stop reason: HOSPADM

## 2022-04-03 RX ORDER — SODIUM CHLORIDE 9 MG/ML
INJECTION, SOLUTION INTRAVENOUS PRN
Status: DISCONTINUED | OUTPATIENT
Start: 2022-04-03 | End: 2022-04-04

## 2022-04-03 RX ORDER — CIPROFLOXACIN 2 MG/ML
400 INJECTION, SOLUTION INTRAVENOUS EVERY 12 HOURS
Status: DISCONTINUED | OUTPATIENT
Start: 2022-04-03 | End: 2022-04-04

## 2022-04-03 RX ORDER — AMLODIPINE BESYLATE 10 MG/1
10 TABLET ORAL DAILY
Status: DISCONTINUED | OUTPATIENT
Start: 2022-04-04 | End: 2022-04-05 | Stop reason: HOSPADM

## 2022-04-03 RX ORDER — ONDANSETRON 2 MG/ML
4 INJECTION INTRAMUSCULAR; INTRAVENOUS EVERY 6 HOURS PRN
Status: DISCONTINUED | OUTPATIENT
Start: 2022-04-03 | End: 2022-04-05 | Stop reason: HOSPADM

## 2022-04-03 RX ADMIN — ENOXAPARIN SODIUM 30 MG: 100 INJECTION SUBCUTANEOUS at 20:00

## 2022-04-03 RX ADMIN — LISINOPRIL 20 MG: 20 TABLET ORAL at 12:38

## 2022-04-03 RX ADMIN — METOPROLOL SUCCINATE 100 MG: 100 TABLET, FILM COATED, EXTENDED RELEASE ORAL at 12:38

## 2022-04-03 RX ADMIN — Medication 1000 MCG: at 12:38

## 2022-04-03 RX ADMIN — SODIUM CHLORIDE, PRESERVATIVE FREE 5 ML: 5 INJECTION INTRAVENOUS at 10:15

## 2022-04-03 RX ADMIN — SERTRALINE 50 MG: 50 TABLET, FILM COATED ORAL at 12:38

## 2022-04-03 RX ADMIN — CIPROFLOXACIN 400 MG: 2 INJECTION, SOLUTION INTRAVENOUS at 13:23

## 2022-04-03 RX ADMIN — AMLODIPINE BESYLATE 5 MG: 5 TABLET ORAL at 12:38

## 2022-04-03 RX ADMIN — POLYETHYLENE GLYCOL 3350, SODIUM SULFATE ANHYDROUS, SODIUM BICARBONATE, SODIUM CHLORIDE, POTASSIUM CHLORIDE 4000 ML: 236; 22.74; 6.74; 5.86; 2.97 POWDER, FOR SOLUTION ORAL at 16:44

## 2022-04-03 RX ADMIN — SODIUM CHLORIDE, PRESERVATIVE FREE 10 ML: 5 INJECTION INTRAVENOUS at 20:00

## 2022-04-03 RX ADMIN — METRONIDAZOLE 500 MG: 500 INJECTION, SOLUTION INTRAVENOUS at 12:10

## 2022-04-03 RX ADMIN — ROSUVASTATIN CALCIUM 40 MG: 20 TABLET, FILM COATED ORAL at 12:38

## 2022-04-03 RX ADMIN — AMLODIPINE BESYLATE 5 MG: 5 TABLET ORAL at 07:45

## 2022-04-03 RX ADMIN — CIPROFLOXACIN 400 MG: 2 INJECTION, SOLUTION INTRAVENOUS at 22:43

## 2022-04-03 RX ADMIN — METRONIDAZOLE 500 MG: 500 INJECTION, SOLUTION INTRAVENOUS at 19:14

## 2022-04-03 ASSESSMENT — PAIN SCALES - GENERAL
PAINLEVEL_OUTOF10: 0

## 2022-04-03 NOTE — H&P
[facility]  Office: [address], DO, Laytonrom Samayoa, DO, Poppy Gomez, DO, Meir Holland Blood, DO, Dio Barr MD, Chelly Correia MD, Bernice Quintana MD, Willie Moreno MD, Claire Bee MD, Oscar Duque MD, Guillermo May MD, Rasta Allen, DO, Roxie Stephen, DO, Shalom Gonzalez MD,  Cecilia Garcia DO, Michael Morocho MD, Elissa Carrera MD, Damián Moraes MD, Cary Anaya DO, Carey Guerrero MD, Praful Nolan MD, Macario Ramirez, [facility], [facility], [facility], Hal Layippo, CNP, Mary Newman, CNS, Malachi Charlton, CNP, Lily Rey, CNP, Tiffanie Mason, CNP, Barry Bailey, CNP, Gary Israel PA-C, Tashia Morlye [facility], Kat Dooley, CNP, Joe Menendez, CNP, Lieutenant Todd, [facility], Yesica Dumont, [facility], [place], CNP, Keysha Wen, [address]    HISTORY AND PHYSICAL EXAMINATION            Date:   4/3/2022  Patient name:  Marina Wright  Date of admission:  4/3/2022  9:56 AM  MRN:   [id number]  Account:  [de-identified]  YOB: 1955  PCP:    Mirna Lambert MD  Room:   2007/2007-01  Code Status:    Full Code    Chief Complaint:     Blood in stool     History Obtained From:     patient, electronic medical record    History of Present Illness:     Marina Wright is a 74 y.o. male who presents to our hospital with complaints of bright red blood per rectum. Patient had a colonoscopy 2 years ago which showed hemorrhoids. Patient did not have these treated. Yesterday evening, patient was at home when he noticed some abdominal cramping and liquid diarrhea. There was blood mixed with stool and mucus. Patient decided to come to the hospital for further evaluation. Patient has a 2-month history of abdominal distention but denies any weight loss. Denies any history of cirrhosis. Denies any nausea, vomiting or throwing up blood. He denies any history of pancreatitis.   He did have a cholecystectomy several years ago. Denies any episodes of hypotension. Denies any fevers, chills. Denies any family history of inflammatory bowel disease. He does have a history of bioprosthetic valve replacement and chronic mitral regurgitation and is on Xarelto for the last 26 years without any issues. Denies any urinary retention, dysuria or polyuria. Denies any personal family history of colon cancer. CT of his abdomen pelvis showed rectosigmoid colonic wall thickening that could be related to colitis. He denies any history of C. difficile. Denies any recent antibiotic use. Denies any new foods. Denies any sick contacts.     Past Medical History:     Past Medical History:   Diagnosis Date    Achilles tendon tear 2013    Allergic rhinitis     Atrial flutter ([address] 1996    Congestive heart failure ([address]     Coronary atherosclerosis     Hyperglycemia 2007    Hyperlipidemia 1996    Hypertension 2004    Mitral insufficiency     Obesity 2006    Osteoarthritis     Status post ablation of atrial flutter 05/09/13    Vasodepressor syncope 2001        Past Surgical History:     Past Surgical History:   Procedure Laterality Date    ATRIAL ABLATION SURGERY  1998, 2013    st.vincents AV node    CATARACT REMOVAL Bilateral     CATARACT REMOVAL WITH IMPLANT  12/06/2017    Dr. Gautam Desai Right 10/25/2021    Dr. Simeon Shirley COLONOSCOPY  02/01/2010    [place], negative    COLONOSCOPY  03/25/2019    Dr. Wilbert Gallego rectal bx(hyperplastic mucosa with evidence of ischemic injury,angiodysplasia no active bleeding,divetrticulosis,hemorroids    COLONOSCOPY N/A 3/25/2019    COLONOSCOPY POLYPECTOMY SNARE/COLD BIOPSY performed by Kwaku Eason MD at [address]      right side of face    INTRACAPSULAR CATARACT EXTRACTION Right 10/25/2021    EYE CATARACT EMULSIFICATION IOL IMPLANT performed by Amber Saha DO at [facility] 1937 Right 2006    medial meniscus arthroscopy, Sean Anderson  KNEE SURGERY Left     MITRAL VALVE SURGERY  8/11/11    34 mm ring annuloplasty    OTHER SURGICAL HISTORY Left 06/12/2020    LASER ABLATION GREATER SAPHENOUIS VEIN WITH PHLEBECTOMY - EVOLV     CO KNEE SCOPE,MED OR LAT MENIS REPAIR Left 6/21/2018    KNEE ARTHROSCOPY-PARTIAL MEDIAL MENISCECTOMY AND CHONDROPLASTY performed by Jackson Avendaño MD at [facility] IO LENS PROSTH W/O ECP Left 12/6/2017    EYE CATARACT EMULSIFICATION IOL IMPLANT performed by Vaishali Lindquist DO at [facility] 4/2/2019    -hiatal hernia,grade 3 reflux esophagitis,antral gastritis, erosive bulbar duodenitis    VEIN SURGERY Left 6/12/2020    LASER ABLATION GREATER SAPHENOUIS VEIN WITH PHLEBECTOMY - Shea Joseph CONF# [id number] - [place]) performed by Ellis Montejo MD at [place] [address]        Medications Prior to Admission:     Prior to Admission medications    Medication Sig Start Date End Date Taking?  Authorizing Provider   vitamin B-12 (CYANOCOBALAMIN) 1000 MCG tablet Take 1,000 mcg by mouth daily    Historical Provider, MD   primidone (MYSOLINE) 50 MG tablet Take Half tablet twice daily 3/22/22   Miah Malcolm MD   amLODIPine (NORVASC) 5 MG tablet Take 1 tablet by mouth daily 3/22/22   Miah Malcolm MD   metoprolol succinate (TOPROL XL) 100 MG extended release tablet TAKE 1 TABLET DAILY 3/18/22   Miah Malcolm MD   XARELTO 20 MG TABS tablet TAKE 1 TABLET DAILY 1/31/22   Miah Malcolm MD   sertraline (ZOLOFT) 50 MG tablet TAKE 1 TABLET DAILY 1/31/22   Miah Malcolm MD   lisinopril (PRINIVIL;ZESTRIL) 20 MG tablet Take 1 tablet by mouth daily 11/16/21   Miah Malcolm MD   albuterol sulfate HFA (PROAIR HFA) 108 (90 Base) MCG/ACT inhaler Inhale 2 puffs into the lungs every 6 hours as needed for Wheezing 7/13/21   Miah Malcolm MD   colchicine (COLCRYS) 0.6 MG tablet Take 1 tablet by mouth 3 times daily (before meals) for 7 days 7/13/21 7/20/21  Miah Malcolm MD rosuvastatin (CRESTOR) 40 MG tablet Take 1 tablet by mouth daily 5/10/21   Richard Ortez MD   omeprazole (PRILOSEC) 20 MG delayed release capsule Take 1 capsule by mouth every other day  Patient not taking: Reported on 4/2/2022 1/4/21   Richard Ortez MD   Spacer/Aero-Holding Chambers [facility] 1 Device by Does not apply route daily  Patient not taking: Reported on 3/22/2022 5/3/18   Richard Ortez MD        Allergies:     Patient has no known allergies. Social History:     Tobacco:    reports that he quit smoking about 25 years ago. His smoking use included cigarettes. He has a 30.00 pack-year smoking history. He has never used smokeless tobacco.  Alcohol:      reports current alcohol use. Drug Use:  reports no history of drug use. Family History:     Family History   Problem Relation Age of Onset    Heart Disease Mother         sudden cardiac death    Heart Attack Father        Review of Systems:     Positive and Negative as described in HPI.     CONSTITUTIONAL:  negative for fevers, chills, sweats, fatigue, weight loss  HEENT:  negative for vision, hearing changes, runny nose, throat pain  RESPIRATORY:  negative for shortness of breath, cough, congestion, wheezing  CARDIOVASCULAR:  negative for chest pain, palpitations  GASTROINTESTINAL:  negative for nausea, vomiting, admits to diarrhe and bright red blood per rectum denies  constipation, +change in bowel habits, and admits abdominal distention with bloating  GENITOURINARY:  negative for difficulty of urination, burning with urination, frequency   INTEGUMENT:  negative for rash, skin lesions, easy bruising   HEMATOLOGIC/LYMPHATIC:  negative for swelling/edema   ALLERGIC/IMMUNOLOGIC:  negative for urticaria , itching  ENDOCRINE:  negative increase in drinking, increase in urination, hot or cold intolerance  MUSCULOSKELETAL:  negative joint pains, muscle aches, swelling of joints  NEUROLOGICAL:  negative for headaches, dizziness, lightheadedness, numbness, pain, tingling extremities  BEHAVIOR/PSYCH:  negative for depression, anxiety    Physical Exam:   BP (!) 192/98   Pulse 76   Temp 98.2 °F (36.8 °C) (Oral)   Resp 16   SpO2 96%   Temp (24hrs), Av.7 °F (36.5 °C), Min:97.1 °F (36.2 °C), Max:98.2 °F (36.8 °C)    No results for input(s): POCGLU in the last 72 hours.   No intake or output data in the 24 hours ending 22 1230    General Appearance: alert, well appearing, and in no acute distress  Mental status: oriented to person, place, and time  Head: normocephalic, atraumatic  Eye: no icterus, redness, pupils equal and reactive, extraocular eye movements intact, conjunctiva clear  Ear: normal external ear, no discharge, hearing intact  Nose: no drainage noted  Mouth: mucous membranes moist  Neck: supple, no carotid bruits, thyroid not palpable  Lungs: Bilateral equal air entry but diminished at bases, clear to ausculation, +scant wheezing, no rales or rhonchi, normal effort  Cardiovascular: normal rate, regular rhythm, no murmur, gallop, rub  Abdomen: Soft, nontender, nondistended, normal bowel sounds, no hepatomegaly or splenomegaly  Neurologic: There are no new focal motor or sensory deficits, normal muscle tone and bulk, no abnormal sensation, normal speech, cranial nerves II through XII grossly intact  Skin: No gross lesions, rashes, bruising or bleeding on exposed skin area  Extremities: peripheral pulses palpable, no pedal edema or calf pain with palpation  Psych: normal affect    Investigations:      Laboratory Testing:  Recent Results (from the past 24 hour(s))   CBC with Auto Differential    Collection Time: 22  6:24 PM   Result Value Ref Range    WBC 5.3 3.5 - 11.3 k/uL    RBC 3.74 (L) 4.21 - 5.77 m/uL    Hemoglobin 12.6 (L) 13.0 - 17.0 g/dL    Hematocrit 37.7 (L) 40.7 - 50.3 %    .8 82.6 - 102.9 fL    MCH 33.7 (H) 25.2 - 33.5 pg    MCHC 33.4 28.4 - 34.8 g/dL    RDW 14.0 11.8 - 14.4 %    Platelets 402 711 - 453 k/uL    MPV 11.2 8.1 - 13.5 fL    NRBC Automated 0.0 0.0 per 100 WBC    Seg Neutrophils 66 (H) 36 - 65 %    Lymphocytes 21 (L) 24 - 43 %    Monocytes 10 3 - 12 %    Eosinophils % 2 1 - 4 %    Basophils 1 0 - 2 %    Immature Granulocytes 0 0 %    Segs Absolute 3.55 1.50 - 8.10 k/uL    Absolute Lymph # 1.09 (L) 1.10 - 3.70 k/uL    Absolute Mono # 0.52 0.10 - 1.20 k/uL    Absolute Eos # 0.11 0.00 - 0.44 k/uL    Basophils Absolute 0.03 0.00 - 0.20 k/uL    Absolute Immature Granulocyte <0.03 0.00 - 0.30 k/uL   Comprehensive Metabolic Panel w/ Reflex to MG    Collection Time: 04/02/22  6:24 PM   Result Value Ref Range    Glucose 92 70 - 99 mg/dL    BUN 6 (L) 8 - 23 mg/dL    CREATININE 0.63 (L) 0.70 - 1.20 mg/dL    Bun/Cre Ratio 10 9 - 20    Calcium 8.8 8.6 - 10.4 mg/dL    Sodium 130 (L) 135 - 144 mmol/L    Potassium 3.9 3.7 - 5.3 mmol/L    Chloride 94 (L) 98 - 107 mmol/L    CO2 24 20 - 31 mmol/L    Anion Gap 12 9 - 17 mmol/L    Alkaline Phosphatase 95 40 - 129 U/L    ALT 13 5 - 41 U/L    AST 22 <40 U/L    Total Bilirubin 0.36 0.3 - 1.2 mg/dL    Total Protein 7.1 6.4 - 8.3 g/dL    Albumin 4.0 3.5 - 5.2 g/dL    Albumin/Globulin Ratio 1.3 1.0 - 2.5    GFR Non-African American >60 >60 mL/min    GFR African American >60 >60 mL/min    GFR Comment          GFR Staging         Lipase    Collection Time: 04/02/22  6:24 PM   Result Value Ref Range    Lipase 13 13 - 60 U/L   Protime-INR    Collection Time: 04/02/22  6:24 PM   Result Value Ref Range    Protime 19.2 (H) 11.5 - 14.2 sec    INR 1.6    APTT    Collection Time: 04/02/22  6:24 PM   Result Value Ref Range    PTT 36.4 (H) 26.8 - 34.8 sec   CBC with Auto Differential    Collection Time: 04/02/22  9:34 PM   Result Value Ref Range    WBC 4.4 3.5 - 11.3 k/uL    RBC 3.51 (L) 4.21 - 5.77 m/uL    Hemoglobin 11.9 (L) 13.0 - 17.0 g/dL    Hematocrit 35.1 (L) 40.7 - 50.3 %    .0 82.6 - 102.9 fL    MCH 33.9 (H) 25.2 - 33.5 pg    MCHC 33.9 28.4 - 34.8 g/dL    RDW 13.9 11.8 - 14.4 %    Platelets 636 471 - 713 k/uL    MPV 10.7 8.1 - 13.5 fL    NRBC Automated 0.0 0.0 per 100 WBC    Seg Neutrophils 63 36 - 65 %    Lymphocytes 26 24 - 43 %    Monocytes 9 3 - 12 %    Eosinophils % 1 1 - 4 %    Basophils 1 0 - 2 %    Immature Granulocytes 0 0 %    Segs Absolute 2.78 1.50 - 8.10 k/uL    Absolute Lymph # 1.13 1.10 - 3.70 k/uL    Absolute Mono # 0.41 0.10 - 1.20 k/uL    Absolute Eos # 0.06 0.00 - 0.44 k/uL    Basophils Absolute <0.03 0.00 - 0.20 k/uL    Absolute Immature Granulocyte <0.03 0.00 - 0.30 k/uL       Imaging/Diagnostics:  CT ABDOMEN PELVIS W IV CONTRAST Additional Contrast? None    Result Date: 4/2/2022  Rectal sigmoid colonic wall thickening could relate to underdistention versus proximal rectosigmoid proctitis/colitis Colonic diverticulosis without acute diverticulitis. Left bladder wall thickening greatest on the right perhaps related to the right inguinal hernia versus cystitis. Please correlate with urine cytology Enlarged prostate. Assessment :      Hospital Problems           Last Modified POA    * (Principal) Gastrointestinal hemorrhage associated with anorectal source 4/3/2022 Yes    Uncontrolled hypertension 4/3/2022 Yes    Colitis with rectal bleeding 4/3/2022 Yes    Hyponatremia 4/3/2022 Yes    Normocytic normochromic anemia 4/3/2022 Yes    History of mitral valve replacement with bioprosthetic valve 4/3/2022 Yes    Dyslipidemia 4/3/2022 Yes    Obesity 4/3/2022 Yes    Rectal prolapse 4/3/2022 Yes    Chronic a-fib ([address] 4/3/2022 Yes          Plan:     Patient status inpatient in the Progressive Unit/Step down    1. Anemia from Bright red blood per rectum likely hemorrhoidal made worse by colitis and anticoagulation:  Hold Xarelto, trend HH Q12 hours for now since no evidence of hemodynamic instability or gross blood loss. GI consulted for colonoscopy. Transfuse prn for symptomatic anemia or Hgb<7. Stop IVF. 2. Rectosigmoid colitis: Check stool PCR/Cdiff.  Check blood cultures. Continue antibiotics. colonoscopy for better evaluation. Check ESR/CRP  3. Hyponatremia: Check urine studies and blood osm. Stop IVF. 4. Uncontrolled hypertension: PCP note reviewed, he Is on lisinopril 20 and was recently increased to Norvasc 5. Likely made worse by IVF. Will order home meds and increase Norvasc to 10  5. Normocytic normochromic anemia: has history of B23 deficiency. Check iron studies. Likely 2/2 #1. Baseline ~14, now 11.9.   6. Chronic atrial fibrillation: He is anticoagulated with Xarelto outpatient. He is on Lopressor for rate control  7. Dyslipidemia: continue statin  8. Obesity BMI 37: recommend weight loss lifestyle modification  9. DVT ppx  10. PTOT  11. Labs, imaging reviewed  12. History reviewed and summarized above  13. Discussed with GI    Consultations:   IP CONSULT TO GI  IP CONSULT TO CASE MANAGEMENT     Patient is admitted as inpatient status because of co-morbidities listed above, severity of signs and symptoms as outlined, requirement for current medical therapies and most importantly because of direct risk to patient if care not provided in a hospital setting. Expected length of stay > 48 hours.     Eirn Solis DO  4/3/2022  12:30 PM    Copy sent to Dr. Sancho Zaidi MD

## 2022-04-03 NOTE — PROGRESS NOTES
Pharmacy Note     Enoxaparin Dose Adjustment    Rosine Hashimoto is a 74 y.o. male. Pharmacist assessment of enoxaparin dose for VTE prophylaxis. Recent Labs     04/02/22  1824   BUN 6*       Recent Labs     04/02/22 1824   CREATININE 0.63*       Estimated Creatinine Clearance: 150 mL/min (A) (based on SCr of 0.63 mg/dL (L)).     Height:   Ht Readings from Last 1 Encounters:   03/22/22 5' 11\" (1.803 m)     Weight:  Wt Readings from Last 1 Encounters:   04/02/22 270 lb (122.5 kg)       The following enoxaparin dose has been adjusted based upon renal function and/or patient weight per P&T Guidelines:             Lovenox 40mg daily to 30mg BID

## 2022-04-03 NOTE — ED PROVIDER NOTES
Care was assumed from the previous physician, Dr. Isamar Arthur, pending reevaluation, follow-up of labs and CT. CT imaging demonstrates findings consistent with proctitis or colitis but is otherwise unremarkable. Hemoglobin dropped from 12.6-11.9 over the course of a few hours, though the patient did receive some IV fluids during this time. He did have production of what he described as blood and \"pus\" while in the ED. Review of his collection demonstrates the presence of bright red blood, the most of this missed the collection device and was in the toilet. Given the continued production of bright red blood as well as rectal inflammation on CT plan at this time is for transfer to [facility] for GI evaluation. This has been discussed with the [facility] on-call at that facility for the hospitalist team and the patient has been accepted there under the care of Dr. Hayden.      Ana Vo MD  04/02/22 [id number]

## 2022-04-03 NOTE — CARE COORDINATION
Case Management Initial Discharge Plan  Tatum Jewell,             Met with:patient to discuss discharge plans. Information verified: address, contacts, phone number, , insurance Yes  Insurance Provider: Medicare, [place] [employer]    Emergency Contact/Next of Kin name & number: Amrita Orosco (wife) [phone]  Who are involved in patient's support system? family    PCP: Mela Malik MD  Date of last visit: last week      Discharge Planning    Living Arrangements:  Spouse/Significant Other     Home has 2 stories  4 stairs to climb to get into front door, 1 flight stairs to climb to reach second floor  Location of bedroom/bathroom in home 2nd    Patient able to perform ADL's:Independent    Current Services (outpatient & in home) none  DME equipment: none  DME provider:     Is patient receiving oral anticoagulation therapy? Yes Xarelto    If indicated:   Physician managing anticoagulation treatment: PCP  Where does patient obtain lab work for ATC treatment? Does patient have any issues/concerns obtaining medications? No  If yes, what are patient's concerns? Is there a preferred Pharmacy after hours or on weekends? Yes    If yes, which pharmacy? [facility]    Potential Assistance Needed:  N/A    Patient agreeable to home care: No  [place] of choice provided:  n/a    Prior SNF/Rehab Placement and Facility: no  Agreeable to SNF/Rehab: No  [place] of choice provided: n/a     Evaluation: no    Expected Discharge date:       Patient expects to be discharged to: If home: is the family and/or caregiver wiling & able to provide support at home? N/a independent  Who will be providing this support?      Follow Up Appointment: Best Day/ Time:      Transportation provider:   Transportation arrangements needed for discharge: No    Readmission Risk              Risk of Unplanned Readmission:  9             Does patient have a readmission risk score greater than 14?: No  If yes, follow-up appointment must be made within 7 days of discharge.      Goals of Care: comfort      Educated patient on transitional options, provided freedom of choice and are agreeable with plan      Discharge Plan: home independently          Electronically signed by Jimmy Barros RN on 4/3/22 at 10:20 AM EDT

## 2022-04-03 NOTE — ED NOTES
[employer] for transfer to [facility], waiting for call back.       Kacy Veras  04/02/22 [id number]

## 2022-04-03 NOTE — CONSULTS
EXTRACTION Right 10/25/2021    EYE CATARACT EMULSIFICATION IOL IMPLANT performed by Conchita Cisneros DO at [facility] Right 2006    medial meniscus arthroscopy, [facility]    KNEE SURGERY Left     MITRAL VALVE SURGERY  8/11/11    34 mm ring annuloplasty    OTHER SURGICAL HISTORY Left 06/12/2020    LASER ABLATION GREATER SAPHENOUIS VEIN WITH PHLEBECTOMY - EVOLV     [place] KNEE SCOPE,MED OR LAT MENIS REPAIR Left 6/21/2018    KNEE ARTHROSCOPY-PARTIAL MEDIAL MENISCECTOMY AND CHONDROPLASTY [facility] LENS PR[facility] W/O ECP Left 12/6/2017    EYE CATARACT EMULSIFICATION IOL IMPLANT performed by Conchita Cisneros DO at [address] [place] [address] 4/2/2019    -hiatal hernia,grade 3 reflux esophagitis,antral gastritis, erosive bulbar duodenitis    VEIN SURGERY Left 6/12/2020    LASER ABLATION GREATER SAPHENOUIS VEIN WITH PHLEBECTOMY - Ambrosio Bowman CONF# [id number] - [place]) performed by Edgard Chung MD at [place] [address]       Medications:      sodium chloride flush  5-40 mL IntraVENous 2 times per day    ciprofloxacin  400 mg IntraVENous Q12H    metroNIDAZOLE  500 mg IntraVENous Q8H    [START ON 4/4/2022] amLODIPine  10 mg Oral Daily    amLODIPine  5 mg Oral Once    lisinopril  20 mg Oral Daily    metoprolol succinate  100 mg Oral Daily    rosuvastatin  40 mg Oral Daily    sertraline  50 mg Oral Daily    vitamin B-12  1,000 mcg Oral Daily    enoxaparin  40 mg SubCUTAneous Daily       Social History:     Social History     Socioeconomic History    Marital status:      Spouse name: Not on file    Number of children: Not on file    Years of education: Not on file    Highest education level: Not on file   Occupational History    Not on file   Tobacco Use    Smoking status: Former Smoker     Packs/day: 1.50     Years: 20.00     Pack years: 30.00     Types: Cigarettes     Quit date: 11/22/1996     Years since quittin.3    Smokeless tobacco: Never Used   Vaping Use    Vaping Use: Never used   Substance and Sexual Activity    Alcohol use: Yes     Alcohol/week: 0.0 standard drinks     Comment: 4 beers a day     Drug use: No    Sexual activity: Not on file   Other Topics Concern    Not on file   Social History Narrative    Not on file     Social Determinants of Health     Financial Resource Strain:     Difficulty of Paying Living Expenses: Not on file   Food Insecurity:     Worried About Running Out of Food in the Last Year: Not on file    Mariela of Food in the Last Year: Not on file   Transportation Needs:     Lack of Transportation (Medical): Not on file    Lack of Transportation (Non-Medical): Not on file   Physical Activity:     Days of Exercise per Week: Not on file    Minutes of Exercise per Session: Not on file   Stress:     Feeling of Stress : Not on file   Social Connections:     Frequency of Communication with Friends and Family: Not on file    Frequency of Social Gatherings with Friends and Family: Not on file    Attends Jain Services: Not on file    Active Member of [address] [place] [address] or Organizations: Not on file    Attends Club or Organization Meetings: Not on file    Marital Status: Not on file   Intimate Partner Violence:     Fear of Current or Ex-Partner: Not on file    Emotionally Abused: Not on file    Physically Abused: Not on file    Sexually Abused: Not on file   Housing Stability:     Unable to Pay for Housing in the Last Year: Not on file    Number of [place] in the Last Year: Not on file    Unstable Housing in the Last Year: Not on file       Family History:     Family History   Problem Relation Age of Onset    Heart Disease Mother         sudden cardiac death    Heart Attack Father         Allergies:   Patient has no known allergies. Review of Systems:   Review of systems as per history of present illness.   Rest of the review of systems were reviewed and was negative. Physical Examination :     Patient Vitals for the past 8 hrs:   BP Temp Temp src Pulse Resp SpO2   04/03/22 1042 -- -- -- 76 16 96 %   04/03/22 0946 (!) 192/98 98.2 °F (36.8 °C) Oral -- -- --     General Appearance: Awake, alert, and in no apparent distress  Head:  Normocephalic, no trauma  Eyes: No icterus, no pallor. Pulmonary/Chest: Breathing normal bilaterally. No accessory muscle use. Abdomen: Soft, non tender. Bowel sounds normal. No organomegaly  All four Extremities: No cyanosis, clubbing, edema, or effusions. Neurologic: No asterixis. Medical Decision Making:   I have independently reviewed/ordered the following labs:  CBC with Differential:   Recent Labs     04/02/22  1824 04/02/22  2134   WBC 5.3 4.4   HGB 12.6* 11.9*   HCT 37.7* 35.1*    152   LYMPHOPCT 21* 26   MONOPCT 10 9     BMP:   Recent Labs     04/02/22  1824   *   K 3.9   CL 94*   CO2 24   BUN 6*   CREATININE 0.63*     Hepatic Function Panel:  @LABRCNT(PROT:2,LABALBU:2,BILIDIR:2,IBILI:2,BILITOT:2,ALKPHOS:2,ALT:2,AST:2)  )  Lab Results   Component Value Date    MUCUS NOT REPORTED 04/18/2019    RBC 3.51 04/02/2022    RBC 4.26 03/17/2022    TRICHOMONAS NOT REPORTED 04/18/2019    WBC 4.4 04/02/2022    YEAST NOT REPORTED 04/18/2019    TURBIDITY CLEAR 04/18/2019     Lab Results   Component Value Date    CREATININE 0.63 04/02/2022    GLUCOSE 92 04/02/2022    GLUCOSE 83 03/17/2022       Imaging: CT of the abdomen was reviewed which showed proctitis possible procto-sigmoiditis    Impression :   Rectal bleeding likely from hemorrhoids versus rectal prolapse  Mild anemia from rectal bleed  Need for anticoagulation  History of GERD  Obesity     Recommendations   His history and prior history of hemorrhoids on prior colonoscopy are consistent with outlet bleeding. He wants to proceed with colonoscopy. We will plan for tomorrow colonoscopy and hopefully can be discharged after that.   In the meantime he can be on clear liquids and bowel prep tonight. Thank you for allowing us to participate in the care of this patient. Please call with questions.   Elen Carbajal MD, [facility]

## 2022-04-03 NOTE — ED NOTES
Received verbal permission from patient to leave voice mail informing wife of his transfer time and room number at [facility]. MAC's. Writer left voicemail for Marvin at this time.      Carrington Sierra RN  04/03/22 5548

## 2022-04-03 NOTE — ED NOTES
ETA 0330     Rogelio Reyna  04/03/22 0118 You can access the FollowMyHealth Patient Portal offered by [facility] by registering at the following website: [email or web address]. By joining [facility] FollowMyHealth portal, you will also be able to view your health information using other applications (apps) compatible with our system.

## 2022-04-04 ENCOUNTER — ANESTHESIA (OUTPATIENT)
Dept: ENDOSCOPY | Age: 67
DRG: 378 | End: 2022-04-04
Payer: MEDICARE

## 2022-04-04 ENCOUNTER — ANESTHESIA EVENT (OUTPATIENT)
Dept: ENDOSCOPY | Age: 67
DRG: 378 | End: 2022-04-04
Payer: MEDICARE

## 2022-04-04 VITALS
RESPIRATION RATE: 11 BRPM | OXYGEN SATURATION: 96 % | SYSTOLIC BLOOD PRESSURE: 131 MMHG | DIASTOLIC BLOOD PRESSURE: 70 MMHG

## 2022-04-04 LAB
ANION GAP SERPL CALCULATED.3IONS-SCNC: 13 MMOL/L (ref 9–17)
BUN BLDV-MCNC: 7 MG/DL (ref 8–23)
CALCIUM SERPL-MCNC: 8.9 MG/DL (ref 8.6–10.4)
CHLORIDE BLD-SCNC: 99 MMOL/L (ref 98–107)
CO2: 22 MMOL/L (ref 20–31)
CREAT SERPL-MCNC: 0.62 MG/DL (ref 0.7–1.2)
GFR AFRICAN AMERICAN: >60 ML/MIN
GFR NON-AFRICAN AMERICAN: >60 ML/MIN
GFR SERPL CREATININE-BSD FRML MDRD: ABNORMAL ML/MIN/{1.73_M2}
GLUCOSE BLD-MCNC: 102 MG/DL (ref 70–99)
HCT VFR BLD CALC: 38.1 % (ref 40.7–50.3)
HCT VFR BLD CALC: 40.1 % (ref 40.7–50.3)
HCT VFR BLD CALC: 42.1 % (ref 40.7–50.3)
HEMOGLOBIN: 12.9 G/DL (ref 13–17)
HEMOGLOBIN: 13 G/DL (ref 13–17)
HEMOGLOBIN: 14.4 G/DL (ref 13–17)
IRON SATURATION: 19 % (ref 20–55)
IRON: 51 UG/DL (ref 59–158)
POTASSIUM SERPL-SCNC: 3.9 MMOL/L (ref 3.7–5.3)
SODIUM BLD-SCNC: 134 MMOL/L (ref 135–144)
TOTAL IRON BINDING CAPACITY: 265 UG/DL (ref 250–450)
UNSATURATED IRON BINDING CAPACITY: 214 UG/DL (ref 112–347)

## 2022-04-04 PROCEDURE — 2500000003 HC RX 250 WO HCPCS

## 2022-04-04 PROCEDURE — 45385 COLONOSCOPY W/LESION REMOVAL: CPT | Performed by: INTERNAL MEDICINE

## 2022-04-04 PROCEDURE — 7100000010 HC PHASE II RECOVERY - FIRST 15 MIN: Performed by: INTERNAL MEDICINE

## 2022-04-04 PROCEDURE — 36415 COLL VENOUS BLD VENIPUNCTURE: CPT

## 2022-04-04 PROCEDURE — 6360000002 HC RX W HCPCS: Performed by: NURSE PRACTITIONER

## 2022-04-04 PROCEDURE — 2580000003 HC RX 258: Performed by: INTERNAL MEDICINE

## 2022-04-04 PROCEDURE — 6370000000 HC RX 637 (ALT 250 FOR IP): Performed by: INTERNAL MEDICINE

## 2022-04-04 PROCEDURE — 6360000002 HC RX W HCPCS

## 2022-04-04 PROCEDURE — 83540 ASSAY OF IRON: CPT

## 2022-04-04 PROCEDURE — 83550 IRON BINDING TEST: CPT

## 2022-04-04 PROCEDURE — 2500000003 HC RX 250 WO HCPCS: Performed by: NURSE PRACTITIONER

## 2022-04-04 PROCEDURE — 80048 BASIC METABOLIC PNL TOTAL CA: CPT

## 2022-04-04 PROCEDURE — 85018 HEMOGLOBIN: CPT

## 2022-04-04 PROCEDURE — 2580000003 HC RX 258: Performed by: NURSE PRACTITIONER

## 2022-04-04 PROCEDURE — 3700000000 HC ANESTHESIA ATTENDED CARE: Performed by: INTERNAL MEDICINE

## 2022-04-04 PROCEDURE — 3700000001 HC ADD 15 MINUTES (ANESTHESIA): Performed by: INTERNAL MEDICINE

## 2022-04-04 PROCEDURE — 2060000000 HC ICU INTERMEDIATE R&B

## 2022-04-04 PROCEDURE — 7100000011 HC PHASE II RECOVERY - ADDTL 15 MIN: Performed by: INTERNAL MEDICINE

## 2022-04-04 PROCEDURE — 0DBL8ZZ EXCISION OF TRANSVERSE COLON, VIA NATURAL OR ARTIFICIAL OPENING ENDOSCOPIC: ICD-10-PCS | Performed by: INTERNAL MEDICINE

## 2022-04-04 PROCEDURE — 88305 TISSUE EXAM BY PATHOLOGIST: CPT

## 2022-04-04 PROCEDURE — 6360000002 HC RX W HCPCS: Performed by: INTERNAL MEDICINE

## 2022-04-04 PROCEDURE — 45380 COLONOSCOPY AND BIOPSY: CPT | Performed by: INTERNAL MEDICINE

## 2022-04-04 PROCEDURE — 2580000003 HC RX 258

## 2022-04-04 PROCEDURE — 3609010600 HC COLONOSCOPY POLYPECTOMY SNARE/COLD BIOPSY: Performed by: INTERNAL MEDICINE

## 2022-04-04 PROCEDURE — 85014 HEMATOCRIT: CPT

## 2022-04-04 PROCEDURE — 2709999900 HC NON-CHARGEABLE SUPPLY: Performed by: INTERNAL MEDICINE

## 2022-04-04 PROCEDURE — 99232 SBSQ HOSP IP/OBS MODERATE 35: CPT | Performed by: PHYSICIAN ASSISTANT

## 2022-04-04 PROCEDURE — 0DBP8ZX EXCISION OF RECTUM, VIA NATURAL OR ARTIFICIAL OPENING ENDOSCOPIC, DIAGNOSTIC: ICD-10-PCS | Performed by: INTERNAL MEDICINE

## 2022-04-04 RX ORDER — HYDRALAZINE HYDROCHLORIDE 20 MG/ML
10 INJECTION INTRAMUSCULAR; INTRAVENOUS EVERY 6 HOURS PRN
Status: DISCONTINUED | OUTPATIENT
Start: 2022-04-04 | End: 2022-04-05 | Stop reason: HOSPADM

## 2022-04-04 RX ORDER — SODIUM CHLORIDE 9 MG/ML
INJECTION, SOLUTION INTRAVENOUS CONTINUOUS PRN
Status: DISCONTINUED | OUTPATIENT
Start: 2022-04-04 | End: 2022-04-04 | Stop reason: SDUPTHER

## 2022-04-04 RX ORDER — LIDOCAINE HYDROCHLORIDE 10 MG/ML
INJECTION, SOLUTION EPIDURAL; INFILTRATION; INTRACAUDAL; PERINEURAL PRN
Status: DISCONTINUED | OUTPATIENT
Start: 2022-04-04 | End: 2022-04-04 | Stop reason: SDUPTHER

## 2022-04-04 RX ORDER — LANOLIN ALCOHOL/MO/W.PET/CERES
325 CREAM (GRAM) TOPICAL
Status: DISCONTINUED | OUTPATIENT
Start: 2022-04-05 | End: 2022-04-05 | Stop reason: HOSPADM

## 2022-04-04 RX ORDER — MESALAMINE 1000 MG/1
1000 SUPPOSITORY RECTAL NIGHTLY
Status: DISCONTINUED | OUTPATIENT
Start: 2022-04-04 | End: 2022-04-05 | Stop reason: HOSPADM

## 2022-04-04 RX ORDER — PROPOFOL 10 MG/ML
INJECTION, EMULSION INTRAVENOUS CONTINUOUS PRN
Status: DISCONTINUED | OUTPATIENT
Start: 2022-04-04 | End: 2022-04-04 | Stop reason: SDUPTHER

## 2022-04-04 RX ADMIN — HYDRALAZINE HYDROCHLORIDE 10 MG: 20 INJECTION INTRAMUSCULAR; INTRAVENOUS at 01:04

## 2022-04-04 RX ADMIN — PROPOFOL 150 MCG/KG/MIN: 10 INJECTION, EMULSION INTRAVENOUS at 09:08

## 2022-04-04 RX ADMIN — Medication 1000 MCG: at 10:13

## 2022-04-04 RX ADMIN — SODIUM CHLORIDE, PRESERVATIVE FREE 10 ML: 5 INJECTION INTRAVENOUS at 21:07

## 2022-04-04 RX ADMIN — SERTRALINE 50 MG: 50 TABLET, FILM COATED ORAL at 10:14

## 2022-04-04 RX ADMIN — LIDOCAINE HYDROCHLORIDE 50 MG: 10 INJECTION, SOLUTION EPIDURAL; INFILTRATION; INTRACAUDAL; PERINEURAL at 09:07

## 2022-04-04 RX ADMIN — METRONIDAZOLE 500 MG: 500 INJECTION, SOLUTION INTRAVENOUS at 02:16

## 2022-04-04 RX ADMIN — AMLODIPINE BESYLATE 10 MG: 10 TABLET ORAL at 10:13

## 2022-04-04 RX ADMIN — ROSUVASTATIN CALCIUM 40 MG: 20 TABLET, FILM COATED ORAL at 10:14

## 2022-04-04 RX ADMIN — SODIUM CHLORIDE: 9 INJECTION, SOLUTION INTRAVENOUS at 08:31

## 2022-04-04 RX ADMIN — LISINOPRIL 20 MG: 20 TABLET ORAL at 10:14

## 2022-04-04 RX ADMIN — METOPROLOL SUCCINATE 100 MG: 100 TABLET, FILM COATED, EXTENDED RELEASE ORAL at 10:14

## 2022-04-04 RX ADMIN — SODIUM CHLORIDE: 9 INJECTION, SOLUTION INTRAVENOUS at 09:04

## 2022-04-04 RX ADMIN — ENOXAPARIN SODIUM 30 MG: 100 INJECTION SUBCUTANEOUS at 20:11

## 2022-04-04 RX ADMIN — HYDRALAZINE HYDROCHLORIDE 10 MG: 20 INJECTION INTRAMUSCULAR; INTRAVENOUS at 16:47

## 2022-04-04 ASSESSMENT — PAIN SCALES - GENERAL
PAINLEVEL_OUTOF10: 0

## 2022-04-04 ASSESSMENT — PAIN - FUNCTIONAL ASSESSMENT: PAIN_FUNCTIONAL_ASSESSMENT: 0-10

## 2022-04-04 NOTE — PROGRESS NOTES
Physical Therapy        Physical Therapy Cancel Note      DATE: 2022    NAME: Constantine Escobedo  MRN: [id number]   : 1955      Patient not seen this date for Physical Therapy due to:    Surgery/Procedure: colonoscopy AM; PT unable to return PM; check       Electronically signed by Dwayne Skiff, PT on 2022 at 3:27 PM

## 2022-04-04 NOTE — FLOWSHEET NOTE
Back to room on Room Air via stretcher without complaints accompanied by his wife and [address] staff member.

## 2022-04-04 NOTE — ANESTHESIA PRE PROCEDURE
Department of Anesthesiology  Preprocedure Note       Name:  Owen Vera   Age:  74 y.o.  :  1955                                          MRN:  [id number]         Date:  2022      Surgeon: Deborah Valentin):  Fuentes Zamorano MD    Procedure:   Procedure: COLONOSCOPY DIAGNOSTIC (N/A )   Anesthesia type: Monitor Anesthesia Care   Pre-op diagnosis: rectal bleeding         Medications prior to admission:   Prior to Admission medications    Medication Sig Start Date End Date Taking?  Authorizing Provider   vitamin B-12 (CYANOCOBALAMIN) 1000 MCG tablet Take 1,000 mcg by mouth daily    Historical Provider, MD   primidone (MYSOLINE) 50 MG tablet Take Half tablet twice daily 3/22/22   Chauncey Hightower MD   amLODIPine (NORVASC) 5 MG tablet Take 1 tablet by mouth daily 3/22/22   Chauncey Hightower MD   metoprolol succinate (TOPROL XL) 100 MG extended release tablet TAKE 1 TABLET DAILY 3/18/22   Chauncey Hightower MD   XARELTO 20 MG TABS tablet TAKE 1 TABLET DAILY 22   Chauncey Hightower MD   sertraline (ZOLOFT) 50 MG tablet TAKE 1 TABLET DAILY 22   Chauncey Hightower MD   lisinopril (PRINIVIL;ZESTRIL) 20 MG tablet Take 1 tablet by mouth daily 21   Chauncey Hightower MD   albuterol sulfate HFA (PROAIR HFA) 108 (90 Base) MCG/ACT inhaler Inhale 2 puffs into the lungs every 6 hours as needed for Wheezing 21   Chauncey Hightower MD   colchicine (COLCRYS) 0.6 MG tablet Take 1 tablet by mouth 3 times daily (before meals) for 7 days 21  Chauncey Hightower MD   rosuvastatin (CRESTOR) 40 MG tablet Take 1 tablet by mouth daily 5/10/21   Chauncey Hightower MD   omeprazole (PRILOSEC) 20 MG delayed release capsule Take 1 capsule by mouth every other day  Patient not taking: Reported on 2022   Chauncey Hightower MD   Spacer/Aero-Holding Chambers [facility] 1 Device by Does not apply route daily  Patient not taking: Reported on 3/22/2022 5/3/18   Chauncey Hightower MD       Current medications:    No current facility-administered medications for this visit. No current outpatient medications on file.      Facility-Administered Medications Ordered in Other Visits   Medication Dose Route Frequency Provider Last Rate Last Admin    hydrALAZINE (APRESOLINE) injection 10 mg  10 mg IntraVENous Q6H PRN Rita Grammes, APRN - CNP   10 mg at 04/04/22 0104    0.9 % sodium chloride infusion   IntraVENous PRN Rita Grammes, APRN - CNP        acetaminophen (TYLENOL) tablet 650 mg  650 mg Oral Q6H PRN Rita Grammes, APRN - CNP        Or    acetaminophen (TYLENOL) suppository 650 mg  650 mg Rectal Q6H PRN Rita Grammes, APRN - CNP        ondansetron (ZOFRAN-ODT) disintegrating tablet 4 mg  4 mg Oral Q8H PRN Rita Grammes, APRN - CNP        Or    [facility] injection 4 mg  4 mg IntraVENous Q6H PRN Rita Grammes, APRN - CNP        sodium chloride flush 0.9 % injection 5-40 mL  5-40 mL IntraVENous 2 times per day Rita Keatinges, APRN - CNP   10 mL at 04/03/22 2000    sodium chloride flush 0.9 % injection 5-40 mL  5-40 mL IntraVENous PRN Rita Grammes, APRN - CNP        ciprofloxacin (CIPRO) IVPB 400 mg  400 mg IntraVENous Q12H Rita Grammes, APRN - CNP   Stopped at 04/03/22 2350    metronidazole (FLAGYL) 500 mg in NaCl 100 mL IVPB premix  500 mg IntraVENous Q8H Rita Grammes, APRN - CNP   Stopped at 04/04/22 0320    amLODIPine (NORVASC) tablet 10 mg  10 mg Oral Daily Mohammad I Mashaleh, DO        albuterol sulfate  (90 Base) MCG/ACT inhaler 2 puff  2 puff Inhalation Q6H PRN Mohammad I Mashaleh, DO        lisinopril (PRINIVIL;ZESTRIL) tablet 20 mg  20 mg Oral Daily Mohammad I Mashaleh, DO   20 mg at 04/03/22 1238    metoprolol succinate (TOPROL XL) extended release tablet 100 mg  100 mg Oral Daily Mohammad I Mashaleh, DO   100 mg at 04/03/22 1238    rosuvastatin (CRESTOR) tablet 40 mg  40 mg Oral Daily Mohammad I Mashaleh, DO   40 mg at 04/03/22 1238    sertraline (ZOLOFT) tablet 50 mg  50 mg Oral Daily Mohammad I Rosehaleh, DO   50 mg at 04/03/22 1238    vitamin B-12 (CYANOCOBALAMIN) tablet 1,000 mcg  1,000 mcg Oral Daily Mohammad I Rosehaleh, DO   1,000 mcg at 04/03/22 1238    enoxaparin (LOVENOX) injection 30 mg  30 mg SubCUTAneous BID Mohammad I Rosehalshanon, DO   30 mg at 04/03/22 2000       Allergies:  No Known Allergies    Problem List:    Patient Active Problem List   Diagnosis Code    Typical atrial flutter (HCC) I48.3    ASHD (arteriosclerotic heart disease) I25.10    Hyperglycemia R73.9    Vasodepressor syncope R55    Allergic rhinitis J30.9    Atrial flutter ([address] I48.92    Dyslipidemia E78.5    Obesity E66.9    Achilles tendon tear S86.019A    Osteoarthritis M19.90    Uncontrolled hypertension I10    Neurodermatitis L28.0    Benign prostatic hyperplasia N40.0    Elevated PSA R97.20    Reactive airway disease J45.909    Closed fracture of transverse process of lumbar vertebra (HCC) S32.009A    Angiodysplasia of colon K55.20    Rectal prolapse K62.3    Hemorrhoids K64.9    Gastroesophageal reflux disease with esophagitis K21.00    Erosive esophagitis K22.10    Varicose veins of left leg with edema I83.892    Venous insufficiency I87.2    Chronic a-fib (HCC) I48.20    Mild intermittent asthma without complication H82.87    Gastrointestinal hemorrhage associated with anorectal source K62.5    Colitis with rectal bleeding K52.9, K62.5    Hyponatremia E87.1    Normocytic normochromic anemia D64.9    History of mitral valve replacement with bioprosthetic valve Z95.3       Past Medical History:        Diagnosis Date    Achilles tendon tear 2013    Allergic rhinitis     Atrial flutter ([address] 1996    Congestive heart failure (HCC)     Coronary atherosclerosis     Hyperglycemia 2007    Hyperlipidemia 1996    Hypertension 2004    Mitral insufficiency     Obesity 2006    Osteoarthritis  Status post ablation of atrial flutter 05/09/13    Vasodepressor syncope 2001       Past Surgical History:        Procedure Laterality Date   [place] [address], 2013    st.vincents AV node    CATARACT REMOVAL Bilateral     CATARACT REMOVAL WITH IMPLANT  12/06/2017    Dr. Les Esparza Right 10/25/2021    Dr. Génesis Peguero COLONOSCOPY  02/01/2010    [place], negative    COLONOSCOPY  03/25/2019    Dr. Darlyn Voss rectal bx(hyperplastic mucosa with evidence of ischemic injury,angiodysplasia no active bleeding,divetrticulosis,hemorroids    COLONOSCOPY N/A 3/25/2019    COLONOSCOPY POLYPECTOMY SNARE/COLD BIOPSY performed by Jackson Nixon MD at [address] [place] [address]      right side of face    INTRACAPSULAR CATARACT EXTRACTION Right 10/25/2021    EYE CATARACT EMULSIFICATION IOL IMPLANT performed by Frandy Howell DO [facility]    [facility]    KNEE SURGERY Left     MITRAL VALVE SURGERY  8/11/11    34 mm ring annuloplasty    OTHER SURGICAL HISTORY Left 06/12/2020    LASER ABLATION GREATER SAPHENOUIS VEIN WITH PHLEBECTOMY - EVOLV     [place] KNEE SCOPE,MED OR LAT MENIS REPAIR Left 6/21/2018    KNEE ARTHROSCOPY-PARTIAL MEDIAL MENISCECTOMY AND CHONDROPLASTY performed by Eric Cage MD at [facility] INSJ IO LENS PROSTH W/O ECP Left 12/6/2017    EYE CATARACT EMULSIFICATION IOL IMPLANT performed by Frandy Howell DO at [facility] 4/2/2019    -hiatal hernia,grade 3 reflux esophagitis,antral gastritis, erosive bulbar duodenitis    VEIN SURGERY Left 6/12/2020    LASER ABLATION GREATER SAPHENOUIS VEIN WITH PHLEBECTOMY Lakshmi Britton CONF# [id number] - [employer]) performed by Chula Schafer MD at [address] [place] [address] History:    Social History     Tobacco Use    Smoking status: Former Smoker     Packs/day: 1.50     Years: 20.00     Pack years: 30.00 Types: Cigarettes     Quit date: 1996     Years since quittin.3    Smokeless tobacco: Never Used   Substance Use Topics    Alcohol use: Yes     Alcohol/week: 0.0 standard drinks     Comment: 4 beers a day                                 Counseling given: Not Answered      Vital Signs (Current): There were no vitals filed for this visit. BP Readings from Last 3 Encounters:   22 (!) 154/78   22 (S) (!) 181/91   22 (!) 166/98       NPO Status:                                                                                 BMI:   Wt Readings from Last 3 Encounters:   22 268 lb 11.2 oz (121.9 kg)   22 270 lb (122.5 kg)   22 272 lb (123.4 kg)     There is no height or weight on file to calculate BMI.    CBC:   Lab Results   Component Value Date    WBC 4.4 2022    RBC 3.51 2022    RBC 4.26 2022    HGB 12.9 2022    HCT 40.1 2022    .0 2022    RDW 13.9 2022     2022     2012       CMP:   Lab Results   Component Value Date     2022    K 3.9 2022    CL 99 2022    CO2 22 2022    BUN 7 2022    CREATININE 0.62 2022    GFRAA >60 2022    LABGLOM >60 2022    GLUCOSE 102 2022    GLUCOSE 83 2022    PROT 7.1 2022    CALCIUM 8.9 2022    BILITOT 0.36 2022    ALKPHOS 95 2022    AST 22 2022    ALT 13 2022       POC Tests:   No results for input(s): POCGLU, POCNA, POCK, POCCL, POCBUN, POCHEMO, POCHCT in the last 72 hours.     Coags:   Lab Results   Component Value Date    PROTIME 19.2 2022    PROTIME 11.1 2013    INR 1.6 2022    APTT 36.4 2022       HCG (If Applicable): No results found for: PREGTESTUR, PREGSERUM, HCG, HCGQUANT     ABGs: No results found for: PHART, PO2ART, FSZ4ZJG, HIN2AFQ, BEART, S2AWINDW     Type & Screen (If Applicable):  No results found for: Teresa Schneider    Drug/Infectious Status (If Applicable):  No results found for: HIV, HEPCAB    COVID-19 Screening (If Applicable):   Lab Results   Component Value Date    COVID19 Not Detected 06/09/2020     EKG  6/12/2020  Atrial fibrillation with slow ventricular response  Abnormal ECG  When compared with ECG of 10-MAY-2013 05:18,  Atrial fibrillation has replaced Sinus rhythm      Anesthesia Evaluation  Patient summary reviewed and Nursing notes reviewed no history of anesthetic complications:   Airway: Mallampati: III  TM distance: >3 FB   Neck ROM: full  Mouth opening: > = 3 FB Dental: normal exam     Comment: Some teeth are missing but rest are intact    Pulmonary:normal exam    (+) asthma:                            Cardiovascular:    (+) hypertension:, valvular problems/murmurs:, CAD:, dysrhythmias: atrial fibrillation, CHF:,         Rhythm: irregular  Rate: normal                 ROS comment: S/P MVR 2013 History of mitral valve replacement with bioprosthetic valve     Neuro/Psych:   Negative Neuro/Psych ROS              GI/Hepatic/Renal:   (+) PUD,           Endo/Other: Negative Endo/Other ROS                    Abdominal:   (+) obese,           Vascular:   + PVD, aortic or cerebral, . Other Findings:          Narrative & Impression    Atrial fibrillation with slow ventricular response  Abnormal ECG  When compared with ECG of 10-MAY-2013 05:18,  Atrial fibrillation has replaced Sinus rhythm      Specimen Collected: 06/12/20 12:45          Imaging: CT of the abdomen was reviewed which showed proctitis possible procto-sigmoiditis     Impression :   Rectal bleeding likely from hemorrhoids versus rectal prolapse  Mild anemia from rectal bleed  Need for anticoagulation  History of GERD  Obesity      Recommendations   His history and prior history of hemorrhoids on prior colonoscopy are consistent with outlet bleeding. He wants to proceed with colonoscopy.   We will plan for tomorrow colonoscopy and hopefully can be discharged after that. In the meantime he can be on clear liquids and bowel prep tonight. Anesthesia Plan      MAC     ASA 3       Induction: intravenous. MIPS: Postoperative opioids intended. Anesthetic plan and risks discussed with patient. Plan discussed with CRNA.                   Edison Yung MD   4/4/2022

## 2022-04-04 NOTE — PROGRESS NOTES
[facility]  Office: [address], DO, Godwin Ottof, DO, Cj Gomes, DO, Tarah Becerra Blood, DO, Albino Rob MD, Sarah Delgado MD, Keyla Bojorquez MD, Jeff Angel MD, Johnny May MD, Keri Smith MD, Nay Schmitz MD, Paul Pry, DO, Jama Harness, DO, Flori Min MD,  Manuel Robins, DO, James Mcguire MD, Silvia Khanna MD, Yeimi Gibbons MD, Mariano Stubbs DO, Rommel Ramos MD, Erin Espinosa MD, Bella Cisse [facility], [facility], CNP, Sendy Sullivan, CNP, Ross Garcia, CNP, Gerilyn Cockayne, CNP, Myranda Landa, CNP, Camillo Schaumann, CNP, Yovany Morris PA-C, Marguerite Rob, [facility], Carmel Joseph, CNP, Diogenes Wang, CNP, Juliann Joseph, CNP, Jono Mcginnis, CNS, Juan Diego Trevino, [facility], Joy Lundberg, CNP, Xiomara Carrera, CNP, Willa Daniels, [facility]    Progress Note    4/4/2022    8:28 AM    Name:   Maribel Wood  MRN:     [id number]     [place]:      [de-identified]   Room:   2007/2007-01   Day:  1  Admit Date:  4/3/2022  9:56 AM    PCP:   Bandar Hall MD  Code Status:  Full Code    Subjective:     C/C: Rectal bleeding    Interval History Status: improved. Pt seen and evaluated this morning. He underwent colonoscopy today, results are detailed below. He continues to have blood in his stool, bright red; however, it is slowing down. His main complaint is abdominal distention/bloating, this is a new problem. Denying pain in the abdomen, nausea, vomiting. Brief History:     74year-old male with history of rectal prolapse/hemorrhoids, a-fib, bioprosthetic aortic valve. Presented to the ED after experiencing abdominal cramping/diarrhea with gross blood. Notably he is on Xarelto which he has been on for many years for a-fib. CT A/P showing rectosigmoid colonic wall thickening.      Review of Systems:     Constitutional:  negative for chills, fevers, sweats  Respiratory:  negative for cough, dyspnea on exertion, shortness of breath, wheezing  Cardiovascular:  negative for chest pain, chest pressure/discomfort, lower extremity edema, palpitations  Gastrointestinal:  negative for abdominal pain, constipation, diarrhea, nausea, vomiting. + for abdominal distension, bloating  Neurological:  negative for dizziness, headache    Medications: Allergies:  No Known Allergies    Current Meds:   Scheduled Meds:    sodium chloride flush  5-40 mL IntraVENous 2 times per day    ciprofloxacin  400 mg IntraVENous Q12H    metroNIDAZOLE  500 mg IntraVENous Q8H    amLODIPine  10 mg Oral Daily    lisinopril  20 mg Oral Daily    metoprolol succinate  100 mg Oral Daily    rosuvastatin  40 mg Oral Daily    sertraline  50 mg Oral Daily    vitamin B-12  1,000 mcg Oral Daily    enoxaparin  30 mg SubCUTAneous BID     Continuous Infusions:    sodium chloride       PRN Meds: hydrALAZINE, sodium chloride, acetaminophen **OR** acetaminophen, ondansetron **OR** ondansetron, sodium chloride flush, albuterol sulfate HFA    Data:     Past Medical History:   has a past medical history of Achilles tendon tear, Allergic rhinitis, Atrial flutter ([facility]), Congestive heart failure ([address] Coronary atherosclerosis, Hyperglycemia, Hyperlipidemia, Hypertension, Mitral insufficiency, Obesity, Osteoarthritis, Status post ablation of atrial flutter, and Vasodepressor syncope. Social History:   reports that he quit smoking about 25 years ago. His smoking use included cigarettes. He has a 30.00 pack-year smoking history. He has never used smokeless tobacco. He reports current alcohol use. He reports that he does not use drugs.      Family History:   Family History   Problem Relation Age of Onset    Heart Disease Mother         sudden cardiac death    Heart Attack Father        Vitals:  BP (!) 154/78   Pulse 76   Temp 98.6 °F (37 °C) (Oral)   Resp 18   Wt 268 lb 11.2 oz (121.9 kg)   SpO2 95%   BMI 37.48 kg/m²   Temp (24hrs), sigmoid colonic wall thickening could relate to underdistention versus proximal rectosigmoid proctitis/colitis Colonic diverticulosis without acute diverticulitis. Left bladder wall thickening greatest on the right perhaps related to the right inguinal hernia versus cystitis. Please correlate with urine cytology Enlarged prostate. Physical Examination:        General appearance:  alert, cooperative and no distress  Mental Status:  oriented to person, place and time and normal affect  Lungs:  clear to auscultation bilaterally, normal effort  Heart:  regular rate and rhythm, no murmur  Abdomen:  soft, nontender, normal bowel sounds, no masses, hepatomegaly, splenomegaly. Abdomen is soft, yet distended. Extremities:  no edema, redness, tenderness in the calves  Skin:  no gross lesions, rashes, induration    Assessment:        Hospital Problems           Last Modified POA    * (Principal) Gastrointestinal hemorrhage associated with anorectal source 4/3/2022 Yes    Colitis with rectal bleeding 4/3/2022 Yes    Hyponatremia 4/3/2022 Yes    Normocytic normochromic anemia 4/3/2022 Yes    History of mitral valve replacement with bioprosthetic valve 4/3/2022 Yes    Dyslipidemia 4/3/2022 Yes    Obesity 4/3/2022 Yes    Uncontrolled hypertension 4/3/2022 Yes    Rectal prolapse 4/3/2022 Yes    Chronic a-fib ([address] 4/3/2022 Yes          Plan:        1. Anemia from Bright red blood per rectum likely hemorrhoidal made worse by colitis and anticoagulation:  Hold Xarelto, trend HH Q12 hours for now since no evidence of hemodynamic instability or gross blood loss. Colonoscopy completed 4/4. Iron studies suggesting iron deficiency. Start oral ferrous sulfate. Transfuse prn for symptomatic anemia or Hgb<7.   2. Rectosigmoid colitis: Check stool PCR/Cdiff. Check blood cultures. Continue antibiotics. Check ESR/CRP.  Underwent colonoscopy 4/4 revealing moderate diverticulosis in the sigmoid and descending colon, 5 mm sessile polyp in the transverse colon that was resected completely, pathology pending. Mild erythema and friability in the rectum noted and likely source of rectal bleeding. Biopsy obtained. Internal hemorrhoids noted. 3. Hyponatremia: Improved. Continue to monitor. 4. Uncontrolled hypertension: PCP note reviewed, he Is on lisinopril 20 and was recently increased to Norvasc 5. Likely made worse by IVF. Will order home meds and increase Norvasc to 10  5. Normocytic normochromic anemia: has history of B12 deficiency. Iron studies suggesting CHERYL. Start ferrous sulfate. 6. Abdominal distension/bloating: check hepatic function panel  7. Chronic atrial fibrillation: He is anticoagulated with Xarelto outpatient. He is on Lopressor for rate control  8. Dyslipidemia: continue statin  9. BPH  10. Obesity BMI 37: recommend weight loss lifestyle modification  11. DVT ppx  12.  Tiffanie Harris PA-C  4/4/2022  8:28 AM

## 2022-04-04 NOTE — PROGRESS NOTES
Attemptedx2 to call report to Julia [email or web address] but hung up on 1st attempt and when writer called back Nicole Cordon RN answered but said she was in the middle of a dressing change and to call back.

## 2022-04-04 NOTE — FLOWSHEET NOTE
Assessment: Patient is a 74 y.o. male who arrived to the hospital due to a \"GI Bleed. \" Patient was sitting on commode at time of 's visit. Intervention:  visited patient per initial rounding visits.  introduced herself to patient and inquired about his well-being. Patient shared that he is coping well tonight and has completed the medication needed for tomorrow's procedure. Patient was open and receptive to 's visit. Outcome: Patient was receptive of 's visit and support. Plan: Chaplains can make follow-up visit, per request. Stanislaw Maher can be reached 24/7 via [employer]. Maricruz Tiwari     04/03/22 3071   Encounter Summary   Services provided to: Patient   Referral/Consult From: Rounding   Support System Spouse   Continue Visiting   (4/03/2022)   Complexity of Encounter Low   Length of Encounter 15 minutes   Spiritual Assessment Completed Yes   Routine   Type Initial   Spiritual/Confucianism   Type Spiritual support   Assessment Calm; Approachable;Coping   Intervention Sustaining presence/ Ministry of presence   Outcome Receptive

## 2022-04-04 NOTE — PROGRESS NOTES
Occup[facility] [address]  Occupational Therapy Not Seen Note    DATE: 2022    NAME: Jen Werner  MRN: [id number]   : 1955      Patient not seen this date for Occupational Therapy due to:    Surgery/Procedure: Pt off the floor for Colonoscopy. OT will re-attempt to see Pt at later time / date as appropriate.       Electronically signed by DEAN Florez OTR/L on 2022 at 9:10 AM

## 2022-04-04 NOTE — PLAN OF CARE
Problem: Falls - Risk of:  Goal: Will remain free from falls  Description: Will remain free from falls  Outcome: Ongoing  Goal: Absence of physical injury  Description: Absence of physical injury  Outcome: Ongoing     Problem: Sensory:  Goal: Ability to identify factors that increase the pain will improve  Description: Ability to identify factors that increase the pain will improve  Outcome: Ongoing  Goal: Ability to demonstrate ways to enhance comfort will improve  Description: Ability to demonstrate ways to enhance comfort will improve  Outcome: Ongoing  Goal: General experience of comfort will improve  Description: General experience of comfort will improve  Outcome: Ongoing     Problem:  Bowel/Gastric:  Goal: Control of bowel function will improve  Description: Control of bowel function will improve  Outcome: Ongoing  Goal: Ability to achieve a regular elimination pattern will improve  Description: Ability to achieve a regular elimination pattern will improve  Outcome: Ongoing     Problem: Nutritional:  Goal: Ability to follow a diet with enough fiber (20 to 30 grams) for normal bowel function will improve  Description: Ability to follow a diet with enough fiber (20 to 30 grams) for normal bowel function will improve  Outcome: Ongoing     Problem: Skin Integrity:  Goal: Risk for impaired skin integrity will decrease  Description: Risk for impaired skin integrity will decrease  Outcome: Ongoing

## 2022-04-04 NOTE — OP NOTE
[address] Endoscopy        COLONOSCOPY    Patient:   Rafi Dior    :    1955    Referring/PCP: Joy Camargo MD  Facility:  [facility]  Procedure:   Colonoscopy with biopsy, polypectomy (cold snare)  Date:     2022  Endoscopist:  Maryruth Dakin, MD, [facility]    Indication:  rectal bleeding. Postprocedure diagnosis: Proctitis, transverse colon polyp, hemorrhoids, skin tags, diverticulosis    Anesthesia: MAC    Complications:  None    EBL: None from the procedure    Specimen: Sent to the lab    Description of Procedure:  Informed consent was obtained from the patient after explanation of the procedure including indications, description of the procedure,  benefits and possible risks and complications of the procedure, and alternatives. Questions were answered. The patient's history was reviewed and a directed physical examination was performed prior to the procedure. Patient was monitored throughout the procedure with pulse oximetry and periodic assessment of vital signs. Patient was sedated as noted above. With the patient initially in the left lateral decubitus position, a digital rectal examination was performed and revealed negative without mass, lesions or tenderness. The Olympus video colonoscope was placed in the patient's rectum and advanced without difficulty  to the terminal ileum. The prep was good. Examination of the mucosa was performed during both introduction and withdrawal of the colonoscope. Retroflexed view of the rectum was performed. The patient  was taken to the recovery area in good condition. Findings:     1. Normal terminal ileum  2. Moderate diverticulosis in the sigmoid and descending colon  3.  5 millimeters sessile polyp in the transverse colon resected completely with cold snare. The resection and retrieval was complete. 4.  Mild erythema and friability in the rectum likely the source of rectal bleeding.   This was circumferential about 5 cm above the anal verge. This was biopsied with cold biopsy forceps. 5.  Grade 1 internal hemorrhoids on retroflexion  6. Skin tags on rectal exam     Recommendations: Canasa suppository 1 g at bedtime for 2 months  Okay to discharge from GI standpoint  High-fiber diet    Will sign off. Please do not hesitate to call with any questions. Thank you for allowing us to participate in the care of your patient.     Tonio Glynn MD, [facility]

## 2022-04-04 NOTE — ANESTHESIA POSTPROCEDURE EVALUATION
Department of Anesthesiology  Postprocedure Note    Patient: Leeanne Medley  MRN: [id number]  YOB: 1955  Date of evaluation: 4/4/2022  Time:  11:10 AM     Procedure Summary     Date: 04/04/22 Room / Location: [address] [place] [address]    Anesthesia Start: 6030 Anesthesia Stop: 9296    Procedure: COLONOSCOPY POLYPECTOMY SNARE/COLD BIOPSY (N/A ) Diagnosis: (rectal bleeding)    Surgeons: Cortney Rodriguez MD Responsible Provider: Anmol De La Fuente MD    Anesthesia Type: MAC ASA Status: 3          Anesthesia Type: MAC    Noe Phase I: Noe Score: 10    Noe Phase II: Noe Score: 10    Last vitals: Reviewed and per EMR flowsheets.        Anesthesia Post Evaluation    Patient location during evaluation: PACU  Patient participation: complete - patient participated  Level of consciousness: awake  Pain score: 1  Airway patency: patent  Nausea & Vomiting: no nausea and no vomiting  Complications: no  Cardiovascular status: blood pressure returned to baseline and hemodynamically stable  Respiratory status: acceptable  Hydration status: euvolemic

## 2022-04-05 VITALS
BODY MASS INDEX: 37.62 KG/M2 | DIASTOLIC BLOOD PRESSURE: 81 MMHG | WEIGHT: 268.74 LBS | TEMPERATURE: 98.3 F | RESPIRATION RATE: 16 BRPM | HEART RATE: 67 BPM | OXYGEN SATURATION: 96 % | HEIGHT: 71 IN | SYSTOLIC BLOOD PRESSURE: 137 MMHG

## 2022-04-05 PROBLEM — E87.1 HYPONATREMIA: Status: RESOLVED | Noted: 2022-04-03 | Resolved: 2022-04-05

## 2022-04-05 LAB — SURGICAL PATHOLOGY REPORT: NORMAL

## 2022-04-05 PROCEDURE — 99239 HOSP IP/OBS DSCHRG MGMT >30: CPT | Performed by: PHYSICIAN ASSISTANT

## 2022-04-05 PROCEDURE — 97161 PT EVAL LOW COMPLEX 20 MIN: CPT

## 2022-04-05 PROCEDURE — 6360000002 HC RX W HCPCS: Performed by: INTERNAL MEDICINE

## 2022-04-05 PROCEDURE — 6370000000 HC RX 637 (ALT 250 FOR IP): Performed by: INTERNAL MEDICINE

## 2022-04-05 PROCEDURE — 2580000003 HC RX 258: Performed by: INTERNAL MEDICINE

## 2022-04-05 PROCEDURE — 97116 GAIT TRAINING THERAPY: CPT

## 2022-04-05 PROCEDURE — 6370000000 HC RX 637 (ALT 250 FOR IP): Performed by: PHYSICIAN ASSISTANT

## 2022-04-05 RX ORDER — MESALAMINE 1000 MG/1
1000 SUPPOSITORY RECTAL NIGHTLY
Qty: 60 SUPPOSITORY | Refills: 0 | Status: SHIPPED | OUTPATIENT
Start: 2022-04-05 | End: 2022-05-23 | Stop reason: SDUPTHER

## 2022-04-05 RX ORDER — PANTOPRAZOLE SODIUM 40 MG/1
40 TABLET, DELAYED RELEASE ORAL
Status: DISCONTINUED | OUTPATIENT
Start: 2022-04-06 | End: 2022-04-05 | Stop reason: HOSPADM

## 2022-04-05 RX ORDER — LANOLIN ALCOHOL/MO/W.PET/CERES
325 CREAM (GRAM) TOPICAL
Qty: 90 TABLET | Refills: 3 | Status: SHIPPED | OUTPATIENT
Start: 2022-04-06 | End: 2022-10-03 | Stop reason: SDUPTHER

## 2022-04-05 RX ORDER — PANTOPRAZOLE SODIUM 40 MG/1
40 TABLET, DELAYED RELEASE ORAL
Qty: 30 TABLET | Refills: 3 | Status: SHIPPED | OUTPATIENT
Start: 2022-04-06

## 2022-04-05 RX ORDER — AMLODIPINE BESYLATE 10 MG/1
10 TABLET ORAL DAILY
Qty: 30 TABLET | Refills: 1 | Status: SHIPPED | OUTPATIENT
Start: 2022-04-05 | End: 2022-06-14 | Stop reason: SDUPTHER

## 2022-04-05 RX ADMIN — FERROUS SULFATE TAB EC 325 MG (65 MG FE EQUIVALENT) 325 MG: 325 (65 FE) TABLET DELAYED RESPONSE at 08:55

## 2022-04-05 RX ADMIN — SERTRALINE 50 MG: 50 TABLET, FILM COATED ORAL at 08:55

## 2022-04-05 RX ADMIN — MESALAMINE 1000 MG: 1000 SUPPOSITORY RECTAL at 00:52

## 2022-04-05 RX ADMIN — ENOXAPARIN SODIUM 30 MG: 100 INJECTION SUBCUTANEOUS at 08:55

## 2022-04-05 RX ADMIN — LISINOPRIL 20 MG: 20 TABLET ORAL at 08:55

## 2022-04-05 RX ADMIN — ROSUVASTATIN CALCIUM 40 MG: 20 TABLET, FILM COATED ORAL at 08:55

## 2022-04-05 RX ADMIN — AMLODIPINE BESYLATE 10 MG: 10 TABLET ORAL at 08:55

## 2022-04-05 RX ADMIN — Medication 1000 MCG: at 08:55

## 2022-04-05 RX ADMIN — METOPROLOL SUCCINATE 100 MG: 100 TABLET, FILM COATED, EXTENDED RELEASE ORAL at 08:55

## 2022-04-05 RX ADMIN — SODIUM CHLORIDE, PRESERVATIVE FREE 10 ML: 5 INJECTION INTRAVENOUS at 08:57

## 2022-04-05 NOTE — PROGRESS NOTES
CLINICAL PHARMACY NOTE: MEDS TO BEDS    Total # of Prescriptions Filled: 2   The following medications were delivered to the patient:  · Ferosul  · pantoprazole    Additional Documentation:

## 2022-04-05 NOTE — PROGRESS NOTES
[facility]up[facility] [address]  Occupational Therapy Not Seen Note    DATE: 2022    NAME: Natasha Cruz  MRN: [id number]   : 1955      Patient not seen this date for Occupational Therapy due to:    Patient independent with ADLs and functional tasks with no acute OT needs. Pt reports independently performing simple ADL tasks during hospitalization. Pt and significant other report no safety concerns for returning home at discharge. Will defer OT evaluation at this time. Please reorder OT if future needs arise.          Electronically signed by Ha Levy OT on 2022 at 11:09 AM

## 2022-04-05 NOTE — PLAN OF CARE
Problem: Falls - Risk of:  Goal: Will remain free from falls  Description: Will remain free from falls  Outcome: Completed  Goal: Absence of physical injury  Description: Absence of physical injury  Outcome: Completed     Problem: Sensory:  Goal: Ability to identify factors that increase the pain will improve  Description: Ability to identify factors that increase the pain will improve  Outcome: Completed  Goal: Ability to demonstrate ways to enhance comfort will improve  Description: Ability to demonstrate ways to enhance comfort will improve  Outcome: Completed  Goal: General experience of comfort will improve  Description: General experience of comfort will improve  Outcome: Completed     Problem:  Bowel/Gastric:  Goal: Control of bowel function will improve  Description: Control of bowel function will improve  Outcome: Completed  Goal: Ability to achieve a regular elimination pattern will improve  Description: Ability to achieve a regular elimination pattern will improve  Outcome: Completed     Problem: Nutritional:  Goal: Ability to follow a diet with enough fiber (20 to 30 grams) for normal bowel function will improve  Description: Ability to follow a diet with enough fiber (20 to 30 grams) for normal bowel function will improve  Outcome: Completed     Problem: Skin Integrity:  Goal: Risk for impaired skin integrity will decrease  Description: Risk for impaired skin integrity will decrease  Outcome: Completed

## 2022-04-05 NOTE — PROGRESS NOTES
[facility]  Office: [address], DO, Spencer Murphy, DO, Neetu Navarro, DO, Ted Reyes Blood, DO, Mercedez Villagomez MD, Norma Hou MD, Servando Meneses MD, Stefany Murphy MD, Sonja Segura MD, Laly Luna MD, Jon Dawn MD, Hanna Miguel, DO, Media Sensor, DO, Mahin Vyas MD,  Yarelis Bunch, DO, Behzad Briggs MD, Polo Mcmahan MD, Janny Zafar MD, Ratna Esteban DO, Mable Rodriguez MD, Omaira Mcclellan MD, Magnus Fontenot, [facility], [facility] Cyriljohn, CNP, Sandie Parks, CNP, Kalin Saxena, CNP, Leeann White, CNP, Spike Lagunas, CNP, Karlee Mckinney, CNP, JULIO MerchantC, Erika Crane, [facility], Jeanne Sarah, CNP, Nadira Ibrahim, CNP, Johnny Singh, CNP, Gillian Dobbins, CNS, Anabella Winston, [facility], Olivia Kilgore, CNP, Lolis Reina, CNP, Jason Mg, [facility]    Progress Note    4/5/2022    11:39 AM    Name:   Quan Sims  MRN:     [id number]     [place]:      [de-identified]   Room:   2007/2007-01   Day:  2  Admit Date:  4/3/2022  9:56 AM    PCP:   Gretchen Jamison MD  Code Status:  Full Code    Subjective:     C/C: Rectal bleeding    Interval History Status: improved. Pt seen and evaluated this morning. No new complaints. His stool is no longer bloody; however, he is getting bright red blood when he wipes which has been ongoing for a long time. Hemoglobin is stable. He is tolerating food. He is stable for discharge. Brief History:     74year-old male with history of rectal prolapse/hemorrhoids, a-fib, bioprosthetic aortic valve. Presented to the ED after experiencing abdominal cramping/diarrhea with gross blood. Notably he is on Xarelto which he has been on for many years for a-fib. CT A/P showing rectosigmoid colonic wall thickening.      Review of Systems:     Constitutional:  negative for chills, fevers, sweats  Respiratory:  negative for cough, dyspnea on exertion, shortness of breath, wheezing  Cardiovascular:  negative for chest pain, chest pressure/discomfort, lower extremity edema, palpitations  Gastrointestinal:  negative for abdominal pain, constipation, diarrhea, nausea, vomiting. + for abdominal distension, bloating  Neurological:  negative for dizziness, headache    Medications: Allergies:  No Known Allergies    Current Meds:   Scheduled Meds:    [START ON 4/6/2022] pantoprazole  40 mg Oral QAM AC    mesalamine  1,000 mg Rectal Nightly    ferrous sulfate  325 mg Oral Daily with breakfast    sodium chloride flush  5-40 mL IntraVENous 2 times per day    amLODIPine  10 mg Oral Daily    lisinopril  20 mg Oral Daily    metoprolol succinate  100 mg Oral Daily    rosuvastatin  40 mg Oral Daily    sertraline  50 mg Oral Daily    vitamin B-12  1,000 mcg Oral Daily    enoxaparin  30 mg SubCUTAneous BID     Continuous Infusions:     PRN Meds: hydrALAZINE, acetaminophen **OR** acetaminophen, ondansetron **OR** ondansetron, sodium chloride flush, albuterol sulfate HFA    Data:     Past Medical History:   has a past medical history of Achilles tendon tear, Allergic rhinitis, Atrial flutter ([address] Congestive heart failure ([address] Coronary atherosclerosis, Hyperglycemia, Hyperlipidemia, Hypertension, Mitral insufficiency, Obesity, Osteoarthritis, Status post ablation of atrial flutter, and Vasodepressor syncope. Social History:   reports that he quit smoking about 25 years ago. His smoking use included cigarettes. He has a 30.00 pack-year smoking history. He has never used smokeless tobacco. He reports current alcohol use. He reports that he does not use drugs.      Family History:   Family History   Problem Relation Age of Onset    Heart Disease Mother         sudden cardiac death    Heart Attack Father        Vitals:  /81   Pulse 67   Temp 98.3 °F (36.8 °C) (Oral)   Resp 16   Ht 5' 10.98\" (1.803 m)   Wt 268 lb 11.9 oz (121.9 kg)   SpO2 96%   BMI 37.50 kg/m²   Temp (24hrs), Av.9 °F (36.6 °C), Min:97.4 °F (36.3 °C), Max:98.3 °F (36.8 °C)    No results for input(s): POCGLU in the last 72 hours. I/O (24Hr): Intake/Output Summary (Last 24 hours) at 2022 1139  Last data filed at 2022 1830  Gross per 24 hour   Intake 850 ml   Output --   Net 850 ml       Labs:  Hematology:  Recent Labs     22  21322  1203 22  2213 22  0405 22  1043 22  2251   WBC 5.3  --  4.4  --   --   --   --   --   --    RBC 3.74*  --  3.51*  --   --   --   --   --   --    HGB 12.6*   < > 11.9*   < > 13.2   < > 12.9* 14.4 13.0   HCT 37.7*   < > 35.1*   < > 40.3*   < > 40.1* 42.1 38.1*   .8  --  100.0  --   --   --   --   --   --    MCH 33.7*  --  33.9*  --   --   --   --   --   --    MCHC 33.4  --  33.9  --   --   --   --   --   --    RDW 14.0  --  13.9  --   --   --   --   --   --      --  152  --   --   --   --   --   --    MPV 11.2  --  10.7  --   --   --   --   --   --    SEDRATE  --   --   --   --  9  --   --   --   --    CRP  --   --   --   --  4.1  --   --   --   --    INR 1.6  --   --   --   --   --   --   --   --     < > = values in this interval not displayed.      Chemistry:  Recent Labs     04/02/22  1824 04/04/22  0405   * 134*   K 3.9 3.9   CL 94* 99   CO2 24 22   GLUCOSE 92 102*   BUN 6* 7*   CREATININE 0.63* 0.62*   ANIONGAP 12 13   LABGLOM >60 >60   GFRAA >60 >60   CALCIUM 8.8 8.9     Recent Labs     22  1824 22  1203   PROT 7.1  --    LABALBU 4.0  --    TSH  --  1.18   AST 22  --    ALT 13  --    ALKPHOS 95  --    BILITOT 0.36  --    LIPASE 13  --      ABG:No results found for: POCPH, PHART, PH, POCPCO2, RRV7WJT, PCO2, POCPO2, PO2ART, PO2, POCHCO3, LAJ4WQE, HCO3, NBEA, PBEA, BEART, BE, THGBART, THB, OJC3UXP, BGIC7MXY, X0IUYPSL, O2SAT, FIO2  Lab Results   Component Value Date/Time    SPECIAL RT AC 6 ML 2022 12:04 PM     Lab Results   Component Value Date/Time CULTURE NO GROWTH 1 DAY 04/03/2022 12:04 PM       Radiology:  CT ABDOMEN PELVIS W IV CONTRAST Additional Contrast? None    Result Date: 4/2/2022  Rectal sigmoid colonic wall thickening could relate to underdistention versus proximal rectosigmoid proctitis/colitis Colonic diverticulosis without acute diverticulitis. Left bladder wall thickening greatest on the right perhaps related to the right inguinal hernia versus cystitis. Please correlate with urine cytology Enlarged prostate. Physical Examination:        General appearance:  alert, cooperative and no distress  Mental Status:  oriented to person, place and time and normal affect  Lungs:  clear to auscultation bilaterally, normal effort  Heart:  regular rate and rhythm, no murmur  Abdomen:  soft, nontender, normal bowel sounds, no masses, hepatomegaly, splenomegaly. Abdomen is soft, yet distended. Extremities:  no edema, redness, tenderness in the calves  Skin:  no gross lesions, rashes, induration    Assessment:        Hospital Problems           Last Modified POA    * (Principal) Gastrointestinal hemorrhage associated with anorectal source 4/5/2022 Yes    Colitis with rectal bleeding 4/5/2022 Yes    Normocytic normochromic anemia 4/5/2022 Yes    History of mitral valve replacement with bioprosthetic valve 4/5/2022 Yes    Dyslipidemia 4/5/2022 Yes    Obesity 4/5/2022 Yes    Uncontrolled hypertension 4/5/2022 Yes    Rectal prolapse 4/5/2022 Yes    Chronic a-fib ([address] 4/5/2022 Yes          Plan:        1. Anemia from Bright red blood per rectum likely hemorrhoidal made worse by colitis and anticoagulation:  Resume Xarelto. Repeat CBC on Friday 4/8. Colonoscopy completed 4/4. Iron studies suggesting iron deficiency. Start oral ferrous sulfate.    2. Rectosigmoid colitis: Underwent colonoscopy 4/4 revealing moderate diverticulosis in the sigmoid and descending colon, 5 mm sessile polyp in the transverse colon that was resected completely, pathology suggesting tubular adenoma, discussed with patient. Mild erythema and friability in the rectum noted and likely source of rectal bleeding. Biopsy suggesting colitis either infectious/self limited colitis vs ischemic  3. Hyponatremia: Improved. Continue to monitor. 4. Uncontrolled hypertension: PCP note reviewed, he Is on lisinopril 20 and was recently increased to Norvasc 5. Likely made worse by IVF. Will order home meds and increase Norvasc to 10  5. Normocytic normochromic anemia: has history of B12 deficiency. Iron studies suggesting CHERYL. Start ferrous sulfate. 6. Abdominal distension/bloating: start protonix  7. Chronic atrial fibrillation: He is anticoagulated with Xarelto outpatient. He is on Lopressor for rate control  8. Dyslipidemia: continue statin  9. BPH: f/u as outpatient  10.  Obesity BMI 37: recommend weight loss lifestyle modification          Fredy Zhu PA-C  4/5/2022  11:39 AM

## 2022-04-05 NOTE — DISCHARGE SUMMARY
[facility]  Office: [address], DO, Beatriz Ann, DO, Marie Cabral, DO, Kamila Zabala Blood, DO, Sherlyn Vasquez MD, Matty Torre MD, Elina Gonzalez MD, Susan Erwin MD, Dianelys Aguiar MD, Michele Amin MD, Prema Kiran MD, Victor Hugo Hernández, DO, Jeevan Tesfaye, DO, Mitzi Pineda MD,  Maida Hutchinson DO, Nik Sanchez MD, Pamella Chase MD, Jostin Mcnamara MD, Marta Erickson, DO, Giovanna Díaz MD, Batsheva Barbosa MD, Manuel Tran, [facility], [facility], [facility], Anthony Giron, CNP, Braden Barba, CNP, Sid Alba, CNP, Bonifacio Zavala, CNP, Zully Sim, CNP, Brandan Carrillo PA-C, Krunal Fulton, [facility], Haylie Hurt, CNP, David Kim, CNP, Shakir Dennison, CNP, Nena Garcia, CNS, Jose Mendoza, [facility], Cayden Bond, CNP, Wily Katz, CNP, Jaylon Hinton, [facility]    Discharge Summary     Patient ID: Jen Werner  :  1955   MRN: [id number]     ACCOUNT:  [de-identified]   Patient's PCP: Katie Davis MD  Admit Date: 4/3/2022   Discharge Date: 2022  Length of Stay: 2  Code Status:  Full Code  Admitting Physician: Mitzi Pineda MD  Discharge Physician: Marcela Hummel PA-C     Active Discharge Diagnoses:     Hospital Problem Lists:  Principal Problem:    Gastrointestinal hemorrhage associated with anorectal source  Active Problems:    Colitis with rectal bleeding    Normocytic normochromic anemia    History of mitral valve replacement with bioprosthetic valve    Dyslipidemia    Obesity    Uncontrolled hypertension    Rectal prolapse    Chronic a-fib ([address]  Resolved Problems:    Hyponatremia      Admission Condition:  fair     Discharged Condition: good    Hospital Stay:     Hospital Course:     74year-old male with history of rectal prolapse/hemorrhoids, a-fib, bioprosthetic aortic valve. Presented to the ED after experiencing abdominal cramping/diarrhea with gross blood.  Notably he is on Xarelto which he has been on for many years for a-fib. CT A/P showing rectosigmoid colonic wall thickening. Significant therapeutic interventions:     1. Anemia from Bright red blood per rectum likely hemorrhoidal made worse by colitis and anticoagulation:  Resume Xarelto. Repeat CBC on Friday 4/8. Colonoscopy completed 4/4. Iron studies suggesting iron deficiency. Start oral ferrous sulfate. 2. Rectosigmoid colitis: Underwent colonoscopy 4/4 revealing moderate diverticulosis in the sigmoid and descending colon, 5 mm sessile polyp in the transverse colon that was resected completely, pathology suggesting tubular adenoma, discussed with patient. Mild erythema and friability in the rectum noted and likely source of rectal bleeding. Biopsy suggesting colitis either infectious/self limited colitis vs ischemic  3. Hyponatremia: Improved. Continue to monitor. 4. Uncontrolled hypertension: PCP note reviewed, he Is on lisinopril 20 and was recently increased to Norvasc 5. Likely made worse by IVF. Will order home meds and increase Norvasc to 10  5. Normocytic normochromic anemia: has history of B12 deficiency. Iron studies suggesting CHERYL. Start ferrous sulfate. 6. Abdominal distension/bloating: start protonix  7. Chronic atrial fibrillation: He is anticoagulated with Xarelto outpatient. Jana Ahmadi is on Lopressor for rate control  8. Dyslipidemia: continue statin  9. BPH: f/u as outpatient  10.  Obesity BMI 37: recommend weight loss lifestyle modification    Significant Diagnostic Studies:   Labs / Micro:  CBC:   Lab Results   Component Value Date    WBC 4.4 04/02/2022    RBC 3.51 04/02/2022    RBC 4.26 03/17/2022    HGB 13.0 04/04/2022    HCT 38.1 04/04/2022    .0 04/02/2022    MCH 33.9 04/02/2022    MCHC 33.9 04/02/2022    RDW 13.9 04/02/2022     04/02/2022     01/20/2012     BMP:    Lab Results   Component Value Date    GLUCOSE 102 04/04/2022    GLUCOSE 83 03/17/2022     04/04/2022    K 3.9 04/04/2022    CL 99 04/04/2022    CO2 22 04/04/2022    ANIONGAP 13 04/04/2022    BUN 7 04/04/2022    CREATININE 0.62 04/04/2022    BUNCRER 10 04/02/2022    CALCIUM 8.9 04/04/2022    LABGLOM >60 04/04/2022    GFRAA >60 04/04/2022    GFR      04/04/2022     HFP:    Lab Results   Component Value Date    PROT 7.1 04/02/2022     CMP:    Lab Results   Component Value Date    GLUCOSE 102 04/04/2022    GLUCOSE 83 03/17/2022     04/04/2022    K 3.9 04/04/2022    CL 99 04/04/2022    CO2 22 04/04/2022    BUN 7 04/04/2022    CREATININE 0.62 04/04/2022    ANIONGAP 13 04/04/2022    ALKPHOS 95 04/02/2022    ALT 13 04/02/2022    AST 22 04/02/2022    BILITOT 0.36 04/02/2022    LABALBU 4.0 04/02/2022    ALBUMIN 1.3 04/02/2022    LABGLOM >60 04/04/2022    GFRAA >60 04/04/2022    GFR      04/04/2022    PROT 7.1 04/02/2022    CALCIUM 8.9 04/04/2022     PT/INR:    Lab Results   Component Value Date    PROTIME 19.2 04/02/2022    PROTIME 11.1 05/09/2013    INR 1.6 04/02/2022     PTT:   Lab Results   Component Value Date    APTT 36.4 04/02/2022     TSH:    Lab Results   Component Value Date    TSH 1.18 04/03/2022     Radiology:  CT ABDOMEN PELVIS W IV CONTRAST Additional Contrast? None    Result Date: 4/2/2022  Rectal sigmoid colonic wall thickening could relate to underdistention versus proximal rectosigmoid proctitis/colitis Colonic diverticulosis without acute diverticulitis. Left bladder wall thickening greatest on the right perhaps related to the right inguinal hernia versus cystitis. Please correlate with urine cytology Enlarged prostate. Consultations:    Consults:     Final Specialist Recommendations/Findings:   IP CONSULT TO GI  IP CONSULT TO CASE MANAGEMENT      The patient was seen and examined on day of discharge and this discharge summary is in conjunction with any daily progress note from day of discharge. Discharge plan:     Disposition: Home    Physician Follow Up:   - PCP  - GI, referral placed. Requiring Further Evaluation/Follow Up POST HOSPITALIZATION/Incidental Findings:  - tubular adenoma, will need repeat colonoscopy in 5-10 years at discretion of GI physician    Diet: regular diet    Activity: As tolerated    Discharge Medications:      Medication List      START taking these medications    ferrous sulfate 325 (65 Fe) MG EC tablet  Commonly known as: FE TABS 325  Take 1 tablet by mouth daily (with breakfast)  Start taking on: April 6, 2022     mesalamine 1000 MG suppository  Commonly known as: CANASA  Place 1 suppository rectally nightly     pantoprazole 40 MG tablet  Commonly known as: PROTONIX  Take 1 tablet by mouth every morning (before breakfast)  Start taking on: April 6, 2022        CHANGE how you take these medications    amLODIPine 10 MG tablet  Commonly known as: NORVASC  Take 1 tablet by mouth daily  What changed:   · medication strength  · how much to take        CONTINUE taking these medications    albuterol sulfate  (90 Base) MCG/ACT inhaler  Commonly known as: ProAir HFA  Inhale 2 puffs into the lungs every 6 hours as needed for Wheezing     colchicine 0.6 MG tablet  Commonly known as: Colcrys  Take 1 tablet by mouth 3 times daily (before meals) for 7 days     lisinopril 20 MG tablet  Commonly known as: PRINIVIL;ZESTRIL  Take 1 tablet by mouth daily     metoprolol succinate 100 MG extended release tablet  Commonly known as: TOPROL XL  TAKE 1 TABLET DAILY     [employer]amber Liliana Nahed  1 Device by Does not apply route daily     primidone 50 MG tablet  Commonly known as:  MYSOLINE  Take Half tablet twice daily     rosuvastatin 40 MG tablet  Commonly known as: CRESTOR  Take 1 tablet by mouth daily     sertraline 50 MG tablet  Commonly known as: ZOLOFT  TAKE 1 TABLET DAILY     vitamin B-12 1000 MCG tablet  Commonly known as: CYANOCOBALAMIN     Xarelto 20 MG Tabs tablet  Generic drug: rivaroxaban  TAKE 1 TABLET DAILY        STOP taking these medications    omeprazole 20 MG delayed release capsule  Commonly known as: PRILOSEC           Where to Get Your Medications      These medications were sent to [facility] [address] [facility], [address]  [address], [address] [place] [address]    Phone: [phone]   · amLODIPine 10 MG tablet  · ferrous sulfate 325 (65 Fe) MG EC tablet  · mesalamine 1000 MG suppository  · pantoprazole 40 MG tablet         Discharge Procedure Orders   CBC   Standing Status: Future Standing Exp. Date: 04/05/23   Order Comments: Please forward results to Dede Oddi, MD Carlo Essex, MD, Gastroenterology, [place]   Referral Priority: Routine Referral Type: Eval and Treat   Referral Reason: Specialty Services Required   Referred to Provider: Fletcher Quintero Requested Specialty: Gastroenterology   Number of Visits Requested: 1       Time Spent on discharge is  32 mins in patient examination, evaluation, counseling as well as medication reconciliation, prescriptions for required medications, discharge plan and follow up. Electronically signed by   Rudi Angel PA-C  4/5/2022  11:54 AM      Thank you Dr. Mirna Lambert MD for the opportunity to be involved in this patient's care.

## 2022-04-05 NOTE — PROGRESS NOTES
MD Notification    Notified Person: MD    Notified Person Name:  Darlin    Notification Date/Time: 10/24//2020 at 1449    Notification Interaction: pager    Purpose of Notification: Pt asking if she can continue her Chemotherapy while in the hospital? No order at this time. Please let me know. Thanks    Orders Received: Order to call [employer]     Comments:   Pt discharged with all documented belongings. Pt taken down by wheelchair with wife to daughter's vehicle. Medications delivered. Script given for [place]. All questions answered.

## 2022-04-06 ENCOUNTER — CARE COORDINATION (OUTPATIENT)
Dept: CASE MANAGEMENT | Age: 67
End: 2022-04-06

## 2022-04-06 DIAGNOSIS — K62.5 COLITIS WITH RECTAL BLEEDING: Primary | ICD-10-CM

## 2022-04-06 DIAGNOSIS — K52.9 COLITIS WITH RECTAL BLEEDING: Primary | ICD-10-CM

## 2022-04-06 PROCEDURE — 1111F DSCHRG MED/CURRENT MED MERGE: CPT

## 2022-04-06 NOTE — CARE COORDINATION
[facility] Transitions Initial Follow Up Call    Call within 2 business days of discharge: Yes    Patient: Miguel Recinos Patient : 1955   MRN: [id number]  Reason for Admission:   Discharge Date: 22 RARS: Readmission Risk Score: 8.9 ( )      Last Discharge [facility]       Complaint Diagnosis Description Type Department Provider    4/3/22  Normocytic normochromic anemia . .. Admission (Discharged) [facility] 2 Hannah Merritt MD           Spoke with: [address] [place] [address] Rd: Adarsh Bal    Non-face-to-face services provided:  Obtained and reviewed discharge summary and/or continuity of care documents  Reviewed and followed up on pending diagnostic tests and treatments-prior to call  Education of patient/family/caregiver/guardian to support self-management-S/S to report. Importance of following up on prescribed suppositories and PCP appointment-VU  Assessment and support for treatment adherence and medication management-Assessment completed. Medications reviewed, 1111F Orders entered. Care Transitions 24 Hour Call    Do you have any ongoing symptoms?: Yes  Patient-reported symptoms: Other, Weakness (Comment: scant amount bright red blood with formed, black stool)  Interventions for patient-reported symptoms:  (Comment: physician aware)  Do you have a copy of your discharge instructions?: Yes  Do you have all of your prescriptions and are they filled?: No  Have you been contacted by a [address]?: No  Have you scheduled your follow up appointment?:  (Comment: The PCP office states that they will call the patient with an appointment time.)  Were you discharged with any Home Care or Post Acute Services: No  Do you feel like you have everything you need to keep you well at home?: Yes  Care Transitions Interventions     Is fearful of eating at this point, encouraged to drink plenty of fluids and resume a high fiber diet. VU  Denies nausea.   Instructed that Iron can cause dark colored stool.  VU    Transitions of Care Initial Call    Was this an external facility discharge? No Discharge Facility: [facility] to be reviewed by the provider   Additional needs identified to be addressed with provider: No  none             Method of communication with provider : none      Advance Care Planning:   Does patient have an Advance Directive: reviewed and current, reviewed and needs to be updated, not on file; education provided, not on file, patient declined education, decision maker updated and referral to internal ACP facilitator. Was this a readmission? No  Patient stated reason for admission: Blood in stool, diarrhea   Patients top risk factors for readmission: medical condition-GI Bleed    Care Transition Nurse (CTN) contacted the patient by telephone to perform post hospital discharge assessment. Verified name and  with patient as identifiers. Provided introduction to self, and explanation of the CTN role. CTN reviewed discharge instructions, medical action plan and red flags with patient who verbalized understanding. Patient given an opportunity to ask questions and does not have any further questions or concerns at this time. Were discharge instructions available to patient? Yes. Reviewed appropriate site of care based on symptoms and resources available to patient including: PCP. The patient agrees to contact the PCP office for questions related to their healthcare. Medication reconciliation was performed with patient, who verbalizes understanding of administration of home medications. Advised obtaining a 90-day supply of all daily and as-needed medications. Covid Risk Education     Educated patient about risk for severe COVID-19 due to risk factors according to CDC guidelines. CTN reviewed discharge instructions, medical action plan and red flag symptoms with the patient who verbalized understanding.  Discussed COVID vaccination status: No. Education provided on COVID-19 vaccination as appropriate. Discussed exposure protocols and quarantine with CDC Guidelines. Patient was given an opportunity to verbalize any questions and concerns and agrees to contact PCP or health care provider for questions related to their healthcare. Reviewed and educated patient on any new and changed medications related to discharge diagnosis. Was patient discharged with a pulse oximeter? No Discussed and confirmed pulse oximeter discharge instructions and when to notify provider or seek emergency care. Plan for follow-up call in 3-5 days based on severity of symptoms and risk factors. Plan for next call: symptom management-assess, PCP appointment, replacement for ordered suppositories not covered by physician        Follow Up  No future appointments.     Cynthia Fish RN

## 2022-04-08 ENCOUNTER — TELEPHONE (OUTPATIENT)
Dept: FAMILY MEDICINE CLINIC | Age: 67
End: 2022-04-08

## 2022-04-08 LAB
ABSOLUTE BASO #: 0 X10E9/L (ref 0–0.2)
ABSOLUTE EOS #: 0.2 X10E9/L (ref 0–0.4)
ABSOLUTE LYMPH #: 1 X10E9/L (ref 1–3.5)
ABSOLUTE MONO #: 0.7 X10E9/L (ref 0–0.9)
ABSOLUTE NEUT #: 3.7 X10E9/L (ref 1.5–6.6)
BASOPHILS RELATIVE PERCENT: 0.4 %
CULTURE: NORMAL
EOSINOPHILS RELATIVE PERCENT: 3.9 %
HCT VFR BLD CALC: 41.2 % (ref 39–49)
HEMOGLOBIN: 13.9 G/DL (ref 13–17)
LYMPHOCYTE %: 17.4 %
Lab: NORMAL
MCH RBC QN AUTO: 34 PG (ref 27–34)
MCHC RBC AUTO-ENTMCNC: 33.7 G/DL (ref 32–36)
MCV RBC AUTO: 101 FL (ref 80–100)
MONOCYTES # BLD: 12.2 %
NEUTROPHILS RELATIVE PERCENT: 66.1 %
PDW BLD-RTO: 15.1 % (ref 11.5–15)
PLATELETS: 178 X10E9/L (ref 150–450)
PMV BLD AUTO: 9.5 FL (ref 7–12)
RBC: 4.09 X10E12/L (ref 4.1–5.7)
SPECIMEN DESCRIPTION: NORMAL
WBC: 5.5 X10E9/L (ref 4–11)

## 2022-04-08 ASSESSMENT — PATIENT HEALTH QUESTIONNAIRE - PHQ9
SUM OF ALL RESPONSES TO PHQ QUESTIONS 1-9: 0
SUM OF ALL RESPONSES TO PHQ QUESTIONS 1-9: 0
DEPRESSION UNABLE TO ASSESS: PT REFUSES
2. FEELING DOWN, DEPRESSED OR HOPELESS: 0
SUM OF ALL RESPONSES TO PHQ9 QUESTIONS 1 & 2: 0
1. LITTLE INTEREST OR PLEASURE IN DOING THINGS: 0
SUM OF ALL RESPONSES TO PHQ QUESTIONS 1-9: 0
SUM OF ALL RESPONSES TO PHQ QUESTIONS 1-9: 0

## 2022-04-12 ENCOUNTER — CARE COORDINATION (OUTPATIENT)
Dept: CASE MANAGEMENT | Age: 67
End: 2022-04-12

## 2022-04-15 ENCOUNTER — CARE COORDINATION (OUTPATIENT)
Dept: CASE MANAGEMENT | Age: 67
End: 2022-04-15

## 2022-04-15 NOTE — CARE COORDINATION
[facility] Transitions Follow Up Call    4/15/2022    Patient: Frances Oh  Patient : 1955   MRN: <[id number]>  Reason for Admission: Gastrointestinal hemorrhage  Discharge Date: 22 RARS: Readmission Risk Score: 8.9 ( )         Spoke with: Subsequent transitional call. Spoke to : Daniel. Pt. States he is feeling ok, \"but always tired\"  He has no further GI bleeding,. Per pt. -Stools are dark in color r/t iron tabs. Appetite is good. Taking all medications as prescribed. Pt has not made an appt for discharge f/u with his PCP. Recommended pt call today to schedule. Message routed to Scheduling pool. Pt has an appt with GI on 22. No new issues or concerns. Patient is aware of when to contact MD with any new or worsening symptoms. Advised to contact PCP  with any health concerns for early outpatient intervention in an effort to avoid hospitalization. Report any worsening symptoms to PCP and/or Call 911 and/or GO TO EMERGENCY ROOM if symptoms are severe. Expresses understanding. Care Transitions Follow Up Call    Needs to be reviewed by the provider   Additional needs identified to be addressed with provider: No  none             Method of communication with provider : none      Care Transition Nurse (CTN) contacted the patient by telephone to follow up after admission on 4/3/22 Verified name and  with patient as identifiers. Addressed changes since last contact: none  Discussed follow-up appointments. If no appointment was previously scheduled, appointment scheduling offered: Yes. Is follow up appointment scheduled within 7 days of discharge? No.    Advance Care Planning:   Does patient have an Advance Directive: reviewed and current. CTN reviewed discharge instructions, medical action plan and red flags with patient and discussed any barriers to care and/or understanding of plan of care after discharge.  Discussed appropriate site of care based on symptoms and resources available to patient including: PCP  Specialist  When to call 911. The patient agrees to contact the PCP office for questions related to their healthcare. Patients top risk factors for readmission: medical condition-GI HEMORRHAGE  Interventions to address risk factors: Obtained and reviewed discharge summary and/or continuity of care documents      Non-[facility] follow up appointment(s): n/a    CTN provided contact information for future needs. Plan for follow-up call in 7-10 days based on severity of symptoms and risk factors. Plan for next call: symptom management-rectal. GI bleeding  follow up appointment-schedule an appt ASAP with PCP            Care Transitions Subsequent and Final Call    Subsequent and Final Calls  Care Transitions Interventions  Other Interventions:            Follow Up  Future Appointments   Date Time Provider Emmanuelle Malone   5/23/2022  3:45 PM Alberta Perla, [address] Care Transitions Nurse   [phone]

## 2022-04-18 ENCOUNTER — OFFICE VISIT (OUTPATIENT)
Dept: FAMILY MEDICINE CLINIC | Age: 67
End: 2022-04-18
Payer: MEDICARE

## 2022-04-18 VITALS
HEIGHT: 71 IN | WEIGHT: 258 LBS | OXYGEN SATURATION: 98 % | DIASTOLIC BLOOD PRESSURE: 76 MMHG | SYSTOLIC BLOOD PRESSURE: 134 MMHG | BODY MASS INDEX: 36.12 KG/M2

## 2022-04-18 DIAGNOSIS — K62.89 ACUTE PROCTITIS: ICD-10-CM

## 2022-04-18 DIAGNOSIS — G25.0 ESSENTIAL TREMOR: Primary | ICD-10-CM

## 2022-04-18 DIAGNOSIS — I10 ESSENTIAL HYPERTENSION: ICD-10-CM

## 2022-04-18 PROCEDURE — 99495 TRANSJ CARE MGMT MOD F2F 14D: CPT | Performed by: FAMILY MEDICINE

## 2022-04-18 SDOH — ECONOMIC STABILITY: FOOD INSECURITY: WITHIN THE PAST 12 MONTHS, YOU WORRIED THAT YOUR FOOD WOULD RUN OUT BEFORE YOU GOT MONEY TO BUY MORE.: PATIENT DECLINED

## 2022-04-18 SDOH — ECONOMIC STABILITY: FOOD INSECURITY: WITHIN THE PAST 12 MONTHS, THE FOOD YOU BOUGHT JUST DIDN'T LAST AND YOU DIDN'T HAVE MONEY TO GET MORE.: PATIENT DECLINED

## 2022-04-18 ASSESSMENT — SOCIAL DETERMINANTS OF HEALTH (SDOH): HOW HARD IS IT FOR YOU TO PAY FOR THE VERY BASICS LIKE FOOD, HOUSING, MEDICAL CARE, AND HEATING?: PATIENT DECLINED

## 2022-04-18 NOTE — PATIENT INSTRUCTIONS
SURVEY:    You may be receiving a survey from [employer] regarding your visit today. Please complete the survey to enable us to provide the highest quality of care to you and your family. If you cannot score us a very good on any question, please call the office to discuss how we could of made your experience a very good one. Thank you.

## 2022-04-18 NOTE — PROGRESS NOTES
I, PERCY Henson, am scribing for and in the presence of Dr. Pedro Rees. 4/18/22/1:05/[facility]      [address] [address]  [phone]  Dept: [phone]    HPI:  Pt presents for hospital f/u for rectal bleeding    He says he is doing fine he did have minimal blood yesterday       Hospital course   Jenna Guidry is a 74 y.o. male who presents to our hospital with complaints of bright red blood per rectum. Patient had a colonoscopy 2 years ago which showed hemorrhoids. Patient did not have these treated. Yesterday evening, patient was at home when he noticed some abdominal cramping and liquid diarrhea. There was blood mixed with stool and mucus. Patient decided to come to the hospital for further evaluation. Patient has a 2-month history of abdominal distention but denies any weight loss. Denies any history of cirrhosis. Denies any nausea, vomiting or throwing up blood. He denies any history of pancreatitis. He did have a cholecystectomy several years ago. Denies any episodes of hypotension. Denies any fevers, chills. Denies any family history of inflammatory bowel disease. He does have a history of bioprosthetic valve replacement and chronic mitral regurgitation and is on Xarelto for the last 26 years without any issues. Denies any urinary retention, dysuria or polyuria. Denies any personal family history of colon cancer. CT of his abdomen pelvis showed rectosigmoid colonic wall thickening that could be related to colitis. He denies any history of C. difficile. Denies any recent antibiotic use. Denies any new foods. Denies any sick contacts.     Current Outpatient Medications   Medication Sig Dispense Refill    ferrous sulfate (FE TABS 325) 325 (65 Fe) MG EC tablet Take 1 tablet by mouth daily (with breakfast) 90 tablet 3    mesalamine (CANASA) 1000 MG suppository Place 1 suppository rectally nightly 60 suppository 0    amLODIPine (NORVASC) 10 MG tablet Take 1 tablet by mouth daily 30 tablet 1    vitamin B-12 (CYANOCOBALAMIN) 1000 MCG tablet Take 1,000 mcg by mouth daily      primidone (MYSOLINE) 50 MG tablet Take Half tablet twice daily 30 tablet 1    metoprolol succinate (TOPROL XL) 100 MG extended release tablet TAKE 1 TABLET DAILY 90 tablet 3    XARELTO 20 MG TABS tablet TAKE 1 TABLET DAILY 90 tablet 3    sertraline (ZOLOFT) 50 MG tablet TAKE 1 TABLET DAILY 90 tablet 3    lisinopril (PRINIVIL;ZESTRIL) 20 MG tablet Take 1 tablet by mouth daily 90 tablet 1    rosuvastatin (CRESTOR) 40 MG tablet Take 1 tablet by mouth daily 90 tablet 3    pantoprazole (PROTONIX) 40 MG tablet Take 1 tablet by mouth every morning (before breakfast) (Patient not taking: Reported on 4/18/2022) 30 tablet 3    albuterol sulfate HFA (PROAIR HFA) 108 (90 Base) MCG/ACT inhaler Inhale 2 puffs into the lungs every 6 hours as needed for Wheezing (Patient not taking: Reported on 4/18/2022) 3 Inhaler 0    colchicine (COLCRYS) 0.6 MG tablet Take 1 tablet by mouth 3 times daily (before meals) for 7 days 21 tablet 0    Spacer/Aero-Holding Chambers ([facility] PARESH) MARISEL 1 Device by Does not apply route daily (Patient not taking: Reported on 4/18/2022) 1 Device 0     No current facility-administered medications for this visit. ROS:  Admits weakness  Admits gout bilateral   Admits diarrhea   Denies dizzy / light headed     EXAM:  PHYSICAL EXAM:  General Appearance: in no acute distress, well developed, well nourished. Eyes: pupils equal, round reactive to light and accommodation. Ears: normal canal and TM's. Nose: nares patent, no lesions. Oral Cavity: mucosa moist.  Throat: clear. Neck/Thyroid: neck supple, full range of motion, no cervical lymphadenopathy, no thyromegaly or carotid bruits. Skin: warm and dry. No suspicious lesions. Heart: regular rate and rhythm. No murmurs. S1, S2 normal, no gallops.  irreg / reg 60  Lungs: clear to auscultation bilaterally. Abdomen: bowel sounds present, soft, nontender, nondistended, no masses or organomegaly. Round obese  Musculoskeletal: normal, full range of motion in knees and hips, no swelling or tenderness. Extremities: no cyanosis or edema. Peripheral Pulses: 2+ throughout, symetric. Neurologic: nonfocal, motor strength normal upper and lower extremities, sensory exam intact. Psych: normal affect, speech fluent. /76   Ht 5' 11\" (1.803 m)   Wt 258 lb (117 kg)   SpO2 98%   BMI 35.98 kg/m²   Wt Readings from Last 3 Encounters:   04/18/22 258 lb (117 kg)   04/04/22 268 lb 11.9 oz (121.9 kg)   04/02/22 270 lb (122.5 kg)     BP Readings from Last 3 Encounters:   04/18/22 134/76   04/05/22 137/81   04/04/22 131/70         ASSESSMENT:   Diagnosis Orders   1. Essential tremor     2. Acute proctitis     3. Essential hypertension           PLAN:  I review his hospital stay and colonoscopy. He is currently now taking iron supplement, Mesalamine suppository and Protonix. His blood count is good he does not need to continue taking the iron supplement daily and can decrease to Monday - Wednesday - Friday. His Amlodipine was increased to 10 mg I will continue this dose. He is not eating as much due to lack of appetite, he is encouraged to eat a high fiber diet. He is anxious about eating as well           No orders of the defined types were placed in this encounter. No orders of the defined types were placed in this encounter. I, Dr. Torres Mcgee, personally performed the services described in this documentation as scribed/transcribed by ADRIENNE Ortiz in my presence, and is both accurate and complete.

## 2022-05-23 ENCOUNTER — OFFICE VISIT (OUTPATIENT)
Dept: GASTROENTEROLOGY | Age: 67
End: 2022-05-23
Payer: MEDICARE

## 2022-05-23 VITALS
HEART RATE: 67 BPM | BODY MASS INDEX: 36.48 KG/M2 | WEIGHT: 260.6 LBS | SYSTOLIC BLOOD PRESSURE: 137 MMHG | DIASTOLIC BLOOD PRESSURE: 89 MMHG | HEIGHT: 71 IN

## 2022-05-23 DIAGNOSIS — K62.3 RECTAL PROLAPSE: Primary | ICD-10-CM

## 2022-05-23 PROCEDURE — 3017F COLORECTAL CA SCREEN DOC REV: CPT | Performed by: INTERNAL MEDICINE

## 2022-05-23 PROCEDURE — G8417 CALC BMI ABV UP PARAM F/U: HCPCS | Performed by: INTERNAL MEDICINE

## 2022-05-23 PROCEDURE — 99213 OFFICE O/P EST LOW 20 MIN: CPT | Performed by: INTERNAL MEDICINE

## 2022-05-23 PROCEDURE — 1036F TOBACCO NON-USER: CPT | Performed by: INTERNAL MEDICINE

## 2022-05-23 PROCEDURE — G8427 DOCREV CUR MEDS BY ELIG CLIN: HCPCS | Performed by: INTERNAL MEDICINE

## 2022-05-23 PROCEDURE — 1123F ACP DISCUSS/DSCN MKR DOCD: CPT | Performed by: INTERNAL MEDICINE

## 2022-05-23 RX ORDER — MESALAMINE 1000 MG/1
1000 SUPPOSITORY RECTAL NIGHTLY
Qty: 60 SUPPOSITORY | Refills: 2 | Status: SHIPPED | OUTPATIENT
Start: 2022-05-23

## 2022-05-23 ASSESSMENT — ENCOUNTER SYMPTOMS
VOMITING: 0
RECTAL PAIN: 0
VOICE CHANGE: 0
NAUSEA: 0
BLOOD IN STOOL: 0
WHEEZING: 0
CHOKING: 0
APNEA: 0
CONSTIPATION: 0
DIARRHEA: 0
ABDOMINAL DISTENTION: 0
COLOR CHANGE: 0
SHORTNESS OF BREATH: 0
TROUBLE SWALLOWING: 0
COUGH: 0
SORE THROAT: 0
ABDOMINAL PAIN: 0
ANAL BLEEDING: 0

## 2022-05-23 NOTE — PROGRESS NOTES
GI FOLLOW UP    INTERVAL HISTORY:     Recent inpatient evaluation for rectal bleeding  Patient underwent colonoscopy where the patient was identified to have hemorrhoids and evidence of proctitis which may suggest ulcerative proctitis      Chief Complaint   Patient presents with    Follow-up    Colonoscopy       1. Rectal prolapse          HISTORY OF PRESENT ILLNESS: Rafael Saunders is a 74 y.o. male with a past history remarkable for atrial flutter, congestive heart failure, CAD, hypertension, hyperlipidemia, obesity, cataracts, recent admission to [place] in April 2022 for rectal bleeding, referred for evaluation of rectal bleeding. Now resolved. Currently on Canasa 1 g nightly. Concern for UC proctitis. Denies any active GI symptoms currently. Past Medical,Family, and Social History reviewed and does contribute to the patient presenting condition. Patient's PMH/PSH,SH,PSYCH Hx, MEDs, ALLERGIES, and ROS were all reviewed and updated in the appropriate sections.     PAST MEDICAL HISTORY:  Past Medical History:   Diagnosis Date    Achilles tendon tear 2013    Allergic rhinitis     Atrial flutter ([address] 1996    Congestive heart failure ([address]     Coronary atherosclerosis     Hyperglycemia 2007    Hyperlipidemia 1996    Hypertension 2004    Mitral insufficiency     Obesity 2006    Osteoarthritis     Status post ablation of atrial flutter 05/09/13    Vasodepressor syncope 2001       Past Surgical History:   Procedure Laterality Date    ATRIAL ABLATION SURGERY  1998, 2013    st.vincents AV node    CATARACT REMOVAL Bilateral     CATARACT REMOVAL WITH IMPLANT  12/06/2017    Dr. Barry Gallagherr Right 10/25/2021    Dr. Severiano Good  02/01/2010    [place], negative    COLONOSCOPY  03/25/2019    Dr. Han Antis rectal bx(hyperplastic mucosa with evidence of ischemic injury,angiodysplasia no active bleeding,divetrticulosis,hemorroids    COLONOSCOPY N/A 3/25/2019    COLONOSCOPY POLYPECTOMY SNARE/COLD BIOPSY performed by Marisela Hernandez MD at [address] [facility] N/A 4/4/2022    COLONOSCOPY POLYPECTOMY SNARE/COLD BIOPSY performed by Sarabjit Clinton MD at [address]      right side of face    INTRACAPSULAR CATARACT EXTRACTION Right 10/25/2021    EYE CATARACT EMULSIFICATION IOL IMPLANT performed by Larissa Tiwari DO at [address] Right 2006    medial meniscus arthroscopy, Darrel Kenning    KNEE SURGERY Left     MITRAL VALVE SURGERY  8/11/11    34 mm ring annuloplasty    OTHER SURGICAL HISTORY Left 06/12/2020    LASER ABLATION GREATER SAPHENOUIS VEIN WITH PHLEBECTOMY - [facility]OLV     OH KNEE SCOPE,MED OR LAT MENIS REPAIR Left 6/21/2018    KNEE ARTHROSCOPY-PARTIAL MEDIAL MENISCECTOMY AND CHONDROPLASTY performed by Arnel Cheema MD at [facility] RMVL INSJ IO LENS PROSTH W/O ECP Left 12/6/2017    EYE CATARACT EMULSIFICATION IOL IMPLANT performed by Larissa Tiwari DO at [address] 4/2/2019    -hiatal hernia,grade 3 reflux esophagitis,antral gastritis, erosive bulbar duodenitis    VEIN SURGERY Left 6/12/2020    LASER ABLATION GREATER SAPHENOUIS VEIN WITH PHLEBECTOMY - [place] [facility] CONF# [id number] [facility]) performed by Warren Wiggins MD at [address] [place] [address] Avenue:    Current Outpatient Medications:     mesalamine (CANASA) 1000 MG suppository, Place 1 suppository rectally nightly, Disp: 60 suppository, Rfl: 2    ferrous sulfate (FE TABS 325) 325 (65 Fe) MG EC tablet, Take 1 tablet by mouth daily (with breakfast), Disp: 90 tablet, Rfl: 3    pantoprazole (PROTONIX) 40 MG tablet, Take 1 tablet by mouth every morning (before breakfast), Disp: 30 tablet, Rfl: 3    vitamin B-12 (CYANOCOBALAMIN) 1000 MCG tablet, Take 1,000 mcg by mouth daily, Disp: , Rfl:     primidone (MYSOLINE) 50 MG tablet, Take Half tablet twice daily, Disp: 30 tablet, Rfl: 1    metoprolol succinate (TOPROL XL) 100 MG extended release tablet, TAKE 1 TABLET DAILY, Disp: 90 tablet, Rfl: 3    XARELTO 20 MG TABS tablet, TAKE 1 TABLET DAILY, Disp: 90 tablet, Rfl: 3    sertraline (ZOLOFT) 50 MG tablet, TAKE 1 TABLET DAILY, Disp: 90 tablet, Rfl: 3    lisinopril (PRINIVIL;ZESTRIL) 20 MG tablet, Take 1 tablet by mouth daily, Disp: 90 tablet, Rfl: 1    albuterol sulfate HFA (PROAIR HFA) 108 (90 Base) MCG/ACT inhaler, Inhale 2 puffs into the lungs every 6 hours as needed for Wheezing, Disp: 3 Inhaler, Rfl: 0    rosuvastatin (CRESTOR) 40 MG tablet, Take 1 tablet by mouth daily, Disp: 90 tablet, Rfl: 3    Spacer/Aero-Holding Chambers (OPTICHAMBER PARESH) MARISEL, 1 Device by Does not apply route daily, Disp: 1 Device, Rfl: 0    amLODIPine (NORVASC) 10 MG tablet, Take 1 tablet by mouth daily, Disp: 30 tablet, Rfl: 1    colchicine (COLCRYS) 0.6 MG tablet, Take 1 tablet by mouth 3 times daily (before meals) for 7 days, Disp: 21 tablet, Rfl: 0    ALLERGIES:   No Known Allergies    FAMILY HISTORY:       Problem Relation Age of Onset    Heart Disease Mother         sudden cardiac death    Heart Attack Father          SOCIAL HISTORY:   Social History     Socioeconomic History    Marital status:      Spouse name: Not on file    Number of children: Not on file    Years of education: Not on file    Highest education level: Not on file   Occupational History    Not on file   Tobacco Use    Smoking status: Former Smoker     Packs/day: 1.50     Years: 20.00     Pack years: 30.00     Types: Cigarettes     Quit date: 1996     Years since quittin.5    Smokeless tobacco: Never Used   Vaping Use    Vaping Use: Never used   Substance and Sexual Activity    Alcohol use:  Yes     Alcohol/week: 0.0 standard drinks     Comment: 4 beers a day     Drug use: No    Sexual activity: Not on file   Other Topics Concern    Not on file   Social History Narrative    Not on file     Social Determinants of Health     Financial Resource Strain: Unknown    Difficulty of Paying Living Expenses: Patient refused   Food Insecurity: Unknown    Worried About Running Out of Food in the Last Year: Patient refused    [address] in the Last Year: Patient refused   Transportation Needs:     Lack of Transportation (Medical): Not on file    Lack of Transportation (Non-Medical): Not on file   Physical Activity:     Days of Exercise per Week: Not on file    Minutes of Exercise per Session: Not on file   Stress:     Feeling of Stress : Not on file   Social Connections:     Frequency of Communication with Friends and Family: Not on file    Frequency of Social Gatherings with Friends and Family: Not on file    Attends Nondenominational Services: Not on file    Active Member of [address] [place] [address] or Organizations: Not on file    Attends Club or Organization Meetings: Not on file    Marital Status: Not on file   Intimate Partner Violence:     Fear of Current or Ex-Partner: Not on file    Emotionally Abused: Not on file    Physically Abused: Not on file    Sexually Abused: Not on file   Housing Stability:     Unable to Pay for Housing in the Last Year: Not on file    Number of [place] in the Last Year: Not on file    Unstable Housing in the Last Year: Not on file       REVIEW OF SYSTEMS: A 12-point review of systems was obtained and pertinent positives and negatives were listed below. REVIEW OF SYSTEMS:     Constitutional: No fever, no chills, no lethargy, no weakness. HEENT:  No headache, otalgia, itchy eyes, nasal discharge or sore throat. Cardiac:  No chest pain, dyspnea, orthopnea or PND. Chest:   No cough, phlegm or wheezing. Abdomen:      Detailed by MA   Neuro:  No focal weakness, abnormal movements or seizure like activity. Skin:   No rashes, no itching.   :   No hematuria, no pyuria, no dysuria, no flank pain. Extremities:  No swelling or joint pains. ROS was otherwise negative    Review of Systems   Constitutional: Negative for appetite change, fatigue, fever and unexpected weight change. HENT: Negative for sore throat, trouble swallowing and voice change. Eyes: Negative for visual disturbance. Respiratory: Negative for apnea, cough, choking, shortness of breath and wheezing. Cardiovascular: Negative for chest pain, palpitations and leg swelling. Gastrointestinal: Negative for abdominal distention, abdominal pain, anal bleeding, blood in stool, constipation, diarrhea, nausea, rectal pain and vomiting. Genitourinary: Negative for difficulty urinating. Skin: Negative for color change and rash. Neurological: Negative for dizziness, seizures, weakness, light-headedness, numbness and headaches. Hematological: Does not bruise/bleed easily. Psychiatric/Behavioral: Negative for confusion and sleep disturbance. The patient is not nervous/anxious. PHYSICAL EXAMINATION: Vital signs reviewed per the nursing documentation. /89   Pulse 67   Ht 5' 11\" (1.803 m)   Wt 260 lb 9.6 oz (118.2 kg)   BMI 36.35 kg/m²   Body mass index is 36.35 kg/m². Physical Exam    Physical Exam   Constitutional: Patient is oriented to person, place, and time. Patient appears well-developed and well-nourished. HENT:   Head: Normocephalic and atraumatic. Eyes: Pupils are equal, round, and reactive to light. EOM are normal.   Neck: Normal range of motion. Neck supple. No JVD present. No tracheal deviation present. No thyromegaly present. Cardiovascular: Normal rate, regular rhythm, normal heart sounds and intact distal pulses. Pulmonary/Chest: Effort normal and breath sounds normal. No stridor. No respiratory distress. He has no wheezes. He has no rales. He exhibits no tenderness. Abdominal: Soft. Bowel sounds are normal. He exhibits no distension and no mass. There is no tenderness.  There is no rebound and no guarding. No hernia. Musculoskeletal: Normal range of motion. Lymphadenopathy:    Patient has no cervical adenopathy. Neurological: Patient is alert and oriented to person, place, and time. Psychiatric: Patient has a normal mood and affect. Patient behavior is normal.       LABORATORY DATA: Reviewed  Lab Results   Component Value Date    WBC 5.5 04/07/2022    HGB 13.9 04/07/2022    HCT 41.2 04/07/2022     (H) 04/07/2022     04/07/2022     (L) 04/04/2022    K 3.9 04/04/2022    CL 99 04/04/2022    CO2 22 04/04/2022    BUN 7 (L) 04/04/2022    CREATININE 0.62 (L) 04/04/2022    LABALBU 4.0 04/02/2022    BILITOT 0.36 04/02/2022    ALKPHOS 95 04/02/2022    AST 22 04/02/2022    ALT 13 04/02/2022    INR 1.6 04/02/2022         Lab Results   Component Value Date    RBC 4.09 (L) 04/07/2022    HGB 13.9 04/07/2022     (H) 04/07/2022    MCH 34.0 04/07/2022    MCHC 33.7 04/07/2022    RDW 15.1 (H) 04/07/2022    MPV 9.5 04/07/2022    BASOPCT 0.4 04/07/2022    LYMPHSABS 1.0 04/07/2022    MONOSABS 0.7 04/07/2022    NEUTROABS 3.7 04/07/2022    EOSABS 0.2 04/07/2022    BASOSABS 0.0 04/07/2022         DIAGNOSTIC TESTING:     No results found. IMPRESSION:  Justen Estes is a 74 y.o. male with a past history remarkable for atrial flutter, congestive heart failure, CAD, hypertension, hyperlipidemia, obesity, cataracts, recent admission to [place] in April 2022 for rectal bleeding, referred for evaluation of rectal bleeding. Now resolved. Currently on Canasa 1 g nightly. Concern for UC proctitis. Denies any active GI symptoms currently. Assessment  1. Rectal prolapse        Naila Huntley was seen today for follow-up and colonoscopy. Diagnoses and all orders for this visit:    Rectal prolapse-mild mucosal prolapse versus nonspecific proctitis, potentially related to ulcerative proctitis. Will send for fecal calprotectin.   Patient continue with her Canasa 1 g nightly to treat inflammation. Denies any rectal bleeding currently. Clinically doing well from GI standpoint. No significant upper GI symptoms. Normal bowel movements currently. -     Calprotectin Stool; Future    Other orders  -     mesalamine (CANASA) 1000 MG suppository; Place 1 suppository rectally nightly       RTC in 3 months. Additional comments: Thank you for allowing me to participate in the care of Mr. Princess Burgos. For any further questions please do not hesitate to contact me. I have reviewed and agree with the ROS entered by the MA/LPN from today's encounter documented in a separate note. Anna Alston MD, MPH   Board Certified in Gastroenterology  Board Certified in [address] [place] [address] #: [phone]    this note is created with the assistance of a speech recognition program.  While intending to generate a document that actually reflects the content of the visit, the document can still have some errors including those of syntax and sound a like substitutions which may escape proof reading. It such instances, actual meaning can be extrapolated by contextual diversion.

## 2022-05-27 DIAGNOSIS — G25.2 RESTING TREMOR: ICD-10-CM

## 2022-05-27 RX ORDER — PRIMIDONE 50 MG/1
TABLET ORAL
Qty: 30 TABLET | Refills: 1 | Status: SHIPPED | OUTPATIENT
Start: 2022-05-27 | End: 2022-08-15 | Stop reason: SDUPTHER

## 2022-06-06 ENCOUNTER — TELEPHONE (OUTPATIENT)
Dept: PRIMARY CARE CLINIC | Age: 67
End: 2022-06-06

## 2022-06-06 NOTE — TELEPHONE ENCOUNTER
----- Message from Kimmy Garland sent at 6/6/2022  3:51 PM EDT -----  Subject: Refill Request    QUESTIONS  Name of Medication? lisinopril (PRINIVIL;ZESTRIL) 20 MG tablet  Patient-reported dosage and instructions? once a day  How many days do you have left? 14  Preferred Pharmacy? [address] phone number (if available)? [phone]  ---------------------------------------------------------------------------  --------------,  Name of Medication? rosuvastatin (CRESTOR) 40 MG tablet  Patient-reported dosage and instructions? once a day  How many days do you have left? 10  Preferred Pharmacy? [address] [facility] phone number (if available)? [phone]  ---------------------------------------------------------------------------  --------------  CALL BACK INFO  What is the best way for the office to contact you? Do not leave any   message, patient will call back for answer  Preferred Call Back Phone Number? [phone]  ---------------------------------------------------------------------------  --------------  SCRIPT ANSWERS  Relationship to Patient?  Self

## 2022-06-07 NOTE — TELEPHONE ENCOUNTER
Richard, if  has not sent a refill in for those meds then he will need an appointment prior to 9/1/2022 (within 1-2 weeks), thank you.   Electronically signed by Elizabeth Little MD on 6/7/2022 at 9:19 AM

## 2022-06-14 ENCOUNTER — OFFICE VISIT (OUTPATIENT)
Dept: PRIMARY CARE CLINIC | Age: 67
End: 2022-06-14
Payer: MEDICARE

## 2022-06-14 VITALS
BODY MASS INDEX: 36.54 KG/M2 | DIASTOLIC BLOOD PRESSURE: 72 MMHG | HEIGHT: 71 IN | SYSTOLIC BLOOD PRESSURE: 138 MMHG | WEIGHT: 261 LBS

## 2022-06-14 DIAGNOSIS — R14.0 ABDOMINAL BLOATING: ICD-10-CM

## 2022-06-14 DIAGNOSIS — R73.9 HYPERGLYCEMIA: ICD-10-CM

## 2022-06-14 DIAGNOSIS — E78.5 DYSLIPIDEMIA: ICD-10-CM

## 2022-06-14 DIAGNOSIS — I10 ESSENTIAL HYPERTENSION: Primary | ICD-10-CM

## 2022-06-14 DIAGNOSIS — E78.5 HYPERLIPIDEMIA, UNSPECIFIED HYPERLIPIDEMIA TYPE: ICD-10-CM

## 2022-06-14 DIAGNOSIS — K62.5 COLITIS WITH RECTAL BLEEDING: ICD-10-CM

## 2022-06-14 DIAGNOSIS — K52.9 COLITIS WITH RECTAL BLEEDING: ICD-10-CM

## 2022-06-14 PROCEDURE — G8427 DOCREV CUR MEDS BY ELIG CLIN: HCPCS | Performed by: STUDENT IN AN ORGANIZED HEALTH CARE EDUCATION/TRAINING PROGRAM

## 2022-06-14 PROCEDURE — G8417 CALC BMI ABV UP PARAM F/U: HCPCS | Performed by: STUDENT IN AN ORGANIZED HEALTH CARE EDUCATION/TRAINING PROGRAM

## 2022-06-14 PROCEDURE — 1036F TOBACCO NON-USER: CPT | Performed by: STUDENT IN AN ORGANIZED HEALTH CARE EDUCATION/TRAINING PROGRAM

## 2022-06-14 PROCEDURE — 99211 OFF/OP EST MAY X REQ PHY/QHP: CPT

## 2022-06-14 PROCEDURE — 99214 OFFICE O/P EST MOD 30 MIN: CPT | Performed by: STUDENT IN AN ORGANIZED HEALTH CARE EDUCATION/TRAINING PROGRAM

## 2022-06-14 PROCEDURE — 3017F COLORECTAL CA SCREEN DOC REV: CPT | Performed by: STUDENT IN AN ORGANIZED HEALTH CARE EDUCATION/TRAINING PROGRAM

## 2022-06-14 PROCEDURE — 1123F ACP DISCUSS/DSCN MKR DOCD: CPT | Performed by: STUDENT IN AN ORGANIZED HEALTH CARE EDUCATION/TRAINING PROGRAM

## 2022-06-14 RX ORDER — AMLODIPINE BESYLATE 10 MG/1
10 TABLET ORAL DAILY
Qty: 90 TABLET | Refills: 3 | Status: SHIPPED | OUTPATIENT
Start: 2022-06-14 | End: 2022-08-15 | Stop reason: SDUPTHER

## 2022-06-14 RX ORDER — LISINOPRIL 20 MG/1
20 TABLET ORAL DAILY
Qty: 90 TABLET | Refills: 3 | Status: SHIPPED | OUTPATIENT
Start: 2022-06-14

## 2022-06-14 RX ORDER — ROSUVASTATIN CALCIUM 40 MG/1
40 TABLET, COATED ORAL DAILY
Qty: 90 TABLET | Refills: 3 | Status: SHIPPED | OUTPATIENT
Start: 2022-06-14 | End: 2022-08-05 | Stop reason: SDUPTHER

## 2022-06-14 RX ORDER — AMLODIPINE BESYLATE 10 MG/1
10 TABLET ORAL DAILY
Qty: 90 TABLET | Refills: 0 | Status: SHIPPED | OUTPATIENT
Start: 2022-06-14 | End: 2022-06-14

## 2022-06-14 ASSESSMENT — PATIENT HEALTH QUESTIONNAIRE - PHQ9
SUM OF ALL RESPONSES TO PHQ QUESTIONS 1-9: 0
SUM OF ALL RESPONSES TO PHQ QUESTIONS 1-9: 0
1. LITTLE INTEREST OR PLEASURE IN DOING THINGS: 0
SUM OF ALL RESPONSES TO PHQ QUESTIONS 1-9: 0
SUM OF ALL RESPONSES TO PHQ QUESTIONS 1-9: 0
2. FEELING DOWN, DEPRESSED OR HOPELESS: 0
SUM OF ALL RESPONSES TO PHQ9 QUESTIONS 1 & 2: 0

## 2022-06-14 NOTE — PROGRESS NOTES
[address], [address] , [facility], [place] [address]  1955 is a 74 y.o. male, Established patient, here for evaluation of the following chief complaint(s):    Established New Doctor, Hypertension, Hyperglycemia, and Hyperlipidemia       ASSESSMENT/PLAN:      1. Essential hypertension  -     amLODIPine (NORVASC) 10 MG tablet; Take 1 tablet by mouth daily, Disp-90 tablet, R-3Normal  -     lisinopril (PRINIVIL;ZESTRIL) 20 MG tablet; Take 1 tablet by mouth daily, Disp-90 tablet, R-3Normal  -     rosuvastatin (CRESTOR) 40 MG tablet; Take 1 tablet by mouth daily, Disp-90 tablet, R-3Normal  2. Colitis with rectal bleeding  3. Hyperlipidemia, unspecified hyperlipidemia type  -     rosuvastatin (CRESTOR) 40 MG tablet; Take 1 tablet by mouth daily, Disp-90 tablet, R-3Normal  4. Dyslipidemia  5. Abdominal bloating  6. Hyperglycemia     BP stable at this time on current doses. Refills were provided today. Monitor labs. Colitis - F/U with GI as per prior plans. Discussed dietary changes. Continue w/ current meds/symptoms are stable at this time. Hyperlipidemia - monitor lipids as per prior, continue w/ statin at current dose, LDL is below 70. Hyperglycemia-  Continue w/ diet and exercise      Medications Discontinued During This Encounter   Medication Reason    colchicine (COLCRYS) 0.6 MG tablet Therapy completed    Spacer/Aero-Holding Chambers ([facility]) MARISEL LIST CLEANUP    amLODIPine (NORVASC) 10 MG tablet REORDER    rosuvastatin (CRESTOR) 40 MG tablet REORDER    lisinopril (PRINIVIL;ZESTRIL) 20 MG tablet REORDER    amLODIPine (NORVASC) 10 MG tablet         Return in about 4 months (around 10/14/2022) for Book AWV/F/U Meds. Heather Márquez received counseling on the following healthy behaviors: nutrition, exercise and medication adherence.  I encouraged and discussed lifestyle modifications including diet and exercise and the patient was agreeable to making positive/beneficial changes to both to help improve their overall health. Discussed use, benefit, and side effects of prescribed medications. Barriers to medication compliance addressed. Patient given educational materials: see patient instructions. HM - HM items completed today as per orders. Outstanding HM items though not limited to immunizations were discussed with the patient today, including risks, benefits and alternatives. The patient will discuss these during the next appointment per their preference. If there are any worsening or concerning signs or symptoms, patient will report to the ED and/or contact [phone] for immediate evaluation. Teach back method was used. All patient questions answered. Pt voiced understanding. Subjective    SUBJECTIVE/OBJECTIVE:    HPI     Established New Doctor, Hypertension, Hyperglycemia, and Hyperlipidemia  HYPERLIPIDEMIA   Medication compliance: compliant all of the time. Patient is following a low fat, low cholesterol diet. He is not exercising regularly. OTC Supplements: none. No side effects from the rosuvastatin 40mg. Lab Results   Component Value Date    CHOL 148 11/12/2021    TRIG 66 11/12/2021    HDL 66 11/12/2021     Lab Results   Component Value Date    ALT 13 04/02/2022    AST 22 04/02/2022            HYPERGLYCEMIA:  Diet compliance: compliant most of the time. Current exercise: no regular exercise, just working around the house typically. Last A1c 5.7 on 3/17/2022. HYPERTENSION:  Medication compliance: compliant all of the time. Patient is controlled by the following drug category: Beta Blocker  Medication Therapy: lisinopril metoprolol (Lopressor, Toprol-XL) and amlodipine. He is not exercising. He is adherent to a low-sodium diet.    Blood pressure is not being monitored at home. Patient reports that He has limited alcohol intake. Does the patient have sleep apnea?  No    Abdominal Bloating:   Feels bloated daily and worse after eating x1-2 months. Reports he had an EGD (had history of melena and rectal bleeding) and Dr. Marco A Sosa started him on Protonix. He did have an episode of rectal bleeding last week that lasted 1 day. Blood was bright red. Uses mesalamine suppositories nightly. He denies abd. Pain, nausea, vomiting. He is due to f/u with Dr. Marco A Sosa on . Discussed repeating labs with the patient today and he would like to wait until that time for repeat lab work to be completed.     Family History   Problem Relation Age of Onset    Heart Disease Mother         sudden cardiac death    Heart Attack Father        Health Maintenance   Alcohol/Substance use History - None/Minimal  Smoking History    Tobacco Use      Smoking status: Former Smoker        Packs/day: 1.50        Years: 20.00        Pack years: 30        Types: Cigarettes        Quit date: 1996        Years since quittin.5      Smokeless tobacco: Never Used      Health Maintenance   Topic Date Due    DTaP/Tdap/Td vaccine (1 - Tdap) Never done   [employer] Visit (AWV)  10/03/2021    Lipids  2022    Prostate Specific Antigen (PSA) Screening or Monitoring  2022    A1C test (Diabetic or Prediabetic)  2023    Depression Screen  2023    Pneumococcal 65+ years Vaccine (3 - PPSV23 or PCV20) 11/10/2023    Colorectal Cancer Screen  2032    Flu vaccine  Completed    Shingles vaccine  Completed    COVID-19 Vaccine  Completed    AAA screen  Completed    Hepatitis C screen  Addressed    Hepatitis A vaccine  Aged Out    Hepatitis B vaccine  Aged Out    Hib vaccine  Aged Out    Meningococcal (ACWY) vaccine  Aged Out      Depression Screening  PHQ Scores 2022 2022 2021 10/2/2020 2020 2019 2019   PHQ2 Score 0 0 0 0 0 0 0   PHQ9 Score 0 0 0 0 0 0 0     Interpretation of Total Score Depression Severity: 1-4 = Minimal depression, 5-9 = Mild depression, 10-14 = Moderate depression, 15-19 = Moderately severe depression, 20-27 = Severe depression      Review of Systems   Constitutional: Negative for activity change, appetite change, chills, diaphoresis, fatigue, fever and unexpected weight change. HENT: Negative for sinus pressure, sinus pain, sore throat and trouble swallowing. Respiratory: Negative for cough, shortness of breath and wheezing. Cardiovascular: Negative for chest pain, palpitations and leg swelling. Gastrointestinal: Negative for abdominal pain, diarrhea, nausea and vomiting. Positive for 1 episode of bleeding per rectum and bloating. Endocrine: Negative for cold intolerance, polydipsia, polyphagia and polyuria. Genitourinary: Negative for difficulty urinating, flank pain and frequency. Musculoskeletal: Negative for gait problem and joint swelling. Negative for back pain, neck pain and neck stiffness. Skin: Negative for color change and wound. Negative for pallor and rash. Allergic/Immunologic: Negative for environmental allergies and food allergies. Neurological: Negative for light-headedness, numbness and headaches. Psychiatric/Behavioral: Negative for sleep disturbance. Negative for confusion and suicidal ideas. Objective    Physical Exam   Constitutional: Patient is oriented to person, place, and time. Patient appears well-developed and well-nourished. No distress. HENT: Head: Normocephalic and atraumatic. Eyes: Pupils are equal, round, and reactive to light. Conjunctivae are normal. Right eye exhibits no discharge. Left eye exhibits no discharge. Cardiovascular: Normal rate, regular rhythm and normal heart sounds. Pulmonary/Chest: Effort normal and breath sounds normal. No respiratory distress. Patient has no wheezes. Abdominal: Soft. Bowel sounds are normal. Patient exhibits no distension. There is no tenderness. Musculoskeletal:  Patient exhibits no edema and tenderness. Patient exhibits no deformity.    Neurological: Patient is alert and oriented to person, place, and time. Skin: Skin is warm and dry. Patient is not diaphoretic. Psychiatric: Patient's speech is normal and behavior is normal. Thought content normal.   Vitals reviewed. /72   Ht 5' 11\" (1.803 m)   Wt 261 lb (118.4 kg)   BMI 36.40 kg/m²   BP Readings from Last 3 Encounters:   22 138/72   22 137/89   22 134/76     Lab Results   Component Value Date    WBC 5.5 2022    HGB 13.9 2022    HCT 41.2 2022     2022    CHOL 148 2021    TRIG 66 2021    HDL 66 2021    ALT 13 2022    AST 22 2022     (L) 2022    K 3.9 2022    CL 99 2022    CREATININE 0.62 (L) 2022    BUN 7 (L) 2022    CO2 22 2022    TSH 1.18 2022    PSA 2.03 2021    INR 1.6 2022    LABA1C 5.7 2022     Lab Results   Component Value Date    CALCIUM 8.9 2022     Lab Results   Component Value Date    LDLCHOLESTEROL 69 2021       CURRENT MEDS W/ ASSOC DIAG         Start Date End Date     albuterol sulfate HFA (PROAIR HFA) 108 (90 Base) MCG/ACT inhaler  21  --     Inhale 2 puffs into the lungs every 6 hours as needed for Wheezing     Patient not taking: Reported on 2022     Associated Diagnoses:   Allergic rhinitis, unspecified seasonality, unspecified trigger     amLODIPine (NORVASC) 10 MG tablet ()  22     Take 1 tablet by mouth daily     Associated Diagnoses:  Essential hypertension     ferrous sulfate (FE TABS 325) 325 (65 Fe) MG EC tablet  22  --     Take 1 tablet by mouth daily (with breakfast)     Patient taking differently: Take 325 mg by mouth every other day      Associated Diagnoses:  --     lisinopril (PRINIVIL;ZESTRIL) 20 MG tablet  21  --     Take 1 tablet by mouth daily     Associated Diagnoses:  Essential hypertension     mesalamine (CANASA) 1000 MG suppository  22  --     Place 1 suppository rectally nightly     Associated Diagnoses:  --     metoprolol succinate (TOPROL XL) 100 MG extended release tablet  03/18/22  --     TAKE 1 TABLET DAILY     Associated Diagnoses:  --     pantoprazole (PROTONIX) 40 MG tablet  04/06/22  --     Take 1 tablet by mouth every morning (before breakfast)     Associated Diagnoses:  --     primidone (MYSOLINE) 50 MG tablet  05/27/22  --     Take Half tablet twice daily     Associated Diagnoses:  Resting tremor     rosuvastatin (CRESTOR) 40 MG tablet  05/10/21  --     Take 1 tablet by mouth daily     Associated Diagnoses:  Hyperglycemia, Hyperlipidemia, unspecified hyperlipidemia type, Essential hypertension     sertraline (ZOLOFT) 50 MG tablet  01/31/22  --     TAKE 1 TABLET DAILY     Associated Diagnoses:  --     Spacer/Aero-Holding Chambers ([address] [place] [address]) [address]  05/03/18  --     1 Device by Does not apply route daily     Patient not taking: Reported on 6/14/2022     Associated Diagnoses:  --     vitamin B-12 (CYANOCOBALAMIN) 1000 MCG tablet  --  --     Associated Diagnoses:  --     XARELTO 20 MG TABS tablet  01/31/22  --     TAKE 1 TABLET DAILY     Associated Diagnoses:  --            Please note that this chart was generated using voice recognition Dragon dictation software. Although every effort was made to ensure the accuracy of this automated transcription, some errors in transcription may have occurred.     Electronically signed by Finn Rodriguez MD on 6/14/22 at 9:33 AM

## 2022-08-05 ENCOUNTER — HOSPITAL ENCOUNTER (OUTPATIENT)
Dept: GENERAL RADIOLOGY | Age: 67
Discharge: HOME | End: 2022-08-07
Payer: MEDICARE

## 2022-08-05 ENCOUNTER — HOSPITAL ENCOUNTER (OUTPATIENT)
Age: 67
Discharge: HOME | End: 2022-08-07
Payer: MEDICARE

## 2022-08-05 ENCOUNTER — OFFICE VISIT (OUTPATIENT)
Dept: PRIMARY CARE CLINIC | Age: 67
End: 2022-08-05
Payer: MEDICARE

## 2022-08-05 VITALS
HEART RATE: 74 BPM | WEIGHT: 247 LBS | DIASTOLIC BLOOD PRESSURE: 62 MMHG | HEIGHT: 71 IN | SYSTOLIC BLOOD PRESSURE: 116 MMHG | TEMPERATURE: 97.9 F | OXYGEN SATURATION: 97 % | BODY MASS INDEX: 34.58 KG/M2
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
## 2022-08-05 DIAGNOSIS — E78.5 HYPERLIPIDEMIA, UNSPECIFIED HYPERLIPIDEMIA TYPE: ICD-10-CM

## 2022-08-05 DIAGNOSIS — I10 ESSENTIAL HYPERTENSION: ICD-10-CM

## 2022-08-05 DIAGNOSIS — M54.50 LOW BACK PAIN WITHOUT SCIATICA, UNSPECIFIED BACK PAIN LATERALITY, UNSPECIFIED CHRONICITY: Primary | ICD-10-CM

## 2022-08-05 DIAGNOSIS — T14.8XXA ABRASION OF SKIN: ICD-10-CM

## 2022-08-05 DIAGNOSIS — S32.010K COMPRESSION FRACTURE OF L1 VERTEBRA WITH NONUNION, SUBSEQUENT ENCOUNTER: ICD-10-CM

## 2022-08-05 DIAGNOSIS — M54.50 LOW BACK PAIN WITHOUT SCIATICA, UNSPECIFIED BACK PAIN LATERALITY, UNSPECIFIED CHRONICITY: ICD-10-CM

## 2022-08-05 PROCEDURE — 3017F COLORECTAL CA SCREEN DOC REV: CPT | Performed by: NURSE PRACTITIONER

## 2022-08-05 PROCEDURE — 99211 OFF/OP EST MAY X REQ PHY/QHP: CPT | Performed by: NURSE PRACTITIONER

## 2022-08-05 PROCEDURE — 99214 OFFICE O/P EST MOD 30 MIN: CPT | Performed by: NURSE PRACTITIONER

## 2022-08-05 PROCEDURE — 72100 X-RAY EXAM L-S SPINE 2/3 VWS: CPT

## 2022-08-05 PROCEDURE — PBSHW PBB SHADOW CHARGE: Performed by: NURSE PRACTITIONER

## 2022-08-05 PROCEDURE — 1036F TOBACCO NON-USER: CPT | Performed by: NURSE PRACTITIONER

## 2022-08-05 PROCEDURE — 1123F ACP DISCUSS/DSCN MKR DOCD: CPT | Performed by: NURSE PRACTITIONER

## 2022-08-05 PROCEDURE — G8417 CALC BMI ABV UP PARAM F/U: HCPCS | Performed by: NURSE PRACTITIONER

## 2022-08-05 PROCEDURE — G8427 DOCREV CUR MEDS BY ELIG CLIN: HCPCS | Performed by: NURSE PRACTITIONER

## 2022-08-05 RX ORDER — ROSUVASTATIN CALCIUM 40 MG/1
40 TABLET, COATED ORAL DAILY
Qty: 90 TABLET | Refills: 3 | Status: SHIPPED | OUTPATIENT
Start: 2022-08-05

## 2022-08-05 RX ORDER — MUPIROCIN CALCIUM 20 MG/G
CREAM TOPICAL
Qty: 1 EACH | Refills: 0 | Status: SHIPPED | OUTPATIENT
Start: 2022-08-05 | End: 2022-08-15

## 2022-08-05 RX ORDER — BACLOFEN 5 MG/1
5 TABLET ORAL 3 TIMES DAILY PRN
Qty: 21 TABLET | Refills: 0 | Status: SHIPPED | OUTPATIENT
Start: 2022-08-05 | End: 2022-08-15 | Stop reason: SDUPTHER

## 2022-08-05 ASSESSMENT — ENCOUNTER SYMPTOMS: BACK PAIN: 1

## 2022-08-05 NOTE — PROGRESS NOTES
Name: Amrita Chavira  : 1955         Chief Complaint:     Chief Complaint   Patient presents with    Back Pain     Py here for back pain. He fell possibly over a dog bed about 1 month ago and he feels the pain is getting worse. Lower back is what hurts the most. Hurts while sitting, moving and has to lay flat otherwise it hurts. Pt was taking tylenol for treatment. He has a previous back injury. History of Present Illness:      Amrita Chavira is a 74 y.o.  male who presents with Back Pain (Py here for back pain. He fell possibly over a dog bed about 1 month ago and he feels the pain is getting worse. Lower back is what hurts the most. Hurts while sitting, moving and has to lay flat otherwise it hurts. Pt was taking tylenol for treatment. He has a previous back injury.)      HPI    The patient presents for back pain s/p fall. Fall occurred 3 weeks ago. He states he fell forward. He did not hit his head or lose consciousness. Pain is present lower back, below belt line bilaterally. He does not have any pain along his spine. Pain described as achy. He fell over a dog bed at the time. He is using ice and tylenol to treat. Pain is worsening since onset. Several years ago, he fell and suffered a fracture in 2019 for which he was evaluated in the ED. CT lumbar spine 2019 showed nondisplaced left L1 transverse process fracture. 2019 CT cervical spine no acute abnormality. 2019 CT head no acute intracranial abnormality. He denies neck pain. He has not had any additional falls in the last 3 weeks. Denies urinary symptoms including urinary frequency, urgency, dysuria or hematuria. He denies history of kidney stones. Denies fever or chills. Denies any new/worsening loss of bowel or bladder function. Denies groin numbness. Denies extreme leg weakness. Denies pain that radiates down the back of legs.      Past Medical History:     Past Medical History:   Diagnosis Date    Achilles tendon tear  Allergic rhinitis     Atrial flutter ([facility] 1996    Congestive heart failure ([facility]     Coronary atherosclerosis     Hyperglycemia 2007    Hyperlipidemia 1996    Hypertension 2004    Mitral insufficiency     Obesity 2006    Osteoarthritis     Status post ablation of atrial flutter 05/09/13    Vasodepressor syncope 2001      Reviewed all health maintenance requirements and ordered appropriate tests  Health Maintenance Due   Topic Date Due    DTaP/Tdap/Td vaccine (1 - Tdap) Never done    Annual Wellness Visit (AWV)  10/03/2021    COVID-19 Vaccine (4 - Booster for [employer] Neighbor series) 04/21/2022       Past Surgical History:     Past Surgical History:   Procedure Laterality Date    [facility], 2013    st.vincents AV node    CATARACT REMOVAL Bilateral     CATARACT REMOVAL WITH IMPLANT  12/06/2017    Dr. Concepción Oreilly Right 10/25/2021    Dr. Marixa Up    COLONOSCOPY  02/01/2010    [place], negative    COLONOSCOPY  03/25/2019    Dr. Isa Mack rectal bx(hyperplastic mucosa with evidence of ischemic injury,angiodysplasia no active bleeding,divetrticulosis,hemorroids    COLONOSCOPY N/A 3/25/2019    COLONOSCOPY POLYPECTOMY SNARE/COLD BIOPSY performed by Ace Viera MD at [address] [place] [address] N/A 4/4/2022    COLONOSCOPY POLYPECTOMY SNARE/COLD BIOPSY performed by Marney Hammans, MD at [facility]      right side of face    INTRACAPSULAR CATARACT EXTRACTION Right 10/25/2021    EYE CATARACT EMULSIFICATION IOL IMPLANT performed by Atul Nichols DO at [address] Right 2006    medial meniscus arthroscopy, [facility]    KNEE SURGERY Left     MITRAL VALVE SURGERY  8/11/11    34 mm ring annuloplasty    OTHER SURGICAL HISTORY Left 06/12/2020    LASER ABLATION GREATER SAPHENOUIS VEIN WITH PHLEBECTOMY - EVOLV     VA KNEE SCOPE,MED OR LAT MENIS REPAIR Left 6/21/2018    KNEE ARTHROSCOPY-PARTIAL MEDIAL MENISCECTOMY AND CHONDROPLASTY performed by Lakeshia Tinajero MD at [facility] OR    NV XCAPSL CTRC RMVL INSJ IO LENS PROSTH W/O ECP Left 12/6/2017    EYE CATARACT EMULSIFICATION IOL IMPLANT performed by Hever Dickerson DO at [address] N/A 4/2/2019    -hiatal hernia,grade 3 reflux esophagitis,antral gastritis, erosive bulbar duodenitis    VEIN SURGERY Left 6/12/2020    LASER ABLATION GREATER SAPHENOUIS VEIN WITH PHLEBECTOMY [facility] CONF# [id number] - Maged [place]) performed by Catina Vilchis MD at [place] [address]        Medications:       Prior to Admission medications    Medication Sig Start Date End Date Taking?  Authorizing Provider   amLODIPine (NORVASC) 10 MG tablet Take 1 tablet by mouth daily 6/14/22 9/12/22 Yes Jamia Real MD   lisinopril (PRINIVIL;ZESTRIL) 20 MG tablet Take 1 tablet by mouth daily 6/14/22  Yes Jamia Real MD   mesalamine (CANASA) 1000 MG suppository Place 1 suppository rectally nightly 5/23/22  Yes Lee Goodpasture, MD   ferrous sulfate (FE TABS 325) 325 (65 Fe) MG EC tablet Take 1 tablet by mouth daily (with breakfast)  Patient taking differently: Take 325 mg by mouth every other day 4/6/22  Yes Angelina Adam PA-C   pantoprazole (PROTONIX) 40 MG tablet Take 1 tablet by mouth every morning (before breakfast) 4/6/22  Yes Angelina Adam PA-C   vitamin B-12 (CYANOCOBALAMIN) 1000 MCG tablet Take 1,000 mcg by mouth daily   Yes Historical Provider, MD   metoprolol succinate (TOPROL XL) 100 MG extended release tablet TAKE 1 TABLET DAILY 3/18/22  Yes Antonella Ortiz MD   XARELTO 20 MG TABS tablet TAKE 1 TABLET DAILY 1/31/22  Yes Antonella Ortiz MD   sertraline (ZOLOFT) 50 MG tablet TAKE 1 TABLET DAILY 1/31/22  Yes Antonella Ortiz MD   rosuvastatin (CRESTOR) 40 MG tablet Take 1 tablet by mouth daily  Patient not taking: Reported on 8/5/2022 6/14/22   Jamia Real MD   primidone (MYSOLINE) 50 MG tablet Take Half tablet twice daily  Patient not taking: Reported on 8/5/2022 5/27/22   Antonella Ortiz MD   albuterol sulfate HFA (PROAIR HFA) 108 (90 Base) MCG/ACT inhaler Inhale 2 puffs into the lungs every 6 hours as needed for Wheezing  Patient not taking: No sig reported 7/13/21   Nalini Florian MD        Allergies:       Patient has no known allergies. Social History:     Tobacco:    reports that he quit smoking about 25 years ago. His smoking use included cigarettes. He has a 30.00 pack-year smoking history. He has never used smokeless tobacco.  Alcohol:      reports current alcohol use. Drug Use:  reports no history of drug use. Family History:     Family History   Problem Relation Age of Onset    Heart Disease Mother         sudden cardiac death    Heart Attack Father        Review of Systems:     Positive and Negative as described in HPI    Review of Systems   Constitutional:  Negative for chills and fever. Genitourinary:  Negative for dysuria, frequency, hematuria and urgency. Musculoskeletal:  Positive for back pain. Skin:         Admits lesions on his left hand that are draining clear fluid. Lesions are pruritic in nature. He is unclear how he got the lesions. Neurological:  Negative for weakness. Physical Exam:   Vitals:  BP (!) 98/50   Pulse (!) 47   Temp 97.9 °F (36.6 °C)   Ht 5' 11\" (1.803 m)   Wt 247 lb (112 kg)   SpO2 97%   BMI 34.45 kg/m²     Physical Exam  Constitutional:       General: He is not in acute distress. Appearance: Normal appearance. He is obese. He is not ill-appearing or toxic-appearing. HENT:      Head: Normocephalic. Cardiovascular:      Rate and Rhythm: Normal rate and regular rhythm. Heart sounds: Normal heart sounds. No murmur heard. Pulmonary:      Effort: Pulmonary effort is normal. No respiratory distress. Breath sounds: Normal breath sounds. No stridor. No wheezing, rhonchi or rales. Musculoskeletal:      Comments: No tenderness to palpation thoracic or lumbar spine. No tenderness to palpation right or left lumbar paraspinal muscles.   Negative CVA tenderness bilaterally. Voiced pain with lumbar extension, flexion, and right and left lateral flexion. Bilateral hip ROM WNL. Gait WNL. Skin:     General: Skin is warm. Comments: 3 abrasions located left hand just proximal to first MCP joint. Mild surrounding erythema. Single lesion with clear discharge. No bleeding. Neurological:      Mental Status: He is alert and oriented to person, place, and time. Psychiatric:         Mood and Affect: Mood normal.         Behavior: Behavior normal.         Thought Content: Thought content normal.         Judgment: Judgment normal.       Data:     Lab Results   Component Value Date/Time     04/04/2022 04:05 AM    K 3.9 04/04/2022 04:05 AM    CL 99 04/04/2022 04:05 AM    CO2 22 04/04/2022 04:05 AM    BUN 7 04/04/2022 04:05 AM    CREATININE 0.62 04/04/2022 04:05 AM    GLUCOSE 102 04/04/2022 04:05 AM    GLUCOSE 83 03/17/2022 09:02 AM    PROT 7.1 04/02/2022 06:24 PM    LABALBU 4.0 04/02/2022 06:24 PM    BILITOT 0.36 04/02/2022 06:24 PM    ALKPHOS 95 04/02/2022 06:24 PM    AST 22 04/02/2022 06:24 PM    ALT 13 04/02/2022 06:24 PM     Lab Results   Component Value Date/Time    WBC 5.5 04/07/2022 10:55 AM    WBC 4.4 04/02/2022 09:34 PM    RBC 4.09 04/07/2022 10:55 AM    HGB 13.9 04/07/2022 10:55 AM    HCT 41.2 04/07/2022 10:55 AM     04/07/2022 10:55 AM    MCH 34.0 04/07/2022 10:55 AM    MCHC 33.7 04/07/2022 10:55 AM    RDW 15.1 04/07/2022 10:55 AM     04/07/2022 10:55 AM     04/02/2022 09:34 PM     01/20/2012 07:06 AM    MPV 9.5 04/07/2022 10:55 AM     Lab Results   Component Value Date/Time    TSH 1.18 04/03/2022 12:03 PM     Lab Results   Component Value Date/Time    CHOL 148 11/12/2021 07:59 AM    HDL 66 11/12/2021 07:59 AM    PSA 2.03 11/12/2021 07:59 AM    LABA1C 5.7 03/17/2022 09:02 AM       Assessment/Plan:      Diagnosis Orders   1.  Low back pain without sciatica, unspecified back pain laterality, unspecified chronicity [M54.50 (ICD-10-CM)]          Muscle strain:  - Baclofen 5 mg 3 times daily as needed prescribed. Reviewed side effect such as drowsiness  - He is not a candidate for NSAIDs given his cardiac history  - Continue Tylenol  - Continue ice and heat  - Recommended gentle stretching  - He does have a history of fall resulting in lumbar fracture, see HPI for details. Further evaluation with lumbar x-ray today  - Reviewed red flag symptoms of back pain and when to present to the emergency department  - Follow-up 2 weeks with Dr. Aline Dykes or sooner with any concerns     Hand Lesions:   - It appears lesions are from a puncture injury. Patient is unclear of injury. - Topical mupirocin 3 times daily prescribed today. Keep covered with bandage and clean with soap and water.  - Reviewed worsening signs and symptoms of infection and when to notify office    Completed Refills   Requested Prescriptions      No prescriptions requested or ordered in this encounter       No orders of the defined types were placed in this encounter. No results found for this visit on 08/05/22. No follow-ups on file.     Electronically signed by CHENCHO Álvarez CNP on 08/05/22 at 3:06 PM.

## 2022-08-05 NOTE — TELEPHONE ENCOUNTER
----- Message from Register, MA sent at 8/5/2022 12:04 PM EDT -----  Subject: Refill Request    QUESTIONS  Name of Medication? rosuvastatin (CRESTOR) 40 MG tablet  Patient-reported dosage and instructions? 40mg  How many days do you have left? 0  Preferred Pharmacy? [address]  phone number (if available)? [phone]  Additional Information for Provider? Pt completely out, never received   from mail order  ---------------------------------------------------------------------------  --------------  [address]  What is the best way for the office to contact you? OK to leave message on   voicemail  Preferred Call Back Phone Number? [phone]  ---------------------------------------------------------------------------  --------------  SCRIPT ANSWERS  Relationship to Patient?  Self

## 2022-08-05 NOTE — PATIENT INSTRUCTIONS
SURVEY:    You may be receiving a survey from [employer] regarding your visit today. Please complete the survey to enable us to provide the highest quality of care to you and your family. If you cannot score us a very good on any question, please call the office to discuss how we could of made your experience a very good one. Thank you.

## 2022-08-15 ENCOUNTER — HOSPITAL ENCOUNTER (OUTPATIENT)
Dept: CT IMAGING | Age: 67
Discharge: HOME | End: 2022-08-17
Payer: MEDICARE

## 2022-08-15 ENCOUNTER — HOSPITAL ENCOUNTER (OUTPATIENT)
Age: 67
Discharge: HOME | End: 2022-08-15
Payer: MEDICARE

## 2022-08-15 ENCOUNTER — OFFICE VISIT (OUTPATIENT)
Dept: PRIMARY CARE CLINIC | Age: 67
End: 2022-08-15
Payer: MEDICARE

## 2022-08-15 VITALS
HEIGHT: 71 IN | WEIGHT: 243 LBS | SYSTOLIC BLOOD PRESSURE: 118 MMHG | HEART RATE: 85 BPM | OXYGEN SATURATION: 96 % | BODY MASS INDEX: 34.02 KG/M2 | DIASTOLIC BLOOD PRESSURE: 60 MMHG

## 2022-08-15 DIAGNOSIS — M54.50 SEVERE LOW BACK PAIN: ICD-10-CM

## 2022-08-15 DIAGNOSIS — K40.90 NON-RECURRENT UNILATERAL INGUINAL HERNIA WITHOUT OBSTRUCTION OR GANGRENE: ICD-10-CM

## 2022-08-15 DIAGNOSIS — I10 ESSENTIAL HYPERTENSION: ICD-10-CM

## 2022-08-15 DIAGNOSIS — R10.31 SEVERE RIGHT GROIN PAIN: Primary | ICD-10-CM

## 2022-08-15 DIAGNOSIS — S32.010A COMPRESSION FRACTURE OF L1 VERTEBRA, INITIAL ENCOUNTER (HCC): ICD-10-CM

## 2022-08-15 DIAGNOSIS — R10.31 SEVERE RIGHT GROIN PAIN: ICD-10-CM

## 2022-08-15 DIAGNOSIS — G25.2 RESTING TREMOR: ICD-10-CM

## 2022-08-15 DIAGNOSIS — N32.89 BLADDER WALL THICKENING: ICD-10-CM

## 2022-08-15 LAB
ABSOLUTE EOS #: 0.18 K/UL (ref 0–0.44)
ABSOLUTE IMMATURE GRANULOCYTE: <0.03 K/UL (ref 0–0.3)
ABSOLUTE LYMPH #: 1.31 K/UL (ref 1.1–3.7)
ABSOLUTE MONO #: 0.69 K/UL (ref 0.1–1.2)
ALBUMIN SERPL-MCNC: 4 G/DL (ref 3.5–5.2)
ALBUMIN/GLOBULIN RATIO: 1.3 (ref 1–2.5)
ALP BLD-CCNC: 98 U/L (ref 40–129)
ALT SERPL-CCNC: 27 U/L (ref 5–41)
ANION GAP SERPL CALCULATED.3IONS-SCNC: 14 MMOL/L (ref 9–17)
AST SERPL-CCNC: 27 U/L
BACTERIA: ABNORMAL
BASOPHILS # BLD: 1 % (ref 0–2)
BASOPHILS ABSOLUTE: 0.04 K/UL (ref 0–0.2)
BILIRUB SERPL-MCNC: 0.47 MG/DL (ref 0.3–1.2)
BILIRUBIN URINE: NEGATIVE
BUN BLDV-MCNC: 11 MG/DL (ref 8–23)
BUN/CREAT BLD: 18 (ref 9–20)
CALCIUM SERPL-MCNC: 9.2 MG/DL (ref 8.6–10.4)
CHLORIDE BLD-SCNC: 99 MMOL/L (ref 98–107)
CO2: 24 MMOL/L (ref 20–31)
COLOR: YELLOW
CREAT SERPL-MCNC: 0.6 MG/DL (ref 0.7–1.2)
EOSINOPHILS RELATIVE PERCENT: 3 % (ref 1–4)
EPITHELIAL CELLS UA: ABNORMAL /HPF (ref 0–5)
GFR AFRICAN AMERICAN: >60 ML/MIN
GFR NON-AFRICAN AMERICAN: >60 ML/MIN
GFR SERPL CREATININE-BSD FRML MDRD: ABNORMAL ML/MIN/{1.73_M2}
GFR SERPL CREATININE-BSD FRML MDRD: ABNORMAL ML/MIN/{1.73_M2}
GLUCOSE BLD-MCNC: 81 MG/DL (ref 70–99)
GLUCOSE URINE: NEGATIVE
HCT VFR BLD CALC: 41.4 % (ref 40.7–50.3)
HEMOGLOBIN: 13.7 G/DL (ref 13–17)
IMMATURE GRANULOCYTES: 0 %
KETONES, URINE: NEGATIVE
LEUKOCYTE ESTERASE, URINE: NEGATIVE
LYMPHOCYTES # BLD: 19 % (ref 24–43)
MCH RBC QN AUTO: 33.2 PG (ref 25.2–33.5)
MCHC RBC AUTO-ENTMCNC: 33.1 G/DL (ref 28.4–34.8)
MCV RBC AUTO: 100.2 FL (ref 82.6–102.9)
MONOCYTES # BLD: 10 % (ref 3–12)
NITRITE, URINE: NEGATIVE
NRBC AUTOMATED: 0 PER 100 WBC
PDW BLD-RTO: 15.9 % (ref 11.8–14.4)
PH UA: 5.5 (ref 5–9)
PLATELET # BLD: 146 K/UL (ref 138–453)
PMV BLD AUTO: 10.7 FL (ref 8.1–13.5)
POTASSIUM SERPL-SCNC: 3.9 MMOL/L (ref 3.7–5.3)
PROTEIN UA: NEGATIVE
RBC # BLD: 4.13 M/UL (ref 4.21–5.77)
RBC UA: ABNORMAL /HPF (ref 0–2)
SEG NEUTROPHILS: 67 % (ref 36–65)
SEGMENTED NEUTROPHILS ABSOLUTE COUNT: 4.74 K/UL (ref 1.5–8.1)
SODIUM BLD-SCNC: 137 MMOL/L (ref 135–144)
SPECIFIC GRAVITY UA: 1.01 (ref 1.01–1.02)
TOTAL PROTEIN: 7.2 G/DL (ref 6.4–8.3)
TURBIDITY: CLEAR
URINE HGB: ABNORMAL
UROBILINOGEN, URINE: NORMAL
WBC # BLD: 7 K/UL (ref 3.5–11.3)
WBC UA: ABNORMAL /HPF (ref 0–5)

## 2022-08-15 PROCEDURE — 80053 COMPREHEN METABOLIC PANEL: CPT

## 2022-08-15 PROCEDURE — 81001 URINALYSIS AUTO W/SCOPE: CPT

## 2022-08-15 PROCEDURE — 1036F TOBACCO NON-USER: CPT | Performed by: STUDENT IN AN ORGANIZED HEALTH CARE EDUCATION/TRAINING PROGRAM

## 2022-08-15 PROCEDURE — 87086 URINE CULTURE/COLONY COUNT: CPT

## 2022-08-15 PROCEDURE — 99214 OFFICE O/P EST MOD 30 MIN: CPT | Performed by: STUDENT IN AN ORGANIZED HEALTH CARE EDUCATION/TRAINING PROGRAM

## 2022-08-15 PROCEDURE — 6360000004 HC RX CONTRAST MEDICATION: Performed by: STUDENT IN AN ORGANIZED HEALTH CARE EDUCATION/TRAINING PROGRAM

## 2022-08-15 PROCEDURE — 85025 COMPLETE CBC W/AUTO DIFF WBC: CPT

## 2022-08-15 PROCEDURE — G8427 DOCREV CUR MEDS BY ELIG CLIN: HCPCS | Performed by: STUDENT IN AN ORGANIZED HEALTH CARE EDUCATION/TRAINING PROGRAM

## 2022-08-15 PROCEDURE — 1123F ACP DISCUSS/DSCN MKR DOCD: CPT | Performed by: STUDENT IN AN ORGANIZED HEALTH CARE EDUCATION/TRAINING PROGRAM

## 2022-08-15 PROCEDURE — G8417 CALC BMI ABV UP PARAM F/U: HCPCS | Performed by: STUDENT IN AN ORGANIZED HEALTH CARE EDUCATION/TRAINING PROGRAM

## 2022-08-15 PROCEDURE — 3017F COLORECTAL CA SCREEN DOC REV: CPT | Performed by: STUDENT IN AN ORGANIZED HEALTH CARE EDUCATION/TRAINING PROGRAM

## 2022-08-15 PROCEDURE — 36415 COLL VENOUS BLD VENIPUNCTURE: CPT

## 2022-08-15 PROCEDURE — 74177 CT ABD & PELVIS W/CONTRAST: CPT

## 2022-08-15 RX ORDER — PRIMIDONE 50 MG/1
TABLET ORAL
Qty: 30 TABLET | Refills: 2 | Status: SHIPPED | OUTPATIENT
Start: 2022-08-15 | End: 2022-11-03

## 2022-08-15 RX ORDER — OXYCODONE HYDROCHLORIDE AND ACETAMINOPHEN 5; 325 MG/1; MG/1
1 TABLET ORAL EVERY 6 HOURS PRN
Qty: 28 TABLET | Refills: 0 | Status: SHIPPED | OUTPATIENT
Start: 2022-08-15 | End: 2022-08-26 | Stop reason: SDUPTHER

## 2022-08-15 RX ORDER — AMLODIPINE BESYLATE 10 MG/1
10 TABLET ORAL DAILY
Qty: 90 TABLET | Refills: 1 | Status: SHIPPED | OUTPATIENT
Start: 2022-08-15 | End: 2022-11-13

## 2022-08-15 RX ORDER — BACLOFEN 5 MG/1
5 TABLET ORAL 3 TIMES DAILY PRN
Qty: 21 TABLET | Refills: 1 | Status: SHIPPED | OUTPATIENT
Start: 2022-08-15 | End: 2022-10-21

## 2022-08-15 RX ADMIN — IOPAMIDOL 18 ML: 755 INJECTION, SOLUTION INTRAVENOUS at 13:10

## 2022-08-15 RX ADMIN — IOPAMIDOL 75 ML: 755 INJECTION, SOLUTION INTRAVENOUS at 13:09

## 2022-08-15 NOTE — PROGRESS NOTES
[address], [address] , [facility], [place]  1955 is a 74 y.o. male, Established patient, here for evaluation of the following chief complaint(s):    Back Pain       ASSESSMENT/PLAN:    1. Severe right groin pain  -     Comprehensive Metabolic Panel; Future  -     CBC with Auto Differential; Future  -     CT ABDOMEN PELVIS W IV CONTRAST Additional Contrast? Oral; Future  -     Urinalysis with Reflex to Culture; Future  -     Culture, Urine; Future  -     oxyCODONE-acetaminophen (PERCOCET) 5-325 MG per tablet; Take 1 tablet by mouth every 6 hours as needed for Pain for up to 7 days. Intended supply: 7 days. Take lowest dose possible to manage pain, Disp-28 tablet, R-0Normal  2. Essential hypertension  -     amLODIPine (NORVASC) 10 MG tablet; Take 1 tablet by mouth in the morning., Disp-90 tablet, R-1Normal  3. Resting tremor  -     primidone (MYSOLINE) 50 MG tablet; Take Half tablet twice daily, Disp-30 tablet, R-2Normal  4. Severe low back pain  -     Comprehensive Metabolic Panel; Future  -     CBC with Auto Differential; Future  -     CT ABDOMEN PELVIS W IV CONTRAST Additional Contrast? Oral; Future  -     Baclofen (LIORESAL) 5 MG tablet; Take 1 tablet by mouth 3 times daily as needed (muscle spasm) . Caution as this can cause drowsiness. , Disp-21 tablet, R-1Normal  -     oxyCODONE-acetaminophen (PERCOCET) 5-325 MG per tablet; Take 1 tablet by mouth every 6 hours as needed for Pain for up to 7 days. Intended supply: 7 days. Take lowest dose possible to manage pain, Disp-28 tablet, R-0Normal  5. Compression fracture of L1 vertebra, initial encounter ([facility] [address]  -     CT ABDOMEN PELVIS W IV CONTRAST Additional Contrast? Oral; Future  -     oxyCODONE-acetaminophen (PERCOCET) 5-325 MG per tablet; Take 1 tablet by mouth every 6 hours as needed for Pain for up to 7 days. Intended supply: 7 days.  Take lowest dose possible to manage pain, Disp-28 tablet, R-0Normal     Medications Discontinued During This Encounter   Medication Reason    mupirocin (BACTROBAN) 2 % cream LIST CLEANUP    primidone (MYSOLINE) 50 MG tablet REORDER    amLODIPine (NORVASC) 10 MG tablet REORDER    Baclofen (LIORESAL) 5 MG tablet REORDER      We will obtain STAT labs /imaging today in order to further evaluate, suspected right hernia. Depending on the results, we may admit the patient to the hospital for immediate evaluation. He also has an L1 compression # noted on prior imaging and has f/u with Orthopedic Surgery scheduled. Discussed risks/benefits/alternatives given known Cardiac hx and current meds that he is on. Provided with Percocet to use PRN at this time and refilled Baclofen. HTN - well controlled at this time, continue w/ amlodipine/lisinopril    Resting tremor - refilled med today, continue at the current dose. Return in about 1 week (around 8/22/2022), or if symptoms worsen or fail to improve, for F/U Abd/LBP. Maryam Raza received counseling on the following healthy behaviors: nutrition, exercise and medication adherence. I encouraged and discussed lifestyle modifications including diet and exercise and the patient was agreeable to making positive/beneficial changes to both to help improve their overall health. Discussed use, benefit, and side effects of prescribed medications. Barriers to medication compliance addressed. Patient given educational materials: see patient instructions. HM - HM items completed today as per orders. Outstanding HM items though not limited to immunizations were discussed with the patient today, including risks, benefits and alternatives. The patient will discuss these during the next appointment per their preference. If there are any worsening or concerning signs or symptoms, patient will report to the ED and/or contact [id number] for immediate evaluation. Teach back method was used. All patient questions answered.  Pt voiced understanding. Subjective    SUBJECTIVE/OBJECTIVE:    HPI     Back Pain  for 1 months  Nature of Onset and Mechanism -  rapid, worsening over the past several days. Location - bilateral lower back wrapping around to the front into his groin at times (worse in the right groin area)  Characteristics/Radiation/Quality - \"bulge in his groin\" cant sit , stand or lie down / sleep at this time. He has a follow-up scheduled in September 2022. The patient had a prior episode of a fracture in the past. The patient has been tolerating decreased PO intake. The patient denies any n/v and he is passing gas/BM's regularly. There are no red flags such as bladder dysfunction, areflexia, saddle anesthesia, progressive motor weakness, a history of cancer, or the presence of fever, unexplained weight loss, or night sweats. Severity (0-10) - 10/10 at this time. Aggravating Factors - sitting, standing, lifting, twisting, walking, lying down, climbing stairs, changing positions, and exercise  Relieving Factors/Treatment tried - nothing except for Baclofen and OTC medications with minimal relief of his pain. XR Lumbar 8/5/2022  \"     Impression   Age indeterminate but new since most recent imaging L1 vertebral body   compression as detailed. Consider MRI follow-up. Multilevel degenerative change. \"    CT Abdomen/Pelvis 4/02/2022  \"     Impression   Rectal sigmoid colonic wall thickening could relate to underdistention versus   proximal rectosigmoid proctitis/colitis       Colonic diverticulosis without acute diverticulitis. Left bladder wall thickening greatest on the right perhaps related to the   right inguinal hernia versus cystitis. Please correlate with urine cytology       Enlarged prostate. \"  Hypertension: Patient here for follow-up of elevated blood pressure. He is not exercising and is not adherent to low salt diet. Blood pressure is well controlled at home.  Cardiac symptoms none.  The patient is taking his BP medications without any side effects. He is on amlodipine and lisinopril. Essential Tremor for years, Right hand dominant  The patient has had an essential tremor and has been on Primidone for some time now, tolerating this well. The patient notes no prior work-up in the past. He would be interested in further w/u in the future.     Family History   Problem Relation Age of Onset    Heart Disease Mother         sudden cardiac death    Heart Attack Father      Health Maintenance   Alcohol/Substance use History - None/Minimal  Smoking History    Tobacco Use      Smoking status: Former        Packs/day: 1.50        Years: 20.00        Pack years: 30        Types: Cigarettes        Quit date: 1996        Years since quittin.7      Smokeless tobacco: Never      Health Maintenance   Topic Date Due    DTaP/Tdap/Td vaccine (1 - Tdap) Never done    Annual Wellness Visit (AWV)  10/03/2021    COVID-19 Vaccine (4 - Booster for Moderna series) 2022    Flu vaccine (1) 2022    Lipids  2022    A1C test (Diabetic or Prediabetic)  2023    Depression Screen  2023    Pneumococcal 65+ years Vaccine (3 - PPSV23 or PCV20) 11/10/2023    Colorectal Cancer Screen  2032    Shingles vaccine  Completed    AAA screen  Completed    Hepatitis C screen  Addressed    Hepatitis A vaccine  Aged Out    Hepatitis B vaccine  Aged Out    Hib vaccine  Aged Out    Meningococcal (ACWY) vaccine  Aged Out      Depression Screening  PHQ Scores 2022 2022 2021 10/2/2020 2020 2019 2019   PHQ2 Score 0 0 0 0 0 0 0   PHQ9 Score 0 0 0 0 0 0 0     Interpretation of Total Score Depression Severity: 1-4 = Minimal depression, 5-9 = Mild depression, 10-14 = Moderate depression, 15-19 = Moderately severe depression, 20-27 = Severe depression      Review of Systems   Constitutional: Negative for activity change, appetite change, chills, diaphoresis, fatigue, There is no lymphadenopathy. Inspection- No deformity, no atrophy  Palpation - Tenderness: yes, lower lumbar area, bilaterally. ROM - limited flexion, limited extension  Strength- WNL  Sensation -WNL  Reflexes - WNL  Gait: antalgic  PSYCH:  Good fund of knowledge and displays understanding of exam.    Neurological: Patient is alert and oriented to person, place, and time. No tremors identified during today's examination  Skin: Skin is warm and dry. Patient is not diaphoretic. Psychiatric: Patient's speech is normal and behavior is normal. Thought content normal.   Vitals reviewed. /60   Pulse 85   Ht 5' 11\" (1.803 m)   Wt 243 lb (110.2 kg)   SpO2 96%   BMI 33.89 kg/m²   BP Readings from Last 3 Encounters:   08/15/22 118/60   08/05/22 116/62   06/14/22 138/72     Lab Results   Component Value Date    WBC 7.0 08/15/2022    HGB 13.7 08/15/2022    HCT 41.4 08/15/2022     08/15/2022    CHOL 148 11/12/2021    TRIG 66 11/12/2021    HDL 66 11/12/2021    ALT 13 04/02/2022    AST 22 04/02/2022     (L) 04/04/2022    K 3.9 04/04/2022    CL 99 04/04/2022    CREATININE 0.62 (L) 04/04/2022    BUN 7 (L) 04/04/2022    CO2 22 04/04/2022    TSH 1.18 04/03/2022    PSA 2.03 11/12/2021    INR 1.6 04/02/2022    LABA1C 5.7 03/17/2022     Lab Results   Component Value Date    CALCIUM 8.9 04/04/2022     Lab Results   Component Value Date    LDLCHOLESTEROL 69 11/12/2021       CURRENT MEDS W/ ASSOC DIAG           Start Date End Date     albuterol sulfate HFA (PROAIR HFA) 108 (90 Base) MCG/ACT inhaler  07/13/21  --     Inhale 2 puffs into the lungs every 6 hours as needed for Wheezing     Patient not taking: No sig reported     Associated Diagnoses:   Allergic rhinitis, unspecified seasonality, unspecified trigger     amLODIPine (NORVASC) 10 MG tablet  06/14/22 09/12/22     Take 1 tablet by mouth daily     Associated Diagnoses:  Essential hypertension     Baclofen (LIORESAL) 5 MG tablet  08/05/22  --     Take 1 tablet by mouth 3 times daily as needed (muscle spasm) . Caution as this can cause drowsiness. Associated Diagnoses:  --     ferrous sulfate (FE TABS 325) 325 (65 Fe) MG EC tablet  04/06/22  --     Take 1 tablet by mouth daily (with breakfast)     Patient taking differently: Take 325 mg by mouth every other day     Associated Diagnoses:  --     lisinopril (PRINIVIL;ZESTRIL) 20 MG tablet  06/14/22  --     Take 1 tablet by mouth daily     Associated Diagnoses:  Essential hypertension     mesalamine (CANASA) 1000 MG suppository  05/23/22  --     Place 1 suppository rectally nightly     Associated Diagnoses:  --     metoprolol succinate (TOPROL XL) 100 MG extended release tablet  03/18/22  --     TAKE 1 TABLET DAILY     Associated Diagnoses:  --     mupirocin (BACTROBAN) 2 % cream  08/05/22  --     Apply topically to affected area 3 times daily     Associated Diagnoses:  --     pantoprazole (PROTONIX) 40 MG tablet  04/06/22  --     Take 1 tablet by mouth every morning (before breakfast)     Associated Diagnoses:  --     primidone (MYSOLINE) 50 MG tablet  05/27/22  --     Take Half tablet twice daily     Patient not taking: Reported on 8/5/2022     Associated Diagnoses:  Resting tremor     rosuvastatin (CRESTOR) 40 MG tablet  08/05/22  --     Take 1 tablet by mouth in the morning. Associated Diagnoses:  Essential hypertension, Hyperlipidemia, unspecified hyperlipidemia type     sertraline (ZOLOFT) 50 MG tablet  01/31/22  --     TAKE 1 TABLET DAILY     Associated Diagnoses:  --     vitamin B-12 (CYANOCOBALAMIN) 1000 MCG tablet  --  --     Associated Diagnoses:  --     XARELTO 20 MG TABS tablet  01/31/22  --     TAKE 1 TABLET DAILY     Associated Diagnoses:  --              Please note that this chart was generated using voice recognition Dragon dictation software. Although every effort was made to ensure the accuracy of this automated transcription, some errors in transcription may have occurred.   Electronically signed by Marcus Landa MD on 8/15/2022 at 12:22 PM

## 2022-08-17 LAB
CULTURE: NO GROWTH
SPECIMEN DESCRIPTION: NORMAL

## 2022-08-22 ENCOUNTER — HOSPITAL ENCOUNTER (OUTPATIENT)
Dept: MRI IMAGING | Age: 67
Discharge: HOME | End: 2022-08-24
Payer: MEDICARE

## 2022-08-22 DIAGNOSIS — S32.010A COMPRESSION FRACTURE OF L1 VERTEBRA, INITIAL ENCOUNTER (HCC): ICD-10-CM

## 2022-08-22 PROCEDURE — 72148 MRI LUMBAR SPINE W/O DYE: CPT

## 2022-08-23 ENCOUNTER — OFFICE VISIT (OUTPATIENT)
Dept: PRIMARY CARE CLINIC | Age: 67
End: 2022-08-23
Payer: MEDICARE

## 2022-08-23 VITALS
DIASTOLIC BLOOD PRESSURE: 50 MMHG | BODY MASS INDEX: 33.6 KG/M2 | SYSTOLIC BLOOD PRESSURE: 102 MMHG | HEIGHT: 71 IN | WEIGHT: 240 LBS | HEART RATE: 45 BPM | OXYGEN SATURATION: 96 %

## 2022-08-23 DIAGNOSIS — R10.84 GENERALIZED ABDOMINAL PAIN: Primary | ICD-10-CM

## 2022-08-23 DIAGNOSIS — S32.010D COMPRESSION FRACTURE OF L1 VERTEBRA WITH ROUTINE HEALING, SUBSEQUENT ENCOUNTER: ICD-10-CM

## 2022-08-23 DIAGNOSIS — M54.50 SEVERE LOW BACK PAIN: ICD-10-CM

## 2022-08-23 DIAGNOSIS — N32.89 BLADDER WALL THICKENING: ICD-10-CM

## 2022-08-23 PROCEDURE — 1036F TOBACCO NON-USER: CPT | Performed by: STUDENT IN AN ORGANIZED HEALTH CARE EDUCATION/TRAINING PROGRAM

## 2022-08-23 PROCEDURE — 3017F COLORECTAL CA SCREEN DOC REV: CPT | Performed by: STUDENT IN AN ORGANIZED HEALTH CARE EDUCATION/TRAINING PROGRAM

## 2022-08-23 PROCEDURE — 99211 OFF/OP EST MAY X REQ PHY/QHP: CPT

## 2022-08-23 PROCEDURE — 1123F ACP DISCUSS/DSCN MKR DOCD: CPT | Performed by: STUDENT IN AN ORGANIZED HEALTH CARE EDUCATION/TRAINING PROGRAM

## 2022-08-23 PROCEDURE — G8427 DOCREV CUR MEDS BY ELIG CLIN: HCPCS | Performed by: STUDENT IN AN ORGANIZED HEALTH CARE EDUCATION/TRAINING PROGRAM

## 2022-08-23 PROCEDURE — 99213 OFFICE O/P EST LOW 20 MIN: CPT | Performed by: STUDENT IN AN ORGANIZED HEALTH CARE EDUCATION/TRAINING PROGRAM

## 2022-08-23 PROCEDURE — G8417 CALC BMI ABV UP PARAM F/U: HCPCS | Performed by: STUDENT IN AN ORGANIZED HEALTH CARE EDUCATION/TRAINING PROGRAM

## 2022-08-23 NOTE — PROGRESS NOTES
[address], [address] , [facility], [place] [address]  1955 is a 74 y.o. male, Established patient, here for evaluation of the following chief complaint(s):    Follow-up (Abdominal pain and LBP)       ASSESSMENT/PLAN:    1. Generalized abdominal pain  2. Bladder wall thickening  3. Compression fracture of L1 vertebra with routine healing, subsequent encounter  4. Severe low back pain     F/U with General surgery/Urology as per prior plans. Symptoms are improving at this time. F/U with Orthopedic Surgeon - continue w/ current dose of Baclofen/Percocet PRN as needed. There are no discontinued medications. Return if symptoms worsen or fail to improve, for F/U as per prior plans. Ada Saucedo received counseling on the following healthy behaviors: nutrition, exercise and medication adherence. I encouraged and discussed lifestyle modifications including diet and exercise and the patient was agreeable to making positive/beneficial changes to both to help improve their overall health. Discussed use, benefit, and side effects of prescribed medications. Barriers to medication compliance addressed. Patient given educational materials: see patient instructions. HM - HM items completed today as per orders. Outstanding HM items though not limited to immunizations were discussed with the patient today, including risks, benefits and alternatives. The patient will discuss these during the next appointment per their preference. If there are any worsening or concerning signs or symptoms, patient will report to the ED and/or contact [id number] for immediate evaluation. Teach back method was used. All patient questions answered. Pt voiced understanding.      Subjective    SUBJECTIVE/OBJECTIVE:    HPI     Follow-up (Abdominal pain and LBP)  Pt presents to discuss his recent CT and MRI results   Nature of Onset and Mechanism -  unchanged Positive for back pain, no neck pain and neck stiffness. Skin: Negative for color change and wound. Negative for pallor and rash. Allergic/Immunologic: Negative for environmental allergies and food allergies. Neurological: Negative for light-headedness, numbness and headaches. Psychiatric/Behavioral: Negative for sleep disturbance. Negative for confusion and suicidal ideas. Objective    Physical Exam   Constitutional: Patient is oriented to person, place, and time. Patient appears well-developed and well-nourished. No distress. HENT: Head: Normocephalic and atraumatic. Eyes: Pupils are equal, round, and reactive to light. Conjunctivae are normal. Right eye exhibits no discharge. Left eye exhibits no discharge. Cardiovascular: Normal rate, regular rhythm and normal heart sounds. Pulmonary/Chest: Effort normal and breath sounds normal. No respiratory distress. Patient has no wheezes. Abdominal: Soft. Bowel sounds are normal. Patient exhibits no distension. There is minimal right inguinal tenderness. Musculoskeletal:  Patient exhibits no edema and tenderness. Patient exhibits no deformity. SKIN:  Intact without rashes, lesions or ulcerations. NEURO: Sensation to the extremity is intact. VASC:  Capillary refill is less than 3 seconds. Distal pulses are palpable. There is no lymphadenopathy. Inspection- No deformity, no atrophy  Palpation - Tenderness: yes lumbar/thoracic spine  ROM - limited flexion, limited extension  Strength- WNL  Sensation -WNL  Reflexes - WNL  Gait: antalgic  PSYCH:  Good fund of knowledge and displays understanding of exam.    Neurological: Patient is alert and oriented to person, place, and time. Skin: Skin is warm and dry. Patient is not diaphoretic. Psychiatric: Patient's speech is normal and behavior is normal. Thought content normal.   Vitals reviewed.     BP (!) 102/50   Pulse (!) 45   Ht 5' 11\" (1.803 m)   Wt 240 lb (108.9 kg)   SpO2 96%   BMI 33.47 kg/m²   BP Readings from Last 3 Encounters:   08/23/22 (!) 102/50   08/15/22 118/60   08/05/22 116/62     Lab Results   Component Value Date    WBC 7.0 08/15/2022    HGB 13.7 08/15/2022    HCT 41.4 08/15/2022     08/15/2022    CHOL 148 11/12/2021    TRIG 66 11/12/2021    HDL 66 11/12/2021    ALT 27 08/15/2022    AST 27 08/15/2022     08/15/2022    K 3.9 08/15/2022    CL 99 08/15/2022    CREATININE 0.60 (L) 08/15/2022    BUN 11 08/15/2022    CO2 24 08/15/2022    TSH 1.18 04/03/2022    PSA 2.03 11/12/2021    INR 1.6 04/02/2022    LABA1C 5.7 03/17/2022     Lab Results   Component Value Date    CALCIUM 9.2 08/15/2022     Lab Results   Component Value Date    LDLCHOLESTEROL 69 11/12/2021       CURRENT MEDS W/ ASSOC DIAG           Start Date End Date     albuterol sulfate HFA (PROAIR HFA) 108 (90 Base) MCG/ACT inhaler  07/13/21  --     Inhale 2 puffs into the lungs every 6 hours as needed for Wheezing     Patient not taking: No sig reported     Associated Diagnoses: Allergic rhinitis, unspecified seasonality, unspecified trigger     amLODIPine (NORVASC) 10 MG tablet  08/15/22  11/13/22     Take 1 tablet by mouth in the morning. Associated Diagnoses:  Essential hypertension     Baclofen (LIORESAL) 5 MG tablet  08/15/22  --     Take 1 tablet by mouth 3 times daily as needed (muscle spasm) . Caution as this can cause drowsiness.      Associated Diagnoses:  Severe low back pain     ferrous sulfate (FE TABS 325) 325 (65 Fe) MG EC tablet  04/06/22  --     Take 1 tablet by mouth daily (with breakfast)     Patient taking differently: Take 325 mg by mouth every other day     Associated Diagnoses:  --     lisinopril (PRINIVIL;ZESTRIL) 20 MG tablet  06/14/22  --     Take 1 tablet by mouth daily     Associated Diagnoses:  Essential hypertension     mesalamine (CANASA) 1000 MG suppository  05/23/22  --     Place 1 suppository rectally nightly     Associated Diagnoses:  --     metoprolol succinate (TOPROL XL) 100 MG extended release tablet  03/18/22  --     TAKE 1 TABLET DAILY     Associated Diagnoses:  --     pantoprazole (PROTONIX) 40 MG tablet  04/06/22  --     Take 1 tablet by mouth every morning (before breakfast)     Associated Diagnoses:  --     primidone (MYSOLINE) 50 MG tablet  08/15/22  --     Take Half tablet twice daily     Associated Diagnoses:  Resting tremor     rosuvastatin (CRESTOR) 40 MG tablet  08/05/22  --     Take 1 tablet by mouth in the morning. Associated Diagnoses:  Essential hypertension, Hyperlipidemia, unspecified hyperlipidemia type     sertraline (ZOLOFT) 50 MG tablet  01/31/22  --     TAKE 1 TABLET DAILY     Associated Diagnoses:  --     vitamin B-12 (CYANOCOBALAMIN) 1000 MCG tablet  --  --     Associated Diagnoses:  --     XARELTO 20 MG TABS tablet  01/31/22  --     TAKE 1 TABLET DAILY     Associated Diagnoses:  --              Please note that this chart was generated using voice recognition Dragon dictation software. Although every effort was made to ensure the accuracy of this automated transcription, some errors in transcription may have occurred.   Electronically signed by Jamia Real MD on 8/23/2022 at 9:57 AM

## 2022-08-26 DIAGNOSIS — M54.50 SEVERE LOW BACK PAIN: ICD-10-CM

## 2022-08-26 DIAGNOSIS — R10.31 SEVERE RIGHT GROIN PAIN: ICD-10-CM

## 2022-08-26 DIAGNOSIS — S32.010A COMPRESSION FRACTURE OF L1 VERTEBRA, INITIAL ENCOUNTER (HCC): ICD-10-CM

## 2022-08-26 RX ORDER — OXYCODONE HYDROCHLORIDE AND ACETAMINOPHEN 5; 325 MG/1; MG/1
1 TABLET ORAL EVERY 6 HOURS PRN
Qty: 28 TABLET | Refills: 0 | Status: SHIPPED | OUTPATIENT
Start: 2022-08-26 | End: 2022-09-02

## 2022-08-29 ENCOUNTER — OFFICE VISIT (OUTPATIENT)
Dept: GASTROENTEROLOGY | Age: 67
End: 2022-08-29
Payer: MEDICARE

## 2022-08-29 VITALS
WEIGHT: 241.4 LBS | SYSTOLIC BLOOD PRESSURE: 137 MMHG | BODY MASS INDEX: 33.8 KG/M2 | DIASTOLIC BLOOD PRESSURE: 81 MMHG | HEIGHT: 71 IN

## 2022-08-29 DIAGNOSIS — K62.3 RECTAL PROLAPSE: Primary | ICD-10-CM

## 2022-08-29 PROCEDURE — 1036F TOBACCO NON-USER: CPT | Performed by: INTERNAL MEDICINE

## 2022-08-29 PROCEDURE — 99213 OFFICE O/P EST LOW 20 MIN: CPT | Performed by: INTERNAL MEDICINE

## 2022-08-29 PROCEDURE — G8427 DOCREV CUR MEDS BY ELIG CLIN: HCPCS | Performed by: INTERNAL MEDICINE

## 2022-08-29 PROCEDURE — G8417 CALC BMI ABV UP PARAM F/U: HCPCS | Performed by: INTERNAL MEDICINE

## 2022-08-29 PROCEDURE — 1123F ACP DISCUSS/DSCN MKR DOCD: CPT | Performed by: INTERNAL MEDICINE

## 2022-08-29 PROCEDURE — 3017F COLORECTAL CA SCREEN DOC REV: CPT | Performed by: INTERNAL MEDICINE

## 2022-08-29 ASSESSMENT — ENCOUNTER SYMPTOMS
BLOOD IN STOOL: 0
ABDOMINAL PAIN: 0
WHEEZING: 0
APNEA: 0
TROUBLE SWALLOWING: 0
COUGH: 0
NAUSEA: 0
ABDOMINAL DISTENTION: 0
COLOR CHANGE: 0
SORE THROAT: 0
DIARRHEA: 0
VOMITING: 0
CONSTIPATION: 0
CHOKING: 0
RECTAL PAIN: 0
SHORTNESS OF BREATH: 0
VOICE CHANGE: 0
ANAL BLEEDING: 0

## 2022-08-29 NOTE — PROGRESS NOTES
GI FOLLOW UP    INTERVAL HISTORY:     Clinically doing well from GI standpoint  Not tolerating can also with mild mucus discharge  Reports symptoms of rectal prolapse  Denies any bleeding        LEATHA Mccracken,  [facility]    Chief Complaint   Patient presents with    Follow-up     Rectal Prolapse       1. Rectal prolapse          HISTORY OF PRESENT ILLNESS: Robbie Avalos is a 74 y.o. male with a past history remarkable for atrial flutter, congestive heart failure, CAD, hypertension, hyperlipidemia, obesity, cataracts, recent admission to [facility] in April 2022 for rectal bleeding, referred for evaluation of rectal bleeding. Now resolved. Currently on Canasa 1 g nightly. Concern for UC proctitis. Denies any active GI symptoms currently. Past Medical,Family, and Social History reviewed and does contribute to the patient presenting condition. Patient's PMH/PSH,SH,PSYCH Hx, MEDs, ALLERGIES, and ROS were all reviewed and updated in the appropriate sections.     PAST MEDICAL HISTORY:  Past Medical History:   Diagnosis Date    Achilles tendon tear 2013    Allergic rhinitis     Atrial flutter ([address] 1996    Congestive heart failure ([address]     Coronary atherosclerosis     Hyperglycemia 2007    Hyperlipidemia 1996    Hypertension 2004    Mitral insufficiency     Obesity 2006    Osteoarthritis     Status post ablation of atrial flutter 05/09/13    Vasodepressor syncope 2001       Past Surgical History:   Procedure Laterality Date    [facility], 2013    st.vincents AV node    CATARACT REMOVAL Bilateral     CATARACT REMOVAL WITH IMPLANT  12/06/2017    Dr. Moon Bhagat Right 10/25/2021    Dr. Yanira Reza    COLONOSCOPY  02/01/2010    annamariam, negative    COLONOSCOPY  03/25/2019    Dr. Vince Hawkins rectal bx(hyperplastic mucosa with evidence of ischemic injury,angiodysplasia no active bleeding,divetrticulosis,hemorroids    COLONOSCOPY N/A 3/25/2019    COLONOSCOPY POLYPECTOMY SNARE/COLD BIOPSY performed by Delford Homans, MD at [facility] N/A 4/4/2022    COLONOSCOPY POLYPECTOMY SNARE/COLD BIOPSY performed by Rufus Hernandez MD at [facility]      right side of face    INTRACAPSULAR CATARACT EXTRACTION Right 10/25/2021    EYE CATARACT EMULSIFICATION IOL IMPLANT performed by Yoly Brooks DO at [facility] Right 2006    medial meniscus arthroscopy, [place] Angulo    KNEE SURGERY Left     MITRAL VALVE SURGERY  8/11/11    34 mm ring annuloplasty    OTHER SURGICAL HISTORY Left 06/12/2020    LASER ABLATION GREATER SAPHENOUIS VEIN WITH PHLEBECTOMY - EVOLV     VT KNEE SCOPE,MED OR LAT MENIS REPAIR Left 6/21/2018    KNEE ARTHROSCOPY-PARTIAL MEDIAL MENISCECTOMY AND CHONDROPLASTY performed by Murlean Osgood, MD at [facility] LENS PROSTH W/O ECP Left 12/6/2017    EYE CATARACT EMULSIFICATION IOL IMPLANT performed by Yoly Brooks DO at [address] 4/2/2019    -hiatal hernia,grade 3 reflux esophagitis,antral gastritis, erosive bulbar duodenitis    VEIN SURGERY Left 6/12/2020    LASER ABLATION GREATER SAPHENOUIS VEIN WITH PHLEBECTOMY - [place] CONF# [id number] - [place]) performed by Carla Dacosta MD at [address]:    Current Outpatient Medications:     oxyCODONE-acetaminophen (PERCOCET) 5-325 MG per tablet, Take 1 tablet by mouth every 6 hours as needed for Pain for up to 7 days. PDMP reviewed. Last Filled 8/15/2022. Follows with Orthopedic Surgery. MRI indicates #. Intended supply: 7 days.  Take lowest dose possible to manage pain, Disp: 28 tablet, Rfl: 0    amLODIPine (NORVASC) 10 MG tablet, Take 1 tablet by mouth in the morning., Disp: 90 tablet, Rfl: 1    primidone (MYSOLINE) 50 MG tablet, Take Half tablet twice daily, Disp: 30 tablet, Rfl: 2    Baclofen (LIORESAL) 5 MG tablet, Take 1 tablet by mouth 3 times daily as needed (muscle spasm) . Caution as this can cause drowsiness. , Disp: 21 tablet, Rfl: 1    rosuvastatin (CRESTOR) 40 MG tablet, Take 1 tablet by mouth in the morning., Disp: 90 tablet, Rfl: 3    lisinopril (PRINIVIL;ZESTRIL) 20 MG tablet, Take 1 tablet by mouth daily, Disp: 90 tablet, Rfl: 3    mesalamine (CANASA) 1000 MG suppository, Place 1 suppository rectally nightly, Disp: 60 suppository, Rfl: 2    ferrous sulfate (FE TABS 325) 325 (65 Fe) MG EC tablet, Take 1 tablet by mouth daily (with breakfast) (Patient taking differently: Take 325 mg by mouth every other day), Disp: 90 tablet, Rfl: 3    pantoprazole (PROTONIX) 40 MG tablet, Take 1 tablet by mouth every morning (before breakfast), Disp: 30 tablet, Rfl: 3    vitamin B-12 (CYANOCOBALAMIN) 1000 MCG tablet, Take 1,000 mcg by mouth daily, Disp: , Rfl:     metoprolol succinate (TOPROL XL) 100 MG extended release tablet, TAKE 1 TABLET DAILY, Disp: 90 tablet, Rfl: 3    XARELTO 20 MG TABS tablet, TAKE 1 TABLET DAILY, Disp: 90 tablet, Rfl: 3    sertraline (ZOLOFT) 50 MG tablet, TAKE 1 TABLET DAILY, Disp: 90 tablet, Rfl: 3    albuterol sulfate HFA (PROAIR HFA) 108 (90 Base) MCG/ACT inhaler, Inhale 2 puffs into the lungs every 6 hours as needed for Wheezing (Patient not taking: Reported on 8/29/2022), Disp: 3 Inhaler, Rfl: 0    ALLERGIES:   No Known Allergies    FAMILY HISTORY:       Problem Relation Age of Onset    Heart Disease Mother         sudden cardiac death    Heart Attack Father          SOCIAL HISTORY:   Social History     Socioeconomic History    Marital status:      Spouse name: Not on file    Number of children: Not on file    Years of education: Not on file    Highest education level: Not on file   Occupational History    Not on file   Tobacco Use    Smoking status: Former     Packs/day: 1.50     Years: 20.00     Pack years: 30. 00     Types: Cigarettes     Quit date: 1996     Years since quittin.7    Smokeless tobacco: Never   Vaping Use    Vaping Use: Never used   Substance and Sexual Activity    Alcohol use: Yes     Alcohol/week: 0.0 standard drinks     Comment: 4 beers a day     Drug use: No    Sexual activity: Not on file   Other Topics Concern    Not on file   Social History Narrative    Not on file     Social Determinants of Health     Financial Resource Strain: Unknown    Difficulty of Paying Living Expenses: Patient refused   Food Insecurity: Unknown    Worried About Running Out of Food in the Last Year: Patient refused    Ran Out of Food in the Last Year: Patient refused   Transportation Needs: Not on file   Physical Activity: Not on file   Stress: Not on file   Social Connections: Not on file   Intimate Partner Violence: Not on file   Housing Stability: Not on file       REVIEW OF SYSTEMS: A 12-point review of systems was obtained and pertinent positives and negatives were listed below. REVIEW OF SYSTEMS:     Constitutional: No fever, no chills, no lethargy, no weakness. HEENT:  No headache, otalgia, itchy eyes, nasal discharge or sore throat. Cardiac:  No chest pain, dyspnea, orthopnea or PND. Chest:   No cough, phlegm or wheezing. Abdomen:      Detailed by MA   Neuro:  No focal weakness, abnormal movements or seizure like activity. Skin:   No rashes, no itching. :   No hematuria, no pyuria, no dysuria, no flank pain. Extremities:  No swelling or joint pains. ROS was otherwise negative    Review of Systems   Constitutional:  Positive for unexpected weight change. Negative for appetite change, fatigue and fever. HENT:  Negative for sore throat, trouble swallowing and voice change. Eyes:  Negative for visual disturbance. Respiratory:  Negative for apnea, cough, choking, shortness of breath and wheezing. Cardiovascular:  Negative for chest pain, palpitations and leg swelling. 08/15/2022    .2 08/15/2022     08/15/2022     08/15/2022    K 3.9 08/15/2022    CL 99 08/15/2022    CO2 24 08/15/2022    BUN 11 08/15/2022    CREATININE 0.60 (L) 08/15/2022    LABALBU 4.0 08/15/2022    BILITOT 0.47 08/15/2022    ALKPHOS 98 08/15/2022    AST 27 08/15/2022    ALT 27 08/15/2022    INR 1.6 04/02/2022         Lab Results   Component Value Date    RBC 4.13 (L) 08/15/2022    HGB 13.7 08/15/2022    .2 08/15/2022    MCH 33.2 08/15/2022    MCHC 33.1 08/15/2022    RDW 15.9 (H) 08/15/2022    MPV 10.7 08/15/2022    BASOPCT 1 08/15/2022    LYMPHSABS 1.31 08/15/2022    MONOSABS 0.69 08/15/2022    NEUTROABS 4.74 08/15/2022    EOSABS 0.18 08/15/2022    BASOSABS 0.04 08/15/2022         DIAGNOSTIC TESTING:     XR LUMBAR SPINE (2-3 VIEWS)    Result Date: 8/6/2022  EXAMINATION: THREE XRAY VIEWS OF THE LUMBAR SPINE 8/5/2022 4:15 pm COMPARISON: April 2, 2022 CT HISTORY: ORDERING SYSTEM PROVIDED HISTORY: Low back pain without sciatica, unspecified back pain laterality, unspecified chronicity FINDINGS: Lumbar spine alignment is anatomic. New superior endplate compression when compared to most recent imaging L1 with loss of axial height anteriorly by proximally 25%. Remaining vertebral body axial heights maintained. Moderate degenerative change throughout most significant L3-4 L4-5 with loss disc height endplate spondylosis. Moderate facet arthropathy lower lumbar spine. There is calcification abdominal aorta. Age indeterminate but new since most recent imaging L1 vertebral body compression as detailed. Consider MRI follow-up. Multilevel degenerative change. MRI LUMBAR SPINE WO CONTRAST    Result Date: 8/22/2022  EXAMINATION: MRI OF THE LUMBAR SPINE WITHOUT CONTRAST, 8/22/2022 2:18 pm TECHNIQUE: Multiplanar multisequence MRI of the lumbar spine was performed without the administration of intravenous contrast. COMPARISON: CT lumbar spine performed 04/18/2019.  HISTORY: ORDERING SYSTEM PROVIDED HISTORY: Compression fracture of L1 vertebra, initial encounter ([facility], [facility]) FINDINGS: BONES/ALIGNMENT: Edema and loss of vertebral body height at L1. There is no retropulsion. The remaining lumbar vertebral body heights are maintained. There is otherwise age-appropriate bone marrow signal.  There is multilevel degenerative disc disease with loss of disc signal.  There is no disc space narrowing. There is no spondylolisthesis. SPINAL CORD: The conus is normal in caliber and signal and terminates at L1. The cauda equina is unremarkable. SOFT TISSUES: The posterior paraspinal soft tissues are unremarkable. The visualized abdominal structures are unremarkable. L1-L2: There is no significant disc herniation, spinal canal stenosis or neural foraminal narrowing. L2-L3: There is no significant disc herniation, spinal canal stenosis or neural foraminal narrowing. L3-L4: There is a circumferential disc bulge. There is no canal stenosis or foraminal narrowing. L4-L5: There is a circumferential disc bulge with facet and ligamentous hypertrophy. There is a small synovial cyst adjacent to the right aspect of the facet. There is no canal stenosis. There is mild foraminal narrowing. L5-S1: There is a circumferential disc bulge. There is no canal stenosis or foraminal narrowing. Acute/subacute compression fracture at L1. Multilevel degenerative change without canal stenosis. Mild foraminal narrowing at L4-5. CT ABDOMEN PELVIS W IV CONTRAST Additional Contrast? Oral    Result Date: 8/15/2022  EXAMINATION: CT OF THE ABDOMEN AND PELVIS WITH CONTRAST 8/15/2022 1:06 pm TECHNIQUE: CT of the abdomen and pelvis was performed with the administration of intravenous contrast. Multiplanar reformatted images are provided for review. Automated exposure control, iterative reconstruction, and/or weight based adjustment of the mA/kV was utilized to reduce the radiation dose to as low as reasonably achievable.  COMPARISON: CT abdomen and pelvis 04/02/2022. HISTORY: ORDERING SYSTEM PROVIDED HISTORY: Severe right groin pain TECHNOLOGIST PROVIDED HISTORY: STAT Creatinine as needed:->No Right groin pain, abdominal pain FINDINGS: Lower Chest: Images through the lung bases demonstrate no acute process. Organs: The liver and gallbladder are unremarkable. No biliary ductal dilatation is identified. The pancreas, spleen, and bilateral adrenal glands are unremarkable. Left renal parapelvic cysts. No follow-up recommended. The right kidney is unremarkable. No obstructive uropathy. GI/Bowel: Normal appendix. Colonic diverticulosis without evidence of acute diverticulitis. The stomach and small bowel are normal in appearance. No obstruction or wall thickening identified. Pelvis: Right urinary bladder wall thickening is again seen possibly mildly worsened from 04/02/2022 mild adjacent stranding. Mild prostatomegaly. No free fluid. A moderate fat containing right inguinal hernia is noted. No pelvic or inguinal lymphadenopathy. Peritoneum/Retroperitoneum: The abdominal aorta is normal in caliber with mild atherosclerosis. No retroperitoneal or mesenteric lymphadenopathy is identified. No free air or fluid is seen in the abdomen. Bones/Soft Tissues: There is a mild acute or subacute L1 superior endplate compression fracture new from 04/02/2022 but likely stable from 08/05/2022. No retropulsion. 1. Mild right urinary bladder wall thickening possible mildly worsened from 04/02/2022 with mild adjacent stranding. A neoplasm cannot be excluded and direct visualization should be considered. Recommend correlation with urinalysis given the possibility of cystitis. 2. Mild acute or subacute L1 superior endplate compression fracture unchanged from 08/05/2022. 3. Moderate fat containing right inguinal hernia.               IMPRESSION: Andres Dumont is a 74 y.o. male with a past history remarkable for atrial flutter, congestive heart failure, CAD, hypertension, hyperlipidemia, obesity, cataracts, recent admission to [place] in April 2022 for rectal bleeding, referred for evaluation of rectal bleeding. Now resolved. Currently on Canasa 1 g nightly. Concern for UC proctitis. Denies any active GI symptoms currently. Assessment  1. Rectal prolapse        Lyle Ly was seen today for follow-up. Diagnoses and all orders for this visit:    Rectal prolapse-suspect mucosal prolapse of her rectal prolapse given patient symptomology, patient to continue with Canasa every day versus every other day, pending fecal calprotectin to evaluate for inflammatory changes. Will provide referral to colorectal surgery to evaluate for potential surgical options of patient's prolapse symptoms. Doing well from a GI standpoint otherwise. -     Calprotectin Stool; Future  -     AFL - Bridget Navarro MD, Colorectal Surgery, [place]         RTC: 3 months. Additional comments: Thank you for allowing me to participate in the care of Mr. Vandana Lazaro. For any further questions please do not hesitate to contact me. I have reviewed and agree with the ROS entered by the MA/LPN from today's encounter documented in a separate note. Chan De La Cruz MD, MPH   Board Certified in Gastroenterology  Board Certified in [address] [place] [address] #: [phone]    this note is created with the assistance of a speech recognition program.  While intending to generate a document that actually reflects the content of the visit, the document can still have some errors including those of syntax and sound a like substitutions which may escape proof reading. It such instances, actual meaning can be extrapolated by contextual diversion.

## 2022-09-20 ENCOUNTER — OFFICE VISIT (OUTPATIENT)
Dept: UROLOGY | Age: 67
End: 2022-09-20
Payer: MEDICARE

## 2022-09-20 VITALS
BODY MASS INDEX: 33.6 KG/M2 | HEART RATE: 58 BPM | WEIGHT: 240 LBS | DIASTOLIC BLOOD PRESSURE: 80 MMHG | TEMPERATURE: 98.4 F | HEIGHT: 71 IN | SYSTOLIC BLOOD PRESSURE: 128 MMHG

## 2022-09-20 DIAGNOSIS — R35.1 NOCTURIA: ICD-10-CM

## 2022-09-20 DIAGNOSIS — N40.1 BENIGN PROSTATIC HYPERPLASIA WITH WEAK URINARY STREAM: ICD-10-CM

## 2022-09-20 DIAGNOSIS — R39.12 BENIGN PROSTATIC HYPERPLASIA WITH WEAK URINARY STREAM: ICD-10-CM

## 2022-09-20 DIAGNOSIS — N32.89 BLADDER WALL THICKENING: Primary | ICD-10-CM

## 2022-09-20 DIAGNOSIS — R39.15 URGENCY OF MICTURITION: ICD-10-CM

## 2022-09-20 PROCEDURE — G8427 DOCREV CUR MEDS BY ELIG CLIN: HCPCS | Performed by: PHYSICIAN ASSISTANT

## 2022-09-20 PROCEDURE — G8417 CALC BMI ABV UP PARAM F/U: HCPCS | Performed by: PHYSICIAN ASSISTANT

## 2022-09-20 PROCEDURE — PBSHW PR MEASUREMENT,POST-VOID RESIDUAL VOLUME BY US,NON-IMAGING: Performed by: PHYSICIAN ASSISTANT

## 2022-09-20 PROCEDURE — 3017F COLORECTAL CA SCREEN DOC REV: CPT | Performed by: PHYSICIAN ASSISTANT

## 2022-09-20 PROCEDURE — 1123F ACP DISCUSS/DSCN MKR DOCD: CPT | Performed by: PHYSICIAN ASSISTANT

## 2022-09-20 PROCEDURE — 51798 US URINE CAPACITY MEASURE: CPT | Performed by: PHYSICIAN ASSISTANT

## 2022-09-20 PROCEDURE — 99204 OFFICE O/P NEW MOD 45 MIN: CPT | Performed by: PHYSICIAN ASSISTANT

## 2022-09-20 PROCEDURE — 1036F TOBACCO NON-USER: CPT | Performed by: PHYSICIAN ASSISTANT

## 2022-09-20 RX ORDER — TAMSULOSIN HYDROCHLORIDE 0.4 MG/1
0.4 CAPSULE ORAL EVERY EVENING
Qty: 30 CAPSULE | Refills: 2 | Status: SHIPPED | OUTPATIENT
Start: 2022-09-20 | End: 2022-10-10 | Stop reason: SDUPTHER

## 2022-09-20 ASSESSMENT — ENCOUNTER SYMPTOMS
NAUSEA: 0
WHEEZING: 0
SHORTNESS OF BREATH: 0
VOMITING: 0
CONSTIPATION: 0
COUGH: 0
BACK PAIN: 0
EYE REDNESS: 0
ABDOMINAL PAIN: 0
COLOR CHANGE: 0

## 2022-09-20 NOTE — PATIENT INSTRUCTIONS
BLADDER IRRITANTS     There are several changes you can make to your diet to help improve your urinary symptoms. The following have been shown to cause irritation to the bladder and should be AVOIDED if possible:  ~ Coffee (including decaffeinated)   ~ ALL Tea (including green teas and decaffeinated)  ~ Soda/Pop/carbonated beverages/Energy drinks (especially dark dyed yola, root beers, mountain dew, etc)  ~These are MUCH worse if they have caffeine, but can also be irritative to the bladder even without caffeine  ~ Alcoholic beverages  ~ Spicy foods (peppers contain capsaicin, which is very irritating to the bladder)  ~ Acidic foods (for example: tomato based foods, orange juice, etc)    We encourage increased water intake, unless you have been placed on a fluid restriction by another provider. SURVEY:    You may be receiving a survey from [employer] regarding your visit today. Please complete the survey to enable us to provide the highest quality of care to you and your family. If you cannot score us a very good on any question, please call the office to discuss how we could have made your experience a very good one. Thank you.

## 2022-09-20 NOTE — PROGRESS NOTES
Bladderscan performed in office today:  Pt voided - about 1/2 hour ago, he was unable to give a specimen upon arrival at the office today.  PVR - 23 mL

## 2022-09-20 NOTE — PROGRESS NOTES
HPI:      Patient is a 74 y.o. male in no acute distress. He is alert and oriented to person, place, and time. Patient is a new patient referral from Dr. Aleshia Thurman for bladder wall thickening. Patient has never been seen by urology before. Patient is a former smoker. He quit in 1989. He denies any unintentional weight loss or loss of appetite. Patient has a weak stream and has nocturia x1. Patient states that he had worsening urinary symptoms after he \"broke his back\". He does have a strong urge to urinate and only voids small amounts. He denies any dysuria or hematuria. He does have a daily bowel movement. April 2022 patient did have a CT abdomen pelvis with IV contrast which showed bladder wall thickening and an enlarged prostate. Patient again had a CT abdomen pelvis with IV contrast which was independently reviewed showing right-sided bladder wall thickening which appears worsened from April. On radiology read there is also a moderate fat-containing right inguinal hernia. Patient did have lab work last month which showed normal kidney function. Patient did have a negative urine culture. Bladderscan performed in office today: Pt voided - about 1/2 hour ago, he was unable to give a specimen upon arrival at the office today.  PVR - 23 mL     PSA  11/2021- 2.03  7/2021 - 1.81  5/2020 - 2.18  5/2019 - 2.25  6/2018 - 2.99  12/2017 - 1.4  6/2017 - 3.2   6/2016 - 1.6  6/2015 - 1.73  6/2014 - 1.51      Past Medical History:   Diagnosis Date    Achilles tendon tear 2013    Allergic rhinitis     Atrial flutter ([address] 1996    Congestive heart failure ([address]     Coronary atherosclerosis     Hyperglycemia 2007    Hyperlipidemia 1996    Hypertension 2004    Mitral insufficiency     Obesity 2006    Osteoarthritis     Status post ablation of atrial flutter 05/09/13    Vasodepressor syncope 2001     Past Surgical History:   Procedure Laterality Date    [facility]     CATARACT REMOVAL WITH IMPLANT  12/06/2017    Dr. Nu Clements Right 10/25/2021    Dr. Brice Collins    COLONOSCOPY  02/01/2010    [place], negative    COLONOSCOPY  03/25/2019    Dr. Calix Linear rectal bx(hyperplastic mucosa with evidence of ischemic injury,angiodysplasia no active bleeding,divetrticulosis,hemorroids    COLONOSCOPY N/A 3/25/2019    COLONOSCOPY POLYPECTOMY SNARE/COLD BIOPSY performed by Susan Valentin MD at [address] [place] [address] N/A 4/4/2022    COLONOSCOPY POLYPECTOMY SNARE/COLD BIOPSY performed by Shalom Malcolm MD at [facility]      right side of face    INTRACAPSULAR CATARACT EXTRACTION Right 10/25/2021    EYE CATARACT EMULSIFICATION IOL IMPLANT performed by Jagdish Valencia DO at [facility] [address] Right 2006    medial meniscus arthroscopy, [facility]    KNEE SURGERY Left     MITRAL VALVE SURGERY  8/11/11    34 mm ring annuloplasty    OTHER SURGICAL HISTORY Left 06/12/2020    LASER ABLATION GREATER SAPHENOUIS VEIN WITH PHLEBECTOMY - EVOLV     MI KNEE SCOPE,MED OR LAT MENIS REPAIR Left 6/21/2018    KNEE ARTHROSCOPY-PARTIAL MEDIAL MENISCECTOMY AND CHONDROPLASTY performed by Marissa Richardson MD at [address] [place] [address] W/O ECP Left 12/6/2017    EYE CATARACT EMULSIFICATION IOL IMPLANT performed by Jagdish Valencia DO at [facility] 4/2/2019    -hiatal hernia,grade 3 reflux esophagitis,antral gastritis, erosive bulbar duodenitis    VEIN SURGERY Left 6/12/2020    LASER ABLATION GREATER SAPHENOUIS VEIN WITH PHLEBECTOMY - Sherrell Ramirez CONF# [id number] - [place]) performed by Alan Nugent MD at [facility] Encounter Medications as of 9/20/2022   Medication Sig Dispense Refill    amLODIPine (NORVASC) 10 MG tablet Take 1 tablet by mouth in the morning.  90 tablet 1    primidone (MYSOLINE) 50 MG tablet Take Half tablet twice daily 30 tablet 2 Baclofen (LIORESAL) 5 MG tablet Take 1 tablet by mouth 3 times daily as needed (muscle spasm) . Caution as this can cause drowsiness. 21 tablet 1    rosuvastatin (CRESTOR) 40 MG tablet Take 1 tablet by mouth in the morning. 90 tablet 3    lisinopril (PRINIVIL;ZESTRIL) 20 MG tablet Take 1 tablet by mouth daily 90 tablet 3    mesalamine (CANASA) 1000 MG suppository Place 1 suppository rectally nightly 60 suppository 2    ferrous sulfate (FE TABS 325) 325 (65 Fe) MG EC tablet Take 1 tablet by mouth daily (with breakfast) (Patient taking differently: Take 325 mg by mouth every other day) 90 tablet 3    pantoprazole (PROTONIX) 40 MG tablet Take 1 tablet by mouth every morning (before breakfast) 30 tablet 3    vitamin B-12 (CYANOCOBALAMIN) 1000 MCG tablet Take 1,000 mcg by mouth daily      metoprolol succinate (TOPROL XL) 100 MG extended release tablet TAKE 1 TABLET DAILY 90 tablet 3    XARELTO 20 MG TABS tablet TAKE 1 TABLET DAILY 90 tablet 3    sertraline (ZOLOFT) 50 MG tablet TAKE 1 TABLET DAILY 90 tablet 3    albuterol sulfate HFA (PROAIR HFA) 108 (90 Base) MCG/ACT inhaler Inhale 2 puffs into the lungs every 6 hours as needed for Wheezing (Patient not taking: Reported on 8/29/2022) 3 Inhaler 0     No facility-administered encounter medications on file as of 9/20/2022. Current Outpatient Medications on File Prior to Visit   Medication Sig Dispense Refill    amLODIPine (NORVASC) 10 MG tablet Take 1 tablet by mouth in the morning. 90 tablet 1    primidone (MYSOLINE) 50 MG tablet Take Half tablet twice daily 30 tablet 2    Baclofen (LIORESAL) 5 MG tablet Take 1 tablet by mouth 3 times daily as needed (muscle spasm) . Caution as this can cause drowsiness. 21 tablet 1    rosuvastatin (CRESTOR) 40 MG tablet Take 1 tablet by mouth in the morning.  90 tablet 3    lisinopril (PRINIVIL;ZESTRIL) 20 MG tablet Take 1 tablet by mouth daily 90 tablet 3    mesalamine (CANASA) 1000 MG suppository Place 1 suppository rectally testicular pain and urgency. Positive for Weak stream and nocturia    Musculoskeletal:  Negative for back pain, joint swelling and myalgias. Skin:  Negative for color change, rash and wound. Neurological:  Negative for dizziness, tremors and numbness. Hematological:  Negative for adenopathy. Does not bruise/bleed easily. Temp 98.4 °F (36.9 °C)   Ht 5' 11\" (1.803 m)   Wt 240 lb (108.9 kg)   BMI 33.47 kg/m²       PHYSICAL EXAM:  Constitutional: Patient in no acute distress; Neuro: alert and oriented to person place and time. Psych: Mood and affect normal.  Lungs: Respiratory effort normal  Abdomen: Soft, non-tender, non-distended  Rectal: deferred       Lab Results   Component Value Date    BUN 11 08/15/2022     Lab Results   Component Value Date    CREATININE 0.60 (L) 08/15/2022     Lab Results   Component Value Date    PSA 2.03 11/12/2021    PSA 1.81 07/08/2021    PSA 2.18 05/28/2020       ASSESSMENT:   Diagnosis Orders   1. Bladder wall thickening  CA MEASUREMENT,POST-VOID RESIDUAL VOLUME BY US,NON-IMAGING    Urinalysis with Microscopic    Culture, Urine      2. Benign prostatic hyperplasia with weak urinary stream  CA MEASUREMENT,POST-VOID RESIDUAL VOLUME BY US,NON-IMAGING      3. Nocturia  Urinalysis with Microscopic    Culture, Urine      4. Urgency of micturition  Urinalysis with Microscopic    Culture, Urine            PLAN:  We do have concern for his right-sided bladder wall thickening which has been persistent for at least 4 months. Patient did have a negative urine culture at time of last CT scan. We do need to further investigate this with cystoscopy. We discussed risk and benefits of cystoscopy including bleeding and infection. Patient is amenable to schedule. We will start Flomax for his nocturia and weak stream.  We discussed possible side effects of Flomax including hypotension, lightheadedness, dizziness and retrograde ejaculation.     Follow-up for cystoscopy

## 2022-10-03 RX ORDER — LANOLIN ALCOHOL/MO/W.PET/CERES
325 CREAM (GRAM) TOPICAL EVERY OTHER DAY
Qty: 45 TABLET | Refills: 1 | Status: SHIPPED | OUTPATIENT
Start: 2022-10-03

## 2022-10-03 NOTE — TELEPHONE ENCOUNTER
Hello, I will send for another refill and then we can discuss it at his upcoming appointment further, thank you.   Electronically signed by Sariah Mercer MD on 10/3/2022 at 11:10 AM

## 2022-10-03 NOTE — TELEPHONE ENCOUNTER
Patient called to request refill on Ferrous Sulfate, which he is taking every other day. Patient reports these were prescribed when he was in the hospital in April.

## 2022-10-10 ENCOUNTER — PROCEDURE VISIT (OUTPATIENT)
Dept: UROLOGY | Age: 67
End: 2022-10-10
Payer: MEDICARE

## 2022-10-10 VITALS
BODY MASS INDEX: 33.33 KG/M2 | SYSTOLIC BLOOD PRESSURE: 126 MMHG | HEART RATE: 44 BPM | DIASTOLIC BLOOD PRESSURE: 57 MMHG | WEIGHT: 239 LBS

## 2022-10-10 DIAGNOSIS — N40.1 BENIGN PROSTATIC HYPERPLASIA WITH WEAK URINARY STREAM: ICD-10-CM

## 2022-10-10 DIAGNOSIS — R35.1 NOCTURIA: ICD-10-CM

## 2022-10-10 DIAGNOSIS — R39.12 BENIGN PROSTATIC HYPERPLASIA WITH WEAK URINARY STREAM: ICD-10-CM

## 2022-10-10 DIAGNOSIS — N32.89 BLADDER WALL THICKENING: Primary | ICD-10-CM

## 2022-10-10 DIAGNOSIS — R39.15 URGENCY OF MICTURITION: ICD-10-CM

## 2022-10-10 PROCEDURE — 52000 CYSTOURETHROSCOPY: CPT | Performed by: UROLOGY

## 2022-10-10 PROCEDURE — 99213 OFFICE O/P EST LOW 20 MIN: CPT | Performed by: UROLOGY

## 2022-10-10 RX ORDER — TAMSULOSIN HYDROCHLORIDE 0.4 MG/1
0.4 CAPSULE ORAL EVERY EVENING
Qty: 30 CAPSULE | Refills: 5 | Status: SHIPPED | OUTPATIENT
Start: 2022-10-10

## 2022-10-10 NOTE — PROGRESS NOTES
HPI:          Patient is a 74 y.o. male in no acute distress. He is alert and oriented to person, place, and time. History  Patient is a new patient referral from Dr. Carlos Akhtar for bladder wall thickening. Patient has never been seen by urology before. Patient is a former smoker. He quit in 1989. He denies any unintentional weight loss or loss of appetite. Patient has a weak stream and has nocturia x1. Patient states that he had worsening urinary symptoms after he \"broke his back\". He does have a strong urge to urinate and only voids small amounts. He denies any dysuria or hematuria. He does have a daily bowel movement. April 2022 patient did have a CT abdomen pelvis with IV contrast which showed bladder wall thickening and an enlarged prostate. Patient again had a CT abdomen pelvis with IV contrast which was independently reviewed showing right-sided bladder wall thickening which appears worsened from April. On radiology read there is also a moderate fat-containing right inguinal hernia. Patient did have lab work last month which showed normal kidney function. Patient did have a negative urine culture. Bladderscan performed in office today: Pt voided - about 1/2 hour ago, he was unable to give a specimen upon arrival at the office today. PVR - 23 mL      PSA  11/2021- 2.03  7/2021 - 1.81  5/2020 - 2.18  5/2019 - 2.25  6/2018 - 2.99  12/2017 - 1.4  6/2017 - 3.2   6/2016 - 1.6  6/2015 - 1.73  6/2014 - 1.51    Currently  Patient is here today for lower tract visualization. This secondary to bladder wall thickening. We did start Flomax for lower urinary tract symptoms. Patient is doing well on the Flomax. He is happy with his stream.  No recent gross hematuria or dysuria. He is having no side effects from medication. Patient reports no pain today. He is happy with the medication.     Cystoscopy Procedure Note    Pre-operative Diagnosis: bladder wall thickening    Post-operative Diagnosis: Same     Surgeon: Israel Wallis    Assistants: None    Anesthesia : Local    Procedure Details   The risks, benefits, complications, treatment options, and expected outcomes were discussed with the patient. The patient concurred with the proposed plan, giving informed consent. Cystoscopy was performed today under local anesthesia, using sterile technique. The patient was placed in the dorsal lithotomy position, prepped with CHG, and draped in the usual sterile fashion. A 14 [place] flexible cystoscope was used to systematically inspect both the urethra and bladder in their entirety. Findings:  Anterior urethra: normal without strictures  Hyperplasia: trilobar  Bladder: Normal mucosa, without lesions. Ureteral orifice(s) was/were seen in the normal position and effluxing clear urine  Trabeculations several  Diverticulum several small cellules  Description: Trilobar hyperplasia with signs of bladder outlet obstruction         Specimens: Cytology/urine culture No                 Complications:  None; patient tolerated the procedure well.            Disposition: home           Condition: stable     Past Medical History:   Diagnosis Date    Achilles tendon tear 2013    Allergic rhinitis     Atrial flutter ([address] 1996    Congestive heart failure ([address]     Coronary atherosclerosis     Hyperglycemia 2007    Hyperlipidemia 1996    Hypertension 2004    Mitral insufficiency     Obesity 2006    Osteoarthritis     Status post ablation of atrial flutter 05/09/13    Vasodepressor syncope 2001     Past Surgical History:   Procedure Laterality Date    [address] [facility], 2013    [facility]    CATARACT REMOVAL Bilateral     CATARACT REMOVAL WITH IMPLANT  12/06/2017    Dr. Angelica Walton Right 10/25/2021    Dr. Melany Shanks    COLONOSCOPY  02/01/2010    [place], negative    COLONOSCOPY  03/25/2019    Dr. Elba Dinh rectal bx(hyperplastic mucosa with evidence of ischemic injury,angiodysplasia no active bleeding,divetrticulosis,hemorroids    COLONOSCOPY N/A 3/25/2019    COLONOSCOPY POLYPECTOMY SNARE/COLD BIOPSY performed by Isaac Walters MD at [place] [address] N/A 4/4/2022    COLONOSCOPY POLYPECTOMY SNARE/COLD BIOPSY performed by Regi Gunn MD at [facility]      right side of face    INTRACAPSULAR CATARACT EXTRACTION Right 10/25/2021    EYE CATARACT EMULSIFICATION IOL IMPLANT performed by Ibeth Mejia DO at [facility] [address] Right 2006    medial meniscus arthroscopy, [facility]    KNEE SURGERY Left     MITRAL VALVE SURGERY  8/11/11    34 mm ring annuloplasty    OTHER SURGICAL HISTORY Left 06/12/2020    LASER ABLATION GREATER SAPHENOUIS VEIN WITH PHLEBECTOMY - EVOLV     MA KNEE SCOPE,MED OR LAT MENIS REPAIR Left 6/21/2018    KNEE ARTHROSCOPY-PARTIAL MEDIAL MENISCECTOMY AND CHONDROPLASTY performed by Philippe Whitlock MD at [facility] LENS PROSTH W/O ECP Left 12/6/2017    EYE CATARACT EMULSIFICATION IOL IMPLANT performed by Ibeth Mejia DO at [place] [address]. 4/2/2019    -hiatal hernia,grade 3 reflux esophagitis,antral gastritis, erosive bulbar duodenitis    VEIN SURGERY Left 6/12/2020    LASER ABLATION GREATER SAPHENOUIS VEIN WITH PHLEBECTOMY - Onelia Roland CONF# [id number] - [place]) performed by Paul Tam MD at [facility] Encounter Medications as of 10/10/2022   Medication Sig Dispense Refill    ferrous sulfate (FE TABS 325) 325 (65 Fe) MG EC tablet Take 1 tablet by mouth every other day 45 tablet 1    tamsulosin (FLOMAX) 0.4 MG capsule Take 1 capsule by mouth every evening 30 capsule 2    amLODIPine (NORVASC) 10 MG tablet Take 1 tablet by mouth in the morning. 90 tablet 1    primidone (MYSOLINE) 50 MG tablet Take Half tablet twice daily 30 tablet 2    Baclofen (LIORESAL) 5 MG tablet Take 1 tablet by mouth 3 times daily as needed (muscle spasm) .  Caution as this can cause drowsiness. 21 tablet 1    rosuvastatin (CRESTOR) 40 MG tablet Take 1 tablet by mouth in the morning. 90 tablet 3    lisinopril (PRINIVIL;ZESTRIL) 20 MG tablet Take 1 tablet by mouth daily 90 tablet 3    mesalamine (CANASA) 1000 MG suppository Place 1 suppository rectally nightly 60 suppository 2    pantoprazole (PROTONIX) 40 MG tablet Take 1 tablet by mouth every morning (before breakfast) 30 tablet 3    vitamin B-12 (CYANOCOBALAMIN) 1000 MCG tablet Take 1,000 mcg by mouth daily      metoprolol succinate (TOPROL XL) 100 MG extended release tablet TAKE 1 TABLET DAILY 90 tablet 3    XARELTO 20 MG TABS tablet TAKE 1 TABLET DAILY 90 tablet 3    sertraline (ZOLOFT) 50 MG tablet TAKE 1 TABLET DAILY 90 tablet 3    albuterol sulfate HFA (PROAIR HFA) 108 (90 Base) MCG/ACT inhaler Inhale 2 puffs into the lungs every 6 hours as needed for Wheezing 3 Inhaler 0     No facility-administered encounter medications on file as of 10/10/2022. Current Outpatient Medications on File Prior to Visit   Medication Sig Dispense Refill    ferrous sulfate (FE TABS 325) 325 (65 Fe) MG EC tablet Take 1 tablet by mouth every other day 45 tablet 1    tamsulosin (FLOMAX) 0.4 MG capsule Take 1 capsule by mouth every evening 30 capsule 2    amLODIPine (NORVASC) 10 MG tablet Take 1 tablet by mouth in the morning. 90 tablet 1    primidone (MYSOLINE) 50 MG tablet Take Half tablet twice daily 30 tablet 2    Baclofen (LIORESAL) 5 MG tablet Take 1 tablet by mouth 3 times daily as needed (muscle spasm) . Caution as this can cause drowsiness. 21 tablet 1    rosuvastatin (CRESTOR) 40 MG tablet Take 1 tablet by mouth in the morning.  90 tablet 3    lisinopril (PRINIVIL;ZESTRIL) 20 MG tablet Take 1 tablet by mouth daily 90 tablet 3    mesalamine (CANASA) 1000 MG suppository Place 1 suppository rectally nightly 60 suppository 2    pantoprazole (PROTONIX) 40 MG tablet Take 1 tablet by mouth every morning (before breakfast) 30 tablet 3    vitamin B-12 (CYANOCOBALAMIN) 1000 MCG tablet Take 1,000 mcg by mouth daily      metoprolol succinate (TOPROL XL) 100 MG extended release tablet TAKE 1 TABLET DAILY 90 tablet 3    XARELTO 20 MG TABS tablet TAKE 1 TABLET DAILY 90 tablet 3    sertraline (ZOLOFT) 50 MG tablet TAKE 1 TABLET DAILY 90 tablet 3    albuterol sulfate HFA (PROAIR HFA) 108 (90 Base) MCG/ACT inhaler Inhale 2 puffs into the lungs every 6 hours as needed for Wheezing 3 Inhaler 0     No current facility-administered medications on file prior to visit. Patient has no known allergies. Family History   Problem Relation Age of Onset    Heart Disease Mother         sudden cardiac death    Heart Attack Father      Social History     Tobacco Use   Smoking Status Former    Packs/day: 1.50    Years: 20.00    Pack years: 30.00    Types: Cigarettes    Quit date: 1996    Years since quittin.8   Smokeless Tobacco Never       Social History     Substance and Sexual Activity   Alcohol Use Yes    Alcohol/week: 0.0 standard drinks    Comment: 4 beers a day        Review of Systems   Constitutional:  Negative for appetite change, chills and fever. Eyes:  Negative for redness and visual disturbance. Respiratory:  Negative for cough, shortness of breath and wheezing. Cardiovascular:  Negative for chest pain and leg swelling. Gastrointestinal:  Negative for abdominal pain, constipation, nausea and vomiting. Genitourinary:  Negative for decreased urine volume, difficulty urinating, dysuria, enuresis, flank pain, frequency, hematuria, penile discharge, penile pain, scrotal swelling, testicular pain and urgency. Musculoskeletal:  Negative for back pain, joint swelling and myalgias. Skin:  Negative for color change, rash and wound. Neurological:  Negative for dizziness, tremors and numbness. Hematological:  Negative for adenopathy. Does not bruise/bleed easily. There were no vitals taken for this visit.       PHYSICAL EXAM:  Constitutional: Patient in no acute distress; Neuro: alert and oriented to person place and time. Psych: Mood and affect normal.  Skin: Normal  Lungs: Respiratory effort normal  Cardiovascular:  Normal peripheral pulses  Abdomen: Soft, non-tender, non-distended with no CVA, flank pain  Bladder non-tender and not distended. Lymphatics: no palpable lymphadenopathy  Penis normal  Urethral meatus normal  Scrotal exam normal  Testicles normal bilaterally  Epididymis normal bilaterally  No evidence of inguinal hernia    Lab Results   Component Value Date    BUN 11 08/15/2022     Lab Results   Component Value Date    CREATININE 0.60 (L) 08/15/2022     Lab Results   Component Value Date    PSA 2.03 11/12/2021    PSA 1.81 07/08/2021    PSA 2.18 05/28/2020       ASSESSMENT:  This is a 74 y.o. male with the following diagnoses:   Diagnosis Orders   1. Bladder wall thickening        2. Benign prostatic hyperplasia with weak urinary stream        3. Urgency of micturition        4. Nocturia             PLAN:  Patient will continue Flomax. He will follow-up with us in 6 months for symptoms. He is clear from bladder wall thickening standpoint.

## 2022-10-10 NOTE — PROGRESS NOTES
During cystoscopy the following was utilized on patient with no adverse affects:    45% SODIUM CHLORIDE 500 ML BAG  Lot number: [id number]  Expiration date: Oct23      LIDOCAINE HYDROCHLORIDE JELLY 2%   Lot number: [id number]  Expiration date: 2-24

## 2022-10-13 ASSESSMENT — ENCOUNTER SYMPTOMS
COUGH: 0
VOMITING: 0
SHORTNESS OF BREATH: 0
ABDOMINAL PAIN: 0
COLOR CHANGE: 0
EYE REDNESS: 0
BACK PAIN: 0
NAUSEA: 0
CONSTIPATION: 0
WHEEZING: 0

## 2022-10-21 ENCOUNTER — OFFICE VISIT (OUTPATIENT)
Dept: PRIMARY CARE CLINIC | Age: 67
End: 2022-10-21
Payer: MEDICARE

## 2022-10-21 VITALS
HEIGHT: 71 IN | BODY MASS INDEX: 33.88 KG/M2 | DIASTOLIC BLOOD PRESSURE: 64 MMHG | SYSTOLIC BLOOD PRESSURE: 120 MMHG | WEIGHT: 242 LBS | HEART RATE: 78 BPM | OXYGEN SATURATION: 95 %

## 2022-10-21 DIAGNOSIS — I10 ESSENTIAL HYPERTENSION: ICD-10-CM

## 2022-10-21 DIAGNOSIS — Z00.00 MEDICARE ANNUAL WELLNESS VISIT, SUBSEQUENT: Primary | ICD-10-CM

## 2022-10-21 DIAGNOSIS — E78.5 HYPERLIPIDEMIA, UNSPECIFIED HYPERLIPIDEMIA TYPE: ICD-10-CM

## 2022-10-21 DIAGNOSIS — R73.03 PREDIABETES: ICD-10-CM

## 2022-10-21 DIAGNOSIS — E53.8 B12 DEFICIENCY: ICD-10-CM

## 2022-10-21 PROCEDURE — G0439 PPPS, SUBSEQ VISIT: HCPCS | Performed by: STUDENT IN AN ORGANIZED HEALTH CARE EDUCATION/TRAINING PROGRAM

## 2022-10-21 PROCEDURE — 3017F COLORECTAL CA SCREEN DOC REV: CPT | Performed by: STUDENT IN AN ORGANIZED HEALTH CARE EDUCATION/TRAINING PROGRAM

## 2022-10-21 PROCEDURE — G8484 FLU IMMUNIZE NO ADMIN: HCPCS | Performed by: STUDENT IN AN ORGANIZED HEALTH CARE EDUCATION/TRAINING PROGRAM

## 2022-10-21 PROCEDURE — 1123F ACP DISCUSS/DSCN MKR DOCD: CPT | Performed by: STUDENT IN AN ORGANIZED HEALTH CARE EDUCATION/TRAINING PROGRAM

## 2022-10-21 ASSESSMENT — PATIENT HEALTH QUESTIONNAIRE - PHQ9
SUM OF ALL RESPONSES TO PHQ QUESTIONS 1-9: 0
1. LITTLE INTEREST OR PLEASURE IN DOING THINGS: 0
SUM OF ALL RESPONSES TO PHQ QUESTIONS 1-9: 0
SUM OF ALL RESPONSES TO PHQ QUESTIONS 1-9: 0
SUM OF ALL RESPONSES TO PHQ9 QUESTIONS 1 & 2: 0
SUM OF ALL RESPONSES TO PHQ QUESTIONS 1-9: 0
2. FEELING DOWN, DEPRESSED OR HOPELESS: 0

## 2022-10-21 ASSESSMENT — LIFESTYLE VARIABLES
HOW OFTEN DURING THE LAST YEAR HAVE YOU NEEDED AN ALCOHOLIC DRINK FIRST THING IN THE MORNING TO GET YOURSELF GOING AFTER A NIGHT OF HEAVY DRINKING: 0
HAVE YOU OR SOMEONE ELSE BEEN INJURED AS A RESULT OF YOUR DRINKING: 0
HOW OFTEN DURING THE LAST YEAR HAVE YOU FOUND THAT YOU WERE NOT ABLE TO STOP DRINKING ONCE YOU HAD STARTED: 0
HOW OFTEN DURING THE LAST YEAR HAVE YOU BEEN UNABLE TO REMEMBER WHAT HAPPENED THE NIGHT BEFORE BECAUSE YOU HAD BEEN DRINKING: 0
HOW MANY STANDARD DRINKS CONTAINING ALCOHOL DO YOU HAVE ON A TYPICAL DAY: 3 OR 4
HOW OFTEN DURING THE LAST YEAR HAVE YOU FAILED TO DO WHAT WAS NORMALLY EXPECTED FROM YOU BECAUSE OF DRINKING: 0
HAS A RELATIVE, FRIEND, DOCTOR, OR ANOTHER HEALTH PROFESSIONAL EXPRESSED CONCERN ABOUT YOUR DRINKING OR SUGGESTED YOU CUT DOWN: 0
HOW OFTEN DURING THE LAST YEAR HAVE YOU HAD A FEELING OF GUILT OR REMORSE AFTER DRINKING: 0
HOW OFTEN DO YOU HAVE A DRINK CONTAINING ALCOHOL: 2-4 TIMES A MONTH

## 2022-10-21 NOTE — PATIENT INSTRUCTIONS
Personalized Preventive Plan for Mable Moise - 10/21/2022  Medicare offers a range of preventive health benefits. Some of the tests and screenings are paid in full while other may be subject to a deductible, co-insurance, and/or copay. Some of these benefits include a comprehensive review of your medical history including lifestyle, illnesses that may run in your family, and various assessments and screenings as appropriate. After reviewing your medical record and screening and assessments performed today your provider may have ordered immunizations, labs, imaging, and/or referrals for you. A list of these orders (if applicable) as well as your Preventive Care list are included within your After Visit Summary for your review. Other Preventive Recommendations:    A preventive eye exam performed by an eye specialist is recommended every 1-2 years to screen for glaucoma; cataracts, macular degeneration, and other eye disorders. A preventive dental visit is recommended every 6 months. Try to get at least 150 minutes of exercise per week or 10,000 steps per day on a pedometer . Order or download the FREE \"Exercise & Physical Activity: Your Everyday Guide\" from [employer]. Call 3-883.229.2727 or search [employer] online. You need 3616-3610 mg of calcium and 0925-3437 IU of vitamin D per day. It is possible to meet your calcium requirement with diet alone, but a vitamin D supplement is usually necessary to meet this goal.  When exposed to the sun, use a sunscreen that protects against both UVA and UVB radiation with an SPF of 30 or greater. Reapply every 2 to 3 hours or after sweating, drying off with a towel, or swimming. Always wear a seat belt when traveling in a car. Always wear a helmet when riding a bicycle or motorcycle.

## 2022-10-21 NOTE — PROGRESS NOTES
[address], [address] , [facility], [address]    Medicare Annual Wellness Visit    Beth Koehler is here for Medicare AWV    Assessment & Plan   Medicare annual wellness visit, subsequent  -     CBC with Auto Differential; Future  -     Comprehensive Metabolic Panel with Bilirubin; Future  Essential hypertension  -     CBC with Auto Differential; Future  -     Comprehensive Metabolic Panel with Bilirubin; Future  Hyperlipidemia, unspecified hyperlipidemia type  -     CBC with Auto Differential; Future  -     Comprehensive Metabolic Panel with Bilirubin; Future  -     Lipid Panel; Future  B12 deficiency  -     Vitamin B12 & Folate; Future  Prediabetes  -     Hemoglobin A1C; Future    Recommendations for Preventive Services Due: see orders and patient instructions/AVS.  Recommended screening schedule for the next 5-10 years is provided to the patient in written form: see Patient Instructions/AVS.    We will obtain labs for monitoring in about 4 month's prior to his next appointment for monitoring therapy. Return in 4 months (on 2/21/2023) for F/U HTN Medicare Annual Wellness Visit in 1 year. Subjective   The following acute and/or chronic problems were also addressed today: Vitamin B12 deficiency - patient takes supplements and recently had labs. Patient's complete Health Risk Assessment and screening values have been reviewed and are found in Flowsheets. The following problems were reviewed today and where indicated follow up appointments were made and/or referrals ordered.     Positive Risk Factor Screenings with Interventions:    Fall Risk:  Do you feel unsteady or are you worried about falling? : no  2 or more falls in past year?: (!) yes  Fall with injury in past year?: (!) yes        Alcohol Screening:  Alcohol Use: Heavy Drinker    Frequency of Alcohol Consumption: 2-4 times a month    Average Number of Drinks: 3 or 4    Frequency of Binge Drinking: Monthly     AUDIT-C Score: 5  AUDIT Total Score: 5  An AUDIT-C score of 3-7 indicates potential alcohol risk. An AUDIT Total score of 8 or more is associated with harmful or hazardous drinking. A score of 13 or more in women, and 15 or more in men, is likely to indicate alcohol dependence. Health Habits/Nutrition:  Physical Activity: Inactive    Days of Exercise per Week: 0 days    Minutes of Exercise per Session: 0 min     Have you lost any weight without trying in the past 3 months?: No  Body mass index: (!) 33.75  Have you seen the dentist within the past year?: (!) No  Health Habits/Nutrition Interventions:  Inadequate physical activity:  patient agrees to exercise for at least 150 minutes/week  Nutritional issues:  educational materials for healthy, well-balanced diet provided, educational materials to promote weight loss provided  Dental exam overdue:  patient encouraged to make appointment with his/her dentist    Hearing/Vision:  Do you or your family notice any trouble with your hearing that hasn't been managed with hearing aids?: No  Do you have difficulty driving, watching TV, or doing any of your daily activities because of your eyesight?: No  Have you had an eye exam within the past year?: (!) No  No results found. The patient indicates that he made some safety changes - turning lights on/moved the dog bed to the other side of the room. Objective   Vitals:    10/21/22 0909   BP: 120/64   Pulse: 78   SpO2: 95%   Weight: 242 lb (109.8 kg)   Height: 5' 11\" (1.803 m)      Body mass index is 33.75 kg/m².       General Appearance: alert and oriented to person, place and time, well developed and well- nourished, in no acute distress  Skin: warm and dry, no rash or erythema  Head: normocephalic and atraumatic  Eyes: pupils equal, round, and reactive to light, extraocular eye movements intact, conjunctivae normal  ENT: tympanic membrane, external ear and ear canal normal bilaterally, nose without deformity, nasal mucosa and turbinates normal without polyps  Neck: supple and non-tender without mass, no thyromegaly or thyroid nodules, no cervical lymphadenopathy  Pulmonary/Chest: clear to auscultation bilaterally- wheezing to the posterior lung fields, no rales or rhonchi, normal air movement, no respiratory distress  Cardiovascular: normal rate, regular rhythm, normal S1 and S2, no murmurs, rubs, clicks, or gallops, distal pulses intact, no carotid bruits  Abdomen: soft, non-tender, non-distended, normal bowel sounds, no masses or organomegaly  Extremities: no cyanosis, clubbing or edema  Musculoskeletal: normal range of motion, no joint swelling, deformity or tenderness  Neurologic: reflexes normal and symmetric, no cranial nerve deficit, gait, coordination and speech normal       No Known Allergies  Prior to Visit Medications    Medication Sig Taking? Authorizing Provider   tamsulosin (FLOMAX) 0.4 MG capsule Take 1 capsule by mouth every evening Yes Landy Magdaleno MD   ferrous sulfate (FE TABS 325) 325 (65 Fe) MG EC tablet Take 1 tablet by mouth every other day Yes Donnell Montalvo MD   amLODIPine (NORVASC) 10 MG tablet Take 1 tablet by mouth in the morning. Yes Donnell Montalvo MD   primidone (MYSOLINE) 50 MG tablet Take Half tablet twice daily Yes Donnell Montalvo MD   rosuvastatin (CRESTOR) 40 MG tablet Take 1 tablet by mouth in the morning.  Yes Donnell Montalvo MD   lisinopril (PRINIVIL;ZESTRIL) 20 MG tablet Take 1 tablet by mouth daily Yes Donnell Montalvo MD   mesalamine (CANASA) 1000 MG suppository Place 1 suppository rectally nightly Yes Lady Chevy MD   pantoprazole (PROTONIX) 40 MG tablet Take 1 tablet by mouth every morning (before breakfast) Yes Stuart Daniel PA-C   vitamin B-12 (CYANOCOBALAMIN) 1000 MCG tablet Take 1,000 mcg by mouth daily Yes Historical Provider, MD   metoprolol succinate (TOPROL XL) 100 MG extended release tablet TAKE 1 TABLET DAILY Yes MD Vimal Madden 20 MG TABS tablet TAKE 1 TABLET DAILY Yes Gladis Connell MD   sertraline (ZOLOFT) 50 MG tablet TAKE 1 TABLET DAILY Yes Gladis Connell MD       [facility] (Including outside providers/suppliers regularly involved in providing care):   Patient Care Team:  Lee Lopez MD as PCP - General (Family Medicine)  Lee Lopez MD as PCP - [facility] Empaneled Provider  Tuan Connor MD as Consulting Physician (Gastroenterology)     Reviewed and updated this visit:  Tobacco  Allergies  Meds  Problems  Med Hx  Surg Hx  Soc Hx  Fam Hx          Electronically signed by Lee Lopez MD on 10/21/2022 at 9:34 AM

## 2022-11-03 DIAGNOSIS — G25.2 RESTING TREMOR: ICD-10-CM

## 2022-11-03 RX ORDER — PRIMIDONE 50 MG/1
TABLET ORAL
Qty: 30 TABLET | Refills: 5 | Status: SHIPPED | OUTPATIENT
Start: 2022-11-03

## 2022-11-20 PROBLEM — Z00.00 MEDICARE ANNUAL WELLNESS VISIT, SUBSEQUENT: Status: RESOLVED | Noted: 2022-10-21 | Resolved: 2022-11-20

## 2022-12-05 ENCOUNTER — OFFICE VISIT (OUTPATIENT)
Dept: GASTROENTEROLOGY | Age: 67
End: 2022-12-05
Payer: MEDICARE

## 2022-12-05 VITALS
BODY MASS INDEX: 32.9 KG/M2 | HEIGHT: 71 IN | OXYGEN SATURATION: 95 % | DIASTOLIC BLOOD PRESSURE: 76 MMHG | HEART RATE: 67 BPM | SYSTOLIC BLOOD PRESSURE: 130 MMHG | WEIGHT: 235 LBS

## 2022-12-05 DIAGNOSIS — K64.9 HEMORRHOIDS, UNSPECIFIED HEMORRHOID TYPE: Primary | ICD-10-CM

## 2022-12-05 PROCEDURE — G8484 FLU IMMUNIZE NO ADMIN: HCPCS | Performed by: INTERNAL MEDICINE

## 2022-12-05 PROCEDURE — 3017F COLORECTAL CA SCREEN DOC REV: CPT | Performed by: INTERNAL MEDICINE

## 2022-12-05 PROCEDURE — G8417 CALC BMI ABV UP PARAM F/U: HCPCS | Performed by: INTERNAL MEDICINE

## 2022-12-05 PROCEDURE — 3074F SYST BP LT 130 MM HG: CPT | Performed by: INTERNAL MEDICINE

## 2022-12-05 PROCEDURE — 1123F ACP DISCUSS/DSCN MKR DOCD: CPT | Performed by: INTERNAL MEDICINE

## 2022-12-05 PROCEDURE — 1036F TOBACCO NON-USER: CPT | Performed by: INTERNAL MEDICINE

## 2022-12-05 PROCEDURE — G8428 CUR MEDS NOT DOCUMENT: HCPCS | Performed by: INTERNAL MEDICINE

## 2022-12-05 PROCEDURE — 99213 OFFICE O/P EST LOW 20 MIN: CPT | Performed by: INTERNAL MEDICINE

## 2022-12-05 PROCEDURE — 3078F DIAST BP <80 MM HG: CPT | Performed by: INTERNAL MEDICINE

## 2022-12-05 ASSESSMENT — ENCOUNTER SYMPTOMS
RECTAL PAIN: 0
COUGH: 0
WHEEZING: 0
SORE THROAT: 0
ABDOMINAL DISTENTION: 0
DIARRHEA: 0
NAUSEA: 0
CHOKING: 0
TROUBLE SWALLOWING: 0
VOICE CHANGE: 0
COLOR CHANGE: 0
ABDOMINAL PAIN: 0
VOMITING: 0
APNEA: 0
CONSTIPATION: 0
SHORTNESS OF BREATH: 0
ANAL BLEEDING: 0
BLOOD IN STOOL: 0

## 2022-12-05 NOTE — PROGRESS NOTES
GI FOLLOW UP    INTERVAL HISTORY:     Clinically doing well from GI standpoint  Not tolerating can also with mild mucus discharge  Reports symptoms of rectal prolapse  Denies any bleeding    Ongoing treatment with Dr. El Mccain with regards to his hemorrhoids    No referring provider defined for this encounter. Chief Complaint   Patient presents with    Rectal Problems     prolapse       1. Hemorrhoids, unspecified hemorrhoid type          HISTORY OF PRESENT ILLNESS: Kaitlin Dias is a 74 y.o. male with a past history remarkable for atrial flutter, congestive heart failure, CAD, hypertension, hyperlipidemia, obesity, cataracts, recent admission to [facility] in April 2022 for rectal bleeding, referred for evaluation of rectal bleeding. Now resolved. Currently on Canasa 1 g nightly. Concern for UC proctitis. Denies any active GI symptoms currently. Past Medical,Family, and Social History reviewed and does contribute to the patient presenting condition. Patient's PMH/PSH,SH,PSYCH Hx, MEDs, ALLERGIES, and ROS were all reviewed and updated in the appropriate sections.     PAST MEDICAL HISTORY:  Past Medical History:   Diagnosis Date    Achilles tendon tear 2013    Allergic rhinitis     Atrial flutter ([address] 1996    Congestive heart failure ([address]     Coronary atherosclerosis     Hyperglycemia 2007    Hyperlipidemia 1996    Hypertension 2004    Mitral insufficiency     Obesity 2006    Osteoarthritis     Status post ablation of atrial flutter 05/09/13    Vasodepressor syncope 2001       Past Surgical History:   Procedure Laterality Date    [facility], 2013    st[facility]vincents AV node    CATARACT REMOVAL Bilateral     CATARACT REMOVAL WITH IMPLANT  12/06/2017    Dr. Levar Fritz Right 10/25/2021    Dr. Neto Kramer    COLONOSCOPY  02/01/2010    pratima negative    COLONOSCOPY  03/25/2019    Dr. Reeta Bamberger rectal bx(hyperplastic mucosa with evidence of ischemic injury,angiodysplasia no active bleeding,divetrticulosis,hemorroids    COLONOSCOPY N/A 3/25/2019    COLONOSCOPY POLYPECTOMY SNARE/COLD BIOPSY performed by Sophy Lowery MD at [address] [facility] N/A 4/4/2022    COLONOSCOPY POLYPECTOMY SNARE/COLD BIOPSY performed by Carlos Gallegos MD at [facility]      right side of face    INTRACAPSULAR CATARACT EXTRACTION Right 10/25/2021    EYE CATARACT EMULSIFICATION IOL IMPLANT performed by Curtis Menjivar DO at [facility] [address] Right 2006    medial meniscus arthroscopy, Latoya Goldvein    KNEE SURGERY Left     MITRAL VALVE SURGERY  8/11/11    34 mm ring annuloplasty    OTHER SURGICAL HISTORY Left 06/12/2020    LASER ABLATION GREATER SAPHENOUIS VEIN WITH PHLEBECTOMY - EVOLV     SC KNEE SCOPE,MED OR LAT MENIS REPAIR Left 6/21/2018    KNEE ARTHROSCOPY-PARTIAL MEDIAL MENISCECTOMY AND CHONDROPLASTY performed by Ke Mccarthy MD at [address] [place] [address] W/O ECP Left 12/6/2017    EYE CATARACT EMULSIFICATION IOL IMPLANT performed by Curtis Menjivar DO at [address] [facility] 4/2/2019    -hiatal hernia,grade 3 reflux esophagitis,antral gastritis, erosive bulbar duodenitis    VEIN SURGERY Left 6/12/2020    LASER ABLATION GREATER SAPHENOUIS VEIN WITH PHLEBECTOMY - Diantha Eve CONF# [id number] - [place]) performed by Wei Thomas MD at [address]:    Current Outpatient Medications:     sertraline (ZOLOFT) 50 MG tablet, TAKE 1 TABLET DAILY, Disp: 90 tablet, Rfl: 1    primidone (MYSOLINE) 50 MG tablet, TAKE ONE-HALF (1/2) TABLET TWICE A DAY, Disp: 30 tablet, Rfl: 5    tamsulosin (FLOMAX) 0.4 MG capsule, Take 1 capsule by mouth every evening (Patient not taking: Reported on 12/5/2022), Disp: 30 capsule, Rfl: 5    ferrous sulfate (FE TABS 325) 325 (65 Fe) MG EC tablet, Take 1 tablet by mouth every other day, Disp: 45 tablet, Rfl: 1    amLODIPine (NORVASC) 10 MG tablet, Take 1 tablet by mouth in the morning., Disp: 90 tablet, Rfl: 1    rosuvastatin (CRESTOR) 40 MG tablet, Take 1 tablet by mouth in the morning., Disp: 90 tablet, Rfl: 3    lisinopril (PRINIVIL;ZESTRIL) 20 MG tablet, Take 1 tablet by mouth daily, Disp: 90 tablet, Rfl: 3    mesalamine (CANASA) 1000 MG suppository, Place 1 suppository rectally nightly (Patient not taking: Reported on 2022), Disp: 60 suppository, Rfl: 2    pantoprazole (PROTONIX) 40 MG tablet, Take 1 tablet by mouth every morning (before breakfast), Disp: 30 tablet, Rfl: 3    vitamin B-12 (CYANOCOBALAMIN) 1000 MCG tablet, Take 1,000 mcg by mouth daily, Disp: , Rfl:     metoprolol succinate (TOPROL XL) 100 MG extended release tablet, TAKE 1 TABLET DAILY, Disp: 90 tablet, Rfl: 3    XARELTO 20 MG TABS tablet, TAKE 1 TABLET DAILY, Disp: 90 tablet, Rfl: 3    ALLERGIES:   No Known Allergies    FAMILY HISTORY:       Problem Relation Age of Onset    Heart Disease Mother         sudden cardiac death    Heart Attack Father          SOCIAL HISTORY:   Social History     Socioeconomic History    Marital status:      Spouse name: Not on file    Number of children: Not on file    Years of education: Not on file    Highest education level: Not on file   Occupational History    Not on file   Tobacco Use    Smoking status: Former     Packs/day: 1.50     Years: 20.00     Pack years: 30.00     Types: Cigarettes     Quit date: 1996     Years since quittin.0    Smokeless tobacco: Never   Vaping Use    Vaping Use: Never used   Substance and Sexual Activity    Alcohol use:  Yes     Alcohol/week: 0.0 standard drinks     Comment: 4 beers a day     Drug use: No    Sexual activity: Not on file   Other Topics Concern    Not on file   Social History Narrative    Not on file     Social Determinants of Health     Financial Resource Strain: Unknown Difficulty of Paying Living Expenses: Patient refused   Food Insecurity: Unknown    Worried About Running Out of Food in the Last Year: Patient refused    Ran Out of Food in the Last Year: Patient refused   Transportation Needs: Not on file   Physical Activity: Inactive    Days of Exercise per Week: 0 days    Minutes of Exercise per Session: 0 min   Stress: Not on file   Social Connections: Not on file   Intimate Partner Violence: Not on file   Housing Stability: Not on file       REVIEW OF SYSTEMS: A 12-point review of systems was obtained and pertinent positives and negatives were listed below. REVIEW OF SYSTEMS:     Constitutional: No fever, no chills, no lethargy, no weakness. HEENT:  No headache, otalgia, itchy eyes, nasal discharge or sore throat. Cardiac:  No chest pain, dyspnea, orthopnea or PND. Chest:   No cough, phlegm or wheezing. Abdomen:      Detailed by MA   Neuro:  No focal weakness, abnormal movements or seizure like activity. Skin:   No rashes, no itching. :   No hematuria, no pyuria, no dysuria, no flank pain. Extremities:  No swelling or joint pains. ROS was otherwise negative    Review of Systems   Constitutional:  Negative for appetite change, fatigue, fever and unexpected weight change. HENT:  Negative for sore throat, trouble swallowing and voice change. Eyes:  Negative for visual disturbance. Respiratory:  Negative for apnea, cough, choking, shortness of breath and wheezing. Cardiovascular:  Negative for chest pain, palpitations and leg swelling. Gastrointestinal:  Negative for abdominal distention, abdominal pain, anal bleeding, blood in stool, constipation, diarrhea, nausea, rectal pain and vomiting. Genitourinary:  Negative for difficulty urinating. Skin:  Negative for color change and rash. Neurological:  Negative for dizziness, seizures, weakness, light-headedness, numbness and headaches. Hematological:  Does not bruise/bleed easily. Psychiatric/Behavioral:  Negative for confusion and sleep disturbance. The patient is not nervous/anxious. PHYSICAL EXAMINATION: Vital signs reviewed per the nursing documentation. /76 (Site: Right Lower Arm, Position: Sitting, Cuff Size: Small Adult)   Pulse 67   Ht 5' 11\" (1.803 m)   Wt 235 lb (106.6 kg)   SpO2 95%   BMI 32.78 kg/m²   Body mass index is 32.78 kg/m². Physical Exam    Physical Exam   Constitutional: Patient is oriented to person, place, and time. Patient appears well-developed and well-nourished. HENT:   Head: Normocephalic and atraumatic. Eyes: Pupils are equal, round, and reactive to light. EOM are normal.   Neck: Normal range of motion. Neck supple. No JVD present. No tracheal deviation present. No thyromegaly present. Cardiovascular: Normal rate, regular rhythm, normal heart sounds and intact distal pulses. Pulmonary/Chest: Effort normal and breath sounds normal. No stridor. No respiratory distress. He has no wheezes. He has no rales. He exhibits no tenderness. Abdominal: Soft. Bowel sounds are normal. He exhibits no distension and no mass. There is no tenderness. There is no rebound and no guarding. No hernia. Musculoskeletal: Normal range of motion. Lymphadenopathy:    Patient has no cervical adenopathy. Neurological: Patient is alert and oriented to person, place, and time. Psychiatric: Patient has a normal mood and affect.  Patient behavior is normal.       LABORATORY DATA: Reviewed  Lab Results   Component Value Date    WBC 5.8 10/14/2022    HGB 13.0 (L) 10/14/2022    HCT 39.3 (L) 10/14/2022    .6 (H) 10/14/2022     10/14/2022     10/14/2022    K 4.0 10/14/2022     10/14/2022    CO2 27 10/14/2022    BUN 10 10/14/2022    CREATININE 0.66 (L) 10/14/2022    LABALBU 4.0 08/15/2022    BILITOT 0.47 08/15/2022    ALKPHOS 98 08/15/2022    AST 16 10/14/2022    ALT 15 10/14/2022    INR 1.6 04/02/2022         Lab Results   Component Value Date    RBC 3.87 (L) 10/14/2022    HGB 13.0 (L) 10/14/2022    .6 (H) 10/14/2022    MCH 33.6 (H) 10/14/2022    MCHC 33.1 10/14/2022    RDW 13.6 10/14/2022    MPV 12.1 10/14/2022    BASOPCT 0.7 10/14/2022    LYMPHSABS 1.01 10/14/2022    MONOSABS 0.68 10/14/2022    NEUTROABS 3.74 10/14/2022    EOSABS 0.30 10/14/2022    BASOSABS 0.04 10/14/2022         DIAGNOSTIC TESTING:     XR LUMBAR SPINE (2-3 VIEWS)    Result Date: 8/6/2022  EXAMINATION: THREE XRAY VIEWS OF THE LUMBAR SPINE 8/5/2022 4:15 pm COMPARISON: April 2, 2022 CT HISTORY: ORDERING SYSTEM PROVIDED HISTORY: Low back pain without sciatica, unspecified back pain laterality, unspecified chronicity FINDINGS: Lumbar spine alignment is anatomic. New superior endplate compression when compared to most recent imaging L1 with loss of axial height anteriorly by proximally 25%. Remaining vertebral body axial heights maintained. Moderate degenerative change throughout most significant L3-4 L4-5 with loss disc height endplate spondylosis. Moderate facet arthropathy lower lumbar spine. There is calcification abdominal aorta. Age indeterminate but new since most recent imaging L1 vertebral body compression as detailed. Consider MRI follow-up. Multilevel degenerative change. MRI LUMBAR SPINE WO CONTRAST    Result Date: 8/22/2022  EXAMINATION: MRI OF THE LUMBAR SPINE WITHOUT CONTRAST, 8/22/2022 2:18 pm TECHNIQUE: Multiplanar multisequence MRI of the lumbar spine was performed without the administration of intravenous contrast. COMPARISON: CT lumbar spine performed 04/18/2019. HISTORY: ORDERING SYSTEM PROVIDED HISTORY: Compression fracture of L1 vertebra, initial encounter ([facility]) FINDINGS: BONES/ALIGNMENT: Edema and loss of vertebral body height at L1. There is no retropulsion. The remaining lumbar vertebral body heights are maintained.  There is otherwise age-appropriate bone marrow signal.  There is multilevel degenerative disc disease with loss of disc signal.  There is no disc space narrowing. There is no spondylolisthesis. SPINAL CORD: The conus is normal in caliber and signal and terminates at L1. The cauda equina is unremarkable. SOFT TISSUES: The posterior paraspinal soft tissues are unremarkable. The visualized abdominal structures are unremarkable. L1-L2: There is no significant disc herniation, spinal canal stenosis or neural foraminal narrowing. L2-L3: There is no significant disc herniation, spinal canal stenosis or neural foraminal narrowing. L3-L4: There is a circumferential disc bulge. There is no canal stenosis or foraminal narrowing. L4-L5: There is a circumferential disc bulge with facet and ligamentous hypertrophy. There is a small synovial cyst adjacent to the right aspect of the facet. There is no canal stenosis. There is mild foraminal narrowing. L5-S1: There is a circumferential disc bulge. There is no canal stenosis or foraminal narrowing. Acute/subacute compression fracture at L1. Multilevel degenerative change without canal stenosis. Mild foraminal narrowing at L4-5. CT ABDOMEN PELVIS W IV CONTRAST Additional Contrast? Oral    Result Date: 8/15/2022  EXAMINATION: CT OF THE ABDOMEN AND PELVIS WITH CONTRAST 8/15/2022 1:06 pm TECHNIQUE: CT of the abdomen and pelvis was performed with the administration of intravenous contrast. Multiplanar reformatted images are provided for review. Automated exposure control, iterative reconstruction, and/or weight based adjustment of the mA/kV was utilized to reduce the radiation dose to as low as reasonably achievable. COMPARISON: CT abdomen and pelvis 04/02/2022. HISTORY: ORDERING SYSTEM PROVIDED HISTORY: Severe right groin pain TECHNOLOGIST PROVIDED HISTORY: STAT Creatinine as needed:->No Right groin pain, abdominal pain FINDINGS: Lower Chest: Images through the lung bases demonstrate no acute process. Organs: The liver and gallbladder are unremarkable.   No biliary ductal dilatation is identified. The pancreas, spleen, and bilateral adrenal glands are unremarkable. Left renal parapelvic cysts. No follow-up recommended. The right kidney is unremarkable. No obstructive uropathy. GI/Bowel: Normal appendix. Colonic diverticulosis without evidence of acute diverticulitis. The stomach and small bowel are normal in appearance. No obstruction or wall thickening identified. Pelvis: Right urinary bladder wall thickening is again seen possibly mildly worsened from 04/02/2022 mild adjacent stranding. Mild prostatomegaly. No free fluid. A moderate fat containing right inguinal hernia is noted. No pelvic or inguinal lymphadenopathy. Peritoneum/Retroperitoneum: The abdominal aorta is normal in caliber with mild atherosclerosis. No retroperitoneal or mesenteric lymphadenopathy is identified. No free air or fluid is seen in the abdomen. Bones/Soft Tissues: There is a mild acute or subacute L1 superior endplate compression fracture new from 04/02/2022 but likely stable from 08/05/2022. No retropulsion. 1. Mild right urinary bladder wall thickening possible mildly worsened from 04/02/2022 with mild adjacent stranding. A neoplasm cannot be excluded and direct visualization should be considered. Recommend correlation with urinalysis given the possibility of cystitis. 2. Mild acute or subacute L1 superior endplate compression fracture unchanged from 08/05/2022. 3. Moderate fat containing right inguinal hernia. IMPRESSION: Georgina Tamez is a 74 y.o. male with a past history remarkable for atrial flutter, congestive heart failure, CAD, hypertension, hyperlipidemia, obesity, cataracts, recent admission to [facility] in April 2022 for rectal bleeding, referred for evaluation of rectal bleeding. Now resolved. Currently on Canasa 1 g nightly. Concern for UC proctitis. Denies any active GI symptoms currently. Assessment  1.  Hemorrhoids, unspecified hemorrhoid type Sherryle Graff was seen today for follow-up. Diagnoses and all orders for this visit:    Rectal prolapse-suspect mucosal prolapse of her rectal prolapse given patient symptomology, patient to continue with Canasa every day versus every other day, pending fecal calprotectin to evaluate for inflammatory changes. Patient had recent banding procedure performed by colorectal surgery. Clinically doing well. Repeat procedure pending. Denies any active GI symptoms           RTC: 6 months    Additional comments: Thank you for allowing me to participate in the care of Mr. Storm Sanz. For any further questions please do not hesitate to contact me. I have reviewed and agree with the ROS entered by the MA/LPN from today's encounter documented in a separate note. Jeanette Bonilla MD, MPH   Board Certified in Gastroenterology  Board Certified in [address] [place] [address] #: [phone]    this note is created with the assistance of a speech recognition program.  While intending to generate a document that actually reflects the content of the visit, the document can still have some errors including those of syntax and sound a like substitutions which may escape proof reading. It such instances, actual meaning can be extrapolated by contextual diversion.

## 2023-02-17 DIAGNOSIS — I10 ESSENTIAL HYPERTENSION: ICD-10-CM

## 2023-02-17 RX ORDER — AMLODIPINE BESYLATE 10 MG/1
TABLET ORAL
Qty: 90 TABLET | Refills: 0 | Status: SHIPPED | OUTPATIENT
Start: 2023-02-17

## 2023-02-21 LAB
ABSOLUTE BASO #: 0.03 K/UL (ref 0–0.2)
ABSOLUTE EOS #: 0.2 K/UL (ref 0–0.5)
ABSOLUTE LYMPH #: 1.07 K/UL (ref 1–4)
ABSOLUTE MONO #: 0.73 K/UL (ref 0.2–1)
ABSOLUTE NEUT #: 4.07 K/UL (ref 1.5–7.5)
ALBUMIN SERPL-MCNC: 4 G/DL (ref 3.5–5.2)
ALK PHOSPHATASE: 96 U/L (ref 40–125)
ALT SERPL-CCNC: 11 U/L (ref 5–50)
ANION GAP SERPL CALCULATED.3IONS-SCNC: 13 MEQ/L (ref 7–16)
AST SERPL-CCNC: 17 U/L (ref 9–50)
AVERAGE GLUCOSE: 108 MG/DL
BASOPHILS RELATIVE PERCENT: 0.5 %
BILIRUB SERPL-MCNC: 0.4 MG/DL
BILIRUBIN DIRECT: <0.2 MG/DL (ref 0–0.3)
BUN BLDV-MCNC: 6 MG/DL (ref 8–23)
CALCIUM SERPL-MCNC: 8.8 MG/DL (ref 8.5–10.5)
CHLORIDE BLD-SCNC: 102 MEQ/L (ref 95–107)
CHOLESTEROL/HDL RATIO: 2.4 RATIO
CHOLESTEROL: 122 MG/DL
CO2: 23 MEQ/L (ref 19–31)
CREAT SERPL-MCNC: 0.68 MG/DL (ref 0.8–1.4)
EGFR IF NONAFRICAN AMERICAN: 101 ML/MIN/1.73
EOSINOPHILS RELATIVE PERCENT: 3.3 %
FOLATE: 3.4 UG/L
GLUCOSE: 137 MG/DL (ref 70–99)
HBA1C MFR BLD: 5.4 % (ref 4.2–5.6)
HCT VFR BLD CALC: 38.5 % (ref 40–51)
HDLC SERPL-MCNC: 51 MG/DL
HEMOGLOBIN: 13 G/DL (ref 13.5–17)
LDL CHOLESTEROL CALCULATED: 60 MG/DL
LDL/HDL RATIO: 1.2 RATIO
LYMPHOCYTE %: 17.5 %
MCH RBC QN AUTO: 32.9 PG (ref 25–33)
MCHC RBC AUTO-ENTMCNC: 33.8 G/DL (ref 31–36)
MCV RBC AUTO: 97.5 FL (ref 80–99)
MONOCYTES # BLD: 11.9 %
NEUTROPHILS RELATIVE PERCENT: 66.5 %
PDW BLD-RTO: 14.5 % (ref 11.5–15)
PLATELETS: 181 K/UL (ref 130–400)
PMV BLD AUTO: 11.3 FL (ref 9.3–13)
POTASSIUM SERPL-SCNC: 3.9 MEQ/L (ref 3.5–5.4)
RBC: 3.95 M/UL (ref 4.5–6.1)
SODIUM BLD-SCNC: 138 MEQ/L (ref 133–146)
TOTAL PROTEIN: 6.8 G/DL (ref 6.1–8.3)
TRIGL SERPL-MCNC: 57 MG/DL
VITAMIN B-12: 787 PG/ML (ref 200–950)
VLDLC SERPL CALC-MCNC: 11 MG/DL
WBC: 6.1 K/UL (ref 3.5–11)

## 2023-02-27 ENCOUNTER — OFFICE VISIT (OUTPATIENT)
Dept: PRIMARY CARE CLINIC | Age: 68
End: 2023-02-27
Payer: MEDICARE

## 2023-02-27 VITALS
OXYGEN SATURATION: 97 % | SYSTOLIC BLOOD PRESSURE: 122 MMHG | BODY MASS INDEX: 31.78 KG/M2 | WEIGHT: 227 LBS | HEART RATE: 55 BPM | HEIGHT: 71 IN | DIASTOLIC BLOOD PRESSURE: 60 MMHG

## 2023-02-27 DIAGNOSIS — E53.8 LOW FOLATE: ICD-10-CM

## 2023-02-27 DIAGNOSIS — I48.20 CHRONIC A-FIB (HCC): ICD-10-CM

## 2023-02-27 DIAGNOSIS — E78.5 DYSLIPIDEMIA: ICD-10-CM

## 2023-02-27 DIAGNOSIS — R73.9 HYPERGLYCEMIA: ICD-10-CM

## 2023-02-27 DIAGNOSIS — I10 ESSENTIAL HYPERTENSION: Primary | ICD-10-CM

## 2023-02-27 PROCEDURE — G8417 CALC BMI ABV UP PARAM F/U: HCPCS | Performed by: STUDENT IN AN ORGANIZED HEALTH CARE EDUCATION/TRAINING PROGRAM

## 2023-02-27 PROCEDURE — 1036F TOBACCO NON-USER: CPT | Performed by: STUDENT IN AN ORGANIZED HEALTH CARE EDUCATION/TRAINING PROGRAM

## 2023-02-27 PROCEDURE — 99214 OFFICE O/P EST MOD 30 MIN: CPT | Performed by: STUDENT IN AN ORGANIZED HEALTH CARE EDUCATION/TRAINING PROGRAM

## 2023-02-27 PROCEDURE — 3074F SYST BP LT 130 MM HG: CPT | Performed by: STUDENT IN AN ORGANIZED HEALTH CARE EDUCATION/TRAINING PROGRAM

## 2023-02-27 PROCEDURE — 3078F DIAST BP <80 MM HG: CPT | Performed by: STUDENT IN AN ORGANIZED HEALTH CARE EDUCATION/TRAINING PROGRAM

## 2023-02-27 PROCEDURE — 3017F COLORECTAL CA SCREEN DOC REV: CPT | Performed by: STUDENT IN AN ORGANIZED HEALTH CARE EDUCATION/TRAINING PROGRAM

## 2023-02-27 PROCEDURE — G8484 FLU IMMUNIZE NO ADMIN: HCPCS | Performed by: STUDENT IN AN ORGANIZED HEALTH CARE EDUCATION/TRAINING PROGRAM

## 2023-02-27 PROCEDURE — G8427 DOCREV CUR MEDS BY ELIG CLIN: HCPCS | Performed by: STUDENT IN AN ORGANIZED HEALTH CARE EDUCATION/TRAINING PROGRAM

## 2023-02-27 PROCEDURE — 1123F ACP DISCUSS/DSCN MKR DOCD: CPT | Performed by: STUDENT IN AN ORGANIZED HEALTH CARE EDUCATION/TRAINING PROGRAM

## 2023-02-27 SDOH — ECONOMIC STABILITY: FOOD INSECURITY: WITHIN THE PAST 12 MONTHS, THE FOOD YOU BOUGHT JUST DIDN'T LAST AND YOU DIDN'T HAVE MONEY TO GET MORE.: NEVER TRUE

## 2023-02-27 SDOH — ECONOMIC STABILITY: INCOME INSECURITY: HOW HARD IS IT FOR YOU TO PAY FOR THE VERY BASICS LIKE FOOD, HOUSING, MEDICAL CARE, AND HEATING?: NOT HARD AT ALL

## 2023-02-27 SDOH — ECONOMIC STABILITY: FOOD INSECURITY: WITHIN THE PAST 12 MONTHS, YOU WORRIED THAT YOUR FOOD WOULD RUN OUT BEFORE YOU GOT MONEY TO BUY MORE.: NEVER TRUE

## 2023-02-27 SDOH — ECONOMIC STABILITY: HOUSING INSECURITY
IN THE LAST 12 MONTHS, WAS THERE A TIME WHEN YOU DID NOT HAVE A STEADY PLACE TO SLEEP OR SLEPT IN A SHELTER (INCLUDING NOW)?: NO

## 2023-02-27 ASSESSMENT — PATIENT HEALTH QUESTIONNAIRE - PHQ9
SUM OF ALL RESPONSES TO PHQ9 QUESTIONS 1 & 2: 0
DEPRESSION UNABLE TO ASSESS: PT REFUSES
SUM OF ALL RESPONSES TO PHQ QUESTIONS 1-9: 0
1. LITTLE INTEREST OR PLEASURE IN DOING THINGS: 0
2. FEELING DOWN, DEPRESSED OR HOPELESS: 0

## 2023-02-27 NOTE — PROGRESS NOTES
[address], [address], [facility], [address] is a 69 y.o. male with  has a past medical history of Achilles tendon tear, Allergic rhinitis, Atrial flutter ([address] Congestive heart failure ([address] Coronary atherosclerosis, Hyperglycemia, Hyperlipidemia, Hypertension, Mitral insufficiency, Obesity, Osteoarthritis, Status post ablation of atrial flutter, and Vasodepressor syncope. Presented to the office today for:  Chief Complaint   Patient presents with    Hyperlipidemia    Hypertension    Hyperglycemia    Follow-up     4 month         Assessment/Plan   1. Essential hypertension  -     CBC with Auto Differential; Future  -     Comprehensive Metabolic Panel; Future  2. Chronic a-fib ([facility]  3. Dyslipidemia  -     Lipid Panel; Future  4. Hyperglycemia  -     Hemoglobin A1C; Future  5. Low folate  -     Vitamin B12 & Folate; Future    Return in about 4 months (around 6/27/2023) for F/U HTN/Labs. HTN/Afib - at goal, cont. W/ meds at the current doses  Hyperglycemia - repeat A1c for monitoring, diet/exercise  Hyperlipidemia - cont. Statin at the current dose  Folate supplements  F/U with Specialists per prior plans. All patient questions answered. Pt voiced understanding. Medications Discontinued During This Encounter   Medication Reason    tamsulosin (FLOMAX) 0.4 MG capsule ERROR    pantoprazole (PROTONIX) 40 MG tablet LIST CLEANUP    vitamin B-12 (CYANOCOBALAMIN) 1000 MCG tablet LIST CLEANUP       Patient received counseling on the following healthy behaviors: nutrition, exercise and medication adherence. I encouraged and discussed lifestyle modifications including diet and exercise and the patient was agreeable to making positive/beneficial changes to both to help improve their overall health. Discussed use, benefit, and side effects of prescribed medications. Barriers to medication compliance addressed.  Patient given educational materials: see patient instructions. HM - HM items completed today as per orders. Outstanding HM items though not limited to immunizations were discussed with the patient today, including risks, benefits and alternatives. The patient will discuss these during the next appointment per their preference. If there are any worsening or concerning signs or symptoms, patient will report to the ED and/or contact [id number] for immediate evaluation. Teach back method was used. Subjective:    HPI  Chief Complaint   Patient presents with    Hyperlipidemia    Hypertension    Hyperglycemia    Follow-up     4 month       Hypertension/Chronic Afib  [facility]): Patient here for follow-up of elevated blood pressure. He is not exercising and is not adherent to low salt diet. Blood pressure is well controlled at home. Cardiac symptoms none. Patient denies chest pain, chest pressure/discomfort, claudication, dyspnea, exertional chest pressure/discomfort, fatigue, irregular heart beat, lower extremity edema, near-syncope, orthopnea, palpitations, paroxysmal nocturnal dyspnea, syncope, and tachypnea. Cardiovascular risk factors: advanced age (older than 54 for men, 72 for women), hypertension, and male gender. Use of agents associated with hypertension: none. History of target organ damage: none. He is on Xarelto. He is also taking Toprol, Lisinopril, Amlodipine. HYPERGLYCEMIA:  Diet compliance: compliant most of the time. Current exercise: no regular exercise, just working around the house typically. Last A1c 5.4 on 02/21/2023. He has been making some dietary changes. HYPERLIPIDEMIA   Medication compliance: compliant all of the time. Patient is following a low fat, low cholesterol diet. He is not exercising regularly. OTC Supplements: none. No side effects from the rosuvastatin 40mg. No new myalgias or GI upset.   Lab Results   Component Value Date    CHOL 122 02/21/2023    TRIG 57 02/21/2023    HDL 51 02/21/2023    LDLCALC 60 02/21/2023 Lab Results   Component Value Date    ALT 11 2023    AST 17 2023        He has a history of low Vitamin B12 and took supplements in the past. He had not been taking folic acid supplements in the past/doing OTC supplements at this time. Health Maintenance -   Alcohol/Substance use History - None/Minimal    Tobacco Use      Smoking status: Former        Packs/day: 1.50        Years: 20.00        Pack years: 30        Types: Cigarettes        Quit date: 1996        Years since quittin.2      Smokeless tobacco: Never    Family History   Problem Relation Age of Onset    Heart Disease Mother         sudden cardiac death    Heart Attack Father        [facility] Scores 2023 10/21/2022 2022 2022 2021 10/2/2020 2020   PHQ2 Score 0 0 0 0 0 0 0   PHQ9 Score 0 0 0 0 0 0 0     Interpretation of Total Score Depression Severity: 1-4 = Minimal depression, 5-9 = Mild depression, 10-14 = Moderate depression, 15-19 = Moderately severe depression, 20-27 = Severe depression    Review of Systems  Constitutional: Negative for activity change, appetite change, chills, diaphoresis, fatigue, fever and unexpected weight change. HENT: Negative for sinus pressure, sinus pain, sore throat and trouble swallowing. Respiratory: Negative for cough, shortness of breath and wheezing. Cardiovascular: Negative for chest pain, palpitations and leg swelling. Gastrointestinal: Negative for abdominal pain, diarrhea, nausea and vomiting. Endocrine: Negative for cold intolerance, polydipsia, polyphagia and polyuria. Genitourinary: Negative for difficulty urinating, flank pain and frequency. Musculoskeletal: Negative for gait problem and joint swelling. Negative for back pain, neck pain and neck stiffness. Skin: Negative for color change and wound. Negative for pallor and rash. Allergic/Immunologic: Negative for environmental allergies and food allergies.    Neurological: Negative for light-headedness, numbness and headaches. Psychiatric/Behavioral: Negative for sleep disturbance. Negative for confusion and suicidal ideas. Objective:    /60   Pulse 55   Ht 5' 11\" (1.803 m)   Wt 227 lb (103 kg)   SpO2 97%   BMI 31.66 kg/m²    BP Readings from Last 3 Encounters:   02/27/23 122/60   12/05/22 130/76   10/21/22 120/64     Physical Exam  Constitutional: Patient is oriented to person, place, and time. Patient appears well-developed and well-nourished. No distress. HENT: Head: Normocephalic and atraumatic. Eyes: Pupils are equal, round, and reactive to light. Conjunctivae are normal. Right eye exhibits no discharge. Left eye exhibits no discharge. Cardiovascular: Normal rate, regular rhythm and normal heart sounds. Pulmonary/Chest: Effort normal and breath sounds normal. No respiratory distress. Patient has no wheezes. Abdominal: Soft. Bowel sounds are normal. Patient exhibits no distension. There is no tenderness. Musculoskeletal:  Patient exhibits no edema and tenderness. Patient exhibits no deformity. Neurological: Patient is alert and oriented to person, place, and time. Skin: Skin is warm and dry. Patient is not diaphoretic. Psychiatric: Patient's speech is normal and behavior is normal. Thought content normal.   Vitals reviewed.       Lab Results   Component Value Date    WBC 6.1 02/21/2023    HGB 13.0 (L) 02/21/2023    HCT 38.5 (L) 02/21/2023     02/21/2023    CHOL 122 02/21/2023    TRIG 57 02/21/2023    HDL 51 02/21/2023    ALT 11 02/21/2023    AST 17 02/21/2023     02/21/2023    K 3.9 02/21/2023     02/21/2023    CREATININE 0.68 (L) 02/21/2023    BUN 6 (L) 02/21/2023    CO2 23 02/21/2023    TSH 1.18 04/03/2022    PSA 2.03 11/12/2021    INR 1.6 04/02/2022    LABA1C 5.4 02/21/2023     Lab Results   Component Value Date    CALCIUM 8.8 02/21/2023     Lab Results   Component Value Date    LDLCALC 60 02/21/2023    LDLCHOLESTEROL 69 11/12/2021 Please note that this chart was generated using voice recognition Dragon dictation software. Although every effort was made to ensure the accuracy of this automated transcription, some errors in transcription may have occurred.     Electronically signed by Dr. Linda Lopez MD on 2/27/2023 at 8:19 AM

## 2023-03-03 RX ORDER — METOPROLOL SUCCINATE 100 MG/1
TABLET, EXTENDED RELEASE ORAL
Qty: 90 TABLET | Refills: 3 | Status: SHIPPED | OUTPATIENT
Start: 2023-03-03

## 2023-04-19 ENCOUNTER — OFFICE VISIT (OUTPATIENT)
Dept: UROLOGY | Age: 68
End: 2023-04-19
Payer: MEDICARE

## 2023-04-19 VITALS
BODY MASS INDEX: 31.92 KG/M2 | TEMPERATURE: 97.8 F | WEIGHT: 228 LBS | DIASTOLIC BLOOD PRESSURE: 68 MMHG | SYSTOLIC BLOOD PRESSURE: 134 MMHG | HEIGHT: 71 IN | HEART RATE: 62 BPM

## 2023-04-19 DIAGNOSIS — N40.1 BENIGN PROSTATIC HYPERPLASIA WITH WEAK URINARY STREAM: ICD-10-CM

## 2023-04-19 DIAGNOSIS — Z12.5 SCREENING PSA (PROSTATE SPECIFIC ANTIGEN): Primary | ICD-10-CM

## 2023-04-19 DIAGNOSIS — R97.20 ELEVATED PSA: ICD-10-CM

## 2023-04-19 DIAGNOSIS — R39.12 BENIGN PROSTATIC HYPERPLASIA WITH WEAK URINARY STREAM: ICD-10-CM

## 2023-04-19 PROCEDURE — PBSHW PR MEAS POST-VOIDING RESIDUAL URINE&/BLADDER CAP: Performed by: PHYSICIAN ASSISTANT

## 2023-04-19 PROCEDURE — 51798 US URINE CAPACITY MEASURE: CPT | Performed by: PHYSICIAN ASSISTANT

## 2023-04-19 PROCEDURE — G8417 CALC BMI ABV UP PARAM F/U: HCPCS | Performed by: PHYSICIAN ASSISTANT

## 2023-04-19 PROCEDURE — 3017F COLORECTAL CA SCREEN DOC REV: CPT | Performed by: PHYSICIAN ASSISTANT

## 2023-04-19 PROCEDURE — 3075F SYST BP GE 130 - 139MM HG: CPT | Performed by: PHYSICIAN ASSISTANT

## 2023-04-19 PROCEDURE — G8427 DOCREV CUR MEDS BY ELIG CLIN: HCPCS | Performed by: PHYSICIAN ASSISTANT

## 2023-04-19 PROCEDURE — 1036F TOBACCO NON-USER: CPT | Performed by: PHYSICIAN ASSISTANT

## 2023-04-19 PROCEDURE — 99213 OFFICE O/P EST LOW 20 MIN: CPT | Performed by: PHYSICIAN ASSISTANT

## 2023-04-19 PROCEDURE — 1123F ACP DISCUSS/DSCN MKR DOCD: CPT | Performed by: PHYSICIAN ASSISTANT

## 2023-04-19 PROCEDURE — 3078F DIAST BP <80 MM HG: CPT | Performed by: PHYSICIAN ASSISTANT

## 2023-04-19 RX ORDER — LANOLIN ALCOHOL/MO/W.PET/CERES
400 CREAM (GRAM) TOPICAL DAILY
COMMUNITY

## 2023-04-19 ASSESSMENT — ENCOUNTER SYMPTOMS
COUGH: 0
CONSTIPATION: 0
COLOR CHANGE: 0
EYE REDNESS: 0
SHORTNESS OF BREATH: 0
ABDOMINAL PAIN: 0
BACK PAIN: 0
VOMITING: 0
NAUSEA: 0
WHEEZING: 0

## 2023-04-19 NOTE — PROGRESS NOTES
Bladd[place] performed in office today:  Patient voided - 40 mL, PVR - 27 mL
Gastrointestinal:  Negative for abdominal pain, constipation, nausea and vomiting. Genitourinary:  Negative for decreased urine volume, difficulty urinating, dysuria, enuresis, flank pain, frequency, hematuria, penile discharge, penile pain, scrotal swelling, testicular pain and urgency. Nocturia x1   Musculoskeletal:  Negative for back pain, joint swelling and myalgias. Skin:  Negative for color change, rash and wound. Neurological:  Negative for dizziness, tremors and numbness. Hematological:  Negative for adenopathy. Does not bruise/bleed easily. /68 (Site: Left Upper Arm, Position: Sitting, Cuff Size: Large Adult)   Pulse 62   Temp 97.8 °F (36.6 °C)   Ht 5' 11\" (1.803 m)   Wt 228 lb (103.4 kg)   BMI 31.80 kg/m²       PHYSICAL EXAM:  Constitutional: Patient in no acute distress; Neuro: alert and oriented to person place and time. Psych: Mood and affect normal.  Lungs: Respiratory effort normal  Abdomen: Soft, non-tender, non-distended   Rectal: deferred       Lab Results   Component Value Date    BUN 6 (L) 02/21/2023     Lab Results   Component Value Date    CREATININE 0.68 (L) 02/21/2023     Lab Results   Component Value Date    PSA 2.03 11/12/2021    PSA 1.81 07/08/2021    PSA 2.18 05/28/2020       ASSESSMENT:   Diagnosis Orders   1. Screening PSA (prostate specific antigen)        2. Benign prostatic hyperplasia with weak urinary stream  [facility] POST-VOIDING RESIDUAL URINE&/BLADDER CAP      3. Elevated PSA  PSA, Diagnostic          PLAN:  Patient last PSA was elevated. We will recheck the PSA now. Our follow-up will be based on his PSA. Patient would prefer being followed by his primary care if at all possible. Patient is no longer any medication from our office and was cleared from a bladder thickening standpoint.

## 2023-04-21 DIAGNOSIS — R97.20 ELEVATED PSA: ICD-10-CM

## 2023-04-24 RX ORDER — LANOLIN ALCOHOL/MO/W.PET/CERES
325 CREAM (GRAM) TOPICAL EVERY OTHER DAY
Qty: 45 TABLET | Refills: 1 | Status: SHIPPED | OUTPATIENT
Start: 2023-04-24

## 2023-04-25 ENCOUNTER — TELEPHONE (OUTPATIENT)
Dept: UROLOGY | Age: 68
End: 2023-04-25

## 2023-04-25 NOTE — TELEPHONE ENCOUNTER
----- [facility] at 4/25/2023  9:13 AM EDT -----  Please let him know that his PSA was 2.85. This is normal compared to his PSA from September which was 6.66. Previous PSAs were normal.  We will be happy to follow this patient yearly for PSA and urinary symptom check. At the visit please confirm that patient would rather follow-up with his primary care only.   If that is the case his primary care does need to know that patient wants to be followed in regards to his urinary symptoms and PSA by the PCP

## 2023-04-26 DIAGNOSIS — G25.2 RESTING TREMOR: ICD-10-CM

## 2023-04-26 RX ORDER — PRIMIDONE 50 MG/1
TABLET ORAL
Qty: 30 TABLET | Refills: 5 | Status: SHIPPED | OUTPATIENT
Start: 2023-04-26

## 2023-04-27 ENCOUNTER — APPOINTMENT (OUTPATIENT)
Dept: CT IMAGING | Age: 68
End: 2023-04-27
Payer: MEDICARE

## 2023-04-27 ENCOUNTER — HOSPITAL ENCOUNTER (EMERGENCY)
Age: 68
Discharge: HOME | End: 2023-04-27
Payer: MEDICARE

## 2023-04-27 VITALS
DIASTOLIC BLOOD PRESSURE: 59 MMHG | HEIGHT: 71 IN | BODY MASS INDEX: 31.22 KG/M2 | HEART RATE: 45 BPM | WEIGHT: 223 LBS | SYSTOLIC BLOOD PRESSURE: 116 MMHG | TEMPERATURE: 96.6 F | OXYGEN SATURATION: 95 % | RESPIRATION RATE: 14 BRPM

## 2023-04-27 DIAGNOSIS — F10.920 ACUTE ALCOHOLIC INTOXICATION WITHOUT COMPLICATION (HCC): ICD-10-CM

## 2023-04-27 DIAGNOSIS — R55 SYNCOPE AND COLLAPSE: Primary | ICD-10-CM

## 2023-04-27 LAB
ABSOLUTE EOS #: 0.08 K/UL (ref 0–0.44)
ABSOLUTE IMMATURE GRANULOCYTE: <0.03 K/UL (ref 0–0.3)
ABSOLUTE LYMPH #: 1.58 K/UL (ref 1.1–3.7)
ABSOLUTE MONO #: 0.51 K/UL (ref 0.1–1.2)
ANION GAP SERPL CALCULATED.3IONS-SCNC: 15 MMOL/L (ref 9–17)
BASOPHILS # BLD: 1 % (ref 0–2)
BASOPHILS ABSOLUTE: 0.04 K/UL (ref 0–0.2)
BUN SERPL-MCNC: 17 MG/DL (ref 8–23)
BUN/CREAT BLD: 27 (ref 9–20)
CALCIUM SERPL-MCNC: 9.3 MG/DL (ref 8.6–10.4)
CHLORIDE SERPL-SCNC: 100 MMOL/L (ref 98–107)
CHP ED QC CHECK: 54
CHP ED QC CHECK: 81
CK SERPL-CCNC: 91 U/L (ref 39–308)
CO2 SERPL-SCNC: 21 MMOL/L (ref 20–31)
CREAT SERPL-MCNC: 0.63 MG/DL (ref 0.7–1.2)
EKG ATRIAL RATE: 53 BPM
EKG Q-T INTERVAL: 498 MS
EKG QRS DURATION: 92 MS
EKG QTC CALCULATION (BAZETT): 458 MS
EKG R AXIS: 54 DEGREES
EKG T AXIS: 39 DEGREES
EKG VENTRICULAR RATE: 51 BPM
EOSINOPHILS RELATIVE PERCENT: 1 % (ref 1–4)
ETHANOL PERCENT: 0.29 %
ETHANOL: 295 MG/DL
GFR SERPL CREATININE-BSD FRML MDRD: >60 ML/MIN/1.73M2
GLUCOSE BLD-MCNC: 54 MG/DL (ref 74–100)
GLUCOSE BLD-MCNC: 81 MG/DL (ref 74–100)
GLUCOSE SERPL-MCNC: 77 MG/DL (ref 70–99)
HCT VFR BLD AUTO: 44 % (ref 40.7–50.3)
HGB BLD-MCNC: 14.7 G/DL (ref 13–17)
IMMATURE GRANULOCYTES: 0 %
LACTATE PLASV-SCNC: 3 MMOL/L (ref 0.5–2.2)
LYMPHOCYTES # BLD: 19 % (ref 24–43)
MCH RBC QN AUTO: 34.3 PG (ref 25.2–33.5)
MCHC RBC AUTO-ENTMCNC: 33.4 G/DL (ref 28.4–34.8)
MCV RBC AUTO: 102.6 FL (ref 82.6–102.9)
MONOCYTES # BLD: 6 % (ref 3–12)
NRBC AUTOMATED: 0 PER 100 WBC
PDW BLD-RTO: 13.8 % (ref 11.8–14.4)
PLATELET # BLD AUTO: 179 K/UL (ref 138–453)
PMV BLD AUTO: 11.2 FL (ref 8.1–13.5)
POTASSIUM SERPL-SCNC: 4 MMOL/L (ref 3.7–5.3)
RBC # BLD: 4.29 M/UL (ref 4.21–5.77)
SEG NEUTROPHILS: 73 % (ref 36–65)
SEGMENTED NEUTROPHILS ABSOLUTE COUNT: 6.02 K/UL (ref 1.5–8.1)
SODIUM SERPL-SCNC: 136 MMOL/L (ref 135–144)
TROPONIN I SERPL DL<=0.01 NG/ML-MCNC: 6 NG/L (ref 0–22)
WBC # BLD AUTO: 8.3 K/UL (ref 3.5–11.3)

## 2023-04-27 PROCEDURE — 82947 ASSAY GLUCOSE BLOOD QUANT: CPT

## 2023-04-27 PROCEDURE — 6360000004 HC RX CONTRAST MEDICATION: Performed by: PHYSICIAN ASSISTANT

## 2023-04-27 PROCEDURE — 36415 COLL VENOUS BLD VENIPUNCTURE: CPT

## 2023-04-27 PROCEDURE — 80048 BASIC METABOLIC PNL TOTAL CA: CPT

## 2023-04-27 PROCEDURE — 96360 HYDRATION IV INFUSION INIT: CPT

## 2023-04-27 PROCEDURE — 2580000003 HC RX 258: Performed by: PHYSICIAN ASSISTANT

## 2023-04-27 PROCEDURE — 93010 ELECTROCARDIOGRAM REPORT: CPT | Performed by: FAMILY MEDICINE

## 2023-04-27 PROCEDURE — 71260 CT THORAX DX C+: CPT

## 2023-04-27 PROCEDURE — G0480 DRUG TEST DEF 1-7 CLASSES: HCPCS

## 2023-04-27 PROCEDURE — 93005 ELECTROCARDIOGRAM TRACING: CPT | Performed by: PHYSICIAN ASSISTANT

## 2023-04-27 PROCEDURE — 85025 COMPLETE CBC W/AUTO DIFF WBC: CPT

## 2023-04-27 PROCEDURE — 72125 CT NECK SPINE W/O DYE: CPT

## 2023-04-27 PROCEDURE — 99285 EMERGENCY DEPT VISIT HI MDM: CPT

## 2023-04-27 PROCEDURE — 84484 ASSAY OF TROPONIN QUANT: CPT

## 2023-04-27 PROCEDURE — 83605 ASSAY OF LACTIC ACID: CPT

## 2023-04-27 PROCEDURE — 82550 ASSAY OF CK (CPK): CPT

## 2023-04-27 PROCEDURE — 70450 CT HEAD/BRAIN W/O DYE: CPT

## 2023-04-27 RX ORDER — 0.9 % SODIUM CHLORIDE 0.9 %
1000 INTRAVENOUS SOLUTION INTRAVENOUS ONCE
Status: COMPLETED | OUTPATIENT
Start: 2023-04-27 | End: 2023-04-27

## 2023-04-27 RX ADMIN — IOPAMIDOL 75 ML: 755 INJECTION, SOLUTION INTRAVENOUS at 15:47

## 2023-04-27 RX ADMIN — SODIUM CHLORIDE 1000 ML: 9 INJECTION, SOLUTION INTRAVENOUS at 15:23

## 2023-04-27 ASSESSMENT — PAIN - FUNCTIONAL ASSESSMENT: PAIN_FUNCTIONAL_ASSESSMENT: NONE - DENIES PAIN

## 2023-04-27 NOTE — ED PROVIDER NOTES
[address]      Pt Name: Avila Kwok  MRN: [id number]  [place] 1955  Date of evaluation: 4/27/2023  Provider: Estelita Ohara PA-C    CHIEF COMPLAINT       Chief Complaint   Patient presents with    Fall     Pt arrives via EMS with c/o fall with possible head injury. Pt states he has consumed alcohol today and that he was in his garage \"and the next thing I knew, I was on the ground. \"  Pt states he was on the floor of his garage for 2 hours prior to EMS being called. HISTORY OF PRESENT ILLNESS      Avila Kwok is a 69 y.o. male who presents to the emergency department via EMS after falling at home. Patient was also down for 2 hours until EMS arrived. He states that he felt weak and could not get off the ground. He denies any pain whatsoever. He denies any chest pain, abdominal pain, nausea, vomiting, double or blurry vision. Patient states that he did have a couple shots of vodka along with a beer today. His wife states that he drinks heavily daily. Patient takes Xarelto due to A-fib. He states that he is compliant with his medication  He does not recall hitting his head, but there is some erythema to the right side of the forehead. There is no broken skin or hematomas noted. Patient was able to walk after being lifted by EMS, but states that he just felt weak after falling down.   Patient was placed in c-collar and transported to the emergency room        REVIEW OF SYSTEMS       Review of Systems   AS STATED IN HPI      PAST MEDICAL HISTORY     Past Medical History:   Diagnosis Date    Achilles tendon tear 2013    Allergic rhinitis     Atrial flutter ([address] 1996    Congestive heart failure ([address]     Coronary atherosclerosis     Hyperglycemia 2007    Hyperlipidemia 1996    Hypertension 2004    Mitral insufficiency     Obesity 2006    Osteoarthritis     Status post ablation of atrial flutter 05/09/13    Vasodepressor syncope 2001

## 2023-04-27 NOTE — ED NOTES
Patient presents to ED after a fall PTA and laying on concrete floor for atleast 2 hrs, found by wife and squad brought into ED. Patient does not know how he fell and does not know if he hit his head but acknowledges that ETOH was ingested today. Patient is in C-collar with red, raised area to right forehead and states \"I just couldn't get up\". Denies pain, states \"My hands are so cold\". Wife in room with patient at this time.      Faiza London RN  04/27/23 [id number]

## 2023-04-27 NOTE — ED NOTES
Patient ambulated to restroom with no complaints of dizziness or lightheadedness and with slight limp that is normal for patient as stated by wife and patient.      Jose Danielson RN  04/27/23 [id number]

## 2023-04-27 NOTE — ED NOTES
Patient provided with turkey sandwich and apple juice to help with blood sugar.      Kurtis Garcia RN  04/27/23 [id number]

## 2023-04-30 ENCOUNTER — HOSPITAL ENCOUNTER (EMERGENCY)
Age: 68
Discharge: HOME | End: 2023-04-30
Attending: EMERGENCY MEDICINE
Payer: MEDICARE

## 2023-04-30 ENCOUNTER — APPOINTMENT (OUTPATIENT)
Dept: GENERAL RADIOLOGY | Age: 68
End: 2023-04-30
Payer: MEDICARE

## 2023-04-30 ENCOUNTER — APPOINTMENT (OUTPATIENT)
Dept: CT IMAGING | Age: 68
End: 2023-04-30
Payer: MEDICARE

## 2023-04-30 VITALS
RESPIRATION RATE: 18 BRPM | DIASTOLIC BLOOD PRESSURE: 106 MMHG | WEIGHT: 223 LBS | OXYGEN SATURATION: 97 % | TEMPERATURE: 97.7 F | BODY MASS INDEX: 31.22 KG/M2 | HEART RATE: 67 BPM | HEIGHT: 71 IN | SYSTOLIC BLOOD PRESSURE: 130 MMHG

## 2023-04-30 DIAGNOSIS — M25.421 EFFUSION OF BURSA OF RIGHT ELBOW: Primary | ICD-10-CM

## 2023-04-30 DIAGNOSIS — M79.601 RIGHT ARM PAIN: ICD-10-CM

## 2023-04-30 PROCEDURE — 6370000000 HC RX 637 (ALT 250 FOR IP): Performed by: EMERGENCY MEDICINE

## 2023-04-30 PROCEDURE — 73090 X-RAY EXAM OF FOREARM: CPT

## 2023-04-30 PROCEDURE — 6370000000 HC RX 637 (ALT 250 FOR IP): Performed by: STUDENT IN AN ORGANIZED HEALTH CARE EDUCATION/TRAINING PROGRAM

## 2023-04-30 PROCEDURE — 73200 CT UPPER EXTREMITY W/O DYE: CPT

## 2023-04-30 PROCEDURE — 73080 X-RAY EXAM OF ELBOW: CPT

## 2023-04-30 PROCEDURE — 99284 EMERGENCY DEPT VISIT MOD MDM: CPT

## 2023-04-30 PROCEDURE — 73060 X-RAY EXAM OF HUMERUS: CPT

## 2023-04-30 RX ORDER — HYDROCODONE BITARTRATE AND ACETAMINOPHEN 5; 325 MG/1; MG/1
1 TABLET ORAL ONCE
Status: COMPLETED | OUTPATIENT
Start: 2023-04-30 | End: 2023-04-30

## 2023-04-30 RX ORDER — IBUPROFEN 600 MG/1
600 TABLET ORAL EVERY 6 HOURS PRN
Qty: 120 TABLET | Refills: 0 | Status: SHIPPED | OUTPATIENT
Start: 2023-04-30

## 2023-04-30 RX ORDER — HYDROCODONE BITARTRATE AND ACETAMINOPHEN 5; 325 MG/1; MG/1
1 TABLET ORAL EVERY 6 HOURS PRN
Qty: 12 TABLET | Refills: 0 | Status: SHIPPED | OUTPATIENT
Start: 2023-04-30 | End: 2023-05-03

## 2023-04-30 RX ADMIN — HYDROCODONE BITARTRATE AND ACETAMINOPHEN 1 TABLET: 5; 325 TABLET ORAL at 06:54

## 2023-04-30 RX ADMIN — HYDROCODONE BITARTRATE AND ACETAMINOPHEN 1 TABLET: 5; 325 TABLET ORAL at 09:04

## 2023-04-30 ASSESSMENT — PAIN DESCRIPTION - ORIENTATION
ORIENTATION: RIGHT
ORIENTATION: RIGHT

## 2023-04-30 ASSESSMENT — PAIN DESCRIPTION - LOCATION
LOCATION: ARM;ELBOW
LOCATION: SHOULDER;ARM

## 2023-04-30 ASSESSMENT — PAIN SCALES - GENERAL
PAINLEVEL_OUTOF10: 10
PAINLEVEL_OUTOF10: 7

## 2023-04-30 ASSESSMENT — PAIN - FUNCTIONAL ASSESSMENT: PAIN_FUNCTIONAL_ASSESSMENT: 0-10

## 2023-05-01 ENCOUNTER — OFFICE VISIT (OUTPATIENT)
Dept: PRIMARY CARE CLINIC | Age: 68
End: 2023-05-01
Payer: MEDICARE

## 2023-05-01 VITALS
HEIGHT: 71 IN | SYSTOLIC BLOOD PRESSURE: 110 MMHG | WEIGHT: 227 LBS | BODY MASS INDEX: 31.78 KG/M2 | DIASTOLIC BLOOD PRESSURE: 62 MMHG

## 2023-05-01 DIAGNOSIS — M25.521 RIGHT ELBOW PAIN: ICD-10-CM

## 2023-05-01 DIAGNOSIS — R55 SYNCOPE, UNSPECIFIED SYNCOPE TYPE: Primary | ICD-10-CM

## 2023-05-01 PROCEDURE — 3074F SYST BP LT 130 MM HG: CPT | Performed by: STUDENT IN AN ORGANIZED HEALTH CARE EDUCATION/TRAINING PROGRAM

## 2023-05-01 PROCEDURE — 1123F ACP DISCUSS/DSCN MKR DOCD: CPT | Performed by: STUDENT IN AN ORGANIZED HEALTH CARE EDUCATION/TRAINING PROGRAM

## 2023-05-01 PROCEDURE — 3017F COLORECTAL CA SCREEN DOC REV: CPT | Performed by: STUDENT IN AN ORGANIZED HEALTH CARE EDUCATION/TRAINING PROGRAM

## 2023-05-01 PROCEDURE — 99214 OFFICE O/P EST MOD 30 MIN: CPT | Performed by: STUDENT IN AN ORGANIZED HEALTH CARE EDUCATION/TRAINING PROGRAM

## 2023-05-01 PROCEDURE — 1036F TOBACCO NON-USER: CPT | Performed by: STUDENT IN AN ORGANIZED HEALTH CARE EDUCATION/TRAINING PROGRAM

## 2023-05-01 PROCEDURE — G8427 DOCREV CUR MEDS BY ELIG CLIN: HCPCS | Performed by: STUDENT IN AN ORGANIZED HEALTH CARE EDUCATION/TRAINING PROGRAM

## 2023-05-01 PROCEDURE — 3078F DIAST BP <80 MM HG: CPT | Performed by: STUDENT IN AN ORGANIZED HEALTH CARE EDUCATION/TRAINING PROGRAM

## 2023-05-01 PROCEDURE — G8417 CALC BMI ABV UP PARAM F/U: HCPCS | Performed by: STUDENT IN AN ORGANIZED HEALTH CARE EDUCATION/TRAINING PROGRAM

## 2023-05-01 NOTE — PROGRESS NOTES
[address], [address] , [facility], [address] is a 69 y.o. male with  has a past medical history of Achilles tendon tear, Allergic rhinitis, Atrial flutter ([address] Congestive heart failure ([address] Coronary atherosclerosis, Hyperglycemia, Hyperlipidemia, Hypertension, Mitral insufficiency, Obesity, Osteoarthritis, Status post ablation of atrial flutter, and Vasodepressor syncope. Presented to the office today for:  Chief Complaint   Patient presents with    ED Follow-up    Fall    Elbow Injury       Assessment/Plan   1. Syncope, unspecified syncope type  -     US CAROTID ARTERY BILATERAL; Future  -     ECHO Complete 2D W Doppler W Color; Future  -     Tilt Table Test  -     Longterm Continuous Cardiac Event Monitor; Future  2. Right elbow pain  Return in about 1 month (around 6/1/2023) for F/U Meds/Syncope. We discussed some of the etiologies and natural histories. We discussed the various treatment alternatives including anti-inflammatory medications, physical therapy, injections, further imaging studies and as a last resort surgery. He is scheduled with  for repeat eval in 2 week's time. Given the episode of syncope - Plan to obtain further imaging, Cardiac w/u, ECHO, CAM, Tilt Table test, US Carotid. Prior HR was reviewed, in the 40s at times and he takes Toprol XL. All patient questions answered. Pt voiced understanding. There are no discontinued medications. Patient received counseling on the following healthy behaviors: nutrition, exercise and medication adherence. I encouraged and discussed lifestyle modifications including diet and exercise and the patient was agreeable to making positive/beneficial changes to both to help improve their overall health. Discussed use, benefit, and side effects of prescribed medications. Barriers to medication compliance addressed.  Patient given educational materials: see patient

## 2023-05-16 NOTE — ED NOTES
Ice pack applied to right foot     Yenny Ly RN  07/06/21 [address]127 Opioid Pregnancy And Lactation Text: These medications can lead to premature delivery and should be avoided during pregnancy. These medications are also present in breast milk in small amounts.

## 2023-05-18 ENCOUNTER — HOSPITAL ENCOUNTER (OUTPATIENT)
Dept: NON INVASIVE DIAGNOSTICS | Age: 68
Discharge: HOME | End: 2023-05-18
Payer: MEDICARE

## 2023-05-18 ENCOUNTER — OFFICE VISIT (OUTPATIENT)
Dept: CARDIOLOGY | Age: 68
End: 2023-05-18
Payer: MEDICARE

## 2023-05-18 ENCOUNTER — HOSPITAL ENCOUNTER (OUTPATIENT)
Dept: VASCULAR LAB | Age: 68
Discharge: HOME | End: 2023-05-20
Payer: MEDICARE

## 2023-05-18 VITALS
OXYGEN SATURATION: 98 % | WEIGHT: 226.8 LBS | BODY MASS INDEX: 31.75 KG/M2 | HEIGHT: 71 IN | DIASTOLIC BLOOD PRESSURE: 70 MMHG | HEART RATE: 40 BPM | SYSTOLIC BLOOD PRESSURE: 131 MMHG | RESPIRATION RATE: 18 BRPM

## 2023-05-18 DIAGNOSIS — R55 RECURRENT SYNCOPE: ICD-10-CM

## 2023-05-18 DIAGNOSIS — I10 ESSENTIAL HYPERTENSION: ICD-10-CM

## 2023-05-18 DIAGNOSIS — I48.20 CHRONIC ATRIAL FIBRILLATION (HCC): Primary | ICD-10-CM

## 2023-05-18 DIAGNOSIS — R55 SYNCOPE, UNSPECIFIED SYNCOPE TYPE: ICD-10-CM

## 2023-05-18 DIAGNOSIS — D68.69 SECONDARY HYPERCOAGULABLE STATE (HCC): ICD-10-CM

## 2023-05-18 DIAGNOSIS — F10.11 HISTORY OF ALCOHOL ABUSE: ICD-10-CM

## 2023-05-18 DIAGNOSIS — E78.2 MIXED HYPERLIPIDEMIA: ICD-10-CM

## 2023-05-18 LAB
EKG ATRIAL RATE: 31 BPM
EKG Q-T INTERVAL: 518 MS
EKG QRS DURATION: 94 MS
EKG QTC CALCULATION (BAZETT): 416 MS
EKG R AXIS: 53 DEGREES
EKG T AXIS: 44 DEGREES
EKG VENTRICULAR RATE: 39 BPM

## 2023-05-18 PROCEDURE — 93306 TTE W/DOPPLER COMPLETE: CPT

## 2023-05-18 PROCEDURE — 93005 ELECTROCARDIOGRAM TRACING: CPT | Performed by: INTERNAL MEDICINE

## 2023-05-18 PROCEDURE — 93242 EXT ECG>48HR<7D RECORDING: CPT

## 2023-05-18 PROCEDURE — 99211 OFF/OP EST MAY X REQ PHY/QHP: CPT | Performed by: INTERNAL MEDICINE

## 2023-05-18 PROCEDURE — 93243 EXT ECG>48HR<7D SCAN A/R: CPT

## 2023-05-18 RX ORDER — LISINOPRIL 20 MG/1
TABLET ORAL
Qty: 90 TABLET | Refills: 3 | Status: SHIPPED | OUTPATIENT
Start: 2023-05-18

## 2023-05-18 RX ORDER — METOPROLOL SUCCINATE 25 MG/1
TABLET, EXTENDED RELEASE ORAL
Qty: 30 TABLET | Refills: 3 | Status: SHIPPED | OUTPATIENT
Start: 2023-05-18

## 2023-05-18 RX ORDER — AMLODIPINE BESYLATE 10 MG/1
TABLET ORAL
Qty: 90 TABLET | Refills: 3 | Status: SHIPPED | OUTPATIENT
Start: 2023-05-18

## 2023-05-18 NOTE — PROGRESS NOTES
Severe bradycardia seen throughout the echocardiogram. Notified Dr. Shea Billings immediately with findings. Tilt study not needed at this time per Dr. Shea Billings. Dr. Shea Billings will assess the patient today. Walked the patient over to Dr. Aracely Curran office.

## 2023-05-18 NOTE — PATIENT INSTRUCTIONS
SURVEY:    You may be receiving a survey from [employer] regarding your visit today. Please complete the survey to enable us to provide the highest quality of care to you and your family. If you cannot score us a very good on any question, please call the office to discuss how we could have made your experience a very good one. Thank you.

## 2023-05-22 ENCOUNTER — OFFICE VISIT (OUTPATIENT)
Dept: CARDIOLOGY | Age: 68
End: 2023-05-22
Payer: MEDICARE

## 2023-05-22 VITALS
SYSTOLIC BLOOD PRESSURE: 131 MMHG | HEART RATE: 55 BPM | DIASTOLIC BLOOD PRESSURE: 63 MMHG | BODY MASS INDEX: 31.42 KG/M2 | HEIGHT: 72 IN | RESPIRATION RATE: 18 BRPM | WEIGHT: 232 LBS | OXYGEN SATURATION: 98 %

## 2023-05-22 DIAGNOSIS — Z87.898 HISTORY OF SYNCOPE: Primary | ICD-10-CM

## 2023-05-22 DIAGNOSIS — I48.20 CHRONIC A-FIB (HCC): ICD-10-CM

## 2023-05-22 DIAGNOSIS — E78.2 MIXED HYPERLIPIDEMIA: ICD-10-CM

## 2023-05-22 DIAGNOSIS — Z95.2 MITRAL VALVE REPLACED: ICD-10-CM

## 2023-05-22 DIAGNOSIS — Z79.01 CHRONIC ANTICOAGULATION: ICD-10-CM

## 2023-05-22 DIAGNOSIS — R00.1 BRADYCARDIA: ICD-10-CM

## 2023-05-22 DIAGNOSIS — I10 ESSENTIAL HYPERTENSION: ICD-10-CM

## 2023-05-22 PROCEDURE — G8417 CALC BMI ABV UP PARAM F/U: HCPCS | Performed by: INTERNAL MEDICINE

## 2023-05-22 PROCEDURE — 99211 OFF/OP EST MAY X REQ PHY/QHP: CPT | Performed by: INTERNAL MEDICINE

## 2023-05-22 PROCEDURE — 3078F DIAST BP <80 MM HG: CPT | Performed by: INTERNAL MEDICINE

## 2023-05-22 PROCEDURE — 1123F ACP DISCUSS/DSCN MKR DOCD: CPT | Performed by: INTERNAL MEDICINE

## 2023-05-22 PROCEDURE — 99214 OFFICE O/P EST MOD 30 MIN: CPT | Performed by: INTERNAL MEDICINE

## 2023-05-22 PROCEDURE — 3075F SYST BP GE 130 - 139MM HG: CPT | Performed by: INTERNAL MEDICINE

## 2023-05-22 PROCEDURE — G8427 DOCREV CUR MEDS BY ELIG CLIN: HCPCS | Performed by: INTERNAL MEDICINE

## 2023-05-22 PROCEDURE — 3017F COLORECTAL CA SCREEN DOC REV: CPT | Performed by: INTERNAL MEDICINE

## 2023-05-22 PROCEDURE — 1036F TOBACCO NON-USER: CPT | Performed by: INTERNAL MEDICINE

## 2023-05-22 NOTE — PATIENT INSTRUCTIONS
SURVEY:    You may be receiving a survey from [employer] regarding your visit today. Please complete the survey to enable us to provide the highest quality of care to you and your family. If you cannot score us a very good on any question, please call the office to discuss how we could have made your experience a very good one. Thank you.

## 2023-05-22 NOTE — PROGRESS NOTES
mucosa with evidence of ischemic injury,angiodysplasia no active bleeding,divetrticulosis,hemorroids    COLONOSCOPY N/A 3/25/2019    COLONOSCOPY POLYPECTOMY SNARE/COLD BIOPSY performed by Angi Lindquist MD at [address] at the [facility] N/A 2022    COLONOSCOPY POLYPECTOMY SNARE/COLD BIOPSY performed by Mauro Longoria MD at [facility]      right side of face    INTRACAPSULAR CATARACT EXTRACTION Right 10/25/2021    EYE CATARACT EMULSIFICATION IOL IMPLANT performed by Kit Henry DO at [place] [address] Right 2006    medial meniscus arthroscopy, James Hillock    KNEE SURGERY Left     MITRAL VALVE SURGERY  11    34 mm ring annuloplasty    OTHER SURGICAL HISTORY Left 2020    LASER ABLATION GREATER SAPHENOUIS VEIN WITH PHLEBECTOMY - EVOLV     MA ARTHROSCOPY KNEE W/MENISCUS RPR MEDIAL/LATERAL Left 2018    KNEE ARTHROSCOPY-PARTIAL MEDIAL MENISCECTOMY AND CHONDROPLASTY performed by Kit Durand MD at [facility] INSJ IO LENS PROSTH W/O ECP Left 2017    EYE CATARACT EMULSIFICATION IOL IMPLANT performed by Kit Henry DO at [address] [facility] 2019    -hiatal hernia,grade 3 reflux esophagitis,antral gastritis, erosive bulbar duodenitis    VEIN SURGERY Left 2020    LASER ABLATION GREATER SAPHENOUIS VEIN WITH PHLEBECTOMY Yoly Rere CONF# [id number] [facility]) performed by Sukhjinder Julian MD at [address] History:  Social History     Tobacco Use    Smoking status: Former     Packs/day: 1.50     Years: 20.00     Pack years: 30.00     Types: Cigarettes     Quit date: 1996     Years since quittin.5    Smokeless tobacco: Never   Vaping Use    Vaping Use: Never used   Substance Use Topics    Alcohol use:  Yes     Alcohol/week: 3.0 standard drinks     Types: 3 Shots of liquor per week     Comment: 4 beers a day     Drug use: No        CURRENT MEDICATIONS:        Outpatient Medications Marked

## 2023-06-01 ENCOUNTER — OFFICE VISIT (OUTPATIENT)
Dept: PRIMARY CARE CLINIC | Age: 68
End: 2023-06-01
Payer: MEDICARE

## 2023-06-01 VITALS
WEIGHT: 225 LBS | BODY MASS INDEX: 30.48 KG/M2 | DIASTOLIC BLOOD PRESSURE: 64 MMHG | SYSTOLIC BLOOD PRESSURE: 122 MMHG | HEIGHT: 72 IN | HEART RATE: 68 BPM

## 2023-06-01 DIAGNOSIS — R55 SYNCOPE, UNSPECIFIED SYNCOPE TYPE: ICD-10-CM

## 2023-06-01 DIAGNOSIS — I10 ESSENTIAL HYPERTENSION: Primary | ICD-10-CM

## 2023-06-01 PROCEDURE — 3074F SYST BP LT 130 MM HG: CPT | Performed by: STUDENT IN AN ORGANIZED HEALTH CARE EDUCATION/TRAINING PROGRAM

## 2023-06-01 PROCEDURE — 3078F DIAST BP <80 MM HG: CPT | Performed by: STUDENT IN AN ORGANIZED HEALTH CARE EDUCATION/TRAINING PROGRAM

## 2023-06-01 PROCEDURE — 1036F TOBACCO NON-USER: CPT | Performed by: STUDENT IN AN ORGANIZED HEALTH CARE EDUCATION/TRAINING PROGRAM

## 2023-06-01 PROCEDURE — G8417 CALC BMI ABV UP PARAM F/U: HCPCS | Performed by: STUDENT IN AN ORGANIZED HEALTH CARE EDUCATION/TRAINING PROGRAM

## 2023-06-01 PROCEDURE — 1123F ACP DISCUSS/DSCN MKR DOCD: CPT | Performed by: STUDENT IN AN ORGANIZED HEALTH CARE EDUCATION/TRAINING PROGRAM

## 2023-06-01 PROCEDURE — 3017F COLORECTAL CA SCREEN DOC REV: CPT | Performed by: STUDENT IN AN ORGANIZED HEALTH CARE EDUCATION/TRAINING PROGRAM

## 2023-06-01 PROCEDURE — 99213 OFFICE O/P EST LOW 20 MIN: CPT | Performed by: STUDENT IN AN ORGANIZED HEALTH CARE EDUCATION/TRAINING PROGRAM

## 2023-06-01 PROCEDURE — G8427 DOCREV CUR MEDS BY ELIG CLIN: HCPCS | Performed by: STUDENT IN AN ORGANIZED HEALTH CARE EDUCATION/TRAINING PROGRAM

## 2023-06-01 NOTE — PROGRESS NOTES
02/21/2023    AST 17 02/21/2023     04/27/2023    K 4.0 04/27/2023     04/27/2023    CREATININE 0.63 (L) 04/27/2023    BUN 17 04/27/2023    CO2 21 04/27/2023    TSH 1.18 04/03/2022    PSA 2.03 11/12/2021    INR 1.6 04/02/2022    LABA1C 5.4 02/21/2023     Lab Results   Component Value Date    CALCIUM 9.3 04/27/2023     Lab Results   Component Value Date    LDLCALC 60 02/21/2023    LDLCHOLESTEROL 69 11/12/2021       Please note that this chart was generated using voice recognition Dragon dictation software. Although every effort was made to ensure the accuracy of this automated transcription, some errors in transcription may have occurred.     Electronically signed by Dr. Neftali Watkins MD on 6/1/2023 at 9:21 AM

## 2023-06-05 ENCOUNTER — OFFICE VISIT (OUTPATIENT)
Dept: GASTROENTEROLOGY | Age: 68
End: 2023-06-05
Payer: MEDICARE

## 2023-06-05 VITALS
HEART RATE: 52 BPM | WEIGHT: 222.2 LBS | DIASTOLIC BLOOD PRESSURE: 88 MMHG | BODY MASS INDEX: 30.1 KG/M2 | SYSTOLIC BLOOD PRESSURE: 141 MMHG | HEIGHT: 72 IN

## 2023-06-05 DIAGNOSIS — K64.9 HEMORRHOIDS, UNSPECIFIED HEMORRHOID TYPE: Primary | ICD-10-CM

## 2023-06-05 PROCEDURE — 99213 OFFICE O/P EST LOW 20 MIN: CPT | Performed by: INTERNAL MEDICINE

## 2023-06-05 PROCEDURE — 1036F TOBACCO NON-USER: CPT | Performed by: INTERNAL MEDICINE

## 2023-06-05 PROCEDURE — 1123F ACP DISCUSS/DSCN MKR DOCD: CPT | Performed by: INTERNAL MEDICINE

## 2023-06-05 PROCEDURE — G8427 DOCREV CUR MEDS BY ELIG CLIN: HCPCS | Performed by: INTERNAL MEDICINE

## 2023-06-05 PROCEDURE — 3079F DIAST BP 80-89 MM HG: CPT | Performed by: INTERNAL MEDICINE

## 2023-06-05 PROCEDURE — 3017F COLORECTAL CA SCREEN DOC REV: CPT | Performed by: INTERNAL MEDICINE

## 2023-06-05 PROCEDURE — 3077F SYST BP >= 140 MM HG: CPT | Performed by: INTERNAL MEDICINE

## 2023-06-05 PROCEDURE — G8417 CALC BMI ABV UP PARAM F/U: HCPCS | Performed by: INTERNAL MEDICINE

## 2023-06-05 NOTE — PROGRESS NOTES
GI FOLLOW UP    INTERVAL HISTORY:       No referring provider defined for this encounter. Chief Complaint   Patient presents with    Follow-up     Hemorrhoids        1. Hemorrhoids, unspecified hemorrhoid type          HISTORY OF PRESENT ILLNESS: Giacomo De La Cruz is a 69 y.o. male with a past history remarkable for atrial flutter, congestive heart failure, CAD, hypertension, hyperlipidemia, obesity, cataracts, recent admission to [place] in April 2022 for rectal bleeding, referred for evaluation of rectal bleeding. Now resolved. Currently on Canasa 1 g nightly. Concern for UC proctitis. Denies any active GI symptoms currently. Past Medical,Family, and Social History reviewed and does contribute to the patient presenting condition. Patient's PMH/PSH,SH,PSYCH Hx, MEDs, ALLERGIES, and ROS were all reviewed and updated in the appropriate sections.     PAST MEDICAL HISTORY:  Past Medical History:   Diagnosis Date    Achilles tendon tear 2013    Allergic rhinitis     Atrial flutter ([address] 1996    Congestive heart failure ([address]     Coronary atherosclerosis     Hyperglycemia 2007    Hyperlipidemia 1996    Hypertension 2004    Mitral insufficiency     Obesity 2006    Osteoarthritis     Status post ablation of atrial flutter 05/09/13    Vasodepressor syncope 2001       Past Surgical History:   Procedure Laterality Date    [facility], 2013    [facility]    CATARACT REMOVAL Bilateral     CATARACT REMOVAL WITH IMPLANT  12/06/2017    Dr. Mayer Simmonds Right 10/25/2021    Dr. Bebo Doherty    COLONOSCOPY  02/01/2010    [place], negative    COLONOSCOPY  03/25/2019    Dr. Sowmya Roman rectal bx(hyperplastic mucosa with evidence of ischemic injury,angiodysplasia no active bleeding,divetrticulosis,hemorroids    COLONOSCOPY N/A 3/25/2019    COLONOSCOPY

## 2023-06-23 ENCOUNTER — OFFICE VISIT (OUTPATIENT)
Dept: CARDIOLOGY | Age: 68
End: 2023-06-23
Payer: MEDICARE

## 2023-06-23 VITALS
BODY MASS INDEX: 30.48 KG/M2 | DIASTOLIC BLOOD PRESSURE: 73 MMHG | SYSTOLIC BLOOD PRESSURE: 110 MMHG | WEIGHT: 225 LBS | HEIGHT: 72 IN | RESPIRATION RATE: 18 BRPM | HEART RATE: 62 BPM

## 2023-06-23 DIAGNOSIS — Z79.01 CHRONIC ANTICOAGULATION: ICD-10-CM

## 2023-06-23 DIAGNOSIS — Z87.898 HISTORY OF SYNCOPE: Primary | ICD-10-CM

## 2023-06-23 DIAGNOSIS — I48.20 CHRONIC A-FIB (HCC): ICD-10-CM

## 2023-06-23 DIAGNOSIS — Z95.2 MITRAL VALVE REPLACED: ICD-10-CM

## 2023-06-23 DIAGNOSIS — I10 ESSENTIAL HYPERTENSION: ICD-10-CM

## 2023-06-23 DIAGNOSIS — E78.2 MIXED HYPERLIPIDEMIA: ICD-10-CM

## 2023-06-23 PROCEDURE — 99211 OFF/OP EST MAY X REQ PHY/QHP: CPT | Performed by: INTERNAL MEDICINE

## 2023-06-23 RX ORDER — LOVASTATIN 40 MG/1
TABLET ORAL
COMMUNITY

## 2023-06-23 NOTE — PATIENT INSTRUCTIONS
SURVEY:    You may be receiving a survey from [employer] regarding your visit today. Please complete the survey to enable us to provide the highest quality of care to you and your family. If you cannot score us a very good on any question, please call the office to discuss how we could have made your experience a very good one. Thank you.

## 2023-07-03 ENCOUNTER — OFFICE VISIT (OUTPATIENT)
Dept: PRIMARY CARE CLINIC | Age: 68
End: 2023-07-03
Payer: MEDICARE

## 2023-07-03 VITALS
DIASTOLIC BLOOD PRESSURE: 74 MMHG | SYSTOLIC BLOOD PRESSURE: 102 MMHG | OXYGEN SATURATION: 100 % | HEART RATE: 49 BPM | BODY MASS INDEX: 30.46 KG/M2 | WEIGHT: 224.6 LBS | RESPIRATION RATE: 18 BRPM

## 2023-07-03 DIAGNOSIS — I10 ESSENTIAL HYPERTENSION: Primary | ICD-10-CM

## 2023-07-03 PROCEDURE — 1123F ACP DISCUSS/DSCN MKR DOCD: CPT | Performed by: STUDENT IN AN ORGANIZED HEALTH CARE EDUCATION/TRAINING PROGRAM

## 2023-07-03 PROCEDURE — G8427 DOCREV CUR MEDS BY ELIG CLIN: HCPCS | Performed by: STUDENT IN AN ORGANIZED HEALTH CARE EDUCATION/TRAINING PROGRAM

## 2023-07-03 PROCEDURE — 3017F COLORECTAL CA SCREEN DOC REV: CPT | Performed by: STUDENT IN AN ORGANIZED HEALTH CARE EDUCATION/TRAINING PROGRAM

## 2023-07-03 PROCEDURE — 99213 OFFICE O/P EST LOW 20 MIN: CPT | Performed by: STUDENT IN AN ORGANIZED HEALTH CARE EDUCATION/TRAINING PROGRAM

## 2023-07-03 PROCEDURE — 3078F DIAST BP <80 MM HG: CPT | Performed by: STUDENT IN AN ORGANIZED HEALTH CARE EDUCATION/TRAINING PROGRAM

## 2023-07-03 PROCEDURE — 3074F SYST BP LT 130 MM HG: CPT | Performed by: STUDENT IN AN ORGANIZED HEALTH CARE EDUCATION/TRAINING PROGRAM

## 2023-07-03 PROCEDURE — G8417 CALC BMI ABV UP PARAM F/U: HCPCS | Performed by: STUDENT IN AN ORGANIZED HEALTH CARE EDUCATION/TRAINING PROGRAM

## 2023-07-03 PROCEDURE — 1036F TOBACCO NON-USER: CPT | Performed by: STUDENT IN AN ORGANIZED HEALTH CARE EDUCATION/TRAINING PROGRAM

## 2023-07-03 PROCEDURE — 99211 OFF/OP EST MAY X REQ PHY/QHP: CPT | Performed by: STUDENT IN AN ORGANIZED HEALTH CARE EDUCATION/TRAINING PROGRAM

## 2023-07-03 NOTE — PATIENT INSTRUCTIONS
SURVEY:    You may be receiving a survey from [employer] regarding your visit today. Please complete the survey to enable us to provide the highest quality of care to you and your family. If you cannot score us a very good on any question, please call the office to discuss how we could have made your experience a very good one. Thank you.

## 2023-07-03 NOTE — PROGRESS NOTES
[address], Dc [address] [place], [place], [address] Suzette Childers is a 69 y.o. male with  has a past medical history of Achilles tendon tear, Allergic rhinitis, Atrial flutter ([address]), Congestive heart failure ([address]), Coronary atherosclerosis, Hyperglycemia, Hyperlipidemia, Hypertension, Mitral insufficiency, Obesity, Osteoarthritis, Status post ablation of atrial flutter, and Vasodepressor syncope. Presented to the office today for:  Chief Complaint   Patient presents with    Hypertension       Assessment/Plan   1. Essential hypertension  Return if symptoms worsen or fail to improve, for F/U Per prior plans. Continue w/ meds at the current doses  Monitor BP at home, if hypotensive, instructed to dec. Amlodipine to 5mg. All patient questions answered. Pt voiced understanding. Medications Discontinued During This Encounter   Medication Reason    mesalamine (CANASA) 1000 MG suppository LIST CLEANUP       Patient received counseling on the following healthy behaviors: nutrition, exercise and medication adherence. I encouraged and discussed lifestyle modifications including diet and exercise and the patient was agreeable to making positive/beneficial changes to both to help improve their overall health. Discussed use, benefit, and side effects of prescribed medications. Barriers to medication compliance addressed. Patient given educational materials: see patient instructions. HM - HM items completed today as per orders. Outstanding HM items though not limited to immunizations were discussed with the patient today, including risks, benefits and alternatives. The patient will discuss these during the next appointment per their preference. If there are any worsening or concerning signs or symptoms, patient will report to the ED and/or contact EMS-[id number] for immediate evaluation. Teach back method was used.      Subjective:    HPI  Chief Complaint   Patient presents None

## 2023-08-16 DIAGNOSIS — I10 ESSENTIAL HYPERTENSION: ICD-10-CM

## 2023-08-16 DIAGNOSIS — E78.5 HYPERLIPIDEMIA, UNSPECIFIED HYPERLIPIDEMIA TYPE: ICD-10-CM

## 2023-08-16 RX ORDER — ROSUVASTATIN CALCIUM 40 MG/1
TABLET, COATED ORAL
Qty: 90 TABLET | Refills: 3 | Status: SHIPPED | OUTPATIENT
Start: 2023-08-16

## 2023-11-10 ENCOUNTER — OFFICE VISIT (OUTPATIENT)
Dept: PRIMARY CARE CLINIC | Age: 68
End: 2023-11-10
Payer: MEDICARE

## 2023-11-10 VITALS — DIASTOLIC BLOOD PRESSURE: 72 MMHG | SYSTOLIC BLOOD PRESSURE: 136 MMHG

## 2023-11-10 DIAGNOSIS — Z00.00 MEDICARE ANNUAL WELLNESS VISIT, SUBSEQUENT: Primary | ICD-10-CM

## 2023-11-10 PROCEDURE — 1123F ACP DISCUSS/DSCN MKR DOCD: CPT | Performed by: STUDENT IN AN ORGANIZED HEALTH CARE EDUCATION/TRAINING PROGRAM

## 2023-11-10 PROCEDURE — 3078F DIAST BP <80 MM HG: CPT | Performed by: STUDENT IN AN ORGANIZED HEALTH CARE EDUCATION/TRAINING PROGRAM

## 2023-11-10 PROCEDURE — G8484 FLU IMMUNIZE NO ADMIN: HCPCS | Performed by: STUDENT IN AN ORGANIZED HEALTH CARE EDUCATION/TRAINING PROGRAM

## 2023-11-10 PROCEDURE — G0439 PPPS, SUBSEQ VISIT: HCPCS | Performed by: STUDENT IN AN ORGANIZED HEALTH CARE EDUCATION/TRAINING PROGRAM

## 2023-11-10 PROCEDURE — 3075F SYST BP GE 130 - 139MM HG: CPT | Performed by: STUDENT IN AN ORGANIZED HEALTH CARE EDUCATION/TRAINING PROGRAM

## 2023-11-10 PROCEDURE — 3017F COLORECTAL CA SCREEN DOC REV: CPT | Performed by: STUDENT IN AN ORGANIZED HEALTH CARE EDUCATION/TRAINING PROGRAM

## 2023-11-10 RX ORDER — AMLODIPINE BESYLATE 5 MG/1
5 TABLET ORAL DAILY
COMMUNITY

## 2023-11-10 ASSESSMENT — PATIENT HEALTH QUESTIONNAIRE - PHQ9
DEPRESSION UNABLE TO ASSESS: PT REFUSES
SUM OF ALL RESPONSES TO PHQ QUESTIONS 1-9: 0
1. LITTLE INTEREST OR PLEASURE IN DOING THINGS: 0
2. FEELING DOWN, DEPRESSED OR HOPELESS: 0
SUM OF ALL RESPONSES TO PHQ9 QUESTIONS 1 & 2: 0

## 2023-11-10 ASSESSMENT — LIFESTYLE VARIABLES
HOW MANY STANDARD DRINKS CONTAINING ALCOHOL DO YOU HAVE ON A TYPICAL DAY: PATIENT DECLINED
HOW OFTEN DO YOU HAVE A DRINK CONTAINING ALCOHOL: NEVER

## 2023-11-10 NOTE — PROGRESS NOTES
[address]ie [address] Dr, Dc [address] [place] [address], [id number]      Medicare Annual Wellness Visit    Maciej Samson is here for Medicare AWV and Hypertension    Assessment & Plan   Medicare annual wellness visit, subsequent    Recommendations for Preventive Services Due: see orders and patient instructions/AVS.  Recommended screening schedule for the next 5-10 years is provided to the patient in written form: see Patient Instructions/AVS.    Continue w/ meds at the current doses, well tolerated  F/U with Cardiology per prior plans     Return in 4 months (on 3/10/2024) for F/U Meds/HTN. Subjective   The following acute and/or chronic problems were also addressed today:    Hypertension: Patient here for follow-up of elevated blood pressure. He is exercising some and is adherent to low salt diet. Blood pressure is well controlled at home. He has been taking Lisinopril, Metoprolol, Amlodipine. No dizziness/vertigo noted. Patient's complete Health Risk Assessment and screening values have been reviewed and are found in Flowsheets. The following problems were reviewed today and where indicated follow up appointments were made and/or referrals ordered. Positive Risk Factor Screenings with Interventions:        Depression:  Depression Unable to Assess: Pt refuses  PHQ-2 Score: 0  PHQ-9 Total Score: 0    Interpretation:   1-4 = minimal  5-9 = mild  10-14 = moderate  15-19 = moderately severe  20-27 = severe            Weight and Activity:  Physical Activity: Unknown (11/10/2023)    Exercise Vital Sign     Days of Exercise per Week: Patient refused     Minutes of Exercise per Session: Patient refused     On average, how many days per week do you engage in moderate to strenuous exercise (like a brisk walk)?: Patient refused     There is no height or weight on file to calculate BMI.  (!) Abnormal    Inactivity Interventions:  Patient declined any further interventions or

## 2023-12-10 PROBLEM — Z00.00 MEDICARE ANNUAL WELLNESS VISIT, SUBSEQUENT: Status: RESOLVED | Noted: 2023-11-10 | Resolved: 2023-12-10

## 2024-01-04 ENCOUNTER — OFFICE VISIT (OUTPATIENT)
Dept: CARDIOLOGY | Age: 69
End: 2024-01-04
Payer: MEDICARE

## 2024-01-04 VITALS
OXYGEN SATURATION: 100 % | WEIGHT: 220.4 LBS | RESPIRATION RATE: 18 BRPM | HEART RATE: 64 BPM | BODY MASS INDEX: 29.85 KG/M2 | DIASTOLIC BLOOD PRESSURE: 66 MMHG | SYSTOLIC BLOOD PRESSURE: 114 MMHG | HEIGHT: 72 IN

## 2024-01-04 DIAGNOSIS — Z79.01 CHRONIC ANTICOAGULATION: ICD-10-CM

## 2024-01-04 DIAGNOSIS — Z95.2 MITRAL VALVE REPLACED: ICD-10-CM

## 2024-01-04 DIAGNOSIS — E78.2 MIXED HYPERLIPIDEMIA: ICD-10-CM

## 2024-01-04 DIAGNOSIS — Z87.898 HISTORY OF SYNCOPE: Primary | ICD-10-CM

## 2024-01-04 DIAGNOSIS — I10 ESSENTIAL HYPERTENSION: ICD-10-CM

## 2024-01-04 DIAGNOSIS — I48.20 CHRONIC A-FIB (HCC): ICD-10-CM

## 2024-01-04 PROCEDURE — 99211 OFF/OP EST MAY X REQ PHY/QHP: CPT | Performed by: INTERNAL MEDICINE

## 2024-01-04 NOTE — PROGRESS NOTES
I, Ludy Olguin am scribing for and in the presence of Janay Rush MD, F.A.C.C..    Patient: Edouard De Dios  : 1955  Date of Visit: 2024    REASON FOR VISIT / CONSULTATION: Atrial Fibrillation (HX:CH AFib, syncope, HTN,HLD,mitral valve PT is here for 6 month follow up he states he is doing well denies:CP, sob, lightheaded/dizziness,palp )      History of Present Illness:        Dear Martínez Bourgeois MD    I had the pleasure of seeing Edouard De Dios today. Mr. De Dios is a 68 y.o. male who came to the office today because of a slow heart rate found during cardiac testing.    1-He has history of mitral valve repair many years ago.    2-History of atrial fibrillation status post ablation x2 by Dr. Jarvis.  He has cardioversion back in .  He is known to have atrial fibrillation with slow ventricular response for many years.  He has not seen Dr. Jarvis for several years.    3- No history of coronary artery disease, myocardial infarction or heart failure.    4-He previously had tremors but his Primidone helps tremendously with this.     5-He had been on iron therapy for the last year or so.     6- He has been having syncopal episodes, 3 episodes over the last year.  The first episode he thinks that he tripped and fell and injured his back and ribs.  He reported that the second episode he fell face down and had an nasal bone fracture.  He attributed those 2 episodes to excessive drinking.    7- In the emergency room on 2023 because of her syncopal episode.  As per the emergency room note, he was on the floor in his garage for about 2 hours.  He said he did not drink much of an alcohol that they.    8- He quit drinking alcohol after his last syncopal episode.      9- ECG on 2023: Atrial fibrillation with ventricular rate of 51 bpm.    10- ECG done on 2023: With ventricular rate of 39 bpm.  No acute ischemic changes.    11- On 2023 he was at Cardiopulmonary having an

## 2024-01-11 ENCOUNTER — HOSPITAL ENCOUNTER (OUTPATIENT)
Age: 69
Discharge: HOME | End: 2024-01-11
Attending: INTERNAL MEDICINE
Payer: MEDICARE

## 2024-01-11 ENCOUNTER — TELEPHONE (OUTPATIENT)
Dept: CARDIOLOGY | Age: 69
End: 2024-01-11

## 2024-01-11 ENCOUNTER — HOSPITAL ENCOUNTER (OUTPATIENT)
Age: 69
Discharge: HOME | End: 2024-01-13
Attending: INTERNAL MEDICINE
Payer: MEDICARE

## 2024-01-11 VITALS
SYSTOLIC BLOOD PRESSURE: 114 MMHG | HEIGHT: 72 IN | DIASTOLIC BLOOD PRESSURE: 66 MMHG | BODY MASS INDEX: 29.86 KG/M2 | WEIGHT: 220.46 LBS

## 2024-01-11 DIAGNOSIS — Z79.01 CHRONIC ANTICOAGULATION: ICD-10-CM

## 2024-01-11 DIAGNOSIS — Z87.898 HISTORY OF SYNCOPE: Primary | ICD-10-CM

## 2024-01-11 DIAGNOSIS — Z95.2 MITRAL VALVE REPLACED: ICD-10-CM

## 2024-01-11 DIAGNOSIS — E78.2 MIXED HYPERLIPIDEMIA: ICD-10-CM

## 2024-01-11 DIAGNOSIS — I10 ESSENTIAL HYPERTENSION: ICD-10-CM

## 2024-01-11 DIAGNOSIS — Z87.898 HISTORY OF SYNCOPE: ICD-10-CM

## 2024-01-11 DIAGNOSIS — I48.20 CHRONIC A-FIB (HCC): ICD-10-CM

## 2024-01-11 LAB
ANION GAP SERPL CALCULATED.3IONS-SCNC: 8 MMOL/L (ref 9–17)
BUN SERPL-MCNC: 9 MG/DL (ref 8–23)
BUN/CREAT SERPL: 11 (ref 9–20)
CALCIUM SERPL-MCNC: 9.5 MG/DL (ref 8.6–10.4)
CHLORIDE SERPL-SCNC: 102 MMOL/L (ref 98–107)
CHOLEST SERPL-MCNC: 123 MG/DL
CHOLESTEROL/HDL RATIO: 3.5
CO2 SERPL-SCNC: 28 MMOL/L (ref 20–31)
CREAT SERPL-MCNC: 0.8 MG/DL (ref 0.7–1.2)
ECHO AO ROOT DIAM: 2.4 CM
ECHO AO ROOT DIAM: 3.5 CM
ECHO AO ROOT DIAM: 3.5 CM
ECHO AV ACCELERATION TIME: 71.83 MS
ECHO AV CUSP MM: 1.6 CM
ECHO AV MEAN GRADIENT: 3 MMHG
ECHO AV MEAN VELOCITY: 0.8 M/S
ECHO AV PEAK VELOCITY: 1.2 M/S
ECHO AV REGURGITANT PHT: 727.9 MILLISECOND
ECHO AV VTI: 27.4 CM
ECHO BSA: 2.25 M2
ECHO EST RA PRESSURE: 3 MMHG
ECHO LA DIAMETER INDEX: 2.34 CM/M2
ECHO LA DIAMETER: 5.2 CM
ECHO LA MAJOR AXIS: 7.1 CM
ECHO LA VOL BP: 132 ML (ref 18–58)
ECHO LA VOL MOD A2C: 155 ML (ref 18–58)
ECHO LA VOL MOD A4C: 108 ML (ref 18–58)
ECHO LA VOL/BSA BIPLANE: 59 ML/M2 (ref 16–34)
ECHO LA VOLUME AREA LENGTH: 138 ML
ECHO LA VOLUME INDEX AREA LENGTH: 62 ML/M2 (ref 16–34)
ECHO LA VOLUME INDEX MOD A2C: 70 ML/M2 (ref 16–34)
ECHO LA VOLUME INDEX MOD A4C: 49 ML/M2 (ref 16–34)
ECHO LV E' LATERAL VELOCITY: 10 CM/S
ECHO LV EDV A2C: 91 ML
ECHO LV EDV A4C: 111 ML
ECHO LV EDV BP: 104 ML (ref 67–155)
ECHO LV EDV INDEX A4C: 50 ML/M2
ECHO LV EDV INDEX BP: 47 ML/M2
ECHO LV EDV NDEX A2C: 41 ML/M2
ECHO LV EJECTION FRACTION BIPLANE: 54 % (ref 55–100)
ECHO LV ESV A2C: 43 ML
ECHO LV ESV A4C: 50 ML
ECHO LV ESV BP: 47 ML (ref 22–58)
ECHO LV ESV INDEX A2C: 19 ML/M2
ECHO LV ESV INDEX A4C: 23 ML/M2
ECHO LV ESV INDEX BP: 21 ML/M2
ECHO LV FRACTIONAL SHORTENING: 27 % (ref 28–44)
ECHO LV INTERNAL DIMENSION DIASTOLE INDEX: 2.3 CM/M2
ECHO LV INTERNAL DIMENSION DIASTOLIC: 5.1 CM (ref 4.2–5.9)
ECHO LV INTERNAL DIMENSION SYSTOLIC INDEX: 1.67 CM/M2
ECHO LV INTERNAL DIMENSION SYSTOLIC: 3.7 CM
ECHO LV IVSD: 1.2 CM (ref 0.6–1)
ECHO LV IVSS: 1.7 CM
ECHO LV MASS 2D: 227.4 G (ref 88–224)
ECHO LV MASS INDEX 2D: 102.4 G/M2 (ref 49–115)
ECHO LV POSTERIOR WALL DIASTOLIC: 1.1 CM (ref 0.6–1)
ECHO LV POSTERIOR WALL SYSTOLIC: 1.6 CM
ECHO LV RELATIVE WALL THICKNESS RATIO: 0.43
ECHO LVOT AV VTI INDEX: 0.73
ECHO LVOT MEAN GRADIENT: 1 MMHG
ECHO LVOT VTI: 20 CM
ECHO MV E DECELERATION TIME (DT): 292.8 MS
ECHO MV E VELOCITY: 1.64 M/S
ECHO MV E/E' LATERAL: 16.4
ECHO MV MEAN GRADIENT: 4 MMHG
ECHO PV MAX VELOCITY: 0.7 M/S
ECHO RIGHT VENTRICULAR SYSTOLIC PRESSURE (RVSP): 28 MMHG
ECHO RV INTERNAL DIMENSION: 3.9 CM
ECHO TV REGURGITANT MAX VELOCITY: 2.48 M/S
ERYTHROCYTE [DISTWIDTH] IN BLOOD BY AUTOMATED COUNT: 12.5 % (ref 11.8–14.4)
GFR SERPL CREATININE-BSD FRML MDRD: >60 ML/MIN/1.73M2
GLUCOSE SERPL-MCNC: 85 MG/DL (ref 70–99)
HCT VFR BLD AUTO: 43 % (ref 40.7–50.3)
HDLC SERPL-MCNC: 35 MG/DL
HGB BLD-MCNC: 14.3 G/DL (ref 13–17)
LDLC SERPL CALC-MCNC: 75 MG/DL (ref 0–130)
MCH RBC QN AUTO: 32.1 PG (ref 25.2–33.5)
MCHC RBC AUTO-ENTMCNC: 33.3 G/DL (ref 28.4–34.8)
MCV RBC AUTO: 96.4 FL (ref 82.6–102.9)
NRBC BLD-RTO: 0 PER 100 WBC
PLATELET # BLD AUTO: 195 K/UL (ref 138–453)
PMV BLD AUTO: 11 FL (ref 8.1–13.5)
POTASSIUM SERPL-SCNC: 5.3 MMOL/L (ref 3.7–5.3)
RBC # BLD AUTO: 4.46 M/UL (ref 4.21–5.77)
SODIUM SERPL-SCNC: 138 MMOL/L (ref 135–144)
TRIGL SERPL-MCNC: 66 MG/DL
WBC OTHER # BLD: 6.3 K/UL (ref 3.5–11.3)

## 2024-01-11 PROCEDURE — 93306 TTE W/DOPPLER COMPLETE: CPT

## 2024-01-11 PROCEDURE — 80061 LIPID PANEL: CPT

## 2024-01-11 PROCEDURE — 36415 COLL VENOUS BLD VENIPUNCTURE: CPT

## 2024-01-11 PROCEDURE — 80048 BASIC METABOLIC PNL TOTAL CA: CPT

## 2024-01-11 PROCEDURE — 85027 COMPLETE CBC AUTOMATED: CPT

## 2024-01-11 NOTE — TELEPHONE ENCOUNTER
Per  echo is ok but HR still slow. Decrease Toprol XL to 12.5 mg daily and get a CAM for 3 days after a week of the medication change. Left pt a message to return my call.

## 2024-01-15 ENCOUNTER — TELEPHONE (OUTPATIENT)
Dept: CARDIOLOGY | Age: 69
End: 2024-01-15

## 2024-01-15 ENCOUNTER — HOSPITAL ENCOUNTER (OUTPATIENT)
Age: 69
Discharge: HOME | End: 2024-01-17
Attending: INTERNAL MEDICINE
Payer: MEDICARE

## 2024-01-15 DIAGNOSIS — I48.20 CHRONIC A-FIB (HCC): ICD-10-CM

## 2024-01-15 DIAGNOSIS — Z87.898 HISTORY OF SYNCOPE: ICD-10-CM

## 2024-01-15 PROCEDURE — 93243 EXT ECG>48HR<7D SCAN A/R: CPT

## 2024-01-15 NOTE — TELEPHONE ENCOUNTER
----- Message from Janay Rush MD sent at 1/14/2024  9:43 PM EST -----  Blood work is stable. Thank you

## 2024-01-26 ENCOUNTER — TELEPHONE (OUTPATIENT)
Dept: CARDIOLOGY | Age: 69
End: 2024-01-26

## 2024-01-26 NOTE — TELEPHONE ENCOUNTER
----- Message from Janay Rush MD sent at 1/26/2024  9:10 AM EST -----  Heart monitor is stable, no significant change from prior study back in May 2023.  Continue current therapy and follow-up.  Please call with questions and/or concerns.  Thank you

## 2024-03-13 ENCOUNTER — OFFICE VISIT (OUTPATIENT)
Dept: PRIMARY CARE CLINIC | Age: 69
End: 2024-03-13
Payer: MEDICARE

## 2024-03-13 ENCOUNTER — HOSPITAL ENCOUNTER (OUTPATIENT)
Age: 69
Discharge: HOME | End: 2024-03-13
Payer: MEDICARE

## 2024-03-13 VITALS
DIASTOLIC BLOOD PRESSURE: 80 MMHG | WEIGHT: 221 LBS | HEIGHT: 71 IN | SYSTOLIC BLOOD PRESSURE: 120 MMHG | BODY MASS INDEX: 30.94 KG/M2

## 2024-03-13 DIAGNOSIS — R73.9 HYPERGLYCEMIA: ICD-10-CM

## 2024-03-13 DIAGNOSIS — R06.2 WHEEZING: ICD-10-CM

## 2024-03-13 DIAGNOSIS — E55.9 HYPOVITAMINOSIS D: ICD-10-CM

## 2024-03-13 DIAGNOSIS — E53.8 LOW FOLATE: ICD-10-CM

## 2024-03-13 DIAGNOSIS — D68.69 SECONDARY HYPERCOAGULABLE STATE (HCC): ICD-10-CM

## 2024-03-13 DIAGNOSIS — E78.5 HYPERLIPIDEMIA, UNSPECIFIED HYPERLIPIDEMIA TYPE: ICD-10-CM

## 2024-03-13 DIAGNOSIS — I48.20 CHRONIC A-FIB (HCC): ICD-10-CM

## 2024-03-13 DIAGNOSIS — I10 ESSENTIAL HYPERTENSION: Primary | ICD-10-CM

## 2024-03-13 LAB
25(OH)D3 SERPL-MCNC: 10.7 NG/ML (ref 30–100)
EST. AVERAGE GLUCOSE BLD GHB EST-MCNC: 97 MG/DL
FOLATE SERPL-MCNC: >20 NG/ML (ref 4.8–24.2)
HBA1C MFR BLD: 5 % (ref 4–6)
VIT B12 SERPL-MCNC: 768 PG/ML (ref 232–1245)

## 2024-03-13 PROCEDURE — 83036 HEMOGLOBIN GLYCOSYLATED A1C: CPT

## 2024-03-13 PROCEDURE — 99214 OFFICE O/P EST MOD 30 MIN: CPT | Performed by: STUDENT IN AN ORGANIZED HEALTH CARE EDUCATION/TRAINING PROGRAM

## 2024-03-13 PROCEDURE — 82306 VITAMIN D 25 HYDROXY: CPT

## 2024-03-13 PROCEDURE — 3074F SYST BP LT 130 MM HG: CPT | Performed by: STUDENT IN AN ORGANIZED HEALTH CARE EDUCATION/TRAINING PROGRAM

## 2024-03-13 PROCEDURE — 1036F TOBACCO NON-USER: CPT | Performed by: STUDENT IN AN ORGANIZED HEALTH CARE EDUCATION/TRAINING PROGRAM

## 2024-03-13 PROCEDURE — 3079F DIAST BP 80-89 MM HG: CPT | Performed by: STUDENT IN AN ORGANIZED HEALTH CARE EDUCATION/TRAINING PROGRAM

## 2024-03-13 PROCEDURE — G8484 FLU IMMUNIZE NO ADMIN: HCPCS | Performed by: STUDENT IN AN ORGANIZED HEALTH CARE EDUCATION/TRAINING PROGRAM

## 2024-03-13 PROCEDURE — 36415 COLL VENOUS BLD VENIPUNCTURE: CPT

## 2024-03-13 PROCEDURE — 82607 VITAMIN B-12: CPT

## 2024-03-13 PROCEDURE — 82746 ASSAY OF FOLIC ACID SERUM: CPT

## 2024-03-13 PROCEDURE — 3017F COLORECTAL CA SCREEN DOC REV: CPT | Performed by: STUDENT IN AN ORGANIZED HEALTH CARE EDUCATION/TRAINING PROGRAM

## 2024-03-13 PROCEDURE — G8417 CALC BMI ABV UP PARAM F/U: HCPCS | Performed by: STUDENT IN AN ORGANIZED HEALTH CARE EDUCATION/TRAINING PROGRAM

## 2024-03-13 PROCEDURE — G8427 DOCREV CUR MEDS BY ELIG CLIN: HCPCS | Performed by: STUDENT IN AN ORGANIZED HEALTH CARE EDUCATION/TRAINING PROGRAM

## 2024-03-13 PROCEDURE — 1123F ACP DISCUSS/DSCN MKR DOCD: CPT | Performed by: STUDENT IN AN ORGANIZED HEALTH CARE EDUCATION/TRAINING PROGRAM

## 2024-03-13 SDOH — ECONOMIC STABILITY: FOOD INSECURITY: WITHIN THE PAST 12 MONTHS, THE FOOD YOU BOUGHT JUST DIDN'T LAST AND YOU DIDN'T HAVE MONEY TO GET MORE.: NEVER TRUE

## 2024-03-13 SDOH — ECONOMIC STABILITY: FOOD INSECURITY: WITHIN THE PAST 12 MONTHS, YOU WORRIED THAT YOUR FOOD WOULD RUN OUT BEFORE YOU GOT MONEY TO BUY MORE.: NEVER TRUE

## 2024-03-13 SDOH — ECONOMIC STABILITY: INCOME INSECURITY: HOW HARD IS IT FOR YOU TO PAY FOR THE VERY BASICS LIKE FOOD, HOUSING, MEDICAL CARE, AND HEATING?: NOT HARD AT ALL

## 2024-03-13 ASSESSMENT — PATIENT HEALTH QUESTIONNAIRE - PHQ9
1. LITTLE INTEREST OR PLEASURE IN DOING THINGS: 0
SUM OF ALL RESPONSES TO PHQ QUESTIONS 1-9: 0
SUM OF ALL RESPONSES TO PHQ9 QUESTIONS 1 & 2: 0
SUM OF ALL RESPONSES TO PHQ QUESTIONS 1-9: 0
2. FEELING DOWN, DEPRESSED OR HOPELESS: 0

## 2024-03-13 NOTE — PROGRESS NOTES
[facility] PRIMARY CARE  [address] [place] [address] , [address]  [place], [place], [address]    Edouard De Dios is a 68 y.o. male with  has a past medical history of Achilles tendon tear, Allergic rhinitis, Atrial flutter (HCC), Congestive heart failure (HCC), Coronary atherosclerosis, Hyperglycemia, Hyperlipidemia, Hypertension, Mitral insufficiency, Obesity, Osteoarthritis, Status post ablation of atrial flutter, and Vasodepressor syncope.  Presented to the office today for:  Chief Complaint   Patient presents with    Hypertension       Assessment/Plan   1. Essential hypertension  2. Secondary hypercoagulable state (HCC)  3. Hyperlipidemia, unspecified hyperlipidemia type  4. Chronic a-fib (HCC)  5. Hyperglycemia  -     Hemoglobin A1C; Future  6. Low folate  -     Vitamin B12 & Folate; Future  7. Hypovitaminosis D  -     Vitamin D 25 Hydroxy; Future  8. Wheezing  Return in about 4 months (around 7/13/2024) for F/U Med Management.    HTN/2nd hypercoag state/Afib - continue w/ lisinopril 20mg and toprol XL at 12.5mg  Continue w/ crestor 40mg, lipid panel is at goal.  Obtain repeat labs for hx vitamin deficiency.    Patient has some wheezing noted today-  plan to check in a few day's time with an update as needed.  Symptomatic therapy suggested: push fluids, rest, gargle warm salt water, use vaporizer or mist prn and apply heat to sinuses prn. Nasal saline sprays PRN. Use Neti Pot PRN. Tylenol PRN for pain/fevers, Albuterol PRN for any shortness of breath/wheezing/mild dyspnea. Cepacol PRN for sore throat.  May consider additional w/u and management if needed, including CXR/advanced chest imaging, labs.  If there are any worsening or concerning signs or symptoms, patient will report to the ED and/or contact EMS-911 for immediate evaluation. Teach back method was used. All patient questions answered. Pt voiced understanding.        All patient questions answered.  Pt voiced understanding.   There are no

## 2024-03-18 DIAGNOSIS — E55.9 HYPOVITAMINOSIS D: Primary | ICD-10-CM

## 2024-03-18 RX ORDER — ERGOCALCIFEROL 1.25 MG/1
50000 CAPSULE ORAL WEEKLY
Qty: 12 CAPSULE | Refills: 1 | Status: SHIPPED | OUTPATIENT
Start: 2024-03-18

## 2024-04-23 ENCOUNTER — APPOINTMENT (OUTPATIENT)
Dept: CT IMAGING | Age: 69
End: 2024-04-23
Payer: MEDICARE

## 2024-04-23 ENCOUNTER — HOSPITAL ENCOUNTER (OUTPATIENT)
Age: 69
Setting detail: OBSERVATION
Discharge: HOME | End: 2024-04-24
Attending: EMERGENCY MEDICINE | Admitting: STUDENT IN AN ORGANIZED HEALTH CARE EDUCATION/TRAINING PROGRAM
Payer: MEDICARE

## 2024-04-23 DIAGNOSIS — E16.2 HYPOGLYCEMIA: Primary | ICD-10-CM

## 2024-04-23 DIAGNOSIS — R25.1 TREMOR: ICD-10-CM

## 2024-04-23 LAB
ALBUMIN SERPL-MCNC: 4.3 G/DL (ref 3.5–5.2)
ALBUMIN/GLOB SERPL: 1.4 {RATIO} (ref 1–2.5)
ALP SERPL-CCNC: 76 U/L (ref 40–129)
ALT SERPL-CCNC: 26 U/L (ref 5–41)
ANION GAP SERPL CALCULATED.3IONS-SCNC: 13 MMOL/L (ref 9–17)
ANION GAP SERPL CALCULATED.3IONS-SCNC: 19 MMOL/L (ref 9–17)
AST SERPL-CCNC: 27 U/L
BASOPHILS # BLD: 0.03 K/UL (ref 0–0.2)
BASOPHILS NFR BLD: 1 % (ref 0–2)
BILIRUB SERPL-MCNC: 0.4 MG/DL (ref 0.3–1.2)
BUN SERPL-MCNC: 8 MG/DL (ref 8–23)
BUN SERPL-MCNC: 9 MG/DL (ref 8–23)
BUN/CREAT SERPL: 11 (ref 9–20)
BUN/CREAT SERPL: 15 (ref 9–20)
CALCIUM SERPL-MCNC: 8.5 MG/DL (ref 8.6–10.4)
CALCIUM SERPL-MCNC: 8.9 MG/DL (ref 8.6–10.4)
CHLORIDE SERPL-SCNC: 99 MMOL/L (ref 98–107)
CHLORIDE SERPL-SCNC: 99 MMOL/L (ref 98–107)
CO2 SERPL-SCNC: 19 MMOL/L (ref 20–31)
CO2 SERPL-SCNC: 22 MMOL/L (ref 20–31)
CREAT SERPL-MCNC: 0.6 MG/DL (ref 0.7–1.2)
CREAT SERPL-MCNC: 0.7 MG/DL (ref 0.7–1.2)
EKG Q-T INTERVAL: 416 MS
EKG QRS DURATION: 82 MS
EKG QTC CALCULATION (BAZETT): 445 MS
EKG R AXIS: 48 DEGREES
EKG T AXIS: 41 DEGREES
EKG VENTRICULAR RATE: 69 BPM
EOSINOPHIL # BLD: 0.09 K/UL (ref 0–0.44)
EOSINOPHILS RELATIVE PERCENT: 2 % (ref 1–4)
ERYTHROCYTE [DISTWIDTH] IN BLOOD BY AUTOMATED COUNT: 15 % (ref 11.8–14.4)
GFR SERPL CREATININE-BSD FRML MDRD: >90 ML/MIN/1.73M2
GFR SERPL CREATININE-BSD FRML MDRD: >90 ML/MIN/1.73M2
GLUCOSE BLD-MCNC: 115 MG/DL (ref 74–100)
GLUCOSE BLD-MCNC: 116 MG/DL (ref 74–100)
GLUCOSE BLD-MCNC: 124 MG/DL (ref 74–100)
GLUCOSE BLD-MCNC: 40 MG/DL (ref 74–100)
GLUCOSE BLD-MCNC: 96 MG/DL (ref 74–100)
GLUCOSE SERPL-MCNC: 62 MG/DL (ref 70–99)
GLUCOSE SERPL-MCNC: 67 MG/DL (ref 70–99)
HCT VFR BLD AUTO: 40.6 % (ref 40.7–50.3)
HGB BLD-MCNC: 14.1 G/DL (ref 13–17)
IMM GRANULOCYTES # BLD AUTO: <0.03 K/UL (ref 0–0.3)
IMM GRANULOCYTES NFR BLD: 0 %
LYMPHOCYTES NFR BLD: 1.34 K/UL (ref 1.1–3.7)
LYMPHOCYTES RELATIVE PERCENT: 25 % (ref 24–43)
MCH RBC QN AUTO: 33 PG (ref 25.2–33.5)
MCHC RBC AUTO-ENTMCNC: 34.7 G/DL (ref 28.4–34.8)
MCV RBC AUTO: 95.1 FL (ref 82.6–102.9)
MONOCYTES NFR BLD: 0.45 K/UL (ref 0.1–1.2)
MONOCYTES NFR BLD: 8 % (ref 3–12)
NEUTROPHILS NFR BLD: 64 % (ref 36–65)
NEUTS SEG NFR BLD: 3.54 K/UL (ref 1.5–8.1)
NRBC BLD-RTO: 0 PER 100 WBC
PLATELET # BLD AUTO: 143 K/UL (ref 138–453)
PMV BLD AUTO: 10.9 FL (ref 8.1–13.5)
POTASSIUM SERPL-SCNC: 4.1 MMOL/L (ref 3.7–5.3)
POTASSIUM SERPL-SCNC: 4.2 MMOL/L (ref 3.7–5.3)
PROT SERPL-MCNC: 7.3 G/DL (ref 6.4–8.3)
RBC # BLD AUTO: 4.27 M/UL (ref 4.21–5.77)
SODIUM SERPL-SCNC: 134 MMOL/L (ref 135–144)
SODIUM SERPL-SCNC: 137 MMOL/L (ref 135–144)
TROPONIN I SERPL HS-MCNC: 11 NG/L (ref 0–22)
TROPONIN I SERPL HS-MCNC: 11 NG/L (ref 0–22)
WBC OTHER # BLD: 5.5 K/UL (ref 3.5–11.3)

## 2024-04-23 PROCEDURE — 36415 COLL VENOUS BLD VENIPUNCTURE: CPT

## 2024-04-23 PROCEDURE — 70450 CT HEAD/BRAIN W/O DYE: CPT

## 2024-04-23 PROCEDURE — 80053 COMPREHEN METABOLIC PANEL: CPT

## 2024-04-23 PROCEDURE — G0378 HOSPITAL OBSERVATION PER HR: HCPCS

## 2024-04-23 PROCEDURE — 85025 COMPLETE CBC W/AUTO DIFF WBC: CPT

## 2024-04-23 PROCEDURE — 93005 ELECTROCARDIOGRAM TRACING: CPT | Performed by: EMERGENCY MEDICINE

## 2024-04-23 PROCEDURE — 82947 ASSAY GLUCOSE BLOOD QUANT: CPT

## 2024-04-23 PROCEDURE — 2580000003 HC RX 258: Performed by: EMERGENCY MEDICINE

## 2024-04-23 PROCEDURE — 84484 ASSAY OF TROPONIN QUANT: CPT

## 2024-04-23 PROCEDURE — 96361 HYDRATE IV INFUSION ADD-ON: CPT

## 2024-04-23 PROCEDURE — 6370000000 HC RX 637 (ALT 250 FOR IP): Performed by: NURSE PRACTITIONER

## 2024-04-23 PROCEDURE — 93010 ELECTROCARDIOGRAM REPORT: CPT | Performed by: INTERNAL MEDICINE

## 2024-04-23 PROCEDURE — 2580000003 HC RX 258: Performed by: NURSE PRACTITIONER

## 2024-04-23 PROCEDURE — 96374 THER/PROPH/DIAG INJ IV PUSH: CPT

## 2024-04-23 PROCEDURE — 97166 OT EVAL MOD COMPLEX 45 MIN: CPT

## 2024-04-23 PROCEDURE — 6360000002 HC RX W HCPCS: Performed by: EMERGENCY MEDICINE

## 2024-04-23 PROCEDURE — 82533 TOTAL CORTISOL: CPT

## 2024-04-23 PROCEDURE — 99285 EMERGENCY DEPT VISIT HI MDM: CPT

## 2024-04-23 PROCEDURE — 94761 N-INVAS EAR/PLS OXIMETRY MLT: CPT

## 2024-04-23 PROCEDURE — 80048 BASIC METABOLIC PNL TOTAL CA: CPT

## 2024-04-23 RX ORDER — LORAZEPAM 2 MG/ML
1 INJECTION INTRAMUSCULAR ONCE
Status: COMPLETED | OUTPATIENT
Start: 2024-04-23 | End: 2024-04-23

## 2024-04-23 RX ORDER — POLYETHYLENE GLYCOL 3350 17 G/17G
17 POWDER, FOR SOLUTION ORAL DAILY PRN
Status: DISCONTINUED | OUTPATIENT
Start: 2024-04-23 | End: 2024-04-24 | Stop reason: HOSPADM

## 2024-04-23 RX ORDER — SODIUM CHLORIDE 0.9 % (FLUSH) 0.9 %
10 SYRINGE (ML) INJECTION EVERY 12 HOURS SCHEDULED
Status: DISCONTINUED | OUTPATIENT
Start: 2024-04-23 | End: 2024-04-24 | Stop reason: HOSPADM

## 2024-04-23 RX ORDER — POTASSIUM CHLORIDE 7.45 MG/ML
10 INJECTION INTRAVENOUS PRN
Status: DISCONTINUED | OUTPATIENT
Start: 2024-04-23 | End: 2024-04-24 | Stop reason: HOSPADM

## 2024-04-23 RX ORDER — LISINOPRIL 20 MG/1
20 TABLET ORAL DAILY
Status: DISCONTINUED | OUTPATIENT
Start: 2024-04-24 | End: 2024-04-24 | Stop reason: HOSPADM

## 2024-04-23 RX ORDER — ACETAMINOPHEN 650 MG/1
650 SUPPOSITORY RECTAL EVERY 6 HOURS PRN
Status: DISCONTINUED | OUTPATIENT
Start: 2024-04-23 | End: 2024-04-24 | Stop reason: HOSPADM

## 2024-04-23 RX ORDER — ERGOCALCIFEROL 1.25 MG/1
50000 CAPSULE ORAL WEEKLY
Status: DISCONTINUED | OUTPATIENT
Start: 2024-04-26 | End: 2024-04-24 | Stop reason: HOSPADM

## 2024-04-23 RX ORDER — METOPROLOL SUCCINATE 25 MG/1
12.5 TABLET, EXTENDED RELEASE ORAL DAILY
Status: DISCONTINUED | OUTPATIENT
Start: 2024-04-24 | End: 2024-04-24 | Stop reason: HOSPADM

## 2024-04-23 RX ORDER — PRIMIDONE 50 MG/1
25 TABLET ORAL 2 TIMES DAILY
Status: DISCONTINUED | OUTPATIENT
Start: 2024-04-23 | End: 2024-04-24 | Stop reason: HOSPADM

## 2024-04-23 RX ORDER — ONDANSETRON 4 MG/1
4 TABLET, ORALLY DISINTEGRATING ORAL EVERY 8 HOURS PRN
Status: DISCONTINUED | OUTPATIENT
Start: 2024-04-23 | End: 2024-04-24 | Stop reason: HOSPADM

## 2024-04-23 RX ORDER — DEXTROSE AND SODIUM CHLORIDE 5; .9 G/100ML; G/100ML
INJECTION, SOLUTION INTRAVENOUS CONTINUOUS
Status: DISCONTINUED | OUTPATIENT
Start: 2024-04-23 | End: 2024-04-23

## 2024-04-23 RX ORDER — SODIUM CHLORIDE 9 MG/ML
INJECTION, SOLUTION INTRAVENOUS PRN
Status: DISCONTINUED | OUTPATIENT
Start: 2024-04-23 | End: 2024-04-24 | Stop reason: HOSPADM

## 2024-04-23 RX ORDER — SODIUM CHLORIDE 0.9 % (FLUSH) 0.9 %
10 SYRINGE (ML) INJECTION PRN
Status: DISCONTINUED | OUTPATIENT
Start: 2024-04-23 | End: 2024-04-24 | Stop reason: HOSPADM

## 2024-04-23 RX ORDER — DEXTROSE AND SODIUM CHLORIDE 5; .45 G/100ML; G/100ML
INJECTION, SOLUTION INTRAVENOUS CONTINUOUS
Status: DISCONTINUED | OUTPATIENT
Start: 2024-04-23 | End: 2024-04-24

## 2024-04-23 RX ORDER — AMLODIPINE BESYLATE 5 MG/1
5 TABLET ORAL DAILY
Status: DISCONTINUED | OUTPATIENT
Start: 2024-04-24 | End: 2024-04-24 | Stop reason: HOSPADM

## 2024-04-23 RX ORDER — ACETAMINOPHEN 325 MG/1
650 TABLET ORAL EVERY 6 HOURS PRN
Status: DISCONTINUED | OUTPATIENT
Start: 2024-04-23 | End: 2024-04-24 | Stop reason: HOSPADM

## 2024-04-23 RX ORDER — ROSUVASTATIN CALCIUM 40 MG/1
40 TABLET, COATED ORAL EVERY MORNING
Status: DISCONTINUED | OUTPATIENT
Start: 2024-04-24 | End: 2024-04-24 | Stop reason: HOSPADM

## 2024-04-23 RX ORDER — GLUCAGON 1 MG/ML
1 KIT INJECTION PRN
Status: DISCONTINUED | OUTPATIENT
Start: 2024-04-23 | End: 2024-04-24 | Stop reason: HOSPADM

## 2024-04-23 RX ORDER — POTASSIUM CHLORIDE 20 MEQ/1
40 TABLET, EXTENDED RELEASE ORAL PRN
Status: DISCONTINUED | OUTPATIENT
Start: 2024-04-23 | End: 2024-04-24 | Stop reason: HOSPADM

## 2024-04-23 RX ORDER — ONDANSETRON 2 MG/ML
4 INJECTION INTRAMUSCULAR; INTRAVENOUS EVERY 6 HOURS PRN
Status: DISCONTINUED | OUTPATIENT
Start: 2024-04-23 | End: 2024-04-24 | Stop reason: HOSPADM

## 2024-04-23 RX ORDER — DEXTROSE MONOHYDRATE 100 MG/ML
INJECTION, SOLUTION INTRAVENOUS CONTINUOUS PRN
Status: DISCONTINUED | OUTPATIENT
Start: 2024-04-23 | End: 2024-04-24 | Stop reason: HOSPADM

## 2024-04-23 RX ORDER — FOLIC ACID 1 MG/1
1000 TABLET ORAL DAILY
Status: DISCONTINUED | OUTPATIENT
Start: 2024-04-24 | End: 2024-04-24 | Stop reason: HOSPADM

## 2024-04-23 RX ADMIN — PRIMIDONE 25 MG: 50 TABLET ORAL at 20:29

## 2024-04-23 RX ADMIN — LORAZEPAM 1 MG: 2 INJECTION, SOLUTION INTRAMUSCULAR; INTRAVENOUS at 09:23

## 2024-04-23 RX ADMIN — SERTRALINE HYDROCHLORIDE 50 MG: 50 TABLET ORAL at 20:29

## 2024-04-23 RX ADMIN — DEXTROSE AND SODIUM CHLORIDE: 5; 900 INJECTION, SOLUTION INTRAVENOUS at 14:10

## 2024-04-23 RX ADMIN — DEXTROSE AND SODIUM CHLORIDE: 5; 450 INJECTION, SOLUTION INTRAVENOUS at 15:01

## 2024-04-23 ASSESSMENT — LIFESTYLE VARIABLES
HOW OFTEN DO YOU HAVE A DRINK CONTAINING ALCOHOL: 2-3 TIMES A WEEK
HOW MANY STANDARD DRINKS CONTAINING ALCOHOL DO YOU HAVE ON A TYPICAL DAY: 1 OR 2

## 2024-04-23 ASSESSMENT — PAIN - FUNCTIONAL ASSESSMENT: PAIN_FUNCTIONAL_ASSESSMENT: 0-10

## 2024-04-23 NOTE — PROGRESS NOTES
Contact guard assistance  UE Dressing: Stand by assistance  LE Dressing: Contact guard assistance  Toileting: Contact guard assistance  Functional Mobility: Contact guard assistance  Functional Mobility Skilled Clinical Factors: Bed mobility with SBA. Transfers and  functional mobility with rolling walker with CGA. F static and dynamic standing balance.              Vision  Vision: Impaired  Vision Exceptions: Wears glasses for reading  Hearing  Hearing: Within functional limits  Cognition  Overall Cognitive Status: WFL  Orientation  Overall Orientation Status: Within Functional Limits  Orientation Level: Oriented X4                  Education Given To: Patient;Family  Education Provided: Role of Therapy;Plan of Care;Transfer Training  Education Method: Verbal  Barriers to Learning: None  Education Outcome: Verbalized understanding                        G-Code     OutComes Score                                                  AM-PAC - ADL  AM-PAC Daily Activity - Inpatient   How much help is needed for putting on and taking off regular lower body clothing?: A Little  How much help is needed for bathing (which includes washing, rinsing, drying)?: A Little  How much help is needed for toileting (which includes using toilet, bedpan, or urinal)?: None  How much help is needed for putting on and taking off regular upper body clothing?: None  How much help is needed for taking care of personal grooming?: None  How much help for eating meals?: None  AM-[facility] Inpatient Daily Activity Raw Score: 22  AM-PAC Inpatient ADL T-Scale Score : 47.1  ADL Inpatient CMS 0-100% Score: 25.8  ADL Inpatient CMS G-Code Modifier : CJ    Tinneti Score       Goals  Short Term Goals  Time Frame for Short Term Goals: 21 visits  Short Term Goal 1: Pt will perform functional transfers/functional mobility with mod I using LRAD.  Short Term Goal 2: Pt will tolerate 15 minutes or greater ther act/ther ex without rest breaks to increase functional  activity tolerance.  Short Term Goal 3: Pt will perform dynamic functional reaching tasks with good dynamic standing balance x 5 minutes.  Short Term Goal 4: Pt will perform LB bathing/dressing with mod I.       Therapy Time   Individual Concurrent Group Co-treatment   Time In 1538         Time Out 1551         Minutes 13                 Alycia Cisse OT

## 2024-04-23 NOTE — ED PROVIDER NOTES
rhythm. Tachycardia present.      Heart sounds: No murmur heard.     No friction rub. No gallop.   Pulmonary:      Effort: Pulmonary effort is normal. No respiratory distress.      Breath sounds: Normal breath sounds. No stridor. No wheezing, rhonchi or rales.   Chest:      Chest wall: No tenderness.   Abdominal:      General: There is no distension.      Palpations: Abdomen is soft. There is no mass.      Tenderness: There is no abdominal tenderness. There is no right CVA tenderness, left CVA tenderness, guarding or rebound.      Hernia: No hernia is present.   Neurological:      General: No focal deficit present.      Cranial Nerves: No cranial nerve deficit.      Motor: No weakness.           DDX/DIAGNOSTIC RESULTS / EMERGENCY DEPARTMENT COURSE / MDM     Medical Decision Making  Patient with history of A-fib, anticoagulated no reported trauma does have a known resting tremor, but now seems to have more of a focal tremor going on versus focal seizure.  And feeling generally unwell he has a normal tensive, and he is not tachycardic will trial Ativan in the event this could be a seizure.  Will plan on CT head given this concern we will check labs, and cardiac EKG    Amount and/or Complexity of Data Reviewed  Labs: ordered.  Radiology: ordered.  ECG/medicine tests: ordered.    Risk  Prescription drug management.  Decision regarding hospitalization.        EKG  EKG shows atrial fibrillation with a ventricular rate of 69, no ST elevations or depressions noted.  QRS of 82 QT corrected of 445.  Patient tremoring     All EKG's are interpreted by the Emergency Department Physician who either signs or Co-signs this chart in the absence of a cardiologist.    EMERGENCY DEPARTMENT COURSE:      ED Course as of 04/24/24 0612   Tue Apr 23, 2024   0997 Patient did respond to ativan and shaking has stopped, and he is feeling better [CE]   1246 Patient continues to feel well ,discussed results with patient and unclear his etiology  of his symptoms, will call pcp to discuss follow up, and plan.    [CE]   1314 Glucose noted intially to by 67 [CE]   1356 Patient continues to be hypoglycemia despite eating full meal, and chart shows in the past he has low levels and was evaluated for syncope attributed to his ETOH level  Given persistent hypoglycemia, patient is asymptomatic, but how he presented, will plan on observation with Dr. Bourgeois and ad5 gtt [CE]      ED Course User Index  [CE] Irlanda He DO           FINAL IMPRESSION      1. Hypoglycemia    2. Tremor          DISPOSITION / PLAN     DISPOSITION Admitted 04/23/2024 02:04:13 PM      PATIENT REFERRED TO:  No follow-up provider specified.    DISCHARGE MEDICATIONS:  Current Discharge Medication List          Irlanda He DO  Emergency Medicine Resident    (Please note that portions of thisnote were completed with a voice recognition program.  Efforts were made to edit the dictations but occasionally words are mis-transcribed.)

## 2024-04-23 NOTE — PLAN OF CARE
Problem: Discharge Planning  Goal: Discharge to home or other facility with appropriate resources  Outcome: Progressing  Flowsheets (Taken 4/23/2024 1602)  Discharge to home or other facility with appropriate resources:   Identify barriers to discharge with patient and caregiver   Identify discharge learning needs (meds, wound care, etc)     Problem: Pain  Goal: Verbalizes/displays adequate comfort level or baseline comfort level  Outcome: Progressing  Flowsheets (Taken 4/23/2024 1602)  Verbalizes/displays adequate comfort level or baseline comfort level:   Encourage patient to monitor pain and request assistance   Administer analgesics based on type and severity of pain and evaluate response   Consider cultural and social influences on pain and pain management   Assess pain using appropriate pain scale     Problem: Safety - Adult  Goal: Free from fall injury  Outcome: Progressing  Flowsheets (Taken 4/23/2024 1602)  Free From Fall Injury: Instruct family/caregiver on patient safety     Problem: ABCDS Injury Assessment  Goal: Absence of physical injury  Outcome: Progressing  Flowsheets (Taken 4/23/2024 1602)  Absence of Physical Injury: Implement safety measures based on patient assessment

## 2024-04-23 NOTE — H&P
History and Physical    Patient:  Edouard De Dios  MRN: [id number]    Chief Complaint: Low blood sugar    History Obtained From:  patient, electronic medical record    PCP: Martínez Bourgeois MD    History of Present Illness:   The patient is a 68 y.o. male who presented to the emergency room with complaints of tremors and numbness and tingling in his arms and legs while walking.  Patient complained of feeling cold and unwell.  Patient does have history of CAD, atrial fibrillation and Xarelto.  Patient's last alcohol drink was 5 beers yesterday.  During patient's workup he was afebrile.  Renal function was normal.  Patient was found to have hypoglycemia with blood sugars in the 40s and 60s.  Patient's troponins were 11 and 11.  LFTs were normal.  CBC was negative.  Chest x-ray negative.  EKG showed atrial fibrillation.  Upon admission patient is alert oriented no acute distress.  States overall he feels better.    Past Medical History:        Diagnosis Date    Achilles tendon tear 2013    Allergic rhinitis     Atrial flutter (HCC) 1996    Congestive heart failure (HCC)     Coronary atherosclerosis     Hyperglycemia 2007    Hyperlipidemia 1996    Hypertension 2004    Mitral insufficiency     Obesity 2006    Osteoarthritis     Status post ablation of atrial flutter 05/09/13    Vasodepressor syncope 2001       Past Surgical History:        Procedure Laterality Date    ATRIAL ABLATION SURGERY  1998, 2013    [facility] AV node    CATARACT REMOVAL Bilateral     CATARACT REMOVAL WITH IMPLANT  12/06/2017    Dr. Logan     CATARACT REMOVAL WITH IMPLANT Right 10/25/2021    Dr. Logan    COLONOSCOPY  02/01/2010    [place], negative    COLONOSCOPY  03/25/2019    Dr. Sanchez-random rectal bx(hyperplastic mucosa with evidence of ischemic injury,angiodysplasia no active bleeding,divetrticulosis,hemorroids    COLONOSCOPY N/A 3/25/2019    COLONOSCOPY POLYPECTOMY SNARE/COLD BIOPSY performed by Manuelito Sanchez MD at [facility] OR    COLONOSCOPY N/A

## 2024-04-23 NOTE — PROGRESS NOTES
Pt arrived to unit from ER to room 302 for hypoglycemia. Pt moved to bed from wheelchair. Pt is on room air, respirations even and unlabored. Vitals obtained. Admission assessment completed. Pt oriented to room. Reviewed orders. Pt denies further needs at this time. Call light in reach. Care ongoing.

## 2024-04-23 NOTE — ED NOTES
Patient states feeling much better after Ativan, breathing slowed and unlabored, no sign of extremity shaking. Son at bedside. Patient going to CT Scan per stretcher

## 2024-04-24 ENCOUNTER — APPOINTMENT (OUTPATIENT)
Dept: CT IMAGING | Age: 69
End: 2024-04-24
Payer: MEDICARE

## 2024-04-24 VITALS
OXYGEN SATURATION: 99 % | WEIGHT: 210 LBS | RESPIRATION RATE: 14 BRPM | SYSTOLIC BLOOD PRESSURE: 111 MMHG | HEART RATE: 85 BPM | HEIGHT: 71 IN | TEMPERATURE: 96.8 F | BODY MASS INDEX: 29.4 KG/M2 | DIASTOLIC BLOOD PRESSURE: 66 MMHG

## 2024-04-24 LAB
ANION GAP SERPL CALCULATED.3IONS-SCNC: 9 MMOL/L (ref 9–17)
BASOPHILS # BLD: 0.03 K/UL (ref 0–0.2)
BASOPHILS NFR BLD: 1 % (ref 0–2)
BUN SERPL-MCNC: 10 MG/DL (ref 8–23)
BUN/CREAT SERPL: 14 (ref 9–20)
CALCIUM SERPL-MCNC: 8.3 MG/DL (ref 8.6–10.4)
CHLORIDE SERPL-SCNC: 103 MMOL/L (ref 98–107)
CO2 SERPL-SCNC: 25 MMOL/L (ref 20–31)
CORTIS SERPL-MCNC: 9 UG/DL (ref 2.5–19.5)
CREAT SERPL-MCNC: 0.7 MG/DL (ref 0.7–1.2)
EOSINOPHIL # BLD: 0.11 K/UL (ref 0–0.44)
EOSINOPHILS RELATIVE PERCENT: 2 % (ref 1–4)
ERYTHROCYTE [DISTWIDTH] IN BLOOD BY AUTOMATED COUNT: 15.1 % (ref 11.8–14.4)
GFR SERPL CREATININE-BSD FRML MDRD: >90 ML/MIN/1.73M2
GLUCOSE BLD-MCNC: 102 MG/DL (ref 74–100)
GLUCOSE BLD-MCNC: 105 MG/DL (ref 74–100)
GLUCOSE BLD-MCNC: 85 MG/DL (ref 74–100)
GLUCOSE BLD-MCNC: 92 MG/DL (ref 74–100)
GLUCOSE BLD-MCNC: 92 MG/DL (ref 74–100)
GLUCOSE BLD-MCNC: 93 MG/DL (ref 74–100)
GLUCOSE BLD-MCNC: 93 MG/DL (ref 74–100)
GLUCOSE BLD-MCNC: 96 MG/DL (ref 74–100)
GLUCOSE SERPL-MCNC: 102 MG/DL (ref 70–99)
HCT VFR BLD AUTO: 36.5 % (ref 40.7–50.3)
HGB BLD-MCNC: 12.6 G/DL (ref 13–17)
IMM GRANULOCYTES # BLD AUTO: <0.03 K/UL (ref 0–0.3)
IMM GRANULOCYTES NFR BLD: 0 %
LYMPHOCYTES NFR BLD: 0.96 K/UL (ref 1.1–3.7)
LYMPHOCYTES RELATIVE PERCENT: 20 % (ref 24–43)
MCH RBC QN AUTO: 33.4 PG (ref 25.2–33.5)
MCHC RBC AUTO-ENTMCNC: 34.5 G/DL (ref 28.4–34.8)
MCV RBC AUTO: 96.8 FL (ref 82.6–102.9)
MONOCYTES NFR BLD: 0.57 K/UL (ref 0.1–1.2)
MONOCYTES NFR BLD: 12 % (ref 3–12)
NEUTROPHILS NFR BLD: 65 % (ref 36–65)
NEUTS SEG NFR BLD: 3.15 K/UL (ref 1.5–8.1)
NRBC BLD-RTO: 0 PER 100 WBC
PLATELET # BLD AUTO: 127 K/UL (ref 138–453)
PMV BLD AUTO: 11.1 FL (ref 8.1–13.5)
POTASSIUM SERPL-SCNC: 4.8 MMOL/L (ref 3.7–5.3)
RBC # BLD AUTO: 3.77 M/UL (ref 4.21–5.77)
SODIUM SERPL-SCNC: 137 MMOL/L (ref 135–144)
WBC OTHER # BLD: 4.8 K/UL (ref 3.5–11.3)

## 2024-04-24 PROCEDURE — 74177 CT ABD & PELVIS W/CONTRAST: CPT

## 2024-04-24 PROCEDURE — 85025 COMPLETE CBC W/AUTO DIFF WBC: CPT

## 2024-04-24 PROCEDURE — 6370000000 HC RX 637 (ALT 250 FOR IP): Performed by: NURSE PRACTITIONER

## 2024-04-24 PROCEDURE — G0378 HOSPITAL OBSERVATION PER HR: HCPCS

## 2024-04-24 PROCEDURE — 97535 SELF CARE MNGMENT TRAINING: CPT

## 2024-04-24 PROCEDURE — 36415 COLL VENOUS BLD VENIPUNCTURE: CPT

## 2024-04-24 PROCEDURE — 6360000004 HC RX CONTRAST MEDICATION: Performed by: STUDENT IN AN ORGANIZED HEALTH CARE EDUCATION/TRAINING PROGRAM

## 2024-04-24 PROCEDURE — 94761 N-INVAS EAR/PLS OXIMETRY MLT: CPT

## 2024-04-24 PROCEDURE — 82947 ASSAY GLUCOSE BLOOD QUANT: CPT

## 2024-04-24 PROCEDURE — 80048 BASIC METABOLIC PNL TOTAL CA: CPT

## 2024-04-24 PROCEDURE — 96361 HYDRATE IV INFUSION ADD-ON: CPT

## 2024-04-24 PROCEDURE — 2580000003 HC RX 258: Performed by: NURSE PRACTITIONER

## 2024-04-24 RX ADMIN — DEXTROSE AND SODIUM CHLORIDE: 5; 450 INJECTION, SOLUTION INTRAVENOUS at 01:04

## 2024-04-24 RX ADMIN — PRIMIDONE 25 MG: 50 TABLET ORAL at 08:37

## 2024-04-24 RX ADMIN — METOPROLOL SUCCINATE 12.5 MG: 25 TABLET, EXTENDED RELEASE ORAL at 08:37

## 2024-04-24 RX ADMIN — DEXTROSE AND SODIUM CHLORIDE: 5; 450 INJECTION, SOLUTION INTRAVENOUS at 11:51

## 2024-04-24 RX ADMIN — FOLIC ACID 1000 MCG: 1 TABLET ORAL at 08:37

## 2024-04-24 RX ADMIN — RIVAROXABAN 20 MG: 20 TABLET, FILM COATED ORAL at 08:37

## 2024-04-24 RX ADMIN — IOPAMIDOL 75 ML: 755 INJECTION, SOLUTION INTRAVENOUS at 07:24

## 2024-04-24 RX ADMIN — SERTRALINE HYDROCHLORIDE 50 MG: 50 TABLET ORAL at 08:38

## 2024-04-24 RX ADMIN — ROSUVASTATIN 40 MG: 40 TABLET, FILM COATED ORAL at 08:37

## 2024-04-24 RX ADMIN — LISINOPRIL 20 MG: 20 TABLET ORAL at 08:37

## 2024-04-24 RX ADMIN — AMLODIPINE BESYLATE 5 MG: 5 TABLET ORAL at 08:37

## 2024-04-24 NOTE — PLAN OF CARE
Problem: Discharge Planning  Goal: Discharge to home or other facility with appropriate resources  4/24/2024 1459 by Jeanie Castorena RN  Outcome: Completed  4/24/2024 0422 by Areli Sahu RN  Outcome: Progressing  Flowsheets (Taken 4/24/2024 0422)  Discharge to home or other facility with appropriate resources: Identify barriers to discharge with patient and caregiver     Problem: Pain  Goal: Verbalizes/displays adequate comfort level or baseline comfort level  4/24/2024 1459 by Jeanie Castorena RN  Outcome: Completed  4/24/2024 0422 by Areli Sahu RN  Outcome: Progressing  Flowsheets (Taken 4/23/2024 1602 by Clementine Whelan, RN)  Verbalizes/displays adequate comfort level or baseline comfort level:   Encourage patient to monitor pain and request assistance   Administer analgesics based on type and severity of pain and evaluate response   Consider cultural and social influences on pain and pain management   Assess pain using appropriate pain scale     Problem: Safety - Adult  Goal: Free from fall injury  4/24/2024 1459 by Jeanie Castorena RN  Outcome: Completed  4/24/2024 0422 by Areli Sahu RN  Outcome: Progressing  Flowsheets (Taken 4/24/2024 0422)  Free From Fall Injury: Instruct family/caregiver on patient safety     Problem: ABCDS Injury Assessment  Goal: Absence of physical injury  4/24/2024 1459 by Jeanie Castorena RN  Outcome: Completed  4/24/2024 0422 by Areli Shau RN  Outcome: Progressing  Flowsheets (Taken 4/23/2024 1602 by Clementine Whelan, RN)  Absence of Physical Injury: Implement safety measures based on patient assessment     Problem: Nutrition Deficit:  Goal: Optimize nutritional status  4/24/2024 1459 by Jeanie Castorena RN  Outcome: Completed  4/24/2024 1317 by Diana Briones RD, LD  Flowsheets (Taken 4/24/2024 1317)  Nutrient intake appropriate for improving, restoring, or maintaining nutritional needs:   Monitor oral intake, labs, and treatment plans   Recommend appropriate diets, oral  nutritional supplements, and vitamin/mineral supplements  Note: Nutrition Problem #1: Altered nutrition-related lab values  Intervention: Food and/or Nutrient Delivery: Continue Current Diet

## 2024-04-24 NOTE — CONSULTS
-4.5  Weight Adjustment For: No Adjustment                 BMI Categories: Overweight (BMI 25.0-29.9)    Estimated Daily Nutrient Needs:  Energy Requirements Based On: Kcal/kg  Weight Used for Energy Requirements: Current  Energy (kcal/day): 0974-5626 (25-28/kg)  Weight Used for Protein Requirements: Ideal  Protein (g/day): 101-117g (1.3-1.5g/kg)  Method Used for Fluid Requirements: 1 ml/kcal  Fluid (ml/day): 2,300 ml  Lab Results   Component Value Date/Time    LABA1C 5.0 03/13/2024 10:05 AM      Recent Labs     04/23/24  2155 04/24/24  0001 04/24/24  0232 04/24/24  0418 04/24/24  0626 04/24/24  0842 04/24/24  1026 04/24/24  1232   POCGLU 96 105* 102* 93 92 93 92 96      Lab Results   Component Value Date/Time    TRIG 66 01/11/2024 02:03 PM    HDL 35 01/11/2024 02:03 PM    LDLCALC 60 02/21/2023 09:01 AM      Hematology:  Recent Labs     04/23/24  0905 04/24/24  0525   WBC 5.5 4.8   HGB 14.1 12.6*   HCT 40.6* 36.5*     Chemistry:  Recent Labs     04/23/24  0905 04/23/24  1315 04/24/24  0525    134* 137   K 4.1 4.2 4.8   CL 99 99 103   CO2 19* 22 25   GLUCOSE 67* 62* 102*   BUN 8 9 10   CREATININE 0.7 0.6* 0.7   CALCIUM 8.9 8.5* 8.3*     Recent Labs     04/23/24  0905   PROT 7.3   AST 27   ALT 26   ALKPHOS 76   BILITOT 0.4     Lab Results   Component Value Date    VITD25 10.7 (L) 03/13/2024         Nutrition Diagnosis:   Altered nutrition-related lab values related to endocrine dysfuntion as evidenced by lab values  Inadequate oral intake related to   as evidenced by lab values    Nutrition Interventions:   Food and/or Nutrient Delivery: Continue Current Diet  Nutrition Education/Counseling: Education completed  Coordination of Nutrition Care: Continue to monitor while inpatient  Plan of Care discussed with: Patient    Goals:     Goals: Meet at least 75% of estimated needs       Nutrition Monitoring and Evaluation:   Behavioral-Environmental Outcomes: None Identified  Food/Nutrient Intake Outcomes: Food and

## 2024-04-24 NOTE — PROGRESS NOTES
[facility]  Inpatient/Observation/Outpatient Rehabilitation    Date: 2024  Patient Name: Edouard De Dios       [x] Inpatient Acute/Observation       []  Outpatient  : 1955         [] Pt no showed for scheduled appointment    [] Pt refused/declined therapy at this time due to:           [] Pt cancelled due to:  [] No Reason Given   [] Sick/ill   [] Other:    [] Evaluation held by RN/Provider due to:    [] High Heart Rate   [] High Blood Pressure   [] Orthopedic Consult   [] Hgb < 7   [] Other:    [x] Pt does not require skilled services due to:  independence and good safety with ambulation. No loss of balance with ambulation to the bedside chair without device.     Therapist/Assistant will attempt to see this patient, at our earliest opportunity.       Jill Downey, PT, DPT   Date: 2024

## 2024-04-24 NOTE — PROGRESS NOTES
Progress Note    SUBJECTIVE:    Patient seen for f/u of Hypoglycemia.  He resting in bed no complaints.  Continues to have near hypoglycemia with IVF of D5.  Feels better.       ROS:   Constitutional: negative  for fevers, and negative for chills.  Respiratory: negative for shortness of breath, negative for cough, and negative for wheezing  Cardiovascular: negative for chest pain, and negative for palpitations  Gastrointestinal: negative for abdominal pain, negative for nausea,negative for vomiting, negative for diarrhea, and negative for constipation     All other systems were reviewed with the patient and are negative unless otherwise stated in HPI      OBJECTIVE:      Vitals:   Vitals:    04/24/24 0300   BP: (!) 151/85   Pulse: 67   Resp: 16   Temp: 97.1 °F (36.2 °C)   SpO2: 98%     Weight - Scale: 95.3 kg (210 lb)   Height: 180.3 cm (5' 11\")     Weight  Wt Readings from Last 3 Encounters:   04/24/24 95.3 kg (210 lb)   03/13/24 100.2 kg (221 lb)   01/11/24 100 kg (220 lb 7.4 oz)     Body mass index is 29.29 kg/m².    24HR INTAKE/OUTPUT:      Intake/Output Summary (Last 24 hours) at 4/24/2024 0646  Last data filed at 4/24/2024 0520  Gross per 24 hour   Intake 1830 ml   Output 600 ml   Net 1230 ml     -----------------------------------------------------------------  Exam:    GEN:    Awake, alert and oriented x3.   EYES:  EOMI, pupils equal   NECK: Supple. No lymphadenopathy.  No carotid bruit  CVS:    irregularly irregular, no audible murmur  PULM:  CTA, no wheezes, rales or rhonchi, no acute respiratory distress  ABD:    Bowels sounds normal.  Abdomen is soft.  No distention.  no tenderness to palpation.   EXT:   no edema bilaterally .  No calf tenderness.   NEURO: Moves all extremities.  Motor and sensory are grossly intact  SKIN:  No rashes.  No skin lesions.    -----------------------------------------------------------------    Diagnostic Data:      Complete Blood Count:   Recent Labs     04/23/24  0905   WBC

## 2024-04-24 NOTE — PROGRESS NOTES
Writer went over discharge instructions with pt at this time. Writer educated pt on checking blood glucose at home frequently and signs of hypoglycemia and when to contact his provider or go to the ED, as well as provided written instructions. Pt verbalized understanding. Pt's IV removed and dressing applied. Pt getting dressed at this time.

## 2024-04-24 NOTE — PLAN OF CARE
Problem: Discharge Planning  Goal: Discharge to home or other facility with appropriate resources  4/24/2024 0422 by Areli Sahu RN  Outcome: Progressing  Flowsheets (Taken 4/24/2024 0422)  Discharge to home or other facility with appropriate resources: Identify barriers to discharge with patient and caregiver  4/23/2024 1602 by Clementine Whelan RN  Outcome: Progressing  Flowsheets (Taken 4/23/2024 1602)  Discharge to home or other facility with appropriate resources:   Identify barriers to discharge with patient and caregiver   Identify discharge learning needs (meds, wound care, etc)     Problem: Pain  Goal: Verbalizes/displays adequate comfort level or baseline comfort level  4/24/2024 0422 by Areli Sahu RN  Outcome: Progressing  Flowsheets (Taken 4/23/2024 1602 by Clementine Whelan RN)  Verbalizes/displays adequate comfort level or baseline comfort level:   Encourage patient to monitor pain and request assistance   Administer analgesics based on type and severity of pain and evaluate response   Consider cultural and social influences on pain and pain management   Assess pain using appropriate pain scale  4/23/2024 1602 by Clementine Whelan RN  Outcome: Progressing  Flowsheets (Taken 4/23/2024 1602)  Verbalizes/displays adequate comfort level or baseline comfort level:   Encourage patient to monitor pain and request assistance   Administer analgesics based on type and severity of pain and evaluate response   Consider cultural and social influences on pain and pain management   Assess pain using appropriate pain scale     Problem: Safety - Adult  Goal: Free from fall injury  4/24/2024 0422 by Areli Sahu RN  Outcome: Progressing  Flowsheets (Taken 4/24/2024 0422)  Free From Fall Injury: Instruct family/caregiver on patient safety  4/23/2024 1602 by Clementine Whelan RN  Outcome: Progressing  Flowsheets (Taken 4/23/2024 1602)  Free From Fall Injury: Instruct family/caregiver on patient safety     Problem: ABCDS

## 2024-04-24 NOTE — CARE COORDINATION
Case Management Assessment  Initial Evaluation    Date/Time of Evaluation: 4/24/2024 11:58 AM  Assessment Completed by: Kirsten Clay RN    If patient is discharged prior to next notation, then this note serves as note for discharge by case management.    Patient Name: Edouard De Dios                   YOB: 1955  Diagnosis: Hypoglycemia [E16.2]                   Date / Time: 4/23/2024  8:37 AM    Patient Admission Status: Observation   Readmission Risk (Low < 19, Mod (19-27), High > 27): No data recorded  Current PCP: Martínez Bourgeois MD  PCP verified by CM? Yes    Chart Reviewed: Yes      History Provided by: Patient, Medical Record  Patient Orientation: Alert and Oriented, Person, Place, Situation    Patient Cognition: Alert    Hospitalization in the last 30 days (Readmission):  No    If yes, Readmission Assessment in  Navigator will be completed.    Advance Directives:      Code Status: Full Code   Patient's Primary Decision Maker is: Legal Next of Kin    Primary Decision Maker: Nikki De Dios - Spouse - [phone]    Secondary Decision Maker: Papa De Dios - Child - [phone]    Discharge Planning:    Patient lives with: (P) Spouse/Significant Other Type of Home: (P) House  Primary Care Giver: Self  Patient Support Systems include: Spouse/Significant Other, Friends/Neighbors   Current Financial resources: (P) Medicare  Current community resources: (P) None  Current services prior to admission: (P) None            Current DME:              Type of Home Care services:  (P) None    ADLS  Prior functional level: (P) Independent in ADLs/IADLs  Current functional level: (P) Independent in ADLs/IADLs    PT AM-PAC:   /24  OT AM-PAC: 22 /24    Family can provide assistance at DC: (P) Yes  Would you like Case Management to discuss the discharge plan with any other family members/significant others, and if so, who? (P) No  Plans to Return to Present Housing: (P) Yes  Other Identified Issues/Barriers

## 2024-04-24 NOTE — PROGRESS NOTES
Writer at bedside for shift assessment. Patient sitting up in bed, respirations are even and unlabored while on room air. Vitals obtained and assessment completed, see flowsheet for details. pt denies further needs at this time. Call light in reach.

## 2024-04-24 NOTE — PROGRESS NOTES
Writer to bedside to complete morning assessment. Upon entry to room, pt awake in bed, respirations even and unlabored while on room air. Vitals obtained and assessment completed, see flow sheet for details. Pt Is A&Ox4. Pt denies any pain. Pt denies any SOB or chest pain. Lung sounds have inspiratory wheezing throughout. Pt updated on plan of care and whiteboard updated. Pt denies further needs from writer at this time. Call light in reach. Care ongoing.

## 2024-04-24 NOTE — DISCHARGE INSTR - DIET

## 2024-04-24 NOTE — DISCHARGE SUMMARY
Discharge Summary    Edouard De Dios  :  1955  MRN:  [id number]    Admit date:  2024      Discharge date: 2024     Admitting Physician:  Martínez Bourgeois MD    Discharge Diagnoses:    Principal Problem:    Hypoglycemia  Resolved Problems:    * No resolved hospital problems. *      Hospital Course:   Edouard De Dios is a 68 y.o. male admitted with hypoglycemia.  He presented to the emergency room with complaints of tremors and numbness and tingling in his arms and legs while walking. Patient complained of feeling cold and unwell. Patient does have history of CAD, atrial fibrillation and Xarelto. Patient's last alcohol drink was 5 beers yesterday. During patient's workup he was afebrile. Renal function was normal. Patient was found to have hypoglycemia with blood sugars in the 40s and 60s. Patient's troponins were 11 and 11. LFTs were normal. CBC was negative. Chest x-ray negative. EKG showed atrial fibrillation. Upon admission patient is alert oriented no acute distress. States overall he feels better.  During patient's admission he was placed on dextrose IV fluids and blood sugar has been monitored every 2 hours.  Fluids were stopped and patient is maintaining blood sugars between 80s and 90s.  He is tolerating diet well.  He did meet with the dietitian was given information on hypoglycemia as well as foods to help maintain blood sugar.  I will give him a prescription for glucose tablets and directions in the event of further hypoglycemic episodes.  CT abdomen and pelvis was completed with no pathology for cause.  Cortisol level was completed but is still pending.  Plan will be to discharge patient today he will follow-up with primary care as an outpatient.    Consultants:  none    Procedures: none    Complications: none    Discharge Condition: fair    Exam:  GEN:    Awake, alert and oriented x3.   EYES:   EOMI, pupils equal   NECK: Supple. No lymphadenopathy.  No carotid bruit  CVS:     irregularly  pulmonary fibrosis.  No acute process in the lung bases. Organs: Scattered splenic and hepatic calcified granulomata. Liver enhances normally without evidence of intrahepatic biliary ductal dilatation.  Portal vein is patent. The gallbladder is unremarkable. The spleen, pancreas and adrenal glands are unremarkable. The kidneys enhance symmetrically without evidence of hydronephrosis. GI/Bowel: Small hiatal hernia.  Stomach and duodenal sweep demonstrate no acute abnormality. The appendix is visualized and is unremarkable.  No evidence of acute appendicitis.   There is no evidence of bowel obstruction. No evidence of abnormal bowel wall thickening or distension. There is diverticulosis without evidence of diverticulitis. Pelvis: Enlarged prostate indenting the base of bladder.  Distended bladder may be secondary to chronic bladder outlet obstruction.  Bilateral right greater than left fat containing inguinal hernias without acute hernia complication. Peritoneum/Retroperitoneum: No evidence of ascites or free air.  No evidence of lymphadenopathy.  Aorta is normal in caliber.  Mild-to-moderate atherosclerotic disease of the aorta and branch vessels. Bones/Soft Tissues: Chronic appearing superior endplate L1 compression fracture unchanged since prior 2023 examination.  No acute abnormality of the osseous structures.     No acute intra-abdominal or pelvic process. Enlarged prostate indenting the base of bladder. Distended bladder may be secondary to chronic bladder outlet obstruction.  Recommend correlation with PSA values. Diverticulosis without evidence of acute diverticulitis.  No acute bowel abnormality.     CT Head WO Contrast    Result Date: 4/23/2024  EXAMINATION: CT OF THE HEAD WITHOUT CONTRAST  4/23/2024 10:00 am TECHNIQUE: CT of the head was performed without the administration of intravenous contrast. Automated exposure control, iterative reconstruction, and/or weight based adjustment of the mA/kV was utilized

## 2024-04-24 NOTE — PROGRESS NOTES
Occupational Therapy  Facility/Department: [facility] MED SURG  Daily Treatment Note  NAME: Edouard De Dios  : 1955  MRN: [id number]    Date of Service: 2024    Discharge Recommendations:  Continue to assess pending progress, Home with assist PRN         Patient Diagnosis(es): The primary encounter diagnosis was Hypoglycemia. A diagnosis of Tremor was also pertinent to this visit.     Assessment    Activity Tolerance: Patient tolerated treatment well  Discharge Recommendations: Continue to assess pending progress;Home with assist PRN      Plan   Occupational Therapy Plan  Times Per Day: Once a day  Days Per Week: 7 Days  Current Treatment Recommendations: Strengthening;Functional mobility training;Endurance training;Safety education & training;Patient/Caregiver education & training;Self-Care / ADL     Restrictions   General, fall risk    Subjective   Subjective  Subjective: Pt seated  in recliner, agreed to washing up sinkside.  Pain: denied           Objective    Vitals           ADL  Feeding: Independent  Grooming: Setup;Supervision  UE Bathing: Setup;Supervision  LE Bathing: Setup;Supervision  UE Dressing: Setup;Supervision  LE Dressing: Setup;Supervision  Toileting: Supervision  Functional Mobility: Supervision  Functional Mobility Skilled Clinical Factors: Pt managed IV pole to restroom and back, no AD. 0 LOB noted  Additional Comments: set up/SUP for TB bathing/dressing this AM. Pt stood at the sink for ~ 15 min with 0 LOB. Pt had no further concerns of questions and reports (I) at [facility].        Safety Devices  Type of Devices: All fall risk precautions in place;Call light within reach;Chair alarm in place;Left in chair     Patient Education  Education Given To: Patient  Education Provided: Role of Therapy;Plan of Care;ADL Adaptive Strategies;Fall Prevention Strategies;Transfer Training  Education Method: Verbal  Barriers to Learning: None  Education Outcome: Demonstrated

## 2024-04-25 ENCOUNTER — CARE COORDINATION (OUTPATIENT)
Dept: CASE MANAGEMENT | Age: 69
End: 2024-04-25

## 2024-04-25 ENCOUNTER — ENROLLMENT (OUTPATIENT)
Dept: CASE MANAGEMENT | Age: 69
End: 2024-04-25

## 2024-04-25 DIAGNOSIS — K52.9 COLITIS WITH RECTAL BLEEDING: Primary | ICD-10-CM

## 2024-04-25 DIAGNOSIS — E16.2 HYPOGLYCEMIA: ICD-10-CM

## 2024-04-25 DIAGNOSIS — K62.5 COLITIS WITH RECTAL BLEEDING: Primary | ICD-10-CM

## 2024-04-25 PROCEDURE — 1111F DSCHRG MED/CURRENT MED MERGE: CPT

## 2024-04-25 NOTE — CARE COORDINATION
Care Transitions Outreach Attempt    Call within 2 business days of discharge: Yes   Attempted to reach patient for transitions of care follow up. Unable to reach patient.    Patient: Edouard De Dios Patient : 1955 MRN: <[id number]>    Last Discharge Facility       Date Complaint Diagnosis Description Type Department Provider    24 Arm Pain; Leg Pain Hypoglycemia ... ED to Hosp-Admission (Discharged) (ADMITTED) Martínez Foy MD; [facility]...          # 1 attempt-Attempted initial 24 hour hospital follow up call. Left a Hip[employer] compliant message with name and call back information. Requested return call to [phone].     Was this an external facility discharge? No Discharge Facility Name: [facility]    Noted following upcoming appointments from discharge chart review:   [facility] follow up appointment(s):   Future Appointments   Date Time Provider Department Center   2024  3:00 PM Jasiel Acevedo MD [facility]   2024  8:15 AM Martínez Bourgeois MD [facility]   2025  9:40 AM Janay Rush MD [facility]     Non-BS  follow up appointment(s):     Care Transitions Note    Initial Call - Call within 2 business days of discharge: Yes     Patient Current Location:  Home: [address] [place] [address]    Care Transition Nurse contacted the spouse/partner  by telephone to perform post hospital discharge assessment, verified name and  as identifiers. Provided introduction to self, and explanation of the Care Transition Nurse role.     Patient: Edouard De Dios    Patient : 1955   MRN: <[id number]>    Reason for Admission:   Discharge Date: 24  RURS: No data recorded    Last Discharge Facility       Date Complaint Diagnosis Description Type Department Provider    24 Arm Pain; Leg Pain Hypoglycemia ... ED to Hosp-Admission (Discharged) (ADMITTED) Martínez Foy MD; [facility]...            Was this an external facility discharge?

## 2024-04-29 DIAGNOSIS — G25.2 RESTING TREMOR: ICD-10-CM

## 2024-04-29 RX ORDER — PRIMIDONE 50 MG/1
TABLET ORAL
Qty: 90 TABLET | Refills: 0 | Status: SHIPPED | OUTPATIENT
Start: 2024-04-29

## 2024-05-02 ENCOUNTER — OFFICE VISIT (OUTPATIENT)
Dept: PRIMARY CARE CLINIC | Age: 69
End: 2024-05-02
Payer: MEDICARE

## 2024-05-02 VITALS
SYSTOLIC BLOOD PRESSURE: 138 MMHG | HEART RATE: 62 BPM | OXYGEN SATURATION: 100 % | HEIGHT: 71 IN | DIASTOLIC BLOOD PRESSURE: 60 MMHG | WEIGHT: 218.6 LBS | RESPIRATION RATE: 16 BRPM | BODY MASS INDEX: 30.6 KG/M2

## 2024-05-02 DIAGNOSIS — Z09 HOSPITAL DISCHARGE FOLLOW-UP: Primary | ICD-10-CM

## 2024-05-02 DIAGNOSIS — E16.2 HYPOGLYCEMIA: ICD-10-CM

## 2024-05-02 DIAGNOSIS — R25.1 TREMOR: ICD-10-CM

## 2024-05-02 PROCEDURE — 1123F ACP DISCUSS/DSCN MKR DOCD: CPT | Performed by: FAMILY MEDICINE

## 2024-05-02 PROCEDURE — 3017F COLORECTAL CA SCREEN DOC REV: CPT | Performed by: FAMILY MEDICINE

## 2024-05-02 PROCEDURE — 1036F TOBACCO NON-USER: CPT | Performed by: FAMILY MEDICINE

## 2024-05-02 PROCEDURE — G8417 CALC BMI ABV UP PARAM F/U: HCPCS | Performed by: FAMILY MEDICINE

## 2024-05-02 PROCEDURE — 3078F DIAST BP <80 MM HG: CPT | Performed by: FAMILY MEDICINE

## 2024-05-02 PROCEDURE — G8427 DOCREV CUR MEDS BY ELIG CLIN: HCPCS | Performed by: FAMILY MEDICINE

## 2024-05-02 PROCEDURE — 99214 OFFICE O/P EST MOD 30 MIN: CPT | Performed by: FAMILY MEDICINE

## 2024-05-02 PROCEDURE — 1111F DSCHRG MED/CURRENT MED MERGE: CPT | Performed by: FAMILY MEDICINE

## 2024-05-02 PROCEDURE — 3075F SYST BP GE 130 - 139MM HG: CPT | Performed by: FAMILY MEDICINE

## 2024-05-02 RX ORDER — SODIUM FLUORIDE 1.1 G/100G
CREAM ORAL
COMMUNITY
Start: 2024-03-05

## 2024-05-02 RX ORDER — AMLODIPINE BESYLATE 10 MG/1
TABLET ORAL
COMMUNITY
Start: 2024-02-12 | End: 2024-05-02

## 2024-05-02 RX ORDER — AMOXICILLIN 500 MG/1
CAPSULE ORAL
COMMUNITY
Start: 2024-04-29

## 2024-05-02 NOTE — PROGRESS NOTES
Post-Discharge Transitional Care  Follow Up      Edouard De Dios   YOB: 1955    Date of Office Visit:  5/2/2024  Date of Hospital Admission: 4/23/24  Date of Hospital Discharge: 4/24/24  Risk of hospital readmission (high >=14%. Medium >=10%) :No data recorded    Care management risk score Rising risk (score 2-5) and Complex Care (Scores >=6): No Risk Score On File     Non face to face  following discharge, date last encounter closed (first attempt may have been earlier): 04/25/2024    Call initiated 2 business days of discharge: Yes    ASSESSMENT/PLAN:   Hospital discharge follow-up  -     WI DISCHARGE MEDS RECONCILED W/ CURRENT OUTPATIENT MED LIST  Hypoglycemia  Tremor      Medical Decision Making: moderate complexity  Return if symptoms worsen or fail to improve.    On this date 5/2/2024 I have spent 30 minutes reviewing previous notes, test results and face to face with the patient discussing the diagnosis and importance of compliance with the treatment plan as well as documenting on the day of the visit.     Hypoglycemia-stable, continue observation and follow-up should concerns or issues persist or new issues arise.  Likely due to significantly decreased p.o. intake as patient noted he was only eating approximately 1 time per day.  Has modified his diet at this time and feels fine.    Tremor stable, continue primidone follow-up for further.    Subjective:   HPI:  Follow up of Hospital problems/diagnosis(es): 68-year-old male initially admitted to the emergency department for concerns of tremors, numbness or tingling in arms and legs while walking.  He also felt significantly unwell, malaise.  Patient on history significant for A-fib anticoagulated with Xarelto.  Initial evaluation was concerning for hypoglycemia with sugar in the 40s persistently.  Patient was treated appropriately with IV dextrose and was able to tolerate treatment/medications/diet without any difficulty.  Symptoms

## 2024-05-03 ENCOUNTER — CARE COORDINATION (OUTPATIENT)
Dept: CASE MANAGEMENT | Age: 69
End: 2024-05-03

## 2024-05-03 NOTE — CARE COORDINATION
Care Transitions Follow Up Call    Patient Current Location:  Home: [address] [place] [address] [address]  [facility] [address]    [facility] Care Coordinator contacted the spouse/partner by telephone to follow up after admission on 24.  Verified name and  with spouse/partner as identifiers.    Patient: Edouard De Dios  Patient : 1955   MRN: [id number]  Reason for Admission: hypoglycemia   Discharge Date: 24 RARS: No data recorded    Needs to be reviewed by the provider   Additional needs identified to be addressed with provider: No  none             Method of communication with provider: none.    Subsequent transitional call. Spoke to patient's spouse, Nikki she reports pt not home. BS today was 92 she stated he is doing well. Pt had HFU yesterday everything went well. No med changes. She voices no concerns /needs at this time.     Addressed changes since last contact:   BS good Had HFU  Discussed follow-up appointments. If no appointment was previously scheduled, appointment scheduling offered: Yes.   Is follow up appointment scheduled within 7 days of discharge? No. 8 days     Follow Up  Future Appointments   Date Time Provider Department Center   2024  8:15 AM Martínez Bourgeois MD [facility]   2025  9:40 AM Janay Rush MD [facility] Care Coordinator reviewed discharge instructions, medical action plan, and red flags with spouse/partner and discussed any barriers to care and/or understanding of plan of care after discharge. Discussed appropriate site of care based on symptoms and resources available to patient including: PCP  Specialist  Urgent care clinics  When to call 911. The spouse/partner agrees to contact the PCP office for questions related to their healthcare.     Advance Care Planning:   reviewed and current.   medical condition-hypoglycemia   Patients top risk factors for readmission:   Interventions to address risk factors: Obtained and reviewed discharge summary and/or

## 2024-05-10 ENCOUNTER — CARE COORDINATION (OUTPATIENT)
Dept: CASE MANAGEMENT | Age: 69
End: 2024-05-10

## 2024-05-10 NOTE — CARE COORDINATION
Care Transitions Outreach Attempt    Call within 2 business days of discharge: Yes   Attempted to reach patient for transitions of care follow up. Unable to reach patient.    Patient: Edouard De Dios Patient : 1955 MRN: <[id number]>    Last Discharge Facility       Date Complaint Diagnosis Description Type Department Provider    24 Arm Pain; Leg Pain Hypoglycemia ... ED to Hosp-Admission (Discharged) (ADMITTED) [facility] Martínez Bourgeois MD; Eryn He...          # 1 attempt-Attempted to reach patient for subsequent call.     Was this an external facility discharge? No Discharge Facility Name: [facility]    Noted following upcoming appointments from discharge chart review:   [facility] follow up appointment(s):   Future Appointments   Date Time Provider Department Center   2024  8:15 AM Martínez Bourgeois MD [facility]   2025  9:40 AM Janay Rush MD [facility]     [facility]  follow up appointment(s):

## 2024-05-13 DIAGNOSIS — I10 ESSENTIAL HYPERTENSION: ICD-10-CM

## 2024-05-13 RX ORDER — AMLODIPINE BESYLATE 5 MG/1
5 TABLET ORAL EVERY MORNING
Qty: 90 TABLET | Refills: 0 | Status: SHIPPED | OUTPATIENT
Start: 2024-05-13

## 2024-05-13 RX ORDER — LISINOPRIL 20 MG/1
TABLET ORAL
Qty: 90 TABLET | Refills: 3 | Status: SHIPPED | OUTPATIENT
Start: 2024-05-13

## 2024-05-13 RX ORDER — AMLODIPINE BESYLATE 10 MG/1
TABLET ORAL
Qty: 90 TABLET | Refills: 3 | Status: SHIPPED | OUTPATIENT
Start: 2024-05-13 | End: 2024-05-13

## 2024-05-14 ENCOUNTER — CARE COORDINATION (OUTPATIENT)
Dept: CASE MANAGEMENT | Age: 69
End: 2024-05-14

## 2024-05-14 NOTE — CARE COORDINATION
Care Transitions Outreach Attempt # 2     Call within 2 business days of discharge: Yes   Attempted to reach patient for transitions of care follow up. Unable to reach patient. Unable to leave a VM will resolve episode if no return call.     Patient: Edouard De Dios Patient : 1955 MRN: [id number]    Last Discharge Facility       Date Complaint Diagnosis Description Type Department Provider    24 Arm Pain; Leg Pain Hypoglycemia ... ED to Hosp-Admission (Discharged) (ADMITTED) [facility] Martínez Bourgeois MD; Eryn He...              Was this an external facility discharge? No Discharge Facility Name: [facility]      Future Appointments   Date Time Provider Department Center   2024  8:15 AM Mart[facility]nez Bourgeois MD [facility]   2025  9:40 AM Janay Rush MD [facility]

## 2024-05-20 ENCOUNTER — APPOINTMENT (OUTPATIENT)
Dept: GENERAL RADIOLOGY | Age: 69
End: 2024-05-20
Payer: MEDICARE

## 2024-05-20 ENCOUNTER — HOSPITAL ENCOUNTER (EMERGENCY)
Age: 69
Discharge: HOME | End: 2024-05-20
Attending: EMERGENCY MEDICINE
Payer: MEDICARE

## 2024-05-20 VITALS
HEART RATE: 62 BPM | DIASTOLIC BLOOD PRESSURE: 61 MMHG | OXYGEN SATURATION: 93 % | TEMPERATURE: 97.5 F | RESPIRATION RATE: 19 BRPM | SYSTOLIC BLOOD PRESSURE: 90 MMHG

## 2024-05-20 DIAGNOSIS — J44.1 COPD EXACERBATION (HCC): Primary | ICD-10-CM

## 2024-05-20 DIAGNOSIS — F10.920 ALCOHOLIC INTOXICATION WITHOUT COMPLICATION (HCC): ICD-10-CM

## 2024-05-20 LAB
ALBUMIN SERPL-MCNC: 4.1 G/DL (ref 3.5–5.2)
ALBUMIN/GLOB SERPL: 1.4 {RATIO} (ref 1–2.5)
ALP SERPL-CCNC: 78 U/L (ref 40–129)
ALT SERPL-CCNC: 25 U/L (ref 5–41)
ANION GAP SERPL CALCULATED.3IONS-SCNC: 13 MMOL/L (ref 9–17)
AST SERPL-CCNC: 33 U/L
BASOPHILS # BLD: 0.05 K/UL (ref 0–0.2)
BASOPHILS NFR BLD: 1 % (ref 0–2)
BILIRUB SERPL-MCNC: 0.4 MG/DL (ref 0.3–1.2)
BNP SERPL-MCNC: 966 PG/ML
BUN SERPL-MCNC: 13 MG/DL (ref 8–23)
BUN/CREAT SERPL: 19 (ref 9–20)
CALCIUM SERPL-MCNC: 8.4 MG/DL (ref 8.6–10.4)
CHLORIDE SERPL-SCNC: 99 MMOL/L (ref 98–107)
CO2 SERPL-SCNC: 19 MMOL/L (ref 20–31)
CREAT SERPL-MCNC: 0.7 MG/DL (ref 0.7–1.2)
EKG Q-T INTERVAL: 420 MS
EKG QRS DURATION: 84 MS
EKG QTC CALCULATION (BAZETT): 429 MS
EKG R AXIS: 70 DEGREES
EKG T AXIS: 77 DEGREES
EKG VENTRICULAR RATE: 63 BPM
EOSINOPHIL # BLD: 0.32 K/UL (ref 0–0.44)
EOSINOPHILS RELATIVE PERCENT: 6 % (ref 1–4)
ERYTHROCYTE [DISTWIDTH] IN BLOOD BY AUTOMATED COUNT: 14.9 % (ref 11.8–14.4)
ETHANOL PERCENT: 0.23 %
ETHANOLAMINE SERPL-MCNC: 230 MG/DL (ref 0–0.08)
GFR, ESTIMATED: >90 ML/MIN/1.73M2
GLUCOSE BLD-MCNC: 90 MG/DL (ref 74–100)
GLUCOSE SERPL-MCNC: 76 MG/DL (ref 70–99)
HCT VFR BLD AUTO: 36.7 % (ref 40.7–50.3)
HGB BLD-MCNC: 12.8 G/DL (ref 13–17)
IMM GRANULOCYTES # BLD AUTO: <0.03 K/UL (ref 0–0.3)
IMM GRANULOCYTES NFR BLD: 0 %
LYMPHOCYTES NFR BLD: 1.67 K/UL (ref 1.1–3.7)
LYMPHOCYTES RELATIVE PERCENT: 32 % (ref 24–43)
MCH RBC QN AUTO: 34 PG (ref 25.2–33.5)
MCHC RBC AUTO-ENTMCNC: 34.9 G/DL (ref 28.4–34.8)
MCV RBC AUTO: 97.6 FL (ref 82.6–102.9)
MONOCYTES NFR BLD: 0.56 K/UL (ref 0.1–1.2)
MONOCYTES NFR BLD: 11 % (ref 3–12)
NEUTROPHILS NFR BLD: 50 % (ref 36–65)
NEUTS SEG NFR BLD: 2.64 K/UL (ref 1.5–8.1)
NRBC BLD-RTO: 0 PER 100 WBC
PLATELET # BLD AUTO: 140 K/UL (ref 138–453)
PMV BLD AUTO: 10.2 FL (ref 8.1–13.5)
POTASSIUM SERPL-SCNC: 3.9 MMOL/L (ref 3.7–5.3)
PROT SERPL-MCNC: 7 G/DL (ref 6.4–8.3)
RBC # BLD AUTO: 3.76 M/UL (ref 4.21–5.77)
SODIUM SERPL-SCNC: 131 MMOL/L (ref 135–144)
TROPONIN I SERPL HS-MCNC: 11 NG/L (ref 0–22)
WBC OTHER # BLD: 5.3 K/UL (ref 3.5–11.3)

## 2024-05-20 PROCEDURE — 82947 ASSAY GLUCOSE BLOOD QUANT: CPT

## 2024-05-20 PROCEDURE — 93005 ELECTROCARDIOGRAM TRACING: CPT | Performed by: EMERGENCY MEDICINE

## 2024-05-20 PROCEDURE — 93010 ELECTROCARDIOGRAM REPORT: CPT | Performed by: FAMILY MEDICINE

## 2024-05-20 PROCEDURE — 80053 COMPREHEN METABOLIC PANEL: CPT

## 2024-05-20 PROCEDURE — 6370000000 HC RX 637 (ALT 250 FOR IP): Performed by: EMERGENCY MEDICINE

## 2024-05-20 PROCEDURE — 94664 DEMO&/EVAL PT USE INHALER: CPT

## 2024-05-20 PROCEDURE — 83880 ASSAY OF NATRIURETIC PEPTIDE: CPT

## 2024-05-20 PROCEDURE — 99285 EMERGENCY DEPT VISIT HI MDM: CPT

## 2024-05-20 PROCEDURE — 85025 COMPLETE CBC W/AUTO DIFF WBC: CPT

## 2024-05-20 PROCEDURE — 84484 ASSAY OF TROPONIN QUANT: CPT

## 2024-05-20 PROCEDURE — G0480 DRUG TEST DEF 1-7 CLASSES: HCPCS

## 2024-05-20 PROCEDURE — 71045 X-RAY EXAM CHEST 1 VIEW: CPT

## 2024-05-20 RX ORDER — PREDNISONE 50 MG/1
50 TABLET ORAL DAILY
Qty: 4 TABLET | Refills: 0 | Status: SHIPPED | OUTPATIENT
Start: 2024-05-21 | End: 2024-05-25

## 2024-05-20 RX ORDER — IPRATROPIUM BROMIDE AND ALBUTEROL SULFATE 2.5; .5 MG/3ML; MG/3ML
1 SOLUTION RESPIRATORY (INHALATION) ONCE
Status: COMPLETED | OUTPATIENT
Start: 2024-05-20 | End: 2024-05-20

## 2024-05-20 RX ORDER — ALBUTEROL SULFATE 90 UG/1
2 AEROSOL, METERED RESPIRATORY (INHALATION) 4 TIMES DAILY PRN
Qty: 54 G | Refills: 1 | Status: SHIPPED | OUTPATIENT
Start: 2024-05-20

## 2024-05-20 RX ORDER — AZITHROMYCIN 250 MG/1
250 TABLET, FILM COATED ORAL DAILY
Qty: 4 TABLET | Refills: 0 | Status: SHIPPED | OUTPATIENT
Start: 2024-05-21 | End: 2024-05-25

## 2024-05-20 RX ORDER — AZITHROMYCIN 250 MG/1
500 TABLET, FILM COATED ORAL ONCE
Status: COMPLETED | OUTPATIENT
Start: 2024-05-20 | End: 2024-05-20

## 2024-05-20 RX ADMIN — AZITHROMYCIN DIHYDRATE 500 MG: 250 TABLET, FILM COATED ORAL at 14:42

## 2024-05-20 RX ADMIN — PREDNISONE 50 MG: 20 TABLET ORAL at 14:42

## 2024-05-20 RX ADMIN — IPRATROPIUM BROMIDE AND ALBUTEROL SULFATE 1 DOSE: 2.5; .5 SOLUTION RESPIRATORY (INHALATION) at 13:46

## 2024-05-20 ASSESSMENT — PAIN - FUNCTIONAL ASSESSMENT: PAIN_FUNCTIONAL_ASSESSMENT: NONE - DENIES PAIN

## 2024-05-20 NOTE — ED PROVIDER NOTES
[facility] ED  EMERGENCY DEPARTMENT ENCOUNTER      Pt Name: Edouard De Dios  MRN: [id number]  Birthdate 1955  Date of evaluation: 5/20/2024  Provider: Tere Alcatnara MD    CHIEF COMPLAINT       Chief Complaint   Patient presents with    Shortness of Breath     Patient brought in for increased shortness of breath, does not have inhaler.         HISTORY OF PRESENT ILLNESS   (Location/Symptom, Timing/Onset, Context/Setting, Quality, Duration, Modifying Factors, Severity)  Note limiting factors.   Edouard De Dios is a 68 y.o. male who presents to the emergency department      68-year-old male present emergency department for evaluation of shortness of breath for the past couple days.  No fevers or chills.  Nonproductive cough.  Patient does states he does not have any inhalers and has been quite sometime since he had not used any.  Does admit to drinking alcohol today.Denies any chest pain.  No fevers chills sweats.  No abdominal pain nausea or vomiting.  Nothing tried at home for relief.  No other concerns        Nursing Notes were reviewed.    REVIEW OF SYSTEMS    (2-9 systems for level 4, 10 or more for level 5)     Review of Systems   All other systems reviewed and are negative.      Except as noted above the remainder of the review of systems was reviewed and negative.       PAST MEDICAL HISTORY     Past Medical History:   Diagnosis Date    Achilles tendon tear 2013    Allergic rhinitis     Atrial flutter (HCC) 1996    Congestive heart failure (HCC)     Coronary atherosclerosis     Hyperglycemia 2007    Hyperlipidemia 1996    Hypertension 2004    Mitral insufficiency     Obesity 2006    Osteoarthritis     Status post ablation of atrial flutter 05/09/13    Vasodepressor syncope 2001         SURGICAL HISTORY       Past Surgical History:   Procedure Laterality Date    ATRIAL ABLATION SURGERY  1998, 2013    [facility] AV node    CATARACT REMOVAL Bilateral     CATARACT REMOVAL WITH IMPLANT  12/06/2017

## 2024-05-20 NOTE — DISCHARGE INSTRUCTIONS
Go home and rest use her albuterol 2 puffs every 4-6 hours as needed for wheezing cough or bronchospasms.  Start your prednisone and azithromycin tomorrow and take each once daily until complete.  He did receive your first dose of each today in the emergency department.  Follow-up with your primary care provider in 3 to 5 days if symptoms do not resolve.  You must seek medical attention immediately should he develop any worsening symptoms or any other acute concerns

## 2024-05-21 RX ORDER — METOPROLOL SUCCINATE 25 MG/1
TABLET, EXTENDED RELEASE ORAL
Qty: 90 TABLET | Refills: 3 | Status: SHIPPED | OUTPATIENT
Start: 2024-05-21

## 2024-05-23 ENCOUNTER — OFFICE VISIT (OUTPATIENT)
Dept: PRIMARY CARE CLINIC | Age: 69
End: 2024-05-23
Payer: MEDICARE

## 2024-05-23 VITALS
WEIGHT: 219 LBS | BODY MASS INDEX: 30.66 KG/M2 | DIASTOLIC BLOOD PRESSURE: 62 MMHG | HEART RATE: 64 BPM | RESPIRATION RATE: 18 BRPM | SYSTOLIC BLOOD PRESSURE: 120 MMHG | HEIGHT: 71 IN

## 2024-05-23 DIAGNOSIS — J44.1 COPD EXACERBATION (HCC): Primary | ICD-10-CM

## 2024-05-23 DIAGNOSIS — E16.2 HYPOGLYCEMIA: ICD-10-CM

## 2024-05-23 PROCEDURE — 3074F SYST BP LT 130 MM HG: CPT | Performed by: STUDENT IN AN ORGANIZED HEALTH CARE EDUCATION/TRAINING PROGRAM

## 2024-05-23 PROCEDURE — 3023F SPIROM DOC REV: CPT | Performed by: STUDENT IN AN ORGANIZED HEALTH CARE EDUCATION/TRAINING PROGRAM

## 2024-05-23 PROCEDURE — G8417 CALC BMI ABV UP PARAM F/U: HCPCS | Performed by: STUDENT IN AN ORGANIZED HEALTH CARE EDUCATION/TRAINING PROGRAM

## 2024-05-23 PROCEDURE — 99214 OFFICE O/P EST MOD 30 MIN: CPT | Performed by: STUDENT IN AN ORGANIZED HEALTH CARE EDUCATION/TRAINING PROGRAM

## 2024-05-23 PROCEDURE — 1036F TOBACCO NON-USER: CPT | Performed by: STUDENT IN AN ORGANIZED HEALTH CARE EDUCATION/TRAINING PROGRAM

## 2024-05-23 PROCEDURE — 3078F DIAST BP <80 MM HG: CPT | Performed by: STUDENT IN AN ORGANIZED HEALTH CARE EDUCATION/TRAINING PROGRAM

## 2024-05-23 PROCEDURE — 1123F ACP DISCUSS/DSCN MKR DOCD: CPT | Performed by: STUDENT IN AN ORGANIZED HEALTH CARE EDUCATION/TRAINING PROGRAM

## 2024-05-23 PROCEDURE — 3017F COLORECTAL CA SCREEN DOC REV: CPT | Performed by: STUDENT IN AN ORGANIZED HEALTH CARE EDUCATION/TRAINING PROGRAM

## 2024-05-23 PROCEDURE — G8427 DOCREV CUR MEDS BY ELIG CLIN: HCPCS | Performed by: STUDENT IN AN ORGANIZED HEALTH CARE EDUCATION/TRAINING PROGRAM

## 2024-05-23 RX ORDER — AMLODIPINE BESYLATE 10 MG/1
TABLET ORAL
COMMUNITY
Start: 2024-05-13

## 2024-05-23 NOTE — PROGRESS NOTES
[facility] PRIMARY CARE  [address] [place] [address] , [address]  [place], [place], [address]    Edouard De Dios is a 68 y.o. male with  has a past medical history of Achilles tendon tear, Allergic rhinitis, Atrial flutter (HCC), Congestive heart failure (HCC), Coronary atherosclerosis, Hyperglycemia, Hyperlipidemia, Hypertension, Mitral insufficiency, Obesity, Osteoarthritis, Status post ablation of atrial flutter, and Vasodepressor syncope.  Presented to the office today for:  Chief Complaint   Patient presents with    ED Follow-up       Assessment/Plan   1. COPD exacerbation (HCC)  2. Hypoglycemia  -     External Referral To Endocrinology  Return if symptoms worsen or fail to improve, for F/U per prior plans.    Endocrinology consult re: intermittent hypoglycemia  Continue w/ albuterol PRN QID   Continue w/ Prednisone 50mg PO daily  Symptomatic therapy suggested: push fluids, rest, gargle warm salt water, use vaporizer or mist prn and apply heat to sinuses prn. Nasal saline sprays PRN. Use Neti Pot PRN. Tylenol PRN for pain/fevers, Albuterol PRN for any shortness of breath/wheezing/mild dyspnea. Cepacol PRN for sore throat.  May consider additional w/u and management if needed, including CXR/advanced chest imaging, labs.  If there are any worsening or concerning signs or symptoms, patient will report to the ED and/or contact [id number] for immediate evaluation. Teach back method was used. All patient questions answered. Pt voiced understanding.        All patient questions answered.  Pt voiced understanding.   Medications Discontinued During This Encounter   Medication Reason    amLODIPine (NORVASC) 5 MG tablet LIST CLEANUP    amoxicillin (AMOXIL) 500 MG capsule LIST CLEANUP    amLODIPine (NORVASC) 5 MG tablet LIST CLEANUP       Patient received counseling on the following healthy behaviors: nutrition, exercise and medication adherence. I encouraged and discussed lifestyle modifications including diet and exercise

## 2024-07-16 ENCOUNTER — OFFICE VISIT (OUTPATIENT)
Dept: PRIMARY CARE CLINIC | Age: 69
End: 2024-07-16
Payer: MEDICARE

## 2024-07-16 VITALS
BODY MASS INDEX: 30.66 KG/M2 | HEART RATE: 60 BPM | DIASTOLIC BLOOD PRESSURE: 80 MMHG | HEIGHT: 71 IN | OXYGEN SATURATION: 98 % | WEIGHT: 219 LBS | SYSTOLIC BLOOD PRESSURE: 120 MMHG

## 2024-07-16 DIAGNOSIS — I10 ESSENTIAL HYPERTENSION: Primary | ICD-10-CM

## 2024-07-16 DIAGNOSIS — E78.5 DYSLIPIDEMIA: ICD-10-CM

## 2024-07-16 DIAGNOSIS — I48.20 CHRONIC A-FIB (HCC): ICD-10-CM

## 2024-07-16 DIAGNOSIS — I48.92 ATRIAL FLUTTER, UNSPECIFIED TYPE (HCC): ICD-10-CM

## 2024-07-16 PROCEDURE — 3079F DIAST BP 80-89 MM HG: CPT | Performed by: STUDENT IN AN ORGANIZED HEALTH CARE EDUCATION/TRAINING PROGRAM

## 2024-07-16 PROCEDURE — 3017F COLORECTAL CA SCREEN DOC REV: CPT | Performed by: STUDENT IN AN ORGANIZED HEALTH CARE EDUCATION/TRAINING PROGRAM

## 2024-07-16 PROCEDURE — 99211 OFF/OP EST MAY X REQ PHY/QHP: CPT | Performed by: STUDENT IN AN ORGANIZED HEALTH CARE EDUCATION/TRAINING PROGRAM

## 2024-07-16 PROCEDURE — G8427 DOCREV CUR MEDS BY ELIG CLIN: HCPCS | Performed by: STUDENT IN AN ORGANIZED HEALTH CARE EDUCATION/TRAINING PROGRAM

## 2024-07-16 PROCEDURE — 1036F TOBACCO NON-USER: CPT | Performed by: STUDENT IN AN ORGANIZED HEALTH CARE EDUCATION/TRAINING PROGRAM

## 2024-07-16 PROCEDURE — 1123F ACP DISCUSS/DSCN MKR DOCD: CPT | Performed by: STUDENT IN AN ORGANIZED HEALTH CARE EDUCATION/TRAINING PROGRAM

## 2024-07-16 PROCEDURE — 3074F SYST BP LT 130 MM HG: CPT | Performed by: STUDENT IN AN ORGANIZED HEALTH CARE EDUCATION/TRAINING PROGRAM

## 2024-07-16 PROCEDURE — 99214 OFFICE O/P EST MOD 30 MIN: CPT | Performed by: STUDENT IN AN ORGANIZED HEALTH CARE EDUCATION/TRAINING PROGRAM

## 2024-07-16 PROCEDURE — G8417 CALC BMI ABV UP PARAM F/U: HCPCS | Performed by: STUDENT IN AN ORGANIZED HEALTH CARE EDUCATION/TRAINING PROGRAM

## 2024-07-16 PROCEDURE — G2211 COMPLEX E/M VISIT ADD ON: HCPCS | Performed by: STUDENT IN AN ORGANIZED HEALTH CARE EDUCATION/TRAINING PROGRAM

## 2024-07-16 NOTE — PROGRESS NOTES
[facility] PRIMARY CARE  [address] [place] [address] , [address]  [place], [place], [address]    Edouard De Dios is a 68 y.o. male with  has a past medical history of Achilles tendon tear, Allergic rhinitis, Atrial flutter (HCC), Congestive heart failure (HCC), Coronary atherosclerosis, Hyperglycemia, Hyperlipidemia, Hypertension, Mitral insufficiency, Obesity, Osteoarthritis, Status post ablation of atrial flutter, and Vasodepressor syncope.  Presented to the office today for:  Chief Complaint   Patient presents with    Hypertension    Hyperlipidemia       Assessment/Plan   1. Essential hypertension [I10]  2. Chronic a-fib (HCC)  3. Atrial flutter, unspecified type (HCC)  4. Dyslipidemia  Return in about 4 months (around 11/16/2024) for F/U Med Management.    HTN/2nd hypercoag state/Afib - continue w/ lisinopril 20mg and toprol XL at 12.5mg  Xarelto to be continued at 20mg PO daily.  Continue w/ crestor 40mg, lipid panel is at goal.     All patient questions answered.  Pt voiced understanding.   There are no discontinued medications.    Patient received counseling on the following healthy behaviors: nutrition, exercise and medication adherence. I encouraged and discussed lifestyle modifications including diet and exercise (150+ minutes of moderate-high intensity) and the patient was agreeable to making positive/beneficial changes to both to help improve their overall health. Discussed use, benefit, and side effects of prescribed medications.  Barriers to medication compliance addressed. Patient given educational materials: see patient instructions.     HM - HM items completed today as per orders. Outstanding HM items though not limited to immunizations were discussed with the patient today, including risks, benefits and alternatives. The patient will discuss these during the next appointment per their preference. If there are any worsening or concerning signs or symptoms, patient will report to the ED and/or  Patient called and asked if she could speak to a nurse regarding the dry needling .     Patient tel : [phone]

## 2024-07-26 DIAGNOSIS — G25.2 RESTING TREMOR: ICD-10-CM

## 2024-07-26 RX ORDER — PRIMIDONE 50 MG/1
TABLET ORAL
Qty: 90 TABLET | Refills: 3 | Status: SHIPPED | OUTPATIENT
Start: 2024-07-26

## 2024-08-09 DIAGNOSIS — I10 ESSENTIAL HYPERTENSION: ICD-10-CM

## 2024-08-09 DIAGNOSIS — E78.5 HYPERLIPIDEMIA, UNSPECIFIED HYPERLIPIDEMIA TYPE: ICD-10-CM

## 2024-08-09 RX ORDER — ROSUVASTATIN CALCIUM 40 MG/1
TABLET, COATED ORAL
Qty: 90 TABLET | Refills: 3 | Status: SHIPPED | OUTPATIENT
Start: 2024-08-09

## 2024-09-03 PROBLEM — F33.9 MAJOR DEPRESSIVE DISORDER, RECURRENT, UNSPECIFIED (HCC): Status: ACTIVE | Noted: 2024-09-03

## 2024-09-03 PROBLEM — F33.1 MAJOR DEPRESSIVE DISORDER, RECURRENT, MODERATE (HCC): Status: ACTIVE | Noted: 2024-09-03

## 2024-09-03 PROBLEM — F33.0 MAJOR DEPRESSIVE DISORDER, RECURRENT, MILD (HCC): Status: ACTIVE | Noted: 2024-09-03

## 2024-09-03 PROBLEM — I50.22 CHRONIC SYSTOLIC (CONGESTIVE) HEART FAILURE (HCC): Status: ACTIVE | Noted: 2024-09-03

## 2024-09-03 PROBLEM — R53.82 CHRONIC FATIGUE: Status: ACTIVE | Noted: 2024-09-03

## 2024-09-04 ENCOUNTER — HOSPITAL ENCOUNTER (OUTPATIENT)
Age: 69
Discharge: HOME | End: 2024-09-04
Payer: MEDICARE

## 2024-09-04 DIAGNOSIS — E16.2 HYPOGLYCEMIA: ICD-10-CM

## 2024-09-04 DIAGNOSIS — R53.82 CHRONIC FATIGUE: ICD-10-CM

## 2024-09-04 DIAGNOSIS — R73.9 HYPERGLYCEMIA: ICD-10-CM

## 2024-09-04 DIAGNOSIS — Z13.1 ENCOUNTER FOR SCREENING FOR DIABETES MELLITUS: ICD-10-CM

## 2024-09-04 DIAGNOSIS — R73.03 PREDIABETES: ICD-10-CM

## 2024-09-04 LAB
ALBUMIN SERPL-MCNC: 4.2 G/DL (ref 3.5–5.2)
ALBUMIN/GLOB SERPL: 1.6 {RATIO} (ref 1–2.5)
ALP SERPL-CCNC: 89 U/L (ref 40–129)
ALT SERPL-CCNC: 19 U/L (ref 10–50)
AMOUNT GLUCOSE GIVEN: 75 G
ANION GAP SERPL CALCULATED.3IONS-SCNC: 14 MMOL/L (ref 9–16)
AST SERPL-CCNC: 30 U/L (ref 10–50)
BILIRUB SERPL-MCNC: 0.3 MG/DL (ref 0–1.2)
BUN SERPL-MCNC: 15 MG/DL (ref 8–23)
BUN/CREAT SERPL: 19 (ref 9–20)
C PEPTIDE SERPL-MCNC: 0.8 NG/ML (ref 1.1–4.4)
CALCIUM SERPL-MCNC: 9.3 MG/DL (ref 8.6–10.4)
CHLORIDE SERPL-SCNC: 106 MMOL/L (ref 98–107)
CO2 SERPL-SCNC: 23 MMOL/L (ref 20–31)
COLLECT TIME, 2HR GLUCOSE: 946
COLLECT TIME, FASTING GLUCOSE: 743
CORTIS SERPL-MCNC: 16.7 UG/DL (ref 2.5–19.5)
CREAT SERPL-MCNC: 0.8 MG/DL (ref 0.7–1.2)
ERYTHROCYTE [DISTWIDTH] IN BLOOD BY AUTOMATED COUNT: 14.6 % (ref 11.8–14.4)
EST. AVERAGE GLUCOSE BLD GHB EST-MCNC: 94 MG/DL
GFR, ESTIMATED: >90 ML/MIN/1.73M2
GLUCOSE 2H P 75 G GLC PO SERPL-MCNC: 73 MG/DL
GLUCOSE SERPL-MCNC: 74 MG/DL (ref 74–99)
GLUCOSE TOLERANCE TEST 1 HOUR: 163 MG/DL
GLUCOSE TOLERANCE TEST 2 HOUR: 178 MG/DL
HBA1C MFR BLD: 4.9 % (ref 4–6)
HCT VFR BLD AUTO: 38.9 % (ref 40.7–50.3)
HGB BLD-MCNC: 13.2 G/DL (ref 13–17)
INSULIN REFERENCE RANGE:: NORMAL
INSULIN: 2.9 MU/L
MCH RBC QN AUTO: 34.7 PG (ref 25.2–33.5)
MCHC RBC AUTO-ENTMCNC: 33.9 G/DL (ref 28.4–34.8)
MCV RBC AUTO: 102.4 FL (ref 82.6–102.9)
NRBC BLD-RTO: 0 PER 100 WBC
PLATELET # BLD AUTO: ABNORMAL K/UL (ref 138–453)
PLATELET, FLUORESCENCE: 135 K/UL (ref 138–453)
PLATELETS.RETICULATED NFR BLD AUTO: 4.5 % (ref 1.1–10.3)
POTASSIUM SERPL-SCNC: 3.9 MMOL/L (ref 3.7–5.3)
PROT SERPL-MCNC: 6.9 G/DL (ref 6.6–8.7)
RBC # BLD AUTO: 3.8 M/UL (ref 4.21–5.77)
SODIUM SERPL-SCNC: 143 MMOL/L (ref 136–145)
TSH SERPL DL<=0.05 MIU/L-ACNC: 1.84 UIU/ML (ref 0.27–4.2)
WBC OTHER # BLD: 4.2 K/UL (ref 3.5–11.3)

## 2024-09-04 PROCEDURE — 84681 ASSAY OF C-PEPTIDE: CPT

## 2024-09-04 PROCEDURE — 83525 ASSAY OF INSULIN: CPT

## 2024-09-04 PROCEDURE — 80053 COMPREHEN METABOLIC PANEL: CPT

## 2024-09-04 PROCEDURE — 84439 ASSAY OF FREE THYROXINE: CPT

## 2024-09-04 PROCEDURE — 84443 ASSAY THYROID STIM HORMONE: CPT

## 2024-09-04 PROCEDURE — 82533 TOTAL CORTISOL: CPT

## 2024-09-04 PROCEDURE — 82951 GLUCOSE TOLERANCE TEST (GTT): CPT

## 2024-09-04 PROCEDURE — 83036 HEMOGLOBIN GLYCOSYLATED A1C: CPT

## 2024-09-04 PROCEDURE — 86337 INSULIN ANTIBODIES: CPT

## 2024-09-04 PROCEDURE — 85027 COMPLETE CBC AUTOMATED: CPT

## 2024-09-04 PROCEDURE — 36415 COLL VENOUS BLD VENIPUNCTURE: CPT

## 2024-09-05 LAB — T4 FREE SERPL-MCNC: 1 NG/DL (ref 0.92–1.68)

## 2024-09-07 LAB — HUMAN INSULIN ANTIBODY: <0.4 U/ML (ref 0–0.4)

## 2024-11-21 ENCOUNTER — OFFICE VISIT (OUTPATIENT)
Dept: PRIMARY CARE CLINIC | Age: 69
End: 2024-11-21

## 2024-11-21 VITALS
HEIGHT: 71 IN | HEART RATE: 77 BPM | OXYGEN SATURATION: 96 % | RESPIRATION RATE: 18 BRPM | SYSTOLIC BLOOD PRESSURE: 160 MMHG | BODY MASS INDEX: 31.5 KG/M2 | DIASTOLIC BLOOD PRESSURE: 80 MMHG | WEIGHT: 225 LBS

## 2024-11-21 DIAGNOSIS — Z00.00 MEDICARE ANNUAL WELLNESS VISIT, SUBSEQUENT: Primary | ICD-10-CM

## 2024-11-21 DIAGNOSIS — F33.9 RECURRENT MAJOR DEPRESSIVE DISORDER, REMISSION STATUS UNSPECIFIED (HCC): ICD-10-CM

## 2024-11-21 DIAGNOSIS — I50.22 CHRONIC SYSTOLIC (CONGESTIVE) HEART FAILURE (HCC): ICD-10-CM

## 2024-11-21 DIAGNOSIS — F33.1 MAJOR DEPRESSIVE DISORDER, RECURRENT, MODERATE (HCC): ICD-10-CM

## 2024-11-21 DIAGNOSIS — I10 ESSENTIAL HYPERTENSION: ICD-10-CM

## 2024-11-21 RX ORDER — LISINOPRIL 20 MG/1
20 TABLET ORAL DAILY
Qty: 90 TABLET | Refills: 1 | Status: SHIPPED | OUTPATIENT
Start: 2024-11-21 | End: 2024-11-21 | Stop reason: CLARIF

## 2024-11-21 RX ORDER — AMLODIPINE BESYLATE 10 MG/1
10 TABLET ORAL DAILY
Qty: 90 TABLET | Refills: 1 | Status: SHIPPED | OUTPATIENT
Start: 2024-11-21 | End: 2024-11-21 | Stop reason: CLARIF

## 2024-11-21 ASSESSMENT — LIFESTYLE VARIABLES
HOW OFTEN DURING THE LAST YEAR HAVE YOU BEEN UNABLE TO REMEMBER WHAT HAPPENED THE NIGHT BEFORE BECAUSE YOU HAD BEEN DRINKING: NEVER
HOW OFTEN DURING THE LAST YEAR HAVE YOU FOUND THAT YOU WERE NOT ABLE TO STOP DRINKING ONCE YOU HAD STARTED: NEVER
HAS A RELATIVE, FRIEND, DOCTOR, OR ANOTHER HEALTH PROFESSIONAL EXPRESSED CONCERN ABOUT YOUR DRINKING OR SUGGESTED YOU CUT DOWN: NO
HOW OFTEN DURING THE LAST YEAR HAVE YOU FAILED TO DO WHAT WAS NORMALLY EXPECTED FROM YOU BECAUSE OF DRINKING: NEVER
HOW MANY STANDARD DRINKS CONTAINING ALCOHOL DO YOU HAVE ON A TYPICAL DAY: 3 OR 4
HAVE YOU OR SOMEONE ELSE BEEN INJURED AS A RESULT OF YOUR DRINKING: NO
HOW OFTEN DO YOU HAVE A DRINK CONTAINING ALCOHOL: 2-3 TIMES A WEEK
HOW OFTEN DURING THE LAST YEAR HAVE YOU HAD A FEELING OF GUILT OR REMORSE AFTER DRINKING: NEVER
HOW OFTEN DURING THE LAST YEAR HAVE YOU NEEDED AN ALCOHOLIC DRINK FIRST THING IN THE MORNING TO GET YOURSELF GOING AFTER A NIGHT OF HEAVY DRINKING: NEVER

## 2024-11-21 ASSESSMENT — PATIENT HEALTH QUESTIONNAIRE - PHQ9
8. MOVING OR SPEAKING SO SLOWLY THAT OTHER PEOPLE COULD HAVE NOTICED. OR THE OPPOSITE, BEING SO FIGETY OR RESTLESS THAT YOU HAVE BEEN MOVING AROUND A LOT MORE THAN USUAL: NOT AT ALL
9. THOUGHTS THAT YOU WOULD BE BETTER OFF DEAD, OR OF HURTING YOURSELF: NOT AT ALL
SUM OF ALL RESPONSES TO PHQ QUESTIONS 1-9: 3
4. FEELING TIRED OR HAVING LITTLE ENERGY: NOT AT ALL
1. LITTLE INTEREST OR PLEASURE IN DOING THINGS: SEVERAL DAYS
SUM OF ALL RESPONSES TO PHQ9 QUESTIONS 1 & 2: 2
SUM OF ALL RESPONSES TO PHQ QUESTIONS 1-9: 3
5. POOR APPETITE OR OVEREATING: SEVERAL DAYS
2. FEELING DOWN, DEPRESSED OR HOPELESS: SEVERAL DAYS
7. TROUBLE CONCENTRATING ON THINGS, SUCH AS READING THE NEWSPAPER OR WATCHING TELEVISION: NOT AT ALL
3. TROUBLE FALLING OR STAYING ASLEEP: NOT AT ALL
6. FEELING BAD ABOUT YOURSELF - OR THAT YOU ARE A FAILURE OR HAVE LET YOURSELF OR YOUR FAMILY DOWN: NOT AT ALL
10. IF YOU CHECKED OFF ANY PROBLEMS, HOW DIFFICULT HAVE THESE PROBLEMS MADE IT FOR YOU TO DO YOUR WORK, TAKE CARE OF THINGS AT HOME, OR GET ALONG WITH OTHER PEOPLE: NOT DIFFICULT AT ALL
SUM OF ALL RESPONSES TO PHQ QUESTIONS 1-9: 3
SUM OF ALL RESPONSES TO PHQ QUESTIONS 1-9: 3

## 2024-11-21 NOTE — PROGRESS NOTES
moderate to strenuous exercise (like a brisk walk)?: 0 days (!) Abnormal  On average, how many minutes do you engage in exercise at this level?: 0 min  Interventions:  Patient declined any further interventions or treatment     Abnormal BMI (obese):  Body mass index is 31.38 kg/m². (!) Abnormal  Interventions:  Patient declines any further evaluation or treatment        Vision Screen:  Do you have difficulty driving, watching TV, or doing any of your daily activities because of your eyesight?: No  Have you had an eye exam within the past year?: (!) No  Interventions:   Patient encouraged to make appointment with their eye specialist    Safety:  Do you have non-slip mats or non-slip surfaces or shower bars or grab bars in your shower or bathtub?: (!) No  Interventions:  Patient declined any further interventions or treatment    ADL's:   Patient reports needing help with:  Select all that apply: (!) Banking/Finances  Interventions:  Patient declined any further interventions or treatment                Objective   Vitals:    11/21/24 0811   BP: (!) 160/80   Pulse: 77   Resp: 18   SpO2: 96%   Weight: 102.1 kg (225 lb)   Height: 1.803 m (5' 11\")      Body mass index is 31.38 kg/m².      General Appearance: alert and oriented to person, place and time, well developed and well- nourished, in no acute distress  Skin: warm and dry, no rash or erythema  Head: normocephalic and atraumatic  Eyes: pupils equal, round, and reactive to light, extraocular eye movements intact, conjunctivae normal  ENT: tympanic membrane, external ear and ear canal normal bilaterally, nose without deformity, nasal mucosa and turbinates normal without polyps  Neck: supple and non-tender without mass, no thyromegaly or thyroid nodules, no cervical lymphadenopathy  Pulmonary/Chest: clear to auscultation bilaterally- no wheezes, rales or rhonchi, normal air movement, no respiratory distress  Cardiovascular: normal rate, regular rhythm, normal S1 and

## 2025-01-22 ENCOUNTER — OFFICE VISIT (OUTPATIENT)
Dept: CARDIOLOGY | Age: 70
End: 2025-01-22
Payer: MEDICARE

## 2025-01-22 VITALS
BODY MASS INDEX: 32.23 KG/M2 | HEART RATE: 63 BPM | RESPIRATION RATE: 16 BRPM | HEIGHT: 71 IN | WEIGHT: 230.2 LBS | SYSTOLIC BLOOD PRESSURE: 135 MMHG | DIASTOLIC BLOOD PRESSURE: 70 MMHG | OXYGEN SATURATION: 98 %

## 2025-01-22 DIAGNOSIS — I48.20 CHRONIC ATRIAL FIBRILLATION (HCC): Primary | ICD-10-CM

## 2025-01-22 DIAGNOSIS — Z87.898 HISTORY OF SYNCOPE: ICD-10-CM

## 2025-01-22 DIAGNOSIS — E78.2 MIXED HYPERLIPIDEMIA: ICD-10-CM

## 2025-01-22 DIAGNOSIS — Z98.890 S/P MITRAL VALVE REPAIR: ICD-10-CM

## 2025-01-22 PROCEDURE — 3078F DIAST BP <80 MM HG: CPT | Performed by: INTERNAL MEDICINE

## 2025-01-22 PROCEDURE — 3017F COLORECTAL CA SCREEN DOC REV: CPT | Performed by: INTERNAL MEDICINE

## 2025-01-22 PROCEDURE — 1123F ACP DISCUSS/DSCN MKR DOCD: CPT | Performed by: INTERNAL MEDICINE

## 2025-01-22 PROCEDURE — G8417 CALC BMI ABV UP PARAM F/U: HCPCS | Performed by: INTERNAL MEDICINE

## 2025-01-22 PROCEDURE — 1159F MED LIST DOCD IN RCRD: CPT | Performed by: INTERNAL MEDICINE

## 2025-01-22 PROCEDURE — 93010 ELECTROCARDIOGRAM REPORT: CPT | Performed by: INTERNAL MEDICINE

## 2025-01-22 PROCEDURE — 3075F SYST BP GE 130 - 139MM HG: CPT | Performed by: INTERNAL MEDICINE

## 2025-01-22 PROCEDURE — 93005 ELECTROCARDIOGRAM TRACING: CPT | Performed by: INTERNAL MEDICINE

## 2025-01-22 PROCEDURE — 99214 OFFICE O/P EST MOD 30 MIN: CPT | Performed by: INTERNAL MEDICINE

## 2025-01-22 PROCEDURE — G8427 DOCREV CUR MEDS BY ELIG CLIN: HCPCS | Performed by: INTERNAL MEDICINE

## 2025-01-22 PROCEDURE — 1036F TOBACCO NON-USER: CPT | Performed by: INTERNAL MEDICINE

## 2025-01-22 NOTE — PROGRESS NOTES
I, Goldie Oviedo RN am scribing for and in the presence of Janay Rush MD, F.A.C.C..    Patient: Edouard De Dios  : 1955  Date of Visit: 2025    REASON FOR VISIT / CONSULTATION: Atrial Fibrillation (Hx: Chronic afib, chf, mitral valve replacement. Last weight 220 lb and weight today was 230 lbs. He was in ER in may due to copd exacerb and hospitalized in April due to hypoglycemia. Denies SOB, lightheadedness, dizziness, palpitations, CP.   Pt reports doing fairly well since last visit. He says he could walk about a couple of miles and was doing this int he summer timebut stopped due to weather conditions. )      History of Present Illness:        Dear Martínez Bourgeois MD    I had the pleasure of seeing Edouard De Dios today. Mr. De Dios is a 69 y.o. male who came to the office today because of a slow heart rate found during cardiac testing.    1-He has history of mitral valve repair many years ago.    2-History of atrial fibrillation status post ablation x2 by Dr. Jarvis.  He has cardioversion back in .  He is known to have atrial fibrillation with slow ventricular response for many years.  He has not seen Dr. Jarvis for several years.    3- No history of coronary artery disease, myocardial infarction or heart failure.    4-He previously had tremors but his Primidone helps tremendously with this.     5-He had been on iron therapy for the last year or so.     6- He has been having syncopal episodes, 3 episodes over the last year.  The first episode he thinks that he tripped and fell and injured his back and ribs.  He reported that the second episode he fell face down and had an nasal bone fracture.  He attributed those 2 episodes to excessive drinking.    7- In the emergency room on 2023 because of her syncopal episode.  As per the emergency room note, he was on the floor in his garage for about 2 hours.  He said he did not drink much of an alcohol that they.    8- He quit

## 2025-02-24 ENCOUNTER — TELEPHONE (OUTPATIENT)
Dept: PRIMARY CARE CLINIC | Age: 70
End: 2025-02-24

## 2025-02-24 RX ORDER — LISINOPRIL 20 MG/1
20 TABLET ORAL DAILY
Qty: 90 TABLET | Refills: 3 | Status: SHIPPED | OUTPATIENT
Start: 2025-02-24

## 2025-02-24 NOTE — TELEPHONE ENCOUNTER
Pt's daughter, Flavia, called (Nicho on as well) and would like to know if he's still supposed to be taking Lisinopril. His last visit with Dr Bourgeois states he's to continue Lisinopril but pharmacy told pt the script had been canceled.

## 2025-03-03 DIAGNOSIS — I10 ESSENTIAL HYPERTENSION: Primary | ICD-10-CM

## 2025-03-03 RX ORDER — LISINOPRIL 20 MG/1
20 TABLET ORAL DAILY
Qty: 90 TABLET | Refills: 3 | Status: SHIPPED | OUTPATIENT
Start: 2025-03-03

## 2025-03-24 ENCOUNTER — OFFICE VISIT (OUTPATIENT)
Dept: PRIMARY CARE CLINIC | Age: 70
End: 2025-03-24
Payer: MEDICARE

## 2025-03-24 VITALS
WEIGHT: 238.6 LBS | OXYGEN SATURATION: 97 % | HEART RATE: 78 BPM | BODY MASS INDEX: 33.28 KG/M2 | DIASTOLIC BLOOD PRESSURE: 84 MMHG | SYSTOLIC BLOOD PRESSURE: 130 MMHG

## 2025-03-24 DIAGNOSIS — E78.5 HYPERLIPIDEMIA, UNSPECIFIED HYPERLIPIDEMIA TYPE: ICD-10-CM

## 2025-03-24 DIAGNOSIS — E53.8 LOW FOLATE: ICD-10-CM

## 2025-03-24 DIAGNOSIS — E55.9 HYPOVITAMINOSIS D: ICD-10-CM

## 2025-03-24 DIAGNOSIS — I10 ESSENTIAL HYPERTENSION: Primary | ICD-10-CM

## 2025-03-24 DIAGNOSIS — I50.22 CHRONIC SYSTOLIC (CONGESTIVE) HEART FAILURE: ICD-10-CM

## 2025-03-24 DIAGNOSIS — F33.1 MAJOR DEPRESSIVE DISORDER, RECURRENT, MODERATE (HCC): ICD-10-CM

## 2025-03-24 PROBLEM — J44.1 COPD EXACERBATION (HCC): Status: RESOLVED | Noted: 2024-05-23 | Resolved: 2025-03-24

## 2025-03-24 PROCEDURE — 3017F COLORECTAL CA SCREEN DOC REV: CPT | Performed by: STUDENT IN AN ORGANIZED HEALTH CARE EDUCATION/TRAINING PROGRAM

## 2025-03-24 PROCEDURE — G8427 DOCREV CUR MEDS BY ELIG CLIN: HCPCS | Performed by: STUDENT IN AN ORGANIZED HEALTH CARE EDUCATION/TRAINING PROGRAM

## 2025-03-24 PROCEDURE — G2211 COMPLEX E/M VISIT ADD ON: HCPCS | Performed by: STUDENT IN AN ORGANIZED HEALTH CARE EDUCATION/TRAINING PROGRAM

## 2025-03-24 PROCEDURE — 1123F ACP DISCUSS/DSCN MKR DOCD: CPT | Performed by: STUDENT IN AN ORGANIZED HEALTH CARE EDUCATION/TRAINING PROGRAM

## 2025-03-24 PROCEDURE — 1159F MED LIST DOCD IN RCRD: CPT | Performed by: STUDENT IN AN ORGANIZED HEALTH CARE EDUCATION/TRAINING PROGRAM

## 2025-03-24 PROCEDURE — 99211 OFF/OP EST MAY X REQ PHY/QHP: CPT | Performed by: STUDENT IN AN ORGANIZED HEALTH CARE EDUCATION/TRAINING PROGRAM

## 2025-03-24 PROCEDURE — 1036F TOBACCO NON-USER: CPT | Performed by: STUDENT IN AN ORGANIZED HEALTH CARE EDUCATION/TRAINING PROGRAM

## 2025-03-24 PROCEDURE — 1160F RVW MEDS BY RX/DR IN RCRD: CPT | Performed by: STUDENT IN AN ORGANIZED HEALTH CARE EDUCATION/TRAINING PROGRAM

## 2025-03-24 PROCEDURE — 3075F SYST BP GE 130 - 139MM HG: CPT | Performed by: STUDENT IN AN ORGANIZED HEALTH CARE EDUCATION/TRAINING PROGRAM

## 2025-03-24 PROCEDURE — 3079F DIAST BP 80-89 MM HG: CPT | Performed by: STUDENT IN AN ORGANIZED HEALTH CARE EDUCATION/TRAINING PROGRAM

## 2025-03-24 PROCEDURE — G8417 CALC BMI ABV UP PARAM F/U: HCPCS | Performed by: STUDENT IN AN ORGANIZED HEALTH CARE EDUCATION/TRAINING PROGRAM

## 2025-03-24 PROCEDURE — 99214 OFFICE O/P EST MOD 30 MIN: CPT | Performed by: STUDENT IN AN ORGANIZED HEALTH CARE EDUCATION/TRAINING PROGRAM

## 2025-03-24 SDOH — ECONOMIC STABILITY: FOOD INSECURITY: WITHIN THE PAST 12 MONTHS, YOU WORRIED THAT YOUR FOOD WOULD RUN OUT BEFORE YOU GOT MONEY TO BUY MORE.: NEVER TRUE

## 2025-03-24 SDOH — ECONOMIC STABILITY: FOOD INSECURITY: WITHIN THE PAST 12 MONTHS, THE FOOD YOU BOUGHT JUST DIDN'T LAST AND YOU DIDN'T HAVE MONEY TO GET MORE.: NEVER TRUE

## 2025-03-24 ASSESSMENT — PATIENT HEALTH QUESTIONNAIRE - PHQ9
10. IF YOU CHECKED OFF ANY PROBLEMS, HOW DIFFICULT HAVE THESE PROBLEMS MADE IT FOR YOU TO DO YOUR WORK, TAKE CARE OF THINGS AT HOME, OR GET ALONG WITH OTHER PEOPLE: NOT DIFFICULT AT ALL
SUM OF ALL RESPONSES TO PHQ QUESTIONS 1-9: 2
3. TROUBLE FALLING OR STAYING ASLEEP: NOT AT ALL
1. LITTLE INTEREST OR PLEASURE IN DOING THINGS: NOT AT ALL
6. FEELING BAD ABOUT YOURSELF - OR THAT YOU ARE A FAILURE OR HAVE LET YOURSELF OR YOUR FAMILY DOWN: NOT AT ALL
4. FEELING TIRED OR HAVING LITTLE ENERGY: NOT AT ALL
5. POOR APPETITE OR OVEREATING: SEVERAL DAYS
7. TROUBLE CONCENTRATING ON THINGS, SUCH AS READING THE NEWSPAPER OR WATCHING TELEVISION: NOT AT ALL
9. THOUGHTS THAT YOU WOULD BE BETTER OFF DEAD, OR OF HURTING YOURSELF: NOT AT ALL
8. MOVING OR SPEAKING SO SLOWLY THAT OTHER PEOPLE COULD HAVE NOTICED. OR THE OPPOSITE, BEING SO FIGETY OR RESTLESS THAT YOU HAVE BEEN MOVING AROUND A LOT MORE THAN USUAL: NOT AT ALL
2. FEELING DOWN, DEPRESSED OR HOPELESS: SEVERAL DAYS
SUM OF ALL RESPONSES TO PHQ QUESTIONS 1-9: 2

## 2025-03-24 NOTE — PROGRESS NOTES
[facility] PRIMARY CARE  [address] [place] [address] , [address]  [place], [place], [address]    Edouard De Dios is a 69 y.o. male with  has a past medical history of Achilles tendon tear, Allergic rhinitis, Atrial flutter (HCC), Congestive heart failure (HCC), Coronary atherosclerosis, Hyperglycemia, Hyperlipidemia, Hypertension, Mitral insufficiency, Obesity, Osteoarthritis, Status post ablation of atrial flutter, and Vasodepressor syncope.  Presented to the office today for:  Chief Complaint   Patient presents with    Hypertension    Atrial Fibrillation       Assessment/Plan   1. Essential hypertension  -     CBC with Auto Differential; Future  -     Comprehensive Metabolic Panel; Future  2. Chronic systolic (congestive) heart failure (HCC)  3. Major depressive disorder, recurrent, moderate (HCC)  4. Hyperlipidemia, unspecified hyperlipidemia type  -     Lipid Panel; Future  5. Hypovitaminosis D  -     Vitamin D 25 Hydroxy; Future  6. Low folate  -     Vitamin B12 & Folate; Future  Return in about 4 months (around 7/24/2025) for F/U Med Management.  Assessment & Plan  HTN/2nd hypercoag state/Afib - continue w/ lisinopril 20mg and toprol XL at 12.5mg  Xarelto to be continued at 20mg PO daily.  Continue w/ crestor 40mg, lipid panel is at goal.  Provide guidance/support given recent loss/grief, Zoloft to be continued at 50mg  Obtain labs  F/u with Cardiology per prior plans     All patient questions answered.  Pt voiced understanding.   There are no discontinued medications.    Patient received counseling on the following healthy behaviors: nutrition, exercise and medication adherence. I encouraged and discussed lifestyle modifications including diet and exercise (150+ minutes of moderate-high intensity) and the patient was agreeable to making positive/beneficial changes to both to help improve their overall health. Discussed use, benefit, and side effects of prescribed medications.  Barriers to medication

## 2025-05-06 DIAGNOSIS — I10 ESSENTIAL HYPERTENSION: ICD-10-CM

## 2025-05-06 RX ORDER — AMLODIPINE BESYLATE 10 MG/1
10 TABLET ORAL EVERY MORNING
Qty: 90 TABLET | Refills: 3 | OUTPATIENT
Start: 2025-05-06

## 2025-05-30 RX ORDER — METOPROLOL SUCCINATE 25 MG/1
TABLET, EXTENDED RELEASE ORAL
Qty: 90 TABLET | Refills: 3 | Status: SHIPPED | OUTPATIENT
Start: 2025-05-30

## 2025-06-03 LAB
ALBUMIN: 4.3 G/DL (ref 3.5–5.2)
ALK PHOSPHATASE: 89 U/L (ref 39–118)
ALT SERPL-CCNC: 14 U/L (ref 5–41)
ANION GAP SERPL CALCULATED.3IONS-SCNC: 14 MMOL/L (ref 7–16)
AST SERPL-CCNC: 19 U/L (ref 9–50)
BILIRUB SERPL-MCNC: 0.4 MG/DL
BUN BLDV-MCNC: 10 MG/DL (ref 8–23)
CALCIUM SERPL-MCNC: 9.2 MG/DL (ref 8.6–10.5)
CHLORIDE BLD-SCNC: 102 MMOL/L (ref 96–107)
CHOLESTEROL, TOTAL: 161 MG/DL (ref 100–199)
CHOLESTEROL/HDL RATIO: 2.7 (ref 2–4.5)
CO2: 24 MMOL/L (ref 18–32)
CREAT SERPL-MCNC: 0.74 MG/DL (ref 0.67–1.3)
EGFR IF NONAFRICAN AMERICAN: 97 ML/MIN/1.73M2
FOLATE: >20 NG/ML
GLUCOSE: 94 MG/DL (ref 65–125)
HDLC SERPL-MCNC: 59 MG/DL
LDL CHOLESTEROL: 92 MG/DL
LDL/HDL RATIO: 1.6
POTASSIUM SERPL-SCNC: 4 MMOL/L (ref 3.5–5.4)
SODIUM BLD-SCNC: 140 MMOL/L (ref 135–148)
TOTAL PROTEIN: 6.9 G/DL (ref 6–8.3)
TRIGL SERPL-MCNC: 52 MG/DL (ref 20–149)
VITAMIN B-12: 947 PG/ML (ref 232–1245)
VITAMIN D 25-HYDROXY: 27.6 NG/ML (ref 30–100)
VLDLC SERPL CALC-MCNC: 10 MG/DL

## 2025-06-04 ENCOUNTER — RESULTS FOLLOW-UP (OUTPATIENT)
Dept: PRIMARY CARE CLINIC | Age: 70
End: 2025-06-04

## 2025-06-04 LAB
BASOPHILS ABSOLUTE: 0.04 K/UL (ref 0–0.2)
BASOPHILS RELATIVE PERCENT: 0.8 % (ref 0–2)
EOSINOPHILS ABSOLUTE: 0.2 K/UL (ref 0–0.8)
EOSINOPHILS RELATIVE PERCENT: 3.8 % (ref 0–5)
HCT VFR BLD CALC: 40.9 % (ref 39–52)
HEMOGLOBIN: 13.6 G/DL (ref 13–18)
IMMATURE GRANS (ABS): 0.01 K/UL (ref 0–0.06)
IMMATURE GRANULOCYTES %: 0.2 % (ref 0–2)
LYMPHOCYTES ABSOLUTE: 1.11 K/UL (ref 0.9–5.2)
LYMPHOCYTES RELATIVE PERCENT: 21.1 % (ref 20–45)
MCH RBC QN AUTO: 34.5 PG (ref 26–32)
MCHC RBC AUTO-ENTMCNC: 33.3 G/DL (ref 32–35)
MCV RBC AUTO: 104 FL (ref 75–100)
MONOCYTES ABSOLUTE: 0.52 K/UL (ref 0.1–1)
MONOCYTES RELATIVE PERCENT: 9.9 % (ref 0–13)
NEUTROPHILS ABSOLUTE: 3.37 K/UL (ref 1.9–8)
NEUTROPHILS RELATIVE PERCENT: 64.2 % (ref 45–75)
PDW BLD-RTO: 13.4 % (ref 11.2–14.8)
PLATELET # BLD: 199 THOUS/CMM (ref 140–440)
RBC # BLD: 3.94 MILL/CMM (ref 4.4–6.1)
WBC # BLD: 5.3 THDS/CMM (ref 3.6–11)

## 2025-07-14 DIAGNOSIS — G25.2 RESTING TREMOR: ICD-10-CM

## 2025-07-14 RX ORDER — PRIMIDONE 50 MG/1
25 TABLET ORAL 2 TIMES DAILY
Qty: 90 TABLET | Refills: 3 | Status: SHIPPED | OUTPATIENT
Start: 2025-07-14

## 2025-07-28 ENCOUNTER — OFFICE VISIT (OUTPATIENT)
Dept: PRIMARY CARE CLINIC | Age: 70
End: 2025-07-28
Payer: MEDICARE

## 2025-07-28 VITALS
DIASTOLIC BLOOD PRESSURE: 76 MMHG | WEIGHT: 236.6 LBS | BODY MASS INDEX: 33 KG/M2 | OXYGEN SATURATION: 99 % | RESPIRATION RATE: 18 BRPM | HEART RATE: 64 BPM | SYSTOLIC BLOOD PRESSURE: 126 MMHG

## 2025-07-28 DIAGNOSIS — E55.9 HYPOVITAMINOSIS D: ICD-10-CM

## 2025-07-28 DIAGNOSIS — I10 ESSENTIAL HYPERTENSION: Primary | ICD-10-CM

## 2025-07-28 DIAGNOSIS — E78.5 HYPERLIPIDEMIA, UNSPECIFIED HYPERLIPIDEMIA TYPE: ICD-10-CM

## 2025-07-28 DIAGNOSIS — D68.69 SECONDARY HYPERCOAGULABLE STATE: ICD-10-CM

## 2025-07-28 DIAGNOSIS — I48.92 ATRIAL FLUTTER, UNSPECIFIED TYPE (HCC): ICD-10-CM

## 2025-07-28 DIAGNOSIS — I50.22 CHRONIC SYSTOLIC (CONGESTIVE) HEART FAILURE (HCC): ICD-10-CM

## 2025-07-28 DIAGNOSIS — F33.9 RECURRENT MAJOR DEPRESSIVE DISORDER, REMISSION STATUS UNSPECIFIED: ICD-10-CM

## 2025-07-28 DIAGNOSIS — I48.20 CHRONIC A-FIB (HCC): ICD-10-CM

## 2025-07-28 PROCEDURE — 99214 OFFICE O/P EST MOD 30 MIN: CPT | Performed by: STUDENT IN AN ORGANIZED HEALTH CARE EDUCATION/TRAINING PROGRAM

## 2025-07-28 PROCEDURE — 1160F RVW MEDS BY RX/DR IN RCRD: CPT | Performed by: STUDENT IN AN ORGANIZED HEALTH CARE EDUCATION/TRAINING PROGRAM

## 2025-07-28 PROCEDURE — 1123F ACP DISCUSS/DSCN MKR DOCD: CPT | Performed by: STUDENT IN AN ORGANIZED HEALTH CARE EDUCATION/TRAINING PROGRAM

## 2025-07-28 PROCEDURE — 1159F MED LIST DOCD IN RCRD: CPT | Performed by: STUDENT IN AN ORGANIZED HEALTH CARE EDUCATION/TRAINING PROGRAM

## 2025-07-28 PROCEDURE — 3074F SYST BP LT 130 MM HG: CPT | Performed by: STUDENT IN AN ORGANIZED HEALTH CARE EDUCATION/TRAINING PROGRAM

## 2025-07-28 PROCEDURE — G2211 COMPLEX E/M VISIT ADD ON: HCPCS | Performed by: STUDENT IN AN ORGANIZED HEALTH CARE EDUCATION/TRAINING PROGRAM

## 2025-07-28 PROCEDURE — 1036F TOBACCO NON-USER: CPT | Performed by: STUDENT IN AN ORGANIZED HEALTH CARE EDUCATION/TRAINING PROGRAM

## 2025-07-28 PROCEDURE — G8427 DOCREV CUR MEDS BY ELIG CLIN: HCPCS | Performed by: STUDENT IN AN ORGANIZED HEALTH CARE EDUCATION/TRAINING PROGRAM

## 2025-07-28 PROCEDURE — G8417 CALC BMI ABV UP PARAM F/U: HCPCS | Performed by: STUDENT IN AN ORGANIZED HEALTH CARE EDUCATION/TRAINING PROGRAM

## 2025-07-28 PROCEDURE — 99211 OFF/OP EST MAY X REQ PHY/QHP: CPT | Performed by: STUDENT IN AN ORGANIZED HEALTH CARE EDUCATION/TRAINING PROGRAM

## 2025-07-28 PROCEDURE — 3078F DIAST BP <80 MM HG: CPT | Performed by: STUDENT IN AN ORGANIZED HEALTH CARE EDUCATION/TRAINING PROGRAM

## 2025-07-28 PROCEDURE — 3017F COLORECTAL CA SCREEN DOC REV: CPT | Performed by: STUDENT IN AN ORGANIZED HEALTH CARE EDUCATION/TRAINING PROGRAM

## 2025-07-28 NOTE — PROGRESS NOTES
numbness and headaches.   Psychiatric/Behavioral: Negative for sleep disturbance. Negative for confusion and suicidal ideas.     Objective:    /76 (BP Site: Left Upper Arm, Patient Position: Sitting)   Pulse 64   Resp 18   Wt 107.3 kg (236 lb 9.6 oz)   SpO2 99%   BMI 33.00 kg/m²    BP Readings from Last 3 Encounters:   07/28/25 126/76   03/24/25 130/84   01/22/25 135/70     Physical Exam  Physical Exam            Constitutional: Patient is oriented to person, place, and time. Patient appears well-developed and well-nourished. No distress.   HENT: Head: Normocephalic and atraumatic.   Eyes: Pupils are equal, round, and reactive to light. Conjunctivae are normal. Right eye exhibits no discharge. Left eye exhibits no discharge.   Cardiovascular: Normal rate, regular rhythm and normal heart sounds.   Pulmonary/Chest: Effort normal and breath sounds normal. No respiratory distress. Patient has no wheezes.   Abdominal: Soft. Bowel sounds are normal. Patient exhibits no distension. There is no tenderness.   Musculoskeletal:  Patient exhibits no edema and tenderness. Patient exhibits no deformity.   Neurological: Patient is alert and oriented to person, place, and time.   Skin: Skin is warm and dry. Patient is not diaphoretic.   Psychiatric: Patient's speech is normal and behavior is normal. Thought content normal.   Vitals reviewed.      Lab Results   Component Value Date    WBC 5.3 06/02/2025    HGB 13.6 06/02/2025    HCT 40.9 06/02/2025     06/02/2025    CHOL 161 06/02/2025    TRIG 52 06/02/2025    HDL 59 06/02/2025    ALT 14 06/02/2025    AST 19 06/02/2025     06/02/2025    K 4.0 06/02/2025     06/02/2025    CREATININE 0.74 06/02/2025    BUN 10 06/02/2025    CO2 24 06/02/2025    TSH 1.84 09/04/2024    PSA 2.03 11/12/2021    INR 1.6 04/02/2022    GLUF 73 09/04/2024    LABA1C 4.9 09/04/2024     Lab Results   Component Value Date    CALCIUM 9.2 06/02/2025     No results found for:

## (undated) DEVICE — BANDAGE,GAUZE,BULKEE II,4.5"X4.1YD,STRL: Brand: MEDLINE

## (undated) DEVICE — NEEDLE SPNL L3.5IN PNK HUB S STL REG WALL FIT STYL W/ QNCKE

## (undated) DEVICE — BLADE 30D THK3.4MM 10.5X1.9MM ANG STAB

## (undated) DEVICE — Device

## (undated) DEVICE — DRIP REDUCTION MANIFOLD

## (undated) DEVICE — GOWN,AURORA,NONREINFORCED,LARGE: Brand: MEDLINE

## (undated) DEVICE — SYRINGE, LUER LOCK, 30ML: Brand: MEDLINE

## (undated) DEVICE — TUBING, SUCTION, 9/32" X 12', STRAIGHT: Brand: MEDLINE INDUSTRIES, INC.

## (undated) DEVICE — [AGGRESSIVE PLUS CUTTER, ARTHROSCOPIC SHAVER BLADE,  DO NOT RESTERILIZE,  DO NOT USE IF PACKAGE IS DAMAGED,  KEEP DRY,  KEEP AWAY FROM SUNLIGHT]: Brand: FORMULA

## (undated) DEVICE — SUTURE ETHLN SZ 4-0 L18IN NONABSORBABLE BLK L19MM PS-2 3/8 1667H

## (undated) DEVICE — GLOVE ORANGE PI 7 1/2   MSG9075

## (undated) DEVICE — SYRINGE MED 10ML LUERLOCK TIP W/O SFTY DISP

## (undated) DEVICE — SOFT SHIELD® COLLAGEN SHIELD, 12 HOURS (CE): Brand: SOFT SHIELD® COLLAGEN SHIELDS

## (undated) DEVICE — GLOVE SURG SZ 75 L12IN FNGR THK87MIL WHT LTX FREE

## (undated) DEVICE — SUTURE MCRYL SZ 4-0 L27IN ABSRB UD L19MM PS-2 1/2 CIR PRIM Y426H

## (undated) DEVICE — COVER,TABLE,HEAVY DUTY,77"X90",STRL: Brand: MEDLINE

## (undated) DEVICE — GLOVE ORANGE PI 7   MSG9070

## (undated) DEVICE — SOLUTION IV IRRIG POUR BRL 0.9% SODIUM CHL 2F7124

## (undated) DEVICE — MEDI-VAC NON-CONDUCTIVE TUBING7MM X 30.5 (100FT): Brand: CARDINAL HEALTH

## (undated) DEVICE — SHEET, T, LAPAROTOMY, STERILE: Brand: MEDLINE

## (undated) DEVICE — AIRLIFE™ NASAL OXYGEN CANNULA CURVED, FLARED TIP, WITH 7 FEET (2.1 M) CRUSH RESISTANT TUBING, OVER-THE-EAR STYLE: Brand: AIRLIFE™

## (undated) DEVICE — GLOVE ORANGE PI 8 1/2   MSG9085

## (undated) DEVICE — BANDAGE COMPR W6INXL5YD NONSTERILE TAN BRTH SELF ADH WRP W/

## (undated) DEVICE — HYPODERMIC SAFETY NEEDLE: Brand: MAGELLAN

## (undated) DEVICE — MASTISOL ADHESIVE LIQ 2/3ML

## (undated) DEVICE — COVER,MAYO STAND,STERILE: Brand: MEDLINE

## (undated) DEVICE — CONTAINER,SPECIMEN,4OZ,OR STRL: Brand: MEDLINE

## (undated) DEVICE — STRIP,CLOSURE,WOUND,MEDI-STRIP,1/2X4: Brand: MEDLINE

## (undated) DEVICE — SNARE VASC L240CM LOOP W10MM SHTH DIA2.4MM RND STIFF CLD

## (undated) DEVICE — KNIFE OPHTH DIA22MM 45DEG SLT W HNDL SHRP ANG PNT DEL DBL

## (undated) DEVICE — PACK,ARTHROSCOPY I,SIRUS: Brand: MEDLINE

## (undated) DEVICE — SVMMC AMPUTATION PK

## (undated) DEVICE — FORCEP BX JUMBO 4 2.8 MMX240 CM 3.2 MM OVL CUP RADIAL JAW

## (undated) DEVICE — BANDAGE COBAN 6 IN WND 6INX5YD FOAM

## (undated) DEVICE — AMVISC PLUS  0.8ML: Brand: AMVISC PLUS

## (undated) DEVICE — BETADINE 5% EYE SOL

## (undated) DEVICE — 3M™ STERI-STRIP™ COMPOUND BENZOIN TINCTURE 40 BAGS/CARTON 4 CARTONS/CASE C1544: Brand: 3M™ STERI-STRIP™

## (undated) DEVICE — MARKER,SKIN,WI/RULER AND LABELS: Brand: MEDLINE

## (undated) DEVICE — PADDING CAST W6INXL4YD COT LO LINTING WYTEX

## (undated) DEVICE — INTENDED FOR TISSUE SEPARATION, AND OTHER PROCEDURES THAT REQUIRE A SHARP SURGICAL BLADE TO PUNCTURE OR CUT.: Brand: BARD-PARKER ® CARBON RIB-BACK BLADES

## (undated) DEVICE — 3M™ STERI-STRIP™ REINFORCED ADHESIVE SKIN CLOSURES, R1547, 1/2 IN X 4 IN (12 MM X 100 MM), 6 STRIPS/ENVELOPE: Brand: 3M™ STERI-STRIP™

## (undated) DEVICE — GLOVE SURG SZ 65 L12IN FNGR THK87MIL WHT LTX FREE

## (undated) DEVICE — Device: Brand: ALLEGRO 1X SILICONE I/A HANDPIECE (6)

## (undated) DEVICE — 1000 S-DRAPE TOWEL DRAPE 10/BX: Brand: STERI-DRAPE™

## (undated) DEVICE — APPLICATOR MEDICATED 26 CC SOLUTION HI LT ORNG CHLORAPREP

## (undated) DEVICE — GLOVE SURG SZ 7 L12IN FNGR THK87MIL WHT LTX FREE

## (undated) DEVICE — TUBING PMP L16FT MAIN DISP FOR AR-6400 AR-6475

## (undated) DEVICE — DRAPE,U/ SHT,SPLIT,PLAS,STERIL: Brand: MEDLINE

## (undated) DEVICE — SOLUTION IV IRRIG 0.9% NACL 3000ML BAG 2B7477

## (undated) DEVICE — SHEET,DRAPE,53X77,STERILE: Brand: MEDLINE

## (undated) DEVICE — FORCEPS BX L240CM WRK CHN 2.8MM STD CAP W/ NDL MIC MESH

## (undated) DEVICE — CANNULA ORAL NSL AD CO2 N INTUB O2 DEL DISP TRU LNK

## (undated) DEVICE — GAUZE,SPONGE,FLUFF,6"X6.75",STRL,5/TRAY: Brand: MEDLINE

## (undated) DEVICE — CONTROL SYRINGE LUER-LOCK TIP: Brand: MONOJECT

## (undated) DEVICE — COVER LT HNDL BLU PLAS

## (undated) DEVICE — GLOVE SURG SZ 75 L12IN FNGR THK87MIL DK GRN LTX FREE ISOLEX

## (undated) DEVICE — SPONGE GZ W4XL4IN COT 12 PLY TYP VII WVN C FLD DSGN

## (undated) DEVICE — GAUZE,SPONGE,4"X4",16PLY,XRAY,STRL,LF: Brand: MEDLINE

## (undated) DEVICE — PAD,ABDOMINAL,5"X9",ST,LF,25/BX: Brand: MEDLINE INDUSTRIES, INC.

## (undated) DEVICE — TRAP SPEC RETRV CLR PLAS POLYP IN LN SUCT QUIK CTCH

## (undated) DEVICE — BANDAGE COMPR M W6INXL10YD WHT BGE VELC E MTRX HK AND LOOP

## (undated) DEVICE — SOLUTION IV IRRIG WATER 1000ML POUR BRL 2F7114

## (undated) DEVICE — DRESSING TRNSPAR FLM 2.4X2.8IN SURESITE 123